# Patient Record
Sex: FEMALE | Race: WHITE | NOT HISPANIC OR LATINO | Employment: UNEMPLOYED | URBAN - METROPOLITAN AREA
[De-identification: names, ages, dates, MRNs, and addresses within clinical notes are randomized per-mention and may not be internally consistent; named-entity substitution may affect disease eponyms.]

---

## 2017-08-28 ENCOUNTER — TELEPHONE (OUTPATIENT)
Dept: RHEUMATOLOGY | Facility: CLINIC | Age: 52
End: 2017-08-28

## 2017-09-28 ENCOUNTER — TELEPHONE (OUTPATIENT)
Dept: RHEUMATOLOGY | Facility: CLINIC | Age: 52
End: 2017-09-28

## 2017-09-28 ENCOUNTER — OFFICE VISIT (OUTPATIENT)
Dept: RHEUMATOLOGY | Facility: CLINIC | Age: 52
End: 2017-09-28
Payer: MEDICARE

## 2017-09-28 VITALS
TEMPERATURE: 98.6 F | SYSTOLIC BLOOD PRESSURE: 155 MMHG | HEART RATE: 78 BPM | DIASTOLIC BLOOD PRESSURE: 76 MMHG | WEIGHT: 272 LBS

## 2017-09-28 DIAGNOSIS — M77.8 TENDONITIS OF WRIST, RIGHT: Primary | ICD-10-CM

## 2017-09-28 PROCEDURE — 99203 OFFICE O/P NEW LOW 30 MIN: CPT | Performed by: INTERNAL MEDICINE

## 2017-09-28 RX ORDER — DOCUSATE SODIUM 100 MG/1
100 CAPSULE, LIQUID FILLED ORAL AT BEDTIME
COMMUNITY
Start: 2014-06-24 | End: 2019-03-06

## 2017-09-28 RX ORDER — LORAZEPAM 0.5 MG/1
0.5 TABLET ORAL
COMMUNITY
Start: 2017-08-18 | End: 2018-07-02

## 2017-09-28 RX ORDER — CAPSAICIN 0.025 %
CREAM (GRAM) TOPICAL
COMMUNITY
Start: 2015-09-14 | End: 2018-07-02

## 2017-09-28 RX ORDER — ZIPRASIDONE HYDROCHLORIDE 40 MG/1
60 CAPSULE ORAL AT BEDTIME
COMMUNITY
Start: 2017-08-25

## 2017-09-28 RX ORDER — MEDROXYPROGESTERONE ACETATE 150 MG/ML
150 INJECTION, SUSPENSION INTRAMUSCULAR
COMMUNITY
Start: 2015-07-16 | End: 2018-07-02

## 2017-09-28 RX ORDER — OXYCODONE AND ACETAMINOPHEN 7.5; 325 MG/1; MG/1
1 TABLET ORAL
COMMUNITY
Start: 2017-08-16 | End: 2018-07-02

## 2017-09-28 RX ORDER — HYDROXYZINE PAMOATE 50 MG/1
100 CAPSULE ORAL EVERY 6 HOURS PRN
COMMUNITY
Start: 2017-08-25 | End: 2018-07-11

## 2017-09-28 RX ORDER — FUROSEMIDE 80 MG
40 TABLET ORAL 2 TIMES DAILY
COMMUNITY
Start: 2017-09-14

## 2017-09-28 RX ORDER — ACETAMINOPHEN 325 MG/1
650 TABLET ORAL
COMMUNITY
Start: 2016-02-16 | End: 2018-07-02

## 2017-09-28 RX ORDER — BENZTROPINE MESYLATE 0.5 MG/1
0.5 TABLET ORAL DAILY
COMMUNITY
Start: 2017-08-25 | End: 2018-09-04

## 2017-09-28 RX ORDER — BUDESONIDE 1 MG/2ML
INHALANT ORAL
COMMUNITY
Start: 2016-05-11 | End: 2018-11-21 | Stop reason: DRUGHIGH

## 2017-09-28 RX ORDER — ESCITALOPRAM OXALATE 20 MG/1
TABLET ORAL
COMMUNITY
Start: 2017-08-25

## 2017-09-28 RX ORDER — LITHIUM CARBONATE 300 MG/1
CAPSULE ORAL
COMMUNITY
Start: 2017-08-25

## 2017-09-28 RX ORDER — FLUTICASONE PROPIONATE 50 MCG
1 SPRAY, SUSPENSION (ML) NASAL DAILY PRN
Status: ON HOLD | COMMUNITY
Start: 2017-09-08 | End: 2018-11-21

## 2017-09-28 RX ORDER — PREGABALIN 150 MG/1
150 CAPSULE ORAL 2 TIMES DAILY
COMMUNITY
Start: 2017-09-20 | End: 2019-03-06

## 2017-09-28 RX ORDER — LAMOTRIGINE 200 MG/1
200 TABLET ORAL AT BEDTIME
COMMUNITY
Start: 2017-08-25

## 2017-09-28 RX ORDER — ZIPRASIDONE HYDROCHLORIDE 80 MG/1
80 CAPSULE ORAL DAILY
COMMUNITY
Start: 2017-08-25

## 2017-09-28 RX ORDER — LANSOPRAZOLE 30 MG/1
15 CAPSULE, DELAYED RELEASE ORAL DAILY
COMMUNITY
Start: 2017-09-14

## 2017-09-28 RX ORDER — ALBUTEROL SULFATE 90 UG/1
2 AEROSOL, METERED RESPIRATORY (INHALATION) EVERY 4 HOURS PRN
COMMUNITY
Start: 2016-12-19

## 2017-09-28 RX ORDER — FENTANYL 75 UG/1
1 PATCH TRANSDERMAL
COMMUNITY
Start: 2017-09-07 | End: 2018-07-02

## 2017-09-28 RX ORDER — PROPRANOLOL HYDROCHLORIDE 10 MG/1
TABLET ORAL
COMMUNITY
Start: 2017-08-25 | End: 2018-07-02

## 2017-09-28 RX ORDER — MECLIZINE HYDROCHLORIDE 25 MG/1
25 TABLET ORAL EVERY 8 HOURS PRN
Status: ON HOLD | COMMUNITY
Start: 2016-11-11 | End: 2018-11-21

## 2017-09-28 RX ORDER — LOSARTAN POTASSIUM 25 MG/1
12.5 TABLET ORAL DAILY
COMMUNITY
Start: 2017-08-04 | End: 2018-09-10

## 2017-09-28 RX ORDER — LIOTHYRONINE SODIUM 25 UG/1
25 TABLET ORAL DAILY
COMMUNITY
Start: 2017-07-20 | End: 2018-07-02

## 2017-09-28 RX ORDER — CETIRIZINE HYDROCHLORIDE 10 MG/1
10 TABLET ORAL DAILY
COMMUNITY
Start: 2017-01-06 | End: 2018-08-03

## 2017-09-28 RX ORDER — IBUPROFEN 800 MG/1
800 TABLET, FILM COATED ORAL EVERY 8 HOURS PRN
COMMUNITY
Start: 2017-08-08 | End: 2018-08-27

## 2017-09-28 RX ORDER — GABAPENTIN 800 MG/1
400 TABLET ORAL 3 TIMES DAILY
Status: ON HOLD | COMMUNITY
Start: 2016-11-09 | End: 2018-11-25

## 2017-09-28 NOTE — PROGRESS NOTES
CHIEF COMPLAINT:  Right hand swelling.      HISTORY OF PRESENT ILLNESS:  Betsy Resendiz is a 52-year-old female who arrives almost 20 minutes late for her appointment, who presents for evaluation of ongoing right hand swelling, wrist swelling and pain.  She had been seen by Dr. Nicole and his PA on multiple occasions, initially this started off as a fall onto an outstretched hand.  X-rays were read as unremarkable.  She then had labs performed which she was not responding and improving despite wearing a splint and having some PT.  Labs were interpreted sad abnormal based on elevated CRP, however, it should be noted that the CRP is actually minimally elevated and has been historically more elevated than it was recently.  CRP of 2.62 on 08/01/2017 is down from 3.3 on 03/28/2017.  It was actually 7.7 on 09/20/2013.  An LEE was negative.  Rheumatoid factor and CCP antibody were negative.      Unbeknownst to her orthopedist, she was actually given 40 mg of prednisone for 5 days by her chiropractor for her neck pain.  This did not lead to any type of improvement whatsoever in the swelling or pain in her hands.      She does have neck pain and knee pain.  There are no other small joint pain, swelling, or stiffness.  She does not have a rash or psoriasis.  She has not had an acute or chronic diarrhea.  She does not carry a diagnosis of inflammatory bowel disease.  She does not have any fevers, chills, or sweats.  There is no malar rash, photosensitive rash, stomatitis, or Raynaud's.  She has never had pleurisy, pericarditis, hepatitis, nephritis.      PAST MEDICAL HISTORY:  Bipolar disorder, carpal tunnel syndrome, depression, chronic pain, gastroesophageal reflux disease, pulmonary insufficiency following trauma and surgery, type 2 non-insulin dependent diabetes, asthma, glaucoma.      MEDICATIONS:   1.  Albuterol.   2.  DuoNebs.   3.  Cogentin 0.5 mg b.i.d.   4.  Oxygen.   5.  Pulmicort.   6.  Zostrix cream p.r.n.   7.   Cariprazine 1.5 mg 2 capsules daily.   8.  Cetirizine 10 mg daily.   9.  Docusate 100 mg b.i.d.   10.  Lexapro 20 mg daily.   11.  Fentanyl patch 75 mcg per hour.   12.  Flonase.   13.  Furosemide 80 mg daily.   14.  Gabapentin 800 mg b.i.d.   15.  Hydroxyzine 50 mg q.6h.  p.r.n.   16.  Ibuprofen.   17.  Lamictal 200 mg at bedtime.   18.  Prevacid 30 mg b.i.d.   19.  Liothyronine 25 mcg daily.   20.  Lithium 300 mg in the morning, 600 mg in the evening.   21.  Lorazepam.   22.  Cozaar 25 mg daily.   23.  Antivert   24.  Metformin  mg in the evening.   25.  Singular 10 mg daily.   26.  Emerge in the a.m. 2.5 mg q.4h.  p.r.n.   27.  Oxycodone 7.5/325 mg p.r.n.   28.  Polyethylene glycol.   29.  Trazodone   30.  Lyrica 300 mg b.i.d.   31.  Propranolol 10 mg b.i.d. p.r.n. anxiety.   32.  Geodon 40 mg in the morning and 80 mg in the evening.      DRUG ALLERGIES:  Codeine, lisinopril, statins, sulfa, Imitrex.      SOCIAL HISTORY:  Not a smoker or drinker.      FAMILY HISTORY:  Nobody in the family with rheumatoid arthritis, psoriasis, lupus, Crohn's disease.      REVIEW OF SYSTEMS:  A 10-point review of systems is otherwise negative.      PHYSICAL EXAMINATION:   VITAL SIGNS:  Blood pressure 155/76, pulse 78, weight 272.   HEENT:  Normocephalic, atraumatic.  Sclerae are clear and moist.  Oropharynx without lesions.   NECK:  Supple, without lymphadenopathy.   LUNGS:  Clear.   HEART:  Regular.   EXTREMITIES:  Joint exam there is diffuse swelling throughout the right wrist into the MCP region and really looks more like edema.  There is tenderness diffusely throughout, 2+ symmetrical radial pulses.  Sensation is intact in the fingers.  She does not have any similar findings in the left hand.  It really I cannot say that this is synovitis of the MCPs, PIPs or wrist.  There is no synovitis of the elbows, shoulders, knees, ankles.   SKIN:  Without lesions.      IMPRESSION:  Right hand swelling with previous history of  injury, not responsive to prednisone.      RECOMMENDATIONS:     1.  It is unlikely that this is an inflammatory disorder given her lack of response to prednisone and the fact that her serologies are really negative.   2.  Hence at this point would proceed with an MRI of the hand and wrist to further define and delineate what is going on.         MICHELLE WILKINS MD             D: 2017 14:11   T: 2017 14:48   MT: [id number]      Name:     BECCA HANNAH   MRN:      [id number]        Account:      [id number]   :      1965           Visit Date:   2017      Document: [id number]       cc: Margarito Nicole MD

## 2017-09-28 NOTE — MR AVS SNAPSHOT
"              After Visit Summary   9/28/2017    Betsy Resendiz    MRN: [id number]           Patient Information     Date Of Birth          1965        Visit Information        Provider Department      9/28/2017 12:45 PM Maurilio Watts MD [facility]        Today's Diagnoses     Tendonitis of wrist, right    -  1       Follow-ups after your visit        Future tests that were ordered for you today     Open Future Orders        Priority Expected Expires Ordered    MR Upper Ext Joint Rt w/o & w Contrast Routine  9/28/2018 9/28/2017    MR Hand Right w/o & w Contrast Routine  9/28/2018 9/28/2017            Who to contact     If you have questions or need follow up information about today's clinic visit or your schedule please contact [facility] directly at [phone].  Normal or non-critical lab and imaging results will be communicated to you by MyChart, letter or phone within 4 business days after the clinic has received the results. If you do not hear from us within 7 days, please contact the clinic through MyChart or phone. If you have a critical or abnormal lab result, we will notify you by phone as soon as possible.  Submit refill requests through [employer] or call your pharmacy and they will forward the refill request to us. Please allow 3 business days for your refill to be completed.          Additional Information About Your Visit        MyChart Information     [employer] lets you send messages to your doctor, view your test results, renew your prescriptions, schedule appointments and more. To sign up, go to [email or web address] . Click on \"Log in\" on the left side of the screen, which will take you to the Welcome page. Then click on \"Sign up Now\" on the right side of the page.     You will be asked to enter the access code listed below, as well as some personal information. Please follow the directions to create your username and password.     Your access code is: " [email or web address]  Expires: 2017  1:40 PM     Your access code will  in 90 days. If you need help or a new code, please call your [facility] clinic or [phone].        Care EveryWhere ID     This is your Care EveryWhere ID. This could be used by other organizations to access your [facility] medical records  [id number]        Your Vitals Were     Pulse Temperature                78 98.6  F (37  C)           Blood Pressure from Last 3 Encounters:   17 155/76    Weight from Last 3 Encounters:   17 272 lb (123.4 kg)               Primary Care Provider Office Phone # Fax #    Elle Dunlap [phone]       [facility] [address] [address]        Equal Access to Services     BLADE CHERY : Hadii gemma king hadasho Soomaali, waaxda luqadaha, qaybta kaalmada adeegyada, waxay deysiin hayita chowdhury . So [facility] [phone].    ATENCIÓN: Si habla español, tiene a knight disposición servicios gratuitos de asistencia lingüística. John al [phone].    We comply with applicable federal civil rights laws and [place] laws. We do not discriminate on the basis of race, color, national origin, age, disability sex, sexual orientation or gender identity.            Thank you!     Thank you for choosing [facility]  for your care. Our goal is always to provide you with excellent care. Hearing back from our patients is one way we can continue to improve our services. Please take a few minutes to complete the written survey that you may receive in the mail after your visit with us. Thank you!             Your Updated Medication List - Protect others around you: Learn how to safely use, store and throw away your medicines at www.disposemymeds.org.          This list is accurate as of: 17  1:40 PM.  Always use your most recent med list.                   Brand Name Dispense Instructions for use Diagnosis    acetaminophen 325 MG tablet    TYLENOL      Take 650 mg by mouth        albuterol 108 (90 BASE) MCG/ACT Inhaler    PROAIR HFA/PROVENTIL HFA/VENTOLIN HFA     Inhale 2 puffs into the lungs        benztropine 0.5 MG tablet    COGENTIN     Take 0.5 mg by mouth        budesonide 1 MG/2ML Susp neb solution    PULMICORT     USE 1 VIAL IN NEBULIZER TWICE DAILY        capsaicin 0.025 % Crea cream    ZOSTRIX          cariprazine 1.5 MG Caps capsule    VRAYLAR     Take 3 mg by mouth        cetirizine 10 MG tablet    zyrTEC     Take 10 mg by mouth        escitalopram 20 MG tablet    LEXAPRO     Take 1 tablet by mouth at bedtime        fentaNYL 75 mcg/hr 72 hr patch    DURAGESIC     1 patch        fluticasone 50 MCG/ACT spray    FLONASE     1 spray        furosemide 80 MG tablet    LASIX     Take 80 mg by mouth        gabapentin 800 MG tablet    NEURONTIN     Take 800 mg by mouth        hydrOXYzine 50 MG capsule    VISTARIL     Take 50 mg by mouth        ibuprofen 800 MG tablet    ADVIL/MOTRIN     Take 800 mg by mouth        lamoTRIgine 200 MG tablet    LaMICtal     Take 200 mg by mouth        LANsoprazole 30 MG CR capsule    PREVACID     Take 30 mg by mouth        liothyronine 25 MCG tablet    CYTOMEL     Take 25 mcg by mouth        lithium 300 MG capsule      Take 1 capsule by mouth in the morning and 2 capsules at bedtime        LORazepam 0.5 MG tablet    ATIVAN     Take 0.5 mg by mouth        losartan 25 MG tablet    COZAAR     Take 25 mg by mouth        meclizine 25 MG tablet    ANTIVERT     Take 25 mg by mouth        medroxyPROGESTERone 150 MG/ML injection    DEPO-PROVERA     Inject 150 mg into the muscle        oxyCODONE-acetaminophen 7.5-325 MG per tablet    PERCOCET     Take 1 tablet by mouth        pregabalin 150 MG capsule    LYRICA     Take 300 mg by mouth        propranolol 10 MG tablet    INDERAL     TAKE 1/2 TO 1 TABLET BY MOUTH TWICE DAILY AS NEEDED FOR ANXIETY        STOOL SOFTENER 100 MG capsule   Generic drug:  docusate sodium      Take 100 mg by  mouth        * ziprasidone 80 MG capsule    GEODON     Take 160 mg by mouth        * ziprasidone 40 MG capsule    GEODON     Take 1 capsule by mouth in the morning        * Notice:  This list has 2 medication(s) that are the same as other medications prescribed for you. Read the directions carefully, and ask your doctor or other care provider to review them with you.

## 2017-09-28 NOTE — TELEPHONE ENCOUNTER
Called [facility] to see if they can do the added MRI that was ordered.   They are unable to do all.  Pt. Would like to do her already scheduled one and will call them and make another appointment.  Orders were faxed.      An CLARK  [facility]  Rheumatology-Dr. Watts  [address]   [address] [place] [address]

## 2017-09-28 NOTE — NURSING NOTE
Chief Complaint   Patient presents with     Consult   ref.     Initial /76  Pulse 78  Temp 98.6  F (37  C)  Wt 272 lb (123.4 kg) There is no height or weight on file to calculate BMI.      Patient prefers to be contacted via at Home.   Okay to leave detailed message: Yes  Patient uses MyChart: No    An BUI LPN

## 2017-12-06 ASSESSMENT — MIFFLIN-ST. JEOR: SCORE: 1784.87

## 2017-12-11 ENCOUNTER — ANESTHESIA - HEALTHEAST (OUTPATIENT)
Dept: SURGERY | Facility: CLINIC | Age: 52
End: 2017-12-11

## 2017-12-12 ENCOUNTER — SURGERY - HEALTHEAST (OUTPATIENT)
Dept: SURGERY | Facility: CLINIC | Age: 52
End: 2017-12-12

## 2017-12-12 ASSESSMENT — MIFFLIN-ST. JEOR: SCORE: 1753.02

## 2018-05-25 ENCOUNTER — TELEPHONE (OUTPATIENT)
Dept: ORTHOPEDICS | Facility: CLINIC | Age: 53
End: 2018-05-25

## 2018-05-25 NOTE — TELEPHONE ENCOUNTER
LVM for return call to discuss her shoulder injury.  Briefly reviewed chart in care everywhere, no recent shoulder/upper arm films.  Would like to know where patient was seen, likely given description from below that she would better served by seeing Ortho Surgeon and not Sports Medicine.    Garth Marshall ATC

## 2018-05-25 NOTE — TELEPHONE ENCOUNTER
Patient called today at 8:18. She said she was calling to see if she could be squeezed in today with orthopedics. She has three broken bones in her shoulder and one in her arm. We can call her at [phone] or we can call her friend Dianne at [phone]    Marion MONTGOMERY (R)

## 2018-07-02 ENCOUNTER — NURSING HOME VISIT (OUTPATIENT)
Dept: GERIATRICS | Facility: CLINIC | Age: 53
End: 2018-07-02
Payer: COMMERCIAL

## 2018-07-02 VITALS
TEMPERATURE: 98.2 F | RESPIRATION RATE: 16 BRPM | BODY MASS INDEX: 43.46 KG/M2 | OXYGEN SATURATION: 97 % | HEIGHT: 64 IN | DIASTOLIC BLOOD PRESSURE: 77 MMHG | WEIGHT: 254.6 LBS | HEART RATE: 91 BPM | SYSTOLIC BLOOD PRESSURE: 163 MMHG

## 2018-07-02 DIAGNOSIS — G93.40 ACUTE ENCEPHALOPATHY: Primary | ICD-10-CM

## 2018-07-02 DIAGNOSIS — E66.01 MORBID OBESITY WITH BMI OF 40.0-44.9, ADULT (H): ICD-10-CM

## 2018-07-02 DIAGNOSIS — R40.4 ALTERED LEVEL OF CONSCIOUSNESS: ICD-10-CM

## 2018-07-02 DIAGNOSIS — F11.20 OPIOID TYPE DEPENDENCE, CONTINUOUS USE (H): ICD-10-CM

## 2018-07-02 DIAGNOSIS — F25.0 SCHIZOAFFECTIVE DISORDER, BIPOLAR TYPE (H): ICD-10-CM

## 2018-07-02 DIAGNOSIS — E11.9 TYPE 2 DIABETES MELLITUS WITHOUT COMPLICATION, WITHOUT LONG-TERM CURRENT USE OF INSULIN (H): ICD-10-CM

## 2018-07-02 DIAGNOSIS — Z71.89 ACP (ADVANCE CARE PLANNING): ICD-10-CM

## 2018-07-02 DIAGNOSIS — S42.301D CLOSED FRACTURE OF SHAFT OF RIGHT HUMERUS WITH ROUTINE HEALING, UNSPECIFIED FRACTURE MORPHOLOGY, SUBSEQUENT ENCOUNTER: ICD-10-CM

## 2018-07-02 DIAGNOSIS — J44.9 CHRONIC OBSTRUCTIVE PULMONARY DISEASE, UNSPECIFIED COPD TYPE (H): ICD-10-CM

## 2018-07-02 DIAGNOSIS — G47.33 OSA (OBSTRUCTIVE SLEEP APNEA): ICD-10-CM

## 2018-07-02 PROBLEM — E86.0 DEHYDRATION, MILD: Status: ACTIVE | Noted: 2018-06-26

## 2018-07-02 PROBLEM — F41.1 GAD (GENERALIZED ANXIETY DISORDER): Status: ACTIVE | Noted: 2017-11-21

## 2018-07-02 PROBLEM — D62 ACUTE BLOOD LOSS ANEMIA: Status: ACTIVE | Noted: 2018-02-05

## 2018-07-02 PROBLEM — I10 HTN (HYPERTENSION): Status: ACTIVE | Noted: 2017-10-28

## 2018-07-02 PROBLEM — R79.89 ELEVATED LFTS: Status: ACTIVE | Noted: 2017-10-30

## 2018-07-02 PROBLEM — S42.301A CLOSED FRACTURE OF SHAFT OF RIGHT HUMERUS: Status: ACTIVE | Noted: 2018-05-22

## 2018-07-02 PROBLEM — I26.99 OTHER PULMONARY EMBOLISM WITHOUT ACUTE COR PULMONALE (H): Status: ACTIVE | Noted: 2017-11-28

## 2018-07-02 PROBLEM — D49.4 BLADDER TUMOR: Status: ACTIVE | Noted: 2018-02-07

## 2018-07-02 PROBLEM — J96.11 CHRONIC RESPIRATORY FAILURE WITH HYPOXIA (H): Status: ACTIVE | Noted: 2018-02-07

## 2018-07-02 PROBLEM — L97.921 CHRONIC ULCER OF LEFT LEG, LIMITED TO BREAKDOWN OF SKIN (H): Status: ACTIVE | Noted: 2018-01-12

## 2018-07-02 PROBLEM — I26.99 PULMONARY EMBOLUS (H): Status: ACTIVE | Noted: 2018-02-08

## 2018-07-02 PROCEDURE — 99310 SBSQ NF CARE HIGH MDM 45: CPT | Performed by: NURSE PRACTITIONER

## 2018-07-02 RX ORDER — IPRATROPIUM BROMIDE AND ALBUTEROL SULFATE 2.5; .5 MG/3ML; MG/3ML
1 SOLUTION RESPIRATORY (INHALATION) 4 TIMES DAILY PRN
COMMUNITY

## 2018-07-02 RX ORDER — MONTELUKAST SODIUM 10 MG/1
10 TABLET ORAL AT BEDTIME
COMMUNITY
End: 2018-08-27

## 2018-07-02 RX ORDER — BRIMONIDINE TARTRATE 1.5 MG/ML
1 SOLUTION/ DROPS OPHTHALMIC 2 TIMES DAILY
COMMUNITY

## 2018-07-02 RX ORDER — TRAVOPROST OPHTHALMIC SOLUTION 0.04 MG/ML
1 SOLUTION OPHTHALMIC AT BEDTIME
COMMUNITY

## 2018-07-02 RX ORDER — PALIPERIDONE 6 MG/1
6 TABLET, EXTENDED RELEASE ORAL EVERY MORNING
COMMUNITY

## 2018-07-02 RX ORDER — ERGOCALCIFEROL 1.25 MG/1
50000 CAPSULE, LIQUID FILLED ORAL WEEKLY
COMMUNITY
End: 2018-08-06

## 2018-07-02 NOTE — PROGRESS NOTES
[facility]  PRIMARY CARE PROVIDER AND CLINIC:  [facility] [address] [facility] / [facility] 5*  Chief Complaint   Patient presents with     Clinic Care Coordination - Initial     [place] Medical Record Number:  [id number]    HPI:    Betsy Resendiz is a 53 year old  (1965),admitted to the [facility] [place] from Hospital  [facility].  Hospital stay 6/26/18  through 6/28/18.  Admitted to this facility for  rehab, medical management and nursing care.  HPI information obtained from: facility chart records, facility staff, patient report, [facility] chart review and Care Everywhere [facility] chart review.      Hospital Course: Brought in to [facility] emergency department on 6/26/2018 after a friend found her in her home with an altered level of consciousness and right humoral fx. Abernathy catheter was placed in 2000 cc of urine removed at the time of her Abernathy catheter. Supplemental O2. Narcan was not given initially in the ED.     Current issues are:        Acute encephalopathy  Altered level of consciousness  Patient cannot recall being admitted to the ED and only remembers waking up in a hospital bed later. Tox screen was positive presumptive for Oxycodone and Benzos, patient has long-standing hx of opioid dependence. Catheter was removed prior to discharge to TCU.  Today patient feels tired, denies fever, chills, headache. Denies bouts of confusion.    Closed fracture of shaft of right humerus with routine healing, unspecified fracture morphology, subsequent encounter  Opioid type dependence, continuous use (H)  Right arm is in a sling, patient states that pain is intermittent and that her brace helps. She is using Ultram and Ibuprofen and says that the medicine helps take the edge off. She does not remember breaking her arm. Chart review notes that Ortho follow-up is scheduled w/ Janie for 7/10/18.     Schizoaffective disorder,  bipolar type (H)  Patient taking numerous antipsychotic drugs and is managed by Dr. Veliz, CNS through [facility].  Patient states she has apts w/ Dr. Veliz every 4-6 weeks. She is sleeping fine, denies nausea, changes in B&B, and chart review shows recent electrolytes are stable.     Chronic obstructive pulmonary disease, unspecified COPD type (H)  SPIKE (obstructive sleep apnea)  Patient denies SOB, cough, but is on O2 @ 2L via NC.  Has LD ordered PRN, Singulair 10mg QD, Budesonide BID, and PRN nebs.     Type 2 diabetes mellitus without complication, without long-term current use of insulin (H)  Morbid obesity with BMI of 40.0-44.9, adult (H)  Patient states she has a good appetite and is on Metformin. BG readings are as noted below:  07/02/2018 05:25 122 mg/dL  07/01/2018 05:40 127 mg/dL  06/30/2018 04:45 174 mg/dL  06/29/2018 04:54 150 mg/dL    ACP (advance care planning)  Patient was walked through POLST form with provider and given opportunity to ask questions and confirm wishes.    CODE STATUS/ADVANCE DIRECTIVES DISCUSSION:   CPR/Full code   Patient's living condition: lives alone    ALLERGIES:Codeine; Hmg-coa-r inhibitors; Lisinopril; No clinical screening - see comments; Sulfa drugs; and Sumatriptan    Surgical History    Surgery Date Laterality Comments   BACK SURGERY seven times/back   lumbar /hardware   BIOPSY CERVIX         THORACOTOMY     Left in 2008 from abcess   BLADDER REPAIR         Arthrodesis, right first metatarsophalangeal joint. 1/8/09       RIGHT REVISION ARTHRODESIS 1ST METATARSAL PHALANGEAL JOINT 7/27/09   Right foot /hardware   BUNIONECTOMY 04/21/2011       pressure necrosis and abscess left gluteus. 6/24/2014   debridement   RADIOFREQUENCY ABLATION 04/12/2016 Left     Medical History    Medical History Date   Unspecified asthma(493.90)     Migraine, unspecified, without mention of intractable migraine without mention of status migrainosus     Unspecified glaucoma(365.9)     Depressive  disorder, not elsewhere classified     Bipolar disorder, unspecified (HC)     Type II or unspecified type diabetes mellitus without mention of complication, not stated as uncontrolled dx    Pain disorder     SPIKE (obstructive sleep apnea)     Unspecified disorder of skin and subcutaneous tissue     Carpal tunnel syndrome     Esophageal reflux     Retention of urine, unspecified     Anemia, unspecified     Pulmonary insufficiency following trauma and surgery     Tobacco use disorder     Osteoarthrosis and allied disorders     Pain in joint, ankle and foot     Pneumonia 2008   Bipolar disorder, unspecified (HC)     Compression fracture of L1 lumbar vertebra (HC)     Family History    Medical History Relation Name Comments   Diabetes Maternal Grandmother       Cancer Mother   Multiple myeloma   Cancer Other   Maternal Niece-Wilms     Relation Name Status Comments   Brother   Alive     Father   Alive     Maternal Grandmother         Mother    (Age 51)     Other    (Age 12)     Social History    Tobacco Use Types Packs/Day Years Used Date   Current Some Day Smoker Cigarettes 0.5 17 Quit: 2018   Smokeless Tobacco: Never Used           Tobacco Cessation: Ready to Quit: No; Counseling Given: Yes       Alcohol Use Drinks/Week oz/Week Comments   No 0 Standard drinks or equivalent   0.0        Post Discharge Medication Reconciliation Status: discharge medications reconciled and changed, per note/orders (see AVS).  Current Outpatient Prescriptions   Medication Sig Dispense Refill     ACETAMINOPHEN PO Take 1,000 mg by mouth 3 times daily       albuterol (PROAIR HFA/PROVENTIL HFA/VENTOLIN HFA) 108 (90 BASE) MCG/ACT Inhaler Inhale 2 puffs into the lungs every 4 hours as needed        benztropine (COGENTIN) 0.5 MG tablet Take 0.5 mg by mouth 2 times daily        brimonidine (ALPHAGAN-P) 0.15 % ophthalmic solution Place 1 drop into both eyes 2 times daily       budesonide (PULMICORT) 1 MG/2ML SUSP neb  solution USE 1 VIAL IN NEBULIZER TWICE DAILY       cariprazine (VRAYLAR) 1.5 MG CAPS capsule Take 2 mg by mouth daily        cetirizine (ZYRTEC) 10 MG tablet Take 10 mg by mouth daily        docusate sodium (STOOL SOFTENER) 100 MG capsule Take 100 mg by mouth At Bedtime        escitalopram (LEXAPRO) 20 MG tablet Take 1 tablet by mouth at bedtime       fluticasone (FLONASE) 50 MCG/ACT spray Spray 1 spray into both nostrils daily as needed        furosemide (LASIX) 80 MG tablet Take 80 mg by mouth 2 times daily        gabapentin (NEURONTIN) 800 MG tablet Take 400 mg by mouth 3 times daily        hydrOXYzine (VISTARIL) 50 MG capsule Take 50 mg by mouth every 6 hours as needed        ibuprofen (ADVIL/MOTRIN) 800 MG tablet Take 800 mg by mouth every 8 hours as needed        ipratropium - albuterol 0.5 mg/2.5 mg/3 mL (DUONEB) 0.5-2.5 (3) MG/3ML neb solution Take 1 vial by nebulization 4 times daily       lamoTRIgine (LAMICTAL) 200 MG tablet Take 200 mg by mouth At Bedtime        LANsoprazole (PREVACID) 30 MG CR capsule Take 30 mg by mouth 2 times daily        lithium 300 MG capsule Take 1 capsule by mouth in the morning and 2 capsules at bedtime       losartan (COZAAR) 25 MG tablet Take 25 mg by mouth daily        meclizine (ANTIVERT) 25 MG tablet Take 25 mg by mouth every 8 hours as needed        [START ON 7/29/2018] METFORMIN HCL PO Take 500 mg by mouth daily (with breakfast)       [START ON 7/7/2018] METFORMIN HCL PO Take 500 mg by mouth 2 times daily (with meals)       montelukast (SINGULAIR) 10 MG tablet Take 10 mg by mouth At Bedtime       OYSTER SHELL CALCIUM PO Take 500 mg by mouth 4 times daily       paliperidone (INVEGA) 6 MG 24 hr tablet Take 6 mg by mouth every morning       POTASSIUM CHLORIDE ER PO 30meq by mouth two times a day       pregabalin (LYRICA) 150 MG capsule Take 300 mg by mouth 2 times daily        PROPRANOLOL HCL PO Take 10 mg by mouth 2 times daily       TRAMADOL HCL PO Take 50 mg by mouth every  "6 hours as needed for moderate to severe pain       travoprost, BAK Free, (TRAVATAN Z) 0.004 % ophthalmic solution Place 1 drop into both eyes At Bedtime       vitamin D (ERGOCALCIFEROL) 00688 UNIT capsule Take 50,000 Units by mouth once a week       ziprasidone (GEODON) 40 MG capsule Take 1 capsule by mouth in the morning       ziprasidone (GEODON) 80 MG capsule Take 160 mg by mouth At Bedtime        ROS:  10 point ROS of systems including Constitutional, Eyes, Respiratory, Cardiovascular, Gastroenterology, Genitourinary, Integumentary, Muscularskeletal, Psychiatric were all negative except for pertinent positives noted in my HPI.    Exam:  /77  Pulse 91  Temp 98.2  F (36.8  C)  Resp 16  Ht 5' 4\" (1.626 m)  Wt 254 lb 9.6 oz (115.5 kg)  SpO2 97%  BMI 43.7 kg/m2  GENERAL APPEARANCE:  Alert, in no distress, morbidly obese, cooperative, sluggish.  EYES:  PERRL, Conjunctiva and lids normal with glasses and hx of glaucoma.  RESP: respiratory effort and palpation of chest normal, lungs clear to auscultation , no respiratory distress  CV:  Palpation and auscultation of heart done , regular rate and rhythm, no murmur, rub, or gallop, +2 pedal pulses,  2 pedal pulses in BLE    ABDOMEN: normal bowel sounds, soft, nontender, no hepatosplenomegaly or other masses, no guarding or rebound, bowel sounds normal  NEURO: Examination of sensation by touch is abnormal with numbness/tingling in all extremities which she states she has had., While ROM is intact, movement is sluggish and delayed.   PSYCH: Oriented X 3, insight and judgement impaired, memory impaired , affect and mood flat and sluggish.    Lab/Diagnostic data:  GLUCOSE METER (06/28/2018 12:02 PM)  Only the most recent of 12 results within the time period is included.    Component Value   GLUCOSE METER 130 (H)     Basic Metabolic Panel (06/27/2018 6:16 AM)  Only the most recent of 2 results within the time period is included.    Component Value   SODIUM 143 " "  POTASSIUM 3.9   CHLORIDE 108   CO2,TOTAL 28   ANION GAP 7   GLUCOSE 81   CALCIUM 8.8   BUN 10   CREATININE 0.97   BUN/CREAT RATIO  10   GFR if  >60   GFR if not  >60     URINALYSIS MICROSCOPIC (06/26/2018 5:40 PM)  Component Value   RBC 0-2   WBC 3-5   BACTERIA  Rare   EPITHELIAL CELLS  Few     Blood Gas-Venous (06/26/2018 3:38 PM)  Only the most recent of 2 results within the time period is included.    Component Value   PH,VENOUS  7.43   PCO2,VENOUS  48   PO2,VENOUS 45 (H)   HCO3,VENOUS  32 (H)   BASE EXCESS 6.4 (H)   O2 SAT,VENOUS  87 (H)   PATIENT TEMPERATURE 37.0     CBC WITH AUTO DIFFERENTIAL (06/26/2018 12:50 PM)  Only the most recent of 3 results within the time period is included.    Component Value   WHITE BLOOD COUNT  9.6   RED BLOOD COUNT  4.21   HEMOGLOBIN  13.1   HEMATOCRIT  41.1   MCV  98   MCH  31.1   MCHC  31.9 (L)   RDW  14.4   PLATELET COUNT  190   MPV  11.0     ASSESSMENT/PLAN:  Acute encephalopathy  Altered level of consciousness  Acute. Stable. Improving. Continue to carefully monitor. Follow-up routinely.    Closed fracture of shaft of right humerus with routine healing, unspecified fracture morphology, subsequent encounter  Opioid type dependence, continuous use (H)  Acute. Stable. Healing. Follow-up w/ Ortho on 7/10/18 as planned.    Schizoaffective disorder, bipolar type (H)  Chronic. Stable. Unchanged. Order placed for nursing to schedule regular psych apt w/ Dr. Veliz as noted below.    Chronic obstructive pulmonary disease, unspecified COPD type (H)  SPIKE (obstructive sleep apnea)  Chronic. Stable. Unchanged. Continue current POC and follow-up routinely.    Type 2 diabetes mellitus without complication, without long-term current use of insulin (H)  Morbid obesity with BMI of 40.0-44.9, adult (H)  Chronic. Stable. Unchanged. Continue current POC and follow-up routinely.    ACP (advance care planning)  Patient wishes to remain as a \"Full Code\" which is " reflected in her POLST.    Orders:  1.  Please call primary psych caregiver/clinic to schedule regular appt.  Provider is Dr. Ramona Veliz CNS--per pt, sees every 4-6wks.    Total time spent with patient visit at the skilled nursing facility was 35 min including patient visit, review of past records and review of multiple chronic conditions. . Greater than 50% of total time spent with counseling and coordinating care due to multiple complex chronic conditions.     Electronically signed by:  JOY Barahona DNP, APRN CNP

## 2018-07-02 NOTE — LETTER
7/2/2018        RE: Betsy Resendiz  [address]  [place] [id number]        [place] [facility]  PRIMARY CARE PROVIDER AND CLINIC:  [facility] [address] / [facility] [address]*  Chief Complaint   Patient presents with     Clinic Care Coordination - Initial     [place] Medical Record Number:  [id number]    HPI:    Betsy Resendiz is a 53 year old  (1965),admitted to the [facility] [place] from Hospital  [facility].  Hospital stay 6/26/18  through 6/28/18.  Admitted to this facility for  rehab, medical management and nursing care.  HPI information obtained from: facility chart records, facility staff, patient report, [facility] chart review and Care Everywhere [facility] chart review.      Hospital Course: Brought in to [facility] emergency department on 6/26/2018 after a friend found her in her home with an altered level of consciousness and right humoral fx. Abernathy catheter was placed in 2000 cc of urine removed at the time of her Abernathy catheter. Supplemental O2. Narcan was not given initially in the ED.     Current issues are:        Acute encephalopathy  Altered level of consciousness  Patient cannot recall being admitted to the ED and only remembers waking up in a hospital bed later. Tox screen was positive presumptive for Oxycodone and Benzos, patient has long-standing hx of opioid dependence. Catheter was removed prior to discharge to TCU.  Today patient feels tired, denies fever, chills, headache. Denies bouts of confusion.    Closed fracture of shaft of right humerus with routine healing, unspecified fracture morphology, subsequent encounter  Opioid type dependence, continuous use (H)  Right arm is in a sling, patient states that pain is intermittent and that her brace helps. She is using Ultram and Ibuprofen and says that the medicine helps take the edge off. She does not remember breaking her arm. Chart review  notes that Ortho follow-up is scheduled w/ Janie for 7/10/18.     Schizoaffective disorder, bipolar type (H)  Patient taking numerous antipsychotic drugs and is managed by Dr. Veliz, CNS through [facility].  Patient states she has apts w/ Dr. Veliz every 4-6 weeks. She is sleeping fine, denies nausea, changes in B&B, and chart review shows recent electrolytes are stable.     Chronic obstructive pulmonary disease, unspecified COPD type (H)  SPIKE (obstructive sleep apnea)  Patient denies SOB, cough, but is on O2 @ 2L via NC.  Has LD ordered PRN, Singulair 10mg QD, Budesonide BID, and PRN nebs.     Type 2 diabetes mellitus without complication, without long-term current use of insulin (H)  Morbid obesity with BMI of 40.0-44.9, adult (H)  Patient states she has a good appetite and is on Metformin. BG readings are as noted below:  07/02/2018 05:25 122 mg/dL  07/01/2018 05:40 127 mg/dL  06/30/2018 04:45 174 mg/dL  06/29/2018 04:54 150 mg/dL    ACP (advance care planning)  Patient was walked through POLST form with provider and given opportunity to ask questions and confirm wishes.    CODE STATUS/ADVANCE DIRECTIVES DISCUSSION:   CPR/Full code   Patient's living condition: lives alone    ALLERGIES:Codeine; Hmg-coa-r inhibitors; Lisinopril; No clinical screening - see comments; Sulfa drugs; and Sumatriptan    Surgical History    Surgery Date Laterality Comments   BACK SURGERY seven times/back   lumbar /hardware   BIOPSY CERVIX         THORACOTOMY     Left in 2008 from abcess   BLADDER REPAIR         Arthrodesis, right first metatarsophalangeal joint. 1/8/09       RIGHT REVISION ARTHRODESIS 1ST METATARSAL PHALANGEAL JOINT 7/27/09   Right foot /hardware   BUNIONECTOMY 04/21/2011       pressure necrosis and abscess left gluteus. 6/24/2014   debridement   RADIOFREQUENCY ABLATION 04/12/2016 Left     Medical History    Medical History Date   Unspecified asthma(493.90)     Migraine, unspecified, without mention of intractable  migraine without mention of status migrainosus     Unspecified glaucoma(365.9)     Depressive disorder, not elsewhere classified     Bipolar disorder, unspecified (HC)     Type II or unspecified type diabetes mellitus without mention of complication, not stated as uncontrolled dx    Pain disorder     SPIKE (obstructive sleep apnea)     Unspecified disorder of skin and subcutaneous tissue     Carpal tunnel syndrome     Esophageal reflux     Retention of urine, unspecified     Anemia, unspecified     Pulmonary insufficiency following trauma and surgery     Tobacco use disorder     Osteoarthrosis and allied disorders     Pain in joint, ankle and foot     Pneumonia 2008   Bipolar disorder, unspecified (HC)     Compression fracture of L1 lumbar vertebra (HC)     Family History    Medical History Relation Name Comments   Diabetes Maternal Grandmother       Cancer Mother   Multiple myeloma   Cancer Other   Maternal Niece-Wilms     Relation Name Status Comments   Brother   Alive     Father   Alive     Maternal Grandmother         Mother    (Age 51)     Other    (Age 12)     Social History    Tobacco Use Types Packs/Day Years Used Date   Current Some Day Smoker Cigarettes 0.5 17 Quit: 2018   Smokeless Tobacco: Never Used           Tobacco Cessation: Ready to Quit: No; Counseling Given: Yes       Alcohol Use Drinks/Week oz/Week Comments   No 0 Standard drinks or equivalent   0.0        Post Discharge Medication Reconciliation Status: discharge medications reconciled and changed, per note/orders (see AVS).  Current Outpatient Prescriptions   Medication Sig Dispense Refill     ACETAMINOPHEN PO Take 1,000 mg by mouth 3 times daily       albuterol (PROAIR HFA/PROVENTIL HFA/VENTOLIN HFA) 108 (90 BASE) MCG/ACT Inhaler Inhale 2 puffs into the lungs every 4 hours as needed        benztropine (COGENTIN) 0.5 MG tablet Take 0.5 mg by mouth 2 times daily        brimonidine (ALPHAGAN-P) 0.15 % ophthalmic  solution Place 1 drop into both eyes 2 times daily       budesonide (PULMICORT) 1 MG/2ML SUSP neb solution USE 1 VIAL IN NEBULIZER TWICE DAILY       cariprazine (VRAYLAR) 1.5 MG CAPS capsule Take 2 mg by mouth daily        cetirizine (ZYRTEC) 10 MG tablet Take 10 mg by mouth daily        docusate sodium (STOOL SOFTENER) 100 MG capsule Take 100 mg by mouth At Bedtime        escitalopram (LEXAPRO) 20 MG tablet Take 1 tablet by mouth at bedtime       fluticasone (FLONASE) 50 MCG/ACT spray Spray 1 spray into both nostrils daily as needed        furosemide (LASIX) 80 MG tablet Take 80 mg by mouth 2 times daily        gabapentin (NEURONTIN) 800 MG tablet Take 400 mg by mouth 3 times daily        hydrOXYzine (VISTARIL) 50 MG capsule Take 50 mg by mouth every 6 hours as needed        ibuprofen (ADVIL/MOTRIN) 800 MG tablet Take 800 mg by mouth every 8 hours as needed        ipratropium - albuterol 0.5 mg/2.5 mg/3 mL (DUONEB) 0.5-2.5 (3) MG/3ML neb solution Take 1 vial by nebulization 4 times daily       lamoTRIgine (LAMICTAL) 200 MG tablet Take 200 mg by mouth At Bedtime        LANsoprazole (PREVACID) 30 MG CR capsule Take 30 mg by mouth 2 times daily        lithium 300 MG capsule Take 1 capsule by mouth in the morning and 2 capsules at bedtime       losartan (COZAAR) 25 MG tablet Take 25 mg by mouth daily        meclizine (ANTIVERT) 25 MG tablet Take 25 mg by mouth every 8 hours as needed        [START ON 7/29/2018] METFORMIN HCL PO Take 500 mg by mouth daily (with breakfast)       [START ON 7/7/2018] METFORMIN HCL PO Take 500 mg by mouth 2 times daily (with meals)       montelukast (SINGULAIR) 10 MG tablet Take 10 mg by mouth At Bedtime       OYSTER SHELL CALCIUM PO Take 500 mg by mouth 4 times daily       paliperidone (INVEGA) 6 MG 24 hr tablet Take 6 mg by mouth every morning       POTASSIUM CHLORIDE ER PO 30meq by mouth two times a day       pregabalin (LYRICA) 150 MG capsule Take 300 mg by mouth 2 times daily         "PROPRANOLOL HCL PO Take 10 mg by mouth 2 times daily       TRAMADOL HCL PO Take 50 mg by mouth every 6 hours as needed for moderate to severe pain       travoprost, BAK Free, (TRAVATAN Z) 0.004 % ophthalmic solution Place 1 drop into both eyes At Bedtime       vitamin D (ERGOCALCIFEROL) 16654 UNIT capsule Take 50,000 Units by mouth once a week       ziprasidone (GEODON) 40 MG capsule Take 1 capsule by mouth in the morning       ziprasidone (GEODON) 80 MG capsule Take 160 mg by mouth At Bedtime        ROS:  10 point ROS of systems including Constitutional, Eyes, Respiratory, Cardiovascular, Gastroenterology, Genitourinary, Integumentary, Muscularskeletal, Psychiatric were all negative except for pertinent positives noted in my HPI.    Exam:  /77  Pulse 91  Temp 98.2  F (36.8  C)  Resp 16  Ht 5' 4\" (1.626 m)  Wt 254 lb 9.6 oz (115.5 kg)  SpO2 97%  BMI 43.7 kg/m2  GENERAL APPEARANCE:  Alert, in no distress, morbidly obese, cooperative, sluggish.  EYES:  PERRL, Conjunctiva and lids normal with glasses and hx of glaucoma.  RESP: respiratory effort and palpation of chest normal, lungs clear to auscultation , no respiratory distress  CV:  Palpation and auscultation of heart done , regular rate and rhythm, no murmur, rub, or gallop, +2 pedal pulses,  2 pedal pulses in BLE    ABDOMEN: normal bowel sounds, soft, nontender, no hepatosplenomegaly or other masses, no guarding or rebound, bowel sounds normal  NEURO: Examination of sensation by touch is abnormal with numbness/tingling in all extremities which she states she has had., While ROM is intact, movement is sluggish and delayed.   PSYCH: Oriented X 3, insight and judgement impaired, memory impaired , affect and mood flat and sluggish.    Lab/Diagnostic data:  GLUCOSE METER (06/28/2018 12:02 PM)  Only the most recent of 12 results within the time period is included.    Component Value   GLUCOSE METER 130 (H)     Basic Metabolic Panel (06/27/2018 6:16 AM)  Only " the most recent of 2 results within the time period is included.    Component Value   SODIUM 143   POTASSIUM 3.9   CHLORIDE 108   CO2,TOTAL 28   ANION GAP 7   GLUCOSE 81   CALCIUM 8.8   BUN 10   CREATININE 0.97   BUN/CREAT RATIO  10   GFR if  >60   GFR if not  >60     URINALYSIS MICROSCOPIC (06/26/2018 5:40 PM)  Component Value   RBC 0-2   WBC 3-5   BACTERIA  Rare   EPITHELIAL CELLS  Few     Blood Gas-Venous (06/26/2018 3:38 PM)  Only the most recent of 2 results within the time period is included.    Component Value   PH,VENOUS  7.43   PCO2,VENOUS  48   PO2,VENOUS 45 (H)   HCO3,VENOUS  32 (H)   BASE EXCESS 6.4 (H)   O2 SAT,VENOUS  87 (H)   PATIENT TEMPERATURE 37.0     CBC WITH AUTO DIFFERENTIAL (06/26/2018 12:50 PM)  Only the most recent of 3 results within the time period is included.    Component Value   WHITE BLOOD COUNT  9.6   RED BLOOD COUNT  4.21   HEMOGLOBIN  13.1   HEMATOCRIT  41.1   MCV  98   MCH  31.1   MCHC  31.9 (L)   RDW  14.4   PLATELET COUNT  190   MPV  11.0     ASSESSMENT/PLAN:  Acute encephalopathy  Altered level of consciousness  Acute. Stable. Improving. Continue to carefully monitor. Follow-up routinely.    Closed fracture of shaft of right humerus with routine healing, unspecified fracture morphology, subsequent encounter  Opioid type dependence, continuous use (H)  Acute. Stable. Healing. Follow-up w/ Ortho on 7/10/18 as planned.    Schizoaffective disorder, bipolar type (H)  Chronic. Stable. Unchanged. Order placed for nursing to schedule regular psych apt w/ Dr. Veliz as noted below.    Chronic obstructive pulmonary disease, unspecified COPD type (H)  SPIKE (obstructive sleep apnea)  Chronic. Stable. Unchanged. Continue current POC and follow-up routinely.    Type 2 diabetes mellitus without complication, without long-term current use of insulin (H)  Morbid obesity with BMI of 40.0-44.9, adult (H)  Chronic. Stable. Unchanged. Continue current POC and follow-up  "routinely.    ACP (advance care planning)  Patient wishes to remain as a \"Full Code\" which is reflected in her POLST.    Orders:  1.  Please call primary psych caregiver/clinic to schedule regular appt.  Provider is Dr. Ramona Veliz CNS--per pt, sees every 4-6wks.    Total time spent with patient visit at the skilled nursing facility was 35 min including patient visit, review of past records and review of multiple chronic conditions. . Greater than 50% of total time spent with counseling and coordinating care due to multiple complex chronic conditions.     Electronically signed by:  JOY Barahona DNP, APRN CNP                    Sincerely,        DELL Queen CNP    "

## 2018-07-06 ENCOUNTER — NURSING HOME VISIT (OUTPATIENT)
Dept: GERIATRICS | Facility: CLINIC | Age: 53
End: 2018-07-06
Payer: COMMERCIAL

## 2018-07-06 VITALS
OXYGEN SATURATION: 94 % | TEMPERATURE: 98 F | DIASTOLIC BLOOD PRESSURE: 99 MMHG | RESPIRATION RATE: 17 BRPM | SYSTOLIC BLOOD PRESSURE: 121 MMHG | WEIGHT: 254.6 LBS | HEART RATE: 73 BPM | BODY MASS INDEX: 43.7 KG/M2

## 2018-07-06 DIAGNOSIS — R30.0 DYSURIA: ICD-10-CM

## 2018-07-06 DIAGNOSIS — E66.01 MORBID OBESITY WITH BMI OF 40.0-44.9, ADULT (H): ICD-10-CM

## 2018-07-06 DIAGNOSIS — F25.0 SCHIZOAFFECTIVE DISORDER, BIPOLAR TYPE (H): Primary | ICD-10-CM

## 2018-07-06 PROCEDURE — 99309 SBSQ NF CARE MODERATE MDM 30: CPT | Performed by: NURSE PRACTITIONER

## 2018-07-06 NOTE — PROGRESS NOTES
[facility]    Chief Complaint   Patient presents with     penitentiary Acute       [place] Medical Record Number:  [id number]    HPI:    Betsy Resendiz is a 53 year old  (1965), who is being seen today for an episodic care visit at [facility].  HPI information obtained from: facility chart records, facility staff, patient report and [facility] chart review.    Today's concern is:  Schizoaffective disorder, bipolar type (H)  - No recent mood or behavior concerns. Follows with OP psych.     Morbid obesity with BMI of 40.0-44.9, adult (H)  - Body mass index is 43.7 kg/(m^2).   - On multiple psych meds, likely contributing to obesity.     Dysuria  - Patient states she has a hx of UTI's. Reports dysuria, frequency, urgency and a strong odor.       ALLERGIES: Codeine; Hmg-coa-r inhibitors; Lisinopril; No clinical screening - see comments; Sulfa drugs; and Sumatriptan  Past Medical, Surgical, Family and Social History reviewed and updated in [facility].    Current Outpatient Prescriptions   Medication Sig Dispense Refill     ACETAMINOPHEN PO Take 1,000 mg by mouth 3 times daily       albuterol (PROAIR HFA/PROVENTIL HFA/VENTOLIN HFA) 108 (90 BASE) MCG/ACT Inhaler Inhale 2 puffs into the lungs every 4 hours as needed        benztropine (COGENTIN) 0.5 MG tablet Take 0.5 mg by mouth 2 times daily        brimonidine (ALPHAGAN-P) 0.15 % ophthalmic solution Place 1 drop into both eyes 2 times daily       budesonide (PULMICORT) 1 MG/2ML SUSP neb solution USE 1 VIAL IN NEBULIZER TWICE DAILY       cariprazine (VRAYLAR) 1.5 MG CAPS capsule Take 2 mg by mouth daily        cetirizine (ZYRTEC) 10 MG tablet Take 10 mg by mouth daily        docusate sodium (STOOL SOFTENER) 100 MG capsule Take 100 mg by mouth At Bedtime        escitalopram (LEXAPRO) 20 MG tablet Take 1 tablet by mouth at bedtime       fluticasone (FLONASE) 50 MCG/ACT spray Spray 1 spray into both nostrils daily as needed        furosemide  (LASIX) 80 MG tablet Take 80 mg by mouth 2 times daily        gabapentin (NEURONTIN) 800 MG tablet Take 400 mg by mouth 3 times daily        hydrOXYzine (VISTARIL) 50 MG capsule Take 100 mg by mouth every 6 hours as needed        ibuprofen (ADVIL/MOTRIN) 800 MG tablet Take 800 mg by mouth every 8 hours as needed        ipratropium - albuterol 0.5 mg/2.5 mg/3 mL (DUONEB) 0.5-2.5 (3) MG/3ML neb solution Take 1 vial by nebulization 4 times daily       lamoTRIgine (LAMICTAL) 200 MG tablet Take 200 mg by mouth At Bedtime        LANsoprazole (PREVACID) 30 MG CR capsule Take 30 mg by mouth 2 times daily        LIOTHYRONINE SODIUM PO Take 25 mcg by mouth daily       lithium 300 MG capsule Take 1 capsule by mouth in the morning and 2 capsules at bedtime       losartan (COZAAR) 25 MG tablet Take 12.5 mg by mouth daily        meclizine (ANTIVERT) 25 MG tablet Take 25 mg by mouth every 8 hours as needed        METFORMIN HCL PO Take 500 mg by mouth 2 times daily (with meals)       montelukast (SINGULAIR) 10 MG tablet Take 10 mg by mouth At Bedtime       OYSTER SHELL CALCIUM PO Take 500 mg by mouth 4 times daily       paliperidone (INVEGA) 6 MG 24 hr tablet Take 6 mg by mouth every morning       POTASSIUM CHLORIDE ER PO 30meq by mouth two times a day       pregabalin (LYRICA) 150 MG capsule Take 150 mg by mouth 2 times daily        PROPRANOLOL HCL PO Take 10 mg by mouth 2 times daily       TRAMADOL HCL PO Take 50 mg by mouth every 6 hours as needed for moderate to severe pain       travoprost, BAK Free, (TRAVATAN Z) 0.004 % ophthalmic solution Place 1 drop into both eyes At Bedtime       vitamin D (ERGOCALCIFEROL) 20193 UNIT capsule Take 50,000 Units by mouth once a week       ziprasidone (GEODON) 40 MG capsule Take 1 capsule by mouth in the morning       ziprasidone (GEODON) 80 MG capsule Take 160 mg by mouth At Bedtime        Medications reviewed:  Medications reconciled to facility chart and changes were made to reflect  current medications as identified as above med list. Below are the changes that were made:   Medications stopped since last EPIC medication reconciliation:   Medications Discontinued During This Encounter   Medication Reason     METFORMIN HCL PO Medication Reconciliation Clean Up       Medications started since last [facility] medication reconciliation:  Orders Placed This Encounter   Medications     LIOTHYRONINE SODIUM PO     Sig: Take 25 mcg by mouth daily       REVIEW OF SYSTEMS:  4 point ROS including Respiratory, CV, GI and , other than that noted in the HPI,  is negative    Physical Exam:  BP (!) 121/99 (Patient Position: Sitting)  Pulse 73  Temp 98  F (36.7  C)  Resp 17  Wt 254 lb 9.6 oz (115.5 kg)  SpO2 94%  BMI 43.7 kg/m2  GENERAL APPEARANCE:  Alert, in no distress  RESP:  respiratory effort and palpation of chest normal, auscultation of lungs clear, no respiratory distress  CV:  Palpation and auscultation of heart done , rate and rhythm regular, no murmur, +1 BLE peripheral edema  ABDOMEN:  normal bowel sounds, soft, nontender, no hepatosplenomegaly or other masses  M/S:   Gait and station not assessed- sitting in w/c, Digits and nails normal  SKIN:  Inspection and Palpation of skin and subcutaneous tissue normal  NEURO: 2-12 in normal limits and at patient's baseline  PSYCH:  insight and judgement, memory poor, affect and mood normal    Recent Labs:   GLUCOSE METER (06/28/2018 12:02 PM)  Only the most recent of 12 results within the time period is included.    Component Value   GLUCOSE METER 130 (H)     Basic Metabolic Panel (06/27/2018 6:16 AM)  Only the most recent of 2 results within the time period is included.    Component Value   SODIUM 143   POTASSIUM 3.9   CHLORIDE 108   CO2,TOTAL 28   ANION GAP 7   GLUCOSE 81   CALCIUM 8.8   BUN 10   CREATININE 0.97   BUN/CREAT RATIO  10   GFR if  >60   GFR if not  >60     URINALYSIS MICROSCOPIC (06/26/2018 5:40 PM)  Component  Value   RBC 0-2   WBC 3-5   BACTERIA  Rare   EPITHELIAL CELLS  Few     Blood Gas-Venous (06/26/2018 3:38 PM)  Only the most recent of 2 results within the time period is included.    Component Value   PH,VENOUS  7.43   PCO2,VENOUS  48   PO2,VENOUS 45 (H)   HCO3,VENOUS  32 (H)   BASE EXCESS 6.4 (H)   O2 SAT,VENOUS  87 (H)   PATIENT TEMPERATURE 37.0     CBC WITH AUTO DIFFERENTIAL (06/26/2018 12:50 PM)  Only the most recent of 3 results within the time period is included.    Component Value   WHITE BLOOD COUNT  9.6   RED BLOOD COUNT  4.21   HEMOGLOBIN  13.1   HEMATOCRIT  41.1   MCV  98   MCH  31.1   MCHC  31.9 (L)   RDW  14.4   PLATELET COUNT  190   MPV  11.0       Assessment/Plan:  Schizoaffective disorder, bipolar type (H)  - stable    Morbid obesity with BMI of 40.0-44.9, adult (H)  - continue to monitor    Dysuria  - Will check a UA      Orders:  1. UA Culture if positive Dx: Dysuria    Electronically signed by  DELL Queen CNP

## 2018-07-06 NOTE — LETTER
7/6/2018        RE: Betsy Resendiz  [address]  [facility] [address]        [place] [facility]    Chief Complaint   Patient presents with     long term Acute       [facility] Record Number:  [id number]    HPI:    Betsy Resendiz is a 53 year old  (1965), who is being seen today for an episodic care visit at [facility].  HPI information obtained from: facility chart records, facility staff, patient report and [facility] chart review.    Today's concern is:  Schizoaffective disorder, bipolar type (H)  - No recent mood or behavior concerns. Follows with OP psych.     Morbid obesity with BMI of 40.0-44.9, adult (H)  - Body mass index is 43.7 kg/(m^2).   - On multiple psych meds, likely contributing to obesity.     Dysuria  - Patient states she has a hx of UTI's. Reports dysuria, frequency, urgency and a strong odor.       ALLERGIES: Codeine; Hmg-coa-r inhibitors; Lisinopril; No clinical screening - see comments; Sulfa drugs; and Sumatriptan  Past Medical, Surgical, Family and Social History reviewed and updated in [facility].    Current Outpatient Prescriptions   Medication Sig Dispense Refill     ACETAMINOPHEN PO Take 1,000 mg by mouth 3 times daily       albuterol (PROAIR HFA/PROVENTIL HFA/VENTOLIN HFA) 108 (90 BASE) MCG/ACT Inhaler Inhale 2 puffs into the lungs every 4 hours as needed        benztropine (COGENTIN) 0.5 MG tablet Take 0.5 mg by mouth 2 times daily        brimonidine (ALPHAGAN-P) 0.15 % ophthalmic solution Place 1 drop into both eyes 2 times daily       budesonide (PULMICORT) 1 MG/2ML SUSP neb solution USE 1 VIAL IN NEBULIZER TWICE DAILY       cariprazine (VRAYLAR) 1.5 MG CAPS capsule Take 2 mg by mouth daily        cetirizine (ZYRTEC) 10 MG tablet Take 10 mg by mouth daily        docusate sodium (STOOL SOFTENER) 100 MG capsule Take 100 mg by mouth At Bedtime        escitalopram (LEXAPRO) 20 MG tablet Take 1 tablet by mouth at bedtime        fluticasone (FLONASE) 50 MCG/ACT spray Spray 1 spray into both nostrils daily as needed        furosemide (LASIX) 80 MG tablet Take 80 mg by mouth 2 times daily        gabapentin (NEURONTIN) 800 MG tablet Take 400 mg by mouth 3 times daily        hydrOXYzine (VISTARIL) 50 MG capsule Take 100 mg by mouth every 6 hours as needed        ibuprofen (ADVIL/MOTRIN) 800 MG tablet Take 800 mg by mouth every 8 hours as needed        ipratropium - albuterol 0.5 mg/2.5 mg/3 mL (DUONEB) 0.5-2.5 (3) MG/3ML neb solution Take 1 vial by nebulization 4 times daily       lamoTRIgine (LAMICTAL) 200 MG tablet Take 200 mg by mouth At Bedtime        LANsoprazole (PREVACID) 30 MG CR capsule Take 30 mg by mouth 2 times daily        LIOTHYRONINE SODIUM PO Take 25 mcg by mouth daily       lithium 300 MG capsule Take 1 capsule by mouth in the morning and 2 capsules at bedtime       losartan (COZAAR) 25 MG tablet Take 12.5 mg by mouth daily        meclizine (ANTIVERT) 25 MG tablet Take 25 mg by mouth every 8 hours as needed        METFORMIN HCL PO Take 500 mg by mouth 2 times daily (with meals)       montelukast (SINGULAIR) 10 MG tablet Take 10 mg by mouth At Bedtime       OYSTER SHELL CALCIUM PO Take 500 mg by mouth 4 times daily       paliperidone (INVEGA) 6 MG 24 hr tablet Take 6 mg by mouth every morning       POTASSIUM CHLORIDE ER PO 30meq by mouth two times a day       pregabalin (LYRICA) 150 MG capsule Take 150 mg by mouth 2 times daily        PROPRANOLOL HCL PO Take 10 mg by mouth 2 times daily       TRAMADOL HCL PO Take 50 mg by mouth every 6 hours as needed for moderate to severe pain       travoprost, BAK Free, (TRAVATAN Z) 0.004 % ophthalmic solution Place 1 drop into both eyes At Bedtime       vitamin D (ERGOCALCIFEROL) 56320 UNIT capsule Take 50,000 Units by mouth once a week       ziprasidone (GEODON) 40 MG capsule Take 1 capsule by mouth in the morning       ziprasidone (GEODON) 80 MG capsule Take 160 mg by mouth At Bedtime         Medications reviewed:  Medications reconciled to facility chart and changes were made to reflect current medications as identified as above med list. Below are the changes that were made:   Medications stopped since last EPIC medication reconciliation:   Medications Discontinued During This Encounter   Medication Reason     METFORMIN HCL PO Medication Reconciliation Clean Up       Medications started since last [facility] medication reconciliation:  Orders Placed This Encounter   Medications     LIOTHYRONINE SODIUM PO     Sig: Take 25 mcg by mouth daily       REVIEW OF SYSTEMS:  4 point ROS including Respiratory, CV, GI and , other than that noted in the HPI,  is negative    Physical Exam:  BP (!) 121/99 (Patient Position: Sitting)  Pulse 73  Temp 98  F (36.7  C)  Resp 17  Wt 254 lb 9.6 oz (115.5 kg)  SpO2 94%  BMI 43.7 kg/m2  GENERAL APPEARANCE:  Alert, in no distress  RESP:  respiratory effort and palpation of chest normal, auscultation of lungs clear, no respiratory distress  CV:  Palpation and auscultation of heart done , rate and rhythm regular, no murmur, +1 BLE peripheral edema  ABDOMEN:  normal bowel sounds, soft, nontender, no hepatosplenomegaly or other masses  M/S:   Gait and station not assessed- sitting in w/c, Digits and nails normal  SKIN:  Inspection and Palpation of skin and subcutaneous tissue normal  NEURO: 2-12 in normal limits and at patient's baseline  PSYCH:  insight and judgement, memory poor, affect and mood normal    Recent Labs:   GLUCOSE METER (06/28/2018 12:02 PM)  Only the most recent of 12 results within the time period is included.    Component Value   GLUCOSE METER 130 (H)     Basic Metabolic Panel (06/27/2018 6:16 AM)  Only the most recent of 2 results within the time period is included.    Component Value   SODIUM 143   POTASSIUM 3.9   CHLORIDE 108   CO2,TOTAL 28   ANION GAP 7   GLUCOSE 81   CALCIUM 8.8   BUN 10   CREATININE 0.97   BUN/CREAT RATIO  10   GFR if   American >60   GFR if not  >60     URINALYSIS MICROSCOPIC (06/26/2018 5:40 PM)  Component Value   RBC 0-2   WBC 3-5   BACTERIA  Rare   EPITHELIAL CELLS  Few     Blood Gas-Venous (06/26/2018 3:38 PM)  Only the most recent of 2 results within the time period is included.    Component Value   PH,VENOUS  7.43   PCO2,VENOUS  48   PO2,VENOUS 45 (H)   HCO3,VENOUS  32 (H)   BASE EXCESS 6.4 (H)   O2 SAT,VENOUS  87 (H)   PATIENT TEMPERATURE 37.0     CBC WITH AUTO DIFFERENTIAL (06/26/2018 12:50 PM)  Only the most recent of 3 results within the time period is included.    Component Value   WHITE BLOOD COUNT  9.6   RED BLOOD COUNT  4.21   HEMOGLOBIN  13.1   HEMATOCRIT  41.1   MCV  98   MCH  31.1   MCHC  31.9 (L)   RDW  14.4   PLATELET COUNT  190   MPV  11.0       Assessment/Plan:  Schizoaffective disorder, bipolar type (H)  - stable    Morbid obesity with BMI of 40.0-44.9, adult (H)  - continue to monitor    Dysuria  - Will check a UA      Orders:  1. UA Culture if positive Dx: Dysuria    Electronically signed by  DELL Queen CNP       Sincerely,        DELL Queen CNP

## 2018-07-10 ENCOUNTER — NURSING HOME VISIT (OUTPATIENT)
Dept: GERIATRICS | Facility: CLINIC | Age: 53
End: 2018-07-10
Payer: COMMERCIAL

## 2018-07-10 VITALS
WEIGHT: 258.4 LBS | BODY MASS INDEX: 44.12 KG/M2 | SYSTOLIC BLOOD PRESSURE: 110 MMHG | HEIGHT: 64 IN | OXYGEN SATURATION: 98 % | RESPIRATION RATE: 18 BRPM | HEART RATE: 76 BPM | DIASTOLIC BLOOD PRESSURE: 73 MMHG | TEMPERATURE: 97.8 F

## 2018-07-10 VITALS
RESPIRATION RATE: 18 BRPM | SYSTOLIC BLOOD PRESSURE: 110 MMHG | BODY MASS INDEX: 44.12 KG/M2 | HEART RATE: 76 BPM | WEIGHT: 258.4 LBS | TEMPERATURE: 97.8 F | DIASTOLIC BLOOD PRESSURE: 73 MMHG | HEIGHT: 64 IN | OXYGEN SATURATION: 98 %

## 2018-07-10 DIAGNOSIS — R30.0 DYSURIA: Primary | ICD-10-CM

## 2018-07-10 PROCEDURE — 99308 SBSQ NF CARE LOW MDM 20: CPT | Performed by: NURSE PRACTITIONER

## 2018-07-10 NOTE — PROGRESS NOTES
"[place] [facility]    Chief Complaint   Patient presents with     Nursing Home Acute       HPI:    Betsy Resendiz is a 53 year old  (1965), who is being seen today for an episodic care visit at [facility].    HPI information obtained from: facility chart records, facility staff and patient report. Today's concern is:  Dysuria  Patient states she has a hx of UTI's. Reports dysuria, frequency, urgency and a strong odor.  Had these symptoms x a week  Review of chart indicates similar c/o last week and UA/UC ordered  Conf with DON--urine not collected as when they were attempting to collect urine, patient fell in BR and was sent to urgent care because she fell on her affected arm and was in severe pain.  No urine was ultimately collected      REVIEW OF SYSTEMS:  4 point ROS including Respiratory, CV, GI and , other than that noted in the HPI,  is negative    /73  Pulse 76  Temp 97.8  F (36.6  C)  Resp 18  Ht 5' 4\" (1.626 m)  Wt 258 lb 6.4 oz (117.2 kg)  SpO2 98%  BMI 44.35 kg/m2  GENERAL APPEARANCE:  Alert, in no distress  Patient on toilet during interview.  Has urgency but currently unable to void.  Pain with palpation of lower abdomen    ASSESSMENT/PLAN:  Dysuria  Will re-order ua with conditional uc       Orders:  1.  UA with conditional UC.  Dysuria, abd pain    Electronically signed by:  DELL Lam CNP  "

## 2018-07-10 NOTE — PROGRESS NOTES
"[place] [facility]  PRIMARY CARE PROVIDER AND CLINIC:  Elle Dunlap [facility] [address] [facility] [address]  Chief Complaint   Patient presents with     Hospital F/U     [facility] Medical Record Number:  [id number]    HPI:    Betsy Resendiz is a 53 year old  (1965),admitted to the [facility] from Hospital  [facility].  Hospital stay 6/26/18  through 6/28/18.  Admitted to this facility for  rehab, medical management and nursing care.  HPI information obtained from: facility staff, patient report and [facility] chart review.      According to EHR Hospital Course: Brought in to [facility] emergency department on 6/26/2018 after a friend found her in her home with an altered level of consciousness and right humoral fx. Abernathy catheter was placed in 2000 cc of urine removed at the time of her Abernathy catheter. Supplemental O2. Narcan was not given initially in the ED.\"  - medically managed.     TCU:  - had a fall on 7/6, sent to ED, imaging negative for acute fx. Saw ortho on 7/10, doing well, plan to follow up in 6 weeks.   - Had dysuria a week ago, UA ordered, was not collected, another order placed by NP, collected today.     Current issues are:      - Started on OT/PT, reports making a progress  - Reports pain is 9/10 when severe, aggravated with movent, better with  Ice and medicine. Asking for stronger pain meds. Admits for being on norco and percocet prior to hospitalization. When asked about the fall, reports she tripped. NP reports pt was found unconscious, and was found be positive on admission to the hospital.   - admits for dysuria, frequency, and urgency, also discomfort over lower abdomen.     CODE STATUS/ADVANCE DIRECTIVES DISCUSSION:   CPR/Full code   Patient's living condition: lives alone    ALLERGIES:Codeine; Hmg-coa-r inhibitors; Lisinopril; No clinical screening - see comments; Sulfa drugs; and Sumatriptan     Surgical " History    Surgery Date Laterality Comments   BACK SURGERY seven times/back   lumbar /hardware   BIOPSY CERVIX         THORACOTOMY     Left in  from abscess   BLADDER REPAIR         Arthrodesis, right first metatarsophalangeal joint. 09       RIGHT REVISION ARTHRODESIS 1ST METATARSAL PHALANGEAL JOINT 09   Right foot /hardware   BUNIONECTOMY 2011       pressure necrosis and abscess left gluteus. 2014   debridement   RADIOFREQUENCY ABLATION 2016 Left     Medical History    Medical History Date Comments   Unspecified asthma(493.90)   nebs /inhalers as needed   Migraine, unspecified, without mention of intractable migraine without mention of status migrainosus       Unspecified glaucoma(365.9)       Depressive disorder, not elsewhere classified       Bipolar disorder, unspecified (HC)       Type II or unspecified type diabetes mellitus without mention of complication, not stated as uncontrolled dx  oral agents   Pain disorder   Intractable post thoracotomy pain   SPIKE (obstructive sleep apnea)   CPAP   Unspecified disorder of skin and subcutaneous tissue       Carpal tunnel syndrome       Esophageal reflux       Retention of urine, unspecified       Anemia, unspecified       Pulmonary insufficiency following trauma and surgery       Tobacco use disorder   1 ppd   Osteoarthrosis and allied disorders       Pain in joint, ankle and foot       Pneumonia 2008 LLL empyema and ARDS   Bipolar disorder, unspecified (HC)       Compression fracture of L1 lumbar vertebra (HC)       Family History    Medical History Relation Name Comments   Diabetes Maternal Grandmother       Cancer Mother   Multiple myeloma   Cancer Other   Maternal Niece-Wilms     Relation Name Status Comments   Brother   Alive     Father   Alive     Maternal Grandmother         Mother    (Age 51)     Other    (Age 12)     Social History    Tobacco Use Types Packs/Day Years Used Date   Current Some Day Smoker  Cigarettes 0.5 17 Quit: 01/06/2018   Smokeless Tobacco: Never Used           Tobacco Cessation: Ready to Quit: No; Counseling Given: Yes       Alcohol Use Drinks/Week oz/Week Comments   No 0 Standard drinks or equivalent   0.0        Sex Assigned at Birth Date Recorded   Not on file           Post Discharge Medication Reconciliation Status: discharge medications reconciled and changed, per note/orders (see AVS).  Current Outpatient Prescriptions   Medication Sig Dispense Refill     ACETAMINOPHEN PO Take 1,000 mg by mouth 3 times daily       albuterol (PROAIR HFA/PROVENTIL HFA/VENTOLIN HFA) 108 (90 BASE) MCG/ACT Inhaler Inhale 2 puffs into the lungs every 4 hours as needed        benztropine (COGENTIN) 0.5 MG tablet Take 0.5 mg by mouth 2 times daily        brimonidine (ALPHAGAN-P) 0.15 % ophthalmic solution Place 1 drop into both eyes 2 times daily       budesonide (PULMICORT) 1 MG/2ML SUSP neb solution USE 1 VIAL IN NEBULIZER TWICE DAILY       cariprazine (VRAYLAR) 1.5 MG CAPS capsule Take 2 mg by mouth daily        cetirizine (ZYRTEC) 10 MG tablet Take 10 mg by mouth daily        docusate sodium (STOOL SOFTENER) 100 MG capsule Take 100 mg by mouth At Bedtime        escitalopram (LEXAPRO) 20 MG tablet Take 1 tablet by mouth at bedtime       fluticasone (FLONASE) 50 MCG/ACT spray Spray 1 spray into both nostrils daily as needed        furosemide (LASIX) 80 MG tablet Take 80 mg by mouth 2 times daily        gabapentin (NEURONTIN) 800 MG tablet Take 400 mg by mouth 3 times daily        hydrOXYzine (VISTARIL) 50 MG capsule Take 100 mg by mouth every 6 hours as needed        ibuprofen (ADVIL/MOTRIN) 800 MG tablet Take 800 mg by mouth every 8 hours as needed        ipratropium - albuterol 0.5 mg/2.5 mg/3 mL (DUONEB) 0.5-2.5 (3) MG/3ML neb solution Take 1 vial by nebulization 4 times daily       lamoTRIgine (LAMICTAL) 200 MG tablet Take 200 mg by mouth At Bedtime        LANsoprazole (PREVACID) 30 MG CR capsule Take 30 mg  "by mouth 2 times daily        LIOTHYRONINE SODIUM PO Take 25 mcg by mouth daily       lithium 300 MG capsule Take 1 capsule by mouth in the morning and 2 capsules at bedtime       losartan (COZAAR) 25 MG tablet Take 12.5 mg by mouth daily        meclizine (ANTIVERT) 25 MG tablet Take 25 mg by mouth every 8 hours as needed        METFORMIN HCL PO Take 500 mg by mouth 2 times daily (with meals)       montelukast (SINGULAIR) 10 MG tablet Take 10 mg by mouth At Bedtime       OYSTER SHELL CALCIUM PO Take 500 mg by mouth 4 times daily       paliperidone (INVEGA) 6 MG 24 hr tablet Take 6 mg by mouth every morning       POTASSIUM CHLORIDE ER PO 30meq by mouth two times a day       pregabalin (LYRICA) 150 MG capsule Take 150 mg by mouth 2 times daily        PROPRANOLOL HCL PO Take 10 mg by mouth 2 times daily       TRAMADOL HCL PO Take 50 mg by mouth every 6 hours as needed for moderate to severe pain       travoprost, BAK Free, (TRAVATAN Z) 0.004 % ophthalmic solution Place 1 drop into both eyes At Bedtime       vitamin D (ERGOCALCIFEROL) 47275 UNIT capsule Take 50,000 Units by mouth once a week       ziprasidone (GEODON) 40 MG capsule Take 1 capsule by mouth in the morning       ziprasidone (GEODON) 80 MG capsule Take 160 mg by mouth At Bedtime          ROS:  10 point ROS of systems including Constitutional, Eyes, Respiratory, Cardiovascular, Gastroenterology, Genitourinary, Integumentary, Muscularskeletal, Psychiatric were all negative except for pertinent positives noted in my HPI.    Exam:  /73  Pulse 76  Temp 97.8  F (36.6  C)  Resp 18  Ht 5' 4\" (1.626 m)  Wt 258 lb 6.4 oz (117.2 kg)  SpO2 98%  BMI 44.35 kg/m2  GENERAL APPEARANCE:  in no distress, cooperative, drowsy  ENT:  Mouth and posterior oropharynx normal, moist mucous membranes  EYES:  EOM, conjunctivae, lids, pupils and irises normal  RESP:  respiratory effort and palpation of chest normal, lungs clear to auscultation , no respiratory distress  CV: "  Palpation and auscultation of heart done , regular rate and rhythm, no murmur, rub, or gallop, peripheral edema 1+ in pedal, and distal legs (pitting).   ABDOMEN:  normal bowel sounds, soft, nontender, no hepatosplenomegaly or other masses  M/S:   Gait and station abnormal uses walker and WC for ambulation. RUE in sling. Distal NVB intact.   SKIN:  deep copper discoloration over distal legs.   NEURO:   no NFD appreciated on observation.   PSYCH:  mood and affect flat, drowsy.     Lab/Diagnostic data:    GLUCOSE METER (06/28/2018 12:02 PM)  Only the most recent of 12 results within the time period is included.    Component Value   GLUCOSE METER 130 (H)      Basic Metabolic Panel (06/27/2018 6:16 AM)  Only the most recent of 2 results within the time period is included.    Component Value   SODIUM 143   POTASSIUM 3.9   CHLORIDE 108   CO2,TOTAL 28   ANION GAP 7   GLUCOSE 81   CALCIUM 8.8   BUN 10   CREATININE 0.97   BUN/CREAT RATIO  10   GFR if  >60   GFR if not  >60      URINALYSIS MICROSCOPIC (06/26/2018 5:40 PM)  Component Value   RBC 0-2   WBC 3-5   BACTERIA  Rare   EPITHELIAL CELLS  Few      Blood Gas-Venous (06/26/2018 3:38 PM)  Only the most recent of 2 results within the time period is included.    Component Value   PH,VENOUS  7.43   PCO2,VENOUS  48   PO2,VENOUS 45 (H)   HCO3,VENOUS  32 (H)   BASE EXCESS 6.4 (H)   O2 SAT,VENOUS  87 (H)   PATIENT TEMPERATURE 37.0      CBC WITH AUTO DIFFERENTIAL (06/26/2018 12:50 PM)  Only the most recent of 3 results within the time period is included.    Component Value   WHITE BLOOD COUNT  9.6   RED BLOOD COUNT  4.21   HEMOGLOBIN  13.1   HEMATOCRIT  41.1   MCV  98   MCH  31.1   MCHC  31.9 (L)   RDW  14.4   PLATELET COUNT  190   MPV  11.0          ASSESSMENT/PLAN:  Closed fracture of shaft of right humerus with routine healing, unspecified fracture morphology, subsequent encounter  Opioid type dependence, continuous use (H)  - Physical  deconditioning  -  Physical function improving with OT/PT, continue.  - Analgesia suboptimal subjectively. Pt noted to be comfortable most of the time. Currently on tramadol and tylenol. Given hx of opioid dependence adherence AMS, we would be careful with increasing the dose or changing to a stronger meds.   - Continue DVT Prophylaxis according to Orthopedist's recommendations  - Follow on the surgeon's recommendations    Chronic respiratory failure with hypoxia (H)  Chronic obstructive pulmonary disease, unspecified COPD type (H)  - SPIKE (obstructive sleep apnea)  - on O2 2 liter, at baseline.   - continue current meds.     Morbid obesity with BMI of 40.0-44.9, adult (H)  - complicating therapy, T2D, and SPIKE.   - continue education.     Dysuria  - acute symptoms, sample collected for UA/UCx. Meets the criteria for starting ABX. GFR > 60, hx of allergy to sulfa abx, young age, will give Macrobid 100 mg bid x 5 days. Follow on UCx and manage accordingly.     T2D:  - No results found for: A1C  - on oral meds. Continue to monitor BG and adjust meds accordingly.     Schizoaffective disorder, unspecified type (H)  - on multiple regiment. Continue current meds. Stable. Defer to Psych addressing polypharmacy.        Orders:  1.  DC atarax  2.  Macrobid 100mg BID x5 days  3.  Push oral fluid intake    Electronically signed by:  Selene Rutherford MD

## 2018-07-11 ENCOUNTER — NURSING HOME VISIT (OUTPATIENT)
Dept: GERIATRICS | Facility: CLINIC | Age: 53
End: 2018-07-11
Payer: COMMERCIAL

## 2018-07-11 ENCOUNTER — HOSPITAL LABORATORY (OUTPATIENT)
Facility: OTHER | Age: 53
End: 2018-07-11

## 2018-07-11 DIAGNOSIS — E66.01 MORBID OBESITY WITH BMI OF 40.0-44.9, ADULT (H): ICD-10-CM

## 2018-07-11 DIAGNOSIS — J96.11 CHRONIC RESPIRATORY FAILURE WITH HYPOXIA (H): ICD-10-CM

## 2018-07-11 DIAGNOSIS — Z01.89 ROUTINE LAB DRAW: Primary | ICD-10-CM

## 2018-07-11 DIAGNOSIS — F25.9 SCHIZOAFFECTIVE DISORDER, UNSPECIFIED TYPE (H): ICD-10-CM

## 2018-07-11 DIAGNOSIS — R30.0 DYSURIA: ICD-10-CM

## 2018-07-11 DIAGNOSIS — J44.9 CHRONIC OBSTRUCTIVE PULMONARY DISEASE, UNSPECIFIED COPD TYPE (H): ICD-10-CM

## 2018-07-11 DIAGNOSIS — F11.20 OPIOID TYPE DEPENDENCE, CONTINUOUS USE (H): ICD-10-CM

## 2018-07-11 DIAGNOSIS — S42.301D CLOSED FRACTURE OF SHAFT OF RIGHT HUMERUS WITH ROUTINE HEALING, UNSPECIFIED FRACTURE MORPHOLOGY, SUBSEQUENT ENCOUNTER: Primary | ICD-10-CM

## 2018-07-11 DIAGNOSIS — E11.8 TYPE 2 DIABETES MELLITUS WITH COMPLICATION, WITHOUT LONG-TERM CURRENT USE OF INSULIN (H): ICD-10-CM

## 2018-07-11 LAB
ALBUMIN UR-MCNC: NEGATIVE MG/DL
APPEARANCE UR: CLEAR
BILIRUB UR QL STRIP: NEGATIVE
COLOR UR AUTO: YELLOW
GLUCOSE UR STRIP-MCNC: NEGATIVE MG/DL
HGB UR QL STRIP: NEGATIVE
HYALINE CASTS #/AREA URNS LPF: 1 /LPF (ref 0–2)
KETONES UR STRIP-MCNC: NEGATIVE MG/DL
LEUKOCYTE ESTERASE UR QL STRIP: NEGATIVE
NITRATE UR QL: NEGATIVE
PH UR STRIP: 5 PH (ref 5–7)
RBC #/AREA URNS AUTO: 1 /HPF (ref 0–2)
SOURCE: ABNORMAL
SP GR UR STRIP: 1.02 (ref 1–1.03)
SQUAMOUS #/AREA URNS AUTO: 2 /HPF (ref 0–1)
UROBILINOGEN UR STRIP-MCNC: 0 MG/DL (ref 0–2)
WBC #/AREA URNS AUTO: 1 /HPF (ref 0–5)

## 2018-07-11 PROCEDURE — 99305 1ST NF CARE MODERATE MDM 35: CPT | Performed by: FAMILY MEDICINE

## 2018-07-11 NOTE — LETTER
"    7/11/2018        RE: Betsy Resendiz  [address]  [place] [address]        [place] [facility]  PRIMARY CARE PROVIDER AND CLINIC:  Elle Dunlap [facility] [address]  Chief Complaint   Patient presents with     Hospital F/U     [place] Medical Record Number:  [id number]    HPI:    Betsy Resendiz is a 53 year old  (1965),admitted to the [facility] from Hospital  [facility].  Hospital stay 6/26/18  through 6/28/18.  Admitted to this facility for  rehab, medical management and nursing care.  HPI information obtained from: facility staff, patient report and [facility] chart review.      According to EHR Hospital Course: Brought in to [facility] emergency department on 6/26/2018 after a friend found her in her home with an altered level of consciousness and right humoral fx. Abernathy catheter was placed in 2000 cc of urine removed at the time of her Abernathy catheter. Supplemental O2. Narcan was not given initially in the ED.\"  - medically managed.     TCU:  - had a fall on 7/6, sent to ED, imaging negative for acute fx. Saw ortho on 7/10, doing well, plan to follow up in 6 weeks.   - Had dysuria a week ago, UA ordered, was not collected, another order placed by NP, collected today.     Current issues are:      - Started on OT/PT, reports making a progress  - Reports pain is 9/10 when severe, aggravated with movent, better with  Ice and medicine. Asking for stronger pain meds. Admits for being on norco and percocet prior to hospitalization. When asked about the fall, reports she tripped. NP reports pt was found unconscious, and was found be positive on admission to the hospital.   - admits for dysuria, frequency, and urgency, also discomfort over lower abdomen.     CODE STATUS/ADVANCE DIRECTIVES DISCUSSION:   CPR/Full code   Patient's living condition: lives alone    ALLERGIES:Codeine; Hmg-coa-r " inhibitors; Lisinopril; No clinical screening - see comments; Sulfa drugs; and Sumatriptan     Surgical History    Surgery Date Laterality Comments   BACK SURGERY seven times/back   lumbar /hardware   BIOPSY CERVIX         THORACOTOMY     Left in  from abscess   BLADDER REPAIR         Arthrodesis, right first metatarsophalangeal joint. 09       RIGHT REVISION ARTHRODESIS 1ST METATARSAL PHALANGEAL JOINT 09   Right foot /hardware   BUNIONECTOMY 2011       pressure necrosis and abscess left gluteus. 2014   debridement   RADIOFREQUENCY ABLATION 2016 Left     Medical History    Medical History Date Comments   Unspecified asthma(493.90)   nebs /inhalers as needed   Migraine, unspecified, without mention of intractable migraine without mention of status migrainosus       Unspecified glaucoma(365.9)       Depressive disorder, not elsewhere classified       Bipolar disorder, unspecified (HC)       Type II or unspecified type diabetes mellitus without mention of complication, not stated as uncontrolled dx  oral agents   Pain disorder   Intractable post thoracotomy pain   SPIKE (obstructive sleep apnea)   CPAP   Unspecified disorder of skin and subcutaneous tissue       Carpal tunnel syndrome       Esophageal reflux       Retention of urine, unspecified       Anemia, unspecified       Pulmonary insufficiency following trauma and surgery       Tobacco use disorder   1 ppd   Osteoarthrosis and allied disorders       Pain in joint, ankle and foot       Pneumonia 2008 LLL empyema and ARDS   Bipolar disorder, unspecified (HC)       Compression fracture of L1 lumbar vertebra (HC)       Family History    Medical History Relation Name Comments   Diabetes Maternal Grandmother       Cancer Mother   Multiple myeloma   Cancer Other   Maternal Niece-Wilms     Relation Name Status Comments   Brother   Alive     Father   Alive     Maternal Grandmother         Mother    (Age 51)     Other     (Age 12)     Social History    Tobacco Use Types Packs/Day Years Used Date   Current Some Day Smoker Cigarettes 0.5 17 Quit: 2018   Smokeless Tobacco: Never Used           Tobacco Cessation: Ready to Quit: No; Counseling Given: Yes       Alcohol Use Drinks/Week oz/Week Comments   No 0 Standard drinks or equivalent   0.0        Sex Assigned at Birth Date Recorded   Not on file           Post Discharge Medication Reconciliation Status: discharge medications reconciled and changed, per note/orders (see AVS).  Current Outpatient Prescriptions   Medication Sig Dispense Refill     ACETAMINOPHEN PO Take 1,000 mg by mouth 3 times daily       albuterol (PROAIR HFA/PROVENTIL HFA/VENTOLIN HFA) 108 (90 BASE) MCG/ACT Inhaler Inhale 2 puffs into the lungs every 4 hours as needed        benztropine (COGENTIN) 0.5 MG tablet Take 0.5 mg by mouth 2 times daily        brimonidine (ALPHAGAN-P) 0.15 % ophthalmic solution Place 1 drop into both eyes 2 times daily       budesonide (PULMICORT) 1 MG/2ML SUSP neb solution USE 1 VIAL IN NEBULIZER TWICE DAILY       cariprazine (VRAYLAR) 1.5 MG CAPS capsule Take 2 mg by mouth daily        cetirizine (ZYRTEC) 10 MG tablet Take 10 mg by mouth daily        docusate sodium (STOOL SOFTENER) 100 MG capsule Take 100 mg by mouth At Bedtime        escitalopram (LEXAPRO) 20 MG tablet Take 1 tablet by mouth at bedtime       fluticasone (FLONASE) 50 MCG/ACT spray Spray 1 spray into both nostrils daily as needed        furosemide (LASIX) 80 MG tablet Take 80 mg by mouth 2 times daily        gabapentin (NEURONTIN) 800 MG tablet Take 400 mg by mouth 3 times daily        hydrOXYzine (VISTARIL) 50 MG capsule Take 100 mg by mouth every 6 hours as needed        ibuprofen (ADVIL/MOTRIN) 800 MG tablet Take 800 mg by mouth every 8 hours as needed        ipratropium - albuterol 0.5 mg/2.5 mg/3 mL (DUONEB) 0.5-2.5 (3) MG/3ML neb solution Take 1 vial by nebulization 4 times daily       lamoTRIgine  "(LAMICTAL) 200 MG tablet Take 200 mg by mouth At Bedtime        LANsoprazole (PREVACID) 30 MG CR capsule Take 30 mg by mouth 2 times daily        LIOTHYRONINE SODIUM PO Take 25 mcg by mouth daily       lithium 300 MG capsule Take 1 capsule by mouth in the morning and 2 capsules at bedtime       losartan (COZAAR) 25 MG tablet Take 12.5 mg by mouth daily        meclizine (ANTIVERT) 25 MG tablet Take 25 mg by mouth every 8 hours as needed        METFORMIN HCL PO Take 500 mg by mouth 2 times daily (with meals)       montelukast (SINGULAIR) 10 MG tablet Take 10 mg by mouth At Bedtime       OYSTER SHELL CALCIUM PO Take 500 mg by mouth 4 times daily       paliperidone (INVEGA) 6 MG 24 hr tablet Take 6 mg by mouth every morning       POTASSIUM CHLORIDE ER PO 30meq by mouth two times a day       pregabalin (LYRICA) 150 MG capsule Take 150 mg by mouth 2 times daily        PROPRANOLOL HCL PO Take 10 mg by mouth 2 times daily       TRAMADOL HCL PO Take 50 mg by mouth every 6 hours as needed for moderate to severe pain       travoprost, BAK Free, (TRAVATAN Z) 0.004 % ophthalmic solution Place 1 drop into both eyes At Bedtime       vitamin D (ERGOCALCIFEROL) 91637 UNIT capsule Take 50,000 Units by mouth once a week       ziprasidone (GEODON) 40 MG capsule Take 1 capsule by mouth in the morning       ziprasidone (GEODON) 80 MG capsule Take 160 mg by mouth At Bedtime          ROS:  10 point ROS of systems including Constitutional, Eyes, Respiratory, Cardiovascular, Gastroenterology, Genitourinary, Integumentary, Muscularskeletal, Psychiatric were all negative except for pertinent positives noted in my HPI.    Exam:  /73  Pulse 76  Temp 97.8  F (36.6  C)  Resp 18  Ht 5' 4\" (1.626 m)  Wt 258 lb 6.4 oz (117.2 kg)  SpO2 98%  BMI 44.35 kg/m2  GENERAL APPEARANCE:  in no distress, cooperative, drowsy  ENT:  Mouth and posterior oropharynx normal, moist mucous membranes  EYES:  EOM, conjunctivae, lids, pupils and irises " normal  RESP:  respiratory effort and palpation of chest normal, lungs clear to auscultation , no respiratory distress  CV:  Palpation and auscultation of heart done , regular rate and rhythm, no murmur, rub, or gallop, peripheral edema 1+ in pedal, and distal legs (pitting).   ABDOMEN:  normal bowel sounds, soft, nontender, no hepatosplenomegaly or other masses  M/S:   Gait and station abnormal uses walker and WC for ambulation. RUE in sling. Distal NVB intact.   SKIN:  deep copper discoloration over distal legs.   NEURO:   no NFD appreciated on observation.   PSYCH:  mood and affect flat, drowsy.     Lab/Diagnostic data:    GLUCOSE METER (06/28/2018 12:02 PM)  Only the most recent of 12 results within the time period is included.    Component Value   GLUCOSE METER 130 (H)      Basic Metabolic Panel (06/27/2018 6:16 AM)  Only the most recent of 2 results within the time period is included.    Component Value   SODIUM 143   POTASSIUM 3.9   CHLORIDE 108   CO2,TOTAL 28   ANION GAP 7   GLUCOSE 81   CALCIUM 8.8   BUN 10   CREATININE 0.97   BUN/CREAT RATIO  10   GFR if  >60   GFR if not  >60      URINALYSIS MICROSCOPIC (06/26/2018 5:40 PM)  Component Value   RBC 0-2   WBC 3-5   BACTERIA  Rare   EPITHELIAL CELLS  Few      Blood Gas-Venous (06/26/2018 3:38 PM)  Only the most recent of 2 results within the time period is included.    Component Value   PH,VENOUS  7.43   PCO2,VENOUS  48   PO2,VENOUS 45 (H)   HCO3,VENOUS  32 (H)   BASE EXCESS 6.4 (H)   O2 SAT,VENOUS  87 (H)   PATIENT TEMPERATURE 37.0      CBC WITH AUTO DIFFERENTIAL (06/26/2018 12:50 PM)  Only the most recent of 3 results within the time period is included.    Component Value   WHITE BLOOD COUNT  9.6   RED BLOOD COUNT  4.21   HEMOGLOBIN  13.1   HEMATOCRIT  41.1   MCV  98   MCH  31.1   MCHC  31.9 (L)   RDW  14.4   PLATELET COUNT  190   MPV  11.0          ASSESSMENT/PLAN:  Closed fracture of shaft of right humerus with routine  healing, unspecified fracture morphology, subsequent encounter  Opioid type dependence, continuous use (H)  - Physical deconditioning  -  Physical function improving with OT/PT, continue.  - Analgesia suboptimal subjectively. Pt noted to be comfortable most of the time. Currently on tramadol and tylenol. Given hx of opioid dependence adherence AMS, we would be careful with increasing the dose or changing to a stronger meds.   - Continue DVT Prophylaxis according to Orthopedist's recommendations  - Follow on the surgeon's recommendations    Chronic respiratory failure with hypoxia (H)  Chronic obstructive pulmonary disease, unspecified COPD type (H)  - SPIKE (obstructive sleep apnea)  - on O2 2 liter, at baseline.   - continue current meds.     Morbid obesity with BMI of 40.0-44.9, adult (H)  - complicating therapy, T2D, and SPIKE.   - continue education.     Dysuria  - acute symptoms, sample collected for UA/UCx. Meets the criteria for starting ABX. GFR > 60, hx of allergy to sulfa abx, young age, will give Macrobid 100 mg bid x 5 days. Follow on UCx and manage accordingly.     T2D:  - No results found for: A1C  - on oral meds. Continue to monitor BG and adjust meds accordingly.     Schizoaffective disorder, unspecified type (H)  - on multiple regiment. Continue current meds. Stable. Defer to Psych addressing polypharmacy.        Orders:  1.  DC atarax  2.  Macrobid 100mg BID x5 days  3.  Push oral fluid intake    Electronically signed by:  Selene Rutherford MD              Sincerely,        Selene Rutherford MD

## 2018-07-20 ENCOUNTER — NURSING HOME VISIT (OUTPATIENT)
Dept: GERIATRICS | Facility: CLINIC | Age: 53
End: 2018-07-20
Payer: COMMERCIAL

## 2018-07-20 VITALS
BODY MASS INDEX: 44.29 KG/M2 | OXYGEN SATURATION: 97 % | RESPIRATION RATE: 18 BRPM | HEART RATE: 86 BPM | SYSTOLIC BLOOD PRESSURE: 142 MMHG | WEIGHT: 258 LBS | DIASTOLIC BLOOD PRESSURE: 100 MMHG | TEMPERATURE: 97.8 F

## 2018-07-20 DIAGNOSIS — F25.9 SCHIZOAFFECTIVE DISORDER, UNSPECIFIED TYPE (H): ICD-10-CM

## 2018-07-20 DIAGNOSIS — G89.4 CHRONIC PAIN SYNDROME: ICD-10-CM

## 2018-07-20 DIAGNOSIS — S42.301D CLOSED FRACTURE OF SHAFT OF RIGHT HUMERUS WITH ROUTINE HEALING, UNSPECIFIED FRACTURE MORPHOLOGY, SUBSEQUENT ENCOUNTER: Primary | ICD-10-CM

## 2018-07-20 PROCEDURE — 99308 SBSQ NF CARE LOW MDM 20: CPT | Performed by: NURSE PRACTITIONER

## 2018-07-20 NOTE — PROGRESS NOTES
"[place] [facility]    Chief Complaint   Patient presents with     residential Acute       [place] Medical Record Number:  [id number]    HPI:    Betsy Resendiz is a 53 year old  (1965), who is being seen today for an episodic care visit at [facility].  HPI information obtained from: facility chart records, facility staff, patient report and [facility] chart review     Patient asking to see me today for \"pain\".   She tells me the pain in her arm is 9/10. It is intermittent. It does not radiate.   She is not sure if she is receiving tramadol or naproxen.   Staff and therapy tell me she falls asleep during conversations and therapy. Her non verbal pain signs are absent.   Has some signs of anxiety related to wanting to smoke. She is out of cigarettes.       ALLERGIES: Codeine; Hmg-coa-r inhibitors; Lisinopril; No clinical screening - see comments; Sulfa drugs; and Sumatriptan  Past Medical, Surgical, Family and Social History reviewed and updated in [facility].    Current Outpatient Prescriptions   Medication Sig Dispense Refill     ACETAMINOPHEN PO Take 1,000 mg by mouth 3 times daily       albuterol (PROAIR HFA/PROVENTIL HFA/VENTOLIN HFA) 108 (90 BASE) MCG/ACT Inhaler Inhale 2 puffs into the lungs every 4 hours as needed        benztropine (COGENTIN) 0.5 MG tablet Take 0.5 mg by mouth 2 times daily        brimonidine (ALPHAGAN-P) 0.15 % ophthalmic solution Place 1 drop into both eyes 2 times daily       budesonide (PULMICORT) 1 MG/2ML SUSP neb solution USE 1 VIAL IN NEBULIZER TWICE DAILY       cariprazine (VRAYLAR) 1.5 MG CAPS capsule Take 2 mg by mouth daily        cetirizine (ZYRTEC) 10 MG tablet Take 10 mg by mouth daily        docusate sodium (STOOL SOFTENER) 100 MG capsule Take 100 mg by mouth At Bedtime        escitalopram (LEXAPRO) 20 MG tablet Take 1 tablet by mouth at bedtime       fluticasone (FLONASE) 50 MCG/ACT spray Spray 1 spray into both nostrils daily as needed        " furosemide (LASIX) 80 MG tablet Take 80 mg by mouth 2 times daily        gabapentin (NEURONTIN) 800 MG tablet Take 400 mg by mouth 3 times daily        ibuprofen (ADVIL/MOTRIN) 800 MG tablet Take 800 mg by mouth every 8 hours as needed        ipratropium - albuterol 0.5 mg/2.5 mg/3 mL (DUONEB) 0.5-2.5 (3) MG/3ML neb solution Take 1 vial by nebulization 4 times daily       lamoTRIgine (LAMICTAL) 200 MG tablet Take 200 mg by mouth At Bedtime        LANsoprazole (PREVACID) 30 MG CR capsule Take 30 mg by mouth 2 times daily        LIOTHYRONINE SODIUM PO Take 25 mcg by mouth daily       lithium 300 MG capsule Take 1 capsule by mouth in the morning and 2 capsules at bedtime       losartan (COZAAR) 25 MG tablet Take 12.5 mg by mouth daily        meclizine (ANTIVERT) 25 MG tablet Take 25 mg by mouth every 8 hours as needed        METFORMIN HCL PO Take 500 mg by mouth 2 times daily (with meals)       montelukast (SINGULAIR) 10 MG tablet Take 10 mg by mouth At Bedtime       OYSTER SHELL CALCIUM PO Take 500 mg by mouth 4 times daily       paliperidone (INVEGA) 6 MG 24 hr tablet Take 6 mg by mouth every morning       POTASSIUM CHLORIDE ER PO 30meq by mouth two times a day       pregabalin (LYRICA) 150 MG capsule Take 150 mg by mouth 2 times daily        PROPRANOLOL HCL PO Take 10 mg by mouth 2 times daily       TRAMADOL HCL PO Take 50 mg by mouth every 6 hours as needed for moderate to severe pain       travoprost, BAK Free, (TRAVATAN Z) 0.004 % ophthalmic solution Place 1 drop into both eyes At Bedtime       vitamin D (ERGOCALCIFEROL) 57864 UNIT capsule Take 50,000 Units by mouth once a week       ziprasidone (GEODON) 40 MG capsule Take 1 capsule by mouth in the morning       ziprasidone (GEODON) 80 MG capsule Take 160 mg by mouth At Bedtime        Medications reviewed:  Medications reconciled to facility chart and changes were made to reflect current medications as identified as above med list. Below are the changes that were  made:   Medications stopped since last EPIC medication reconciliation:   There are no discontinued medications.    Medications started since last [facility] medication reconciliation:  No orders of the defined types were placed in this encounter.        REVIEW OF SYSTEMS:  4 point ROS including Respiratory, CV, GI and , other than that noted in the HPI,  is negative    Physical Exam:  BP (!) 142/100  Pulse 86  Temp 97.8  F (36.6  C)  Resp 18  Wt 258 lb (117 kg)  SpO2 97%  BMI 44.29 kg/m2  GENERAL APPEARANCE:  Alert, in no distress  RESP:  respiratory effort and palpation of chest normal, auscultation of lungs clear , no respiratory distress  MSK: right arm in an immobilizer. Good CMS to right hand    Recent Labs: All labs reviewed, none recent.        Assessment/Plan:  1. Closed fracture of shaft of right humerus with routine healing, unspecified fracture morphology, subsequent encounter    2. Chronic pain syndrome    3. Schizoaffective disorder, unspecified type (H)      Patient has a hx of narcotic use for unspecified pain. Is c/o of arm pain d/t recent humerus fx.   Is on lyrica and scheduled tylenol.   Has prn tramadol and naproxen available.   Will have staff keep a pain log and review next week.     Orders:  1. Keep a pain log and note pain Q shift. Note if PRN administered and if effective.  2. Ice to right upper arm for 20 min Q shift Dx pain      Electronically signed by  DELL Queen CNP

## 2018-07-20 NOTE — LETTER
"    7/20/2018        RE: Betsy Resendiz  [address]  [place] [address]        [place] [facility]    Chief Complaint   Patient presents with     penitentiary Acute       [place] Medical Record Number:  [id number]    HPI:    Betsy Resendiz is a 53 year old  (1965), who is being seen today for an episodic care visit at [facility].  HPI information obtained from: facility chart records, facility staff, patient report and [facility] chart review     Patient asking to see me today for \"pain\".   She tells me the pain in her arm is 9/10. It is intermittent. It does not radiate.   She is not sure if she is receiving tramadol or naproxen.   Staff and therapy tell me she falls asleep during conversations and therapy. Her non verbal pain signs are absent.   Has some signs of anxiety related to wanting to smoke. She is out of cigarettes.       ALLERGIES: Codeine; Hmg-coa-r inhibitors; Lisinopril; No clinical screening - see comments; Sulfa drugs; and Sumatriptan  Past Medical, Surgical, Family and Social History reviewed and updated in [facility].    Current Outpatient Prescriptions   Medication Sig Dispense Refill     ACETAMINOPHEN PO Take 1,000 mg by mouth 3 times daily       albuterol (PROAIR HFA/PROVENTIL HFA/VENTOLIN HFA) 108 (90 BASE) MCG/ACT Inhaler Inhale 2 puffs into the lungs every 4 hours as needed        benztropine (COGENTIN) 0.5 MG tablet Take 0.5 mg by mouth 2 times daily        brimonidine (ALPHAGAN-P) 0.15 % ophthalmic solution Place 1 drop into both eyes 2 times daily       budesonide (PULMICORT) 1 MG/2ML SUSP neb solution USE 1 VIAL IN NEBULIZER TWICE DAILY       cariprazine (VRAYLAR) 1.5 MG CAPS capsule Take 2 mg by mouth daily        cetirizine (ZYRTEC) 10 MG tablet Take 10 mg by mouth daily        docusate sodium (STOOL SOFTENER) 100 MG capsule Take 100 mg by mouth At Bedtime        escitalopram (LEXAPRO) 20 MG tablet Take 1 tablet by mouth at bedtime       " fluticasone (FLONASE) 50 MCG/ACT spray Spray 1 spray into both nostrils daily as needed        furosemide (LASIX) 80 MG tablet Take 80 mg by mouth 2 times daily        gabapentin (NEURONTIN) 800 MG tablet Take 400 mg by mouth 3 times daily        ibuprofen (ADVIL/MOTRIN) 800 MG tablet Take 800 mg by mouth every 8 hours as needed        ipratropium - albuterol 0.5 mg/2.5 mg/3 mL (DUONEB) 0.5-2.5 (3) MG/3ML neb solution Take 1 vial by nebulization 4 times daily       lamoTRIgine (LAMICTAL) 200 MG tablet Take 200 mg by mouth At Bedtime        LANsoprazole (PREVACID) 30 MG CR capsule Take 30 mg by mouth 2 times daily        LIOTHYRONINE SODIUM PO Take 25 mcg by mouth daily       lithium 300 MG capsule Take 1 capsule by mouth in the morning and 2 capsules at bedtime       losartan (COZAAR) 25 MG tablet Take 12.5 mg by mouth daily        meclizine (ANTIVERT) 25 MG tablet Take 25 mg by mouth every 8 hours as needed        METFORMIN HCL PO Take 500 mg by mouth 2 times daily (with meals)       montelukast (SINGULAIR) 10 MG tablet Take 10 mg by mouth At Bedtime       OYSTER SHELL CALCIUM PO Take 500 mg by mouth 4 times daily       paliperidone (INVEGA) 6 MG 24 hr tablet Take 6 mg by mouth every morning       POTASSIUM CHLORIDE ER PO 30meq by mouth two times a day       pregabalin (LYRICA) 150 MG capsule Take 150 mg by mouth 2 times daily        PROPRANOLOL HCL PO Take 10 mg by mouth 2 times daily       TRAMADOL HCL PO Take 50 mg by mouth every 6 hours as needed for moderate to severe pain       travoprost, BAK Free, (TRAVATAN Z) 0.004 % ophthalmic solution Place 1 drop into both eyes At Bedtime       vitamin D (ERGOCALCIFEROL) 69362 UNIT capsule Take 50,000 Units by mouth once a week       ziprasidone (GEODON) 40 MG capsule Take 1 capsule by mouth in the morning       ziprasidone (GEODON) 80 MG capsule Take 160 mg by mouth At Bedtime        Medications reviewed:  Medications reconciled to facility chart and changes were made  to reflect current medications as identified as above med list. Below are the changes that were made:   Medications stopped since last EPIC medication reconciliation:   There are no discontinued medications.    Medications started since last [facility] medication reconciliation:  No orders of the defined types were placed in this encounter.        REVIEW OF SYSTEMS:  4 point ROS including Respiratory, CV, GI and , other than that noted in the HPI,  is negative    Physical Exam:  BP (!) 142/100  Pulse 86  Temp 97.8  F (36.6  C)  Resp 18  Wt 258 lb (117 kg)  SpO2 97%  BMI 44.29 kg/m2  GENERAL APPEARANCE:  Alert, in no distress  RESP:  respiratory effort and palpation of chest normal, auscultation of lungs clear , no respiratory distress  MSK: right arm in an immobilizer. Good CMS to right hand    Recent Labs: All labs reviewed, none recent.        Assessment/Plan:  1. Closed fracture of shaft of right humerus with routine healing, unspecified fracture morphology, subsequent encounter    2. Chronic pain syndrome    3. Schizoaffective disorder, unspecified type (H)      Patient has a hx of narcotic use for unspecified pain. Is c/o of arm pain d/t recent humerus fx.   Is on lyrica and scheduled tylenol.   Has prn tramadol and naproxen available.   Will have staff keep a pain log and review next week.     Orders:  1. Keep a pain log and note pain Q shift. Note if PRN administered and if effective.  2. Ice to right upper arm for 20 min Q shift Dx pain      Electronically signed by  DELL Queen CNP                      Sincerely,        DELL Queen CNP

## 2018-07-24 ENCOUNTER — TELEPHONE (OUTPATIENT)
Dept: GERIATRICS | Facility: CLINIC | Age: 53
End: 2018-07-24

## 2018-07-25 NOTE — TELEPHONE ENCOUNTER
Called re: urinary retention. Patient reported to nursing this evening that she has been unable to void since 1300 and her abdomen feels full and is starting to be uncomfortable. Per nurse, no issues prior to today. Nursing didn't know what a PVR is and if they have a bladder scanner doesn't think it works. She completed a course of abx for UTI on 7/16.     Imp:  Urinary retention    Plan  -- since facility unable to bladder scan for PVR, order given to straight cath  -- if >300 ml out, straight cath again next shift  -- they are unable to send out a UA/UC tonight so that can be done tomorrow at discretion of primary team    Suni Milan MD

## 2018-07-30 VITALS
SYSTOLIC BLOOD PRESSURE: 132 MMHG | BODY MASS INDEX: 43.87 KG/M2 | RESPIRATION RATE: 18 BRPM | HEART RATE: 73 BPM | DIASTOLIC BLOOD PRESSURE: 50 MMHG | TEMPERATURE: 98 F | WEIGHT: 255.6 LBS | OXYGEN SATURATION: 98 %

## 2018-07-30 NOTE — PROGRESS NOTES
"[place] [facility]    Chief Complaint   Patient presents with     senior living Acute       [facility] Medical Record Number:  [id number]    HPI:    Betsy Resendiz is a 53 year old  (1965), who is being seen today for an episodic care visit at [facility] [place].  HPI information obtained from: facility chart records, facility staff, patient report, [facility] chart review and Care Everywhere [facility] chart review     .Today's concern is:  Vitamin D deficiency  -on replacement.     Hypertension, unspecified type  - SBP's ~ 120-130. Has been taking scheduled propranolol. Per psych notes it appears this was ordered PRN.     Chronic pain syndrome  - On chronic narcotics prior to fracture. Pain is \"all over\" and achy.   - Previous discussion about chronic narcotics are not recommended long term.   - Currently using prn tramadol about 2-3 times per day. Also on scheduled tylenol and prn NSAIDS.   - Has not been following with pain team    Schizoaffective disorder, unspecified type (H)  - On multiple psych meds. Is anxious at times.   - Often dozing most of the day    Thrush  - Patient reports mouth and throat pain.   - Is on inhaled steroid. She is not rinsing after use.   - Has a hx of thrush    Urinary retention  - having diffculty emptying bladder. Was straight cathed for >1200 ml  - Recent UC negative        ALLERGIES: Codeine; Hmg-coa-r inhibitors; Lisinopril; No clinical screening - see comments; Sulfa drugs; and Sumatriptan  Past Medical, Surgical, Family and Social History reviewed and updated in [facility].    Current Outpatient Prescriptions   Medication Sig Dispense Refill     ACETAMINOPHEN PO Take 1,000 mg by mouth 3 times daily       albuterol (PROAIR HFA/PROVENTIL HFA/VENTOLIN HFA) 108 (90 BASE) MCG/ACT Inhaler Inhale 2 puffs into the lungs every 4 hours as needed        benztropine (COGENTIN) 0.5 MG tablet Take 0.5 mg by mouth 2 times daily        brimonidine (ALPHAGAN-P) 0.15 % ophthalmic " solution Place 1 drop into both eyes 2 times daily       budesonide (PULMICORT) 1 MG/2ML SUSP neb solution USE 1 VIAL IN NEBULIZER TWICE DAILY       cariprazine (VRAYLAR) 1.5 MG CAPS capsule Take 2 mg by mouth daily        cetirizine (ZYRTEC) 10 MG tablet Take 10 mg by mouth daily        docusate sodium (STOOL SOFTENER) 100 MG capsule Take 100 mg by mouth At Bedtime        escitalopram (LEXAPRO) 20 MG tablet Take 1 tablet by mouth at bedtime       fluticasone (FLONASE) 50 MCG/ACT spray Spray 1 spray into both nostrils daily as needed        furosemide (LASIX) 80 MG tablet Take 80 mg by mouth 2 times daily        gabapentin (NEURONTIN) 800 MG tablet Take 400 mg by mouth 3 times daily        ibuprofen (ADVIL/MOTRIN) 800 MG tablet Take 800 mg by mouth every 8 hours as needed        ipratropium - albuterol 0.5 mg/2.5 mg/3 mL (DUONEB) 0.5-2.5 (3) MG/3ML neb solution Take 1 vial by nebulization 4 times daily       lamoTRIgine (LAMICTAL) 200 MG tablet Take 200 mg by mouth At Bedtime        LANsoprazole (PREVACID) 30 MG CR capsule Take 30 mg by mouth 2 times daily        LIOTHYRONINE SODIUM PO Take 25 mcg by mouth daily       lithium 300 MG capsule Take 1 capsule by mouth in the morning and 2 capsules at bedtime       losartan (COZAAR) 25 MG tablet Take 12.5 mg by mouth daily        meclizine (ANTIVERT) 25 MG tablet Take 25 mg by mouth every 8 hours as needed        METFORMIN HCL PO Take 500 mg by mouth 2 times daily (with meals)       montelukast (SINGULAIR) 10 MG tablet Take 10 mg by mouth At Bedtime       OYSTER SHELL CALCIUM PO Take 500 mg by mouth 4 times daily       paliperidone (INVEGA) 6 MG 24 hr tablet Take 6 mg by mouth every morning       POTASSIUM CHLORIDE ER PO 30meq by mouth two times a day       pregabalin (LYRICA) 150 MG capsule Take 150 mg by mouth 2 times daily        PROPRANOLOL HCL PO Take 10 mg by mouth 2 times daily       TRAMADOL HCL PO Take 50 mg by mouth every 6 hours as needed for moderate to  severe pain       travoprost, NICHO Free, (TRAVATAN Z) 0.004 % ophthalmic solution Place 1 drop into both eyes At Bedtime       vitamin D (ERGOCALCIFEROL) 11375 UNIT capsule Take 50,000 Units by mouth once a week       ziprasidone (GEODON) 40 MG capsule Take 1 capsule by mouth in the morning       ziprasidone (GEODON) 80 MG capsule Take 160 mg by mouth At Bedtime        Medications reviewed:  Medications reconciled to facility chart and changes were made to reflect current medications as identified as above med list. Below are the changes that were made:   Medications stopped since last EPIC medication reconciliation:   There are no discontinued medications.    Medications started since last [facility] medication reconciliation:  No orders of the defined types were placed in this encounter.      REVIEW OF SYSTEMS:  4 point ROS including Respiratory, CV, GI and , other than that noted in the HPI,  is negative    Physical Exam:  /50  Pulse 73  Temp 98  F (36.7  C)  Resp 18  Wt 255 lb 9.6 oz (115.9 kg)  SpO2 98%  BMI 43.87 kg/m2  GENERAL APPEARANCE:  Alert, in no distress  RESP:  respiratory effort and palpation of chest normal, auscultation of lungs clear , no respiratory distress  CV:  Palpation and auscultation of heart done , rate and rhythm regular, no murmur, +1 peripheral edema  ABDOMEN:  normal bowel sounds, soft, nontender, no hepatosplenomegaly or other masses  M/S:   Gait and station not assesed  SKIN:  Inspection and Palpation of skin and subcutaneous tissue at baseline  NEURO: 2-12 in normal limits and at patient's baseline  PSYCH:  insight and judgement, memory poor , affect and mood normal    Recent Labs:     Hospital Laboratory on 07/11/2018   Component Date Value Ref Range Status     Color Urine 07/11/2018 Yellow   Final     Appearance Urine 07/11/2018 Clear   Final     Glucose Urine 07/11/2018 Negative  NEG^Negative mg/dL Final     Bilirubin Urine 07/11/2018 Negative  NEG^Negative Final      Ketones Urine 07/11/2018 Negative  NEG^Negative mg/dL Final     Specific Gravity Urine 07/11/2018 1.019  1.003 - 1.035 Final     Blood Urine 07/11/2018 Negative  NEG^Negative Final     pH Urine 07/11/2018 5.0  5.0 - 7.0 pH Final     Protein Albumin Urine 07/11/2018 Negative  NEG^Negative mg/dL Final     Urobilinogen mg/dL 07/11/2018 0.0  0.0 - 2.0 mg/dL Final     Nitrite Urine 07/11/2018 Negative  NEG^Negative Final     Leukocyte Esterase Urine 07/11/2018 Negative  NEG^Negative Final     Source 07/11/2018 Catheter   Final     WBC Urine 07/11/2018 1  0 - 5 /HPF Final     RBC Urine 07/11/2018 1  0 - 2 /HPF Final     Squamous Epithelial /HPF Urine 07/11/2018 2* 0 - 1 /HPF Final     Hyaline Casts 07/11/2018 1  0 - 2 /LPF Final       Assessment/Plan:  Vitamin D deficiency  - Will get labs. Continue replacement with chronic pain    Hypertension, unspecified type  - on arb. Will change propranalol to prn as this was ordered prn for anxiety    Chronic pain syndrome  - previous scheduled narcotics tapered off. Have discussed this with patient. Will continue prn tramadol for now. She is tolerating this, concern for serotonin syndrome with multiple serotoninergic medications.   - will attempt to taper this at f/u visit    Schizoaffective disorder, unspecified type (H)  - on multiple psych meds. Often lethargic. Still having mood swings  - Will have pharmacy review.   - Patient to schedule f/u with psych    Thrush  - Will need to rinse after nebs  - Will start nystatin swish and swallow      Angular cheilitis  - nystatin/tcm cream to areas around mouth until cleared    Urinary Retention-  - Patient to see urology in August. Per patient has a history of urinary retention.   - Will have pharmacy review to see if any mediations contributing to urinary retention      Orders:  1.  Labs:  CBC, CMP, TSH (HTN), A1C (DM), Vit D (Vit D def)  2.  Change propranolol to 10mg BID PRN  3.  Rinse mouth after nebs  4.  Nystatin oral 6ml swish  and hold and spit QID x10 days (thrush)  5.  Nystatin/triamcinoline 0.1% cream to lip folds TID until clears (angular chelitis)  6.  Pt to schedule with psychiatry (schizoaffective disorder, on multiple psych meds)    Total time spent with patient visit was 40 including patient visit and review of past records. Greater than 50% of total time spent with counseling and coordinating care due to multiple chronic and acute conditions.       Electronically signed by  DELL Queen CNP

## 2018-07-31 ENCOUNTER — NURSING HOME VISIT (OUTPATIENT)
Dept: GERIATRICS | Facility: CLINIC | Age: 53
End: 2018-07-31
Payer: COMMERCIAL

## 2018-07-31 DIAGNOSIS — K13.0 ANGULAR CHEILITIS: ICD-10-CM

## 2018-07-31 DIAGNOSIS — R33.9 URINARY RETENTION: ICD-10-CM

## 2018-07-31 DIAGNOSIS — B37.0 THRUSH: ICD-10-CM

## 2018-07-31 DIAGNOSIS — G89.4 CHRONIC PAIN SYNDROME: ICD-10-CM

## 2018-07-31 DIAGNOSIS — I10 HYPERTENSION, UNSPECIFIED TYPE: ICD-10-CM

## 2018-07-31 DIAGNOSIS — E55.9 VITAMIN D DEFICIENCY: Primary | ICD-10-CM

## 2018-07-31 DIAGNOSIS — F25.9 SCHIZOAFFECTIVE DISORDER, UNSPECIFIED TYPE (H): ICD-10-CM

## 2018-07-31 PROBLEM — E86.0 DEHYDRATION, MILD: Status: RESOLVED | Noted: 2018-06-26 | Resolved: 2018-07-31

## 2018-07-31 PROCEDURE — 99310 SBSQ NF CARE HIGH MDM 45: CPT | Performed by: NURSE PRACTITIONER

## 2018-07-31 RX ORDER — NYSTATIN 100000/ML
SUSPENSION, ORAL (FINAL DOSE FORM) ORAL
COMMUNITY
Start: 2018-07-31 | End: 2019-02-15

## 2018-07-31 RX ORDER — NYSTATIN AND TRIAMCINOLONE ACETONIDE 100000; 1 [USP'U]/G; MG/G
CREAM TOPICAL
COMMUNITY
End: 2018-09-17

## 2018-07-31 NOTE — LETTER
"    7/31/2018        RE: Betsy Resendiz  [address]  [place] [address]        [place] [facility]    Chief Complaint   Patient presents with     custodial Acute       [facility] Medical Record Number:  [id number]    HPI:    Betsy Resendiz is a 53 year old  (1965), who is being seen today for an episodic care visit at [facility] [place].  HPI information obtained from: facility chart records, facility staff, patient report, [facility] chart review and Care Everywhere [facility] chart review     .Today's concern is:  Vitamin D deficiency  -on replacement.     Hypertension, unspecified type  - SBP's ~ 120-130. Has been taking scheduled propranolol. Per psych notes it appears this was ordered PRN.     Chronic pain syndrome  - On chronic narcotics prior to fracture. Pain is \"all over\" and achy.   - Previous discussion about chronic narcotics are not recommended long term.   - Currently using prn tramadol about 2-3 times per day. Also on scheduled tylenol and prn NSAIDS.   - Has not been following with pain team    Schizoaffective disorder, unspecified type (H)  - On multiple psych meds. Is anxious at times.   - Often dozing most of the day    Thrush  - Patient reports mouth and throat pain.   - Is on inhaled steroid. She is not rinsing after use.   - Has a hx of thrush    Urinary retention  - having diffculty emptying bladder. Was straight cathed for >1200 ml  - Recent UC negative        ALLERGIES: Codeine; Hmg-coa-r inhibitors; Lisinopril; No clinical screening - see comments; Sulfa drugs; and Sumatriptan  Past Medical, Surgical, Family and Social [facility]    Current Outpatient Prescriptions   Medication Sig Dispense Refill     ACETAMINOPHEN PO Take 1,000 mg by mouth 3 times daily       albuterol (PROAIR HFA/PROVENTIL HFA/VENTOLIN HFA) 108 (90 BASE) MCG/ACT Inhaler Inhale 2 puffs into the lungs every 4 hours as needed        benztropine (COGENTIN) 0.5 MG " tablet Take 0.5 mg by mouth 2 times daily        brimonidine (ALPHAGAN-P) 0.15 % ophthalmic solution Place 1 drop into both eyes 2 times daily       budesonide (PULMICORT) 1 MG/2ML SUSP neb solution USE 1 VIAL IN NEBULIZER TWICE DAILY       cariprazine (VRAYLAR) 1.5 MG CAPS capsule Take 2 mg by mouth daily        cetirizine (ZYRTEC) 10 MG tablet Take 10 mg by mouth daily        docusate sodium (STOOL SOFTENER) 100 MG capsule Take 100 mg by mouth At Bedtime        escitalopram (LEXAPRO) 20 MG tablet Take 1 tablet by mouth at bedtime       fluticasone (FLONASE) 50 MCG/ACT spray Spray 1 spray into both nostrils daily as needed        furosemide (LASIX) 80 MG tablet Take 80 mg by mouth 2 times daily        gabapentin (NEURONTIN) 800 MG tablet Take 400 mg by mouth 3 times daily        ibuprofen (ADVIL/MOTRIN) 800 MG tablet Take 800 mg by mouth every 8 hours as needed        ipratropium - albuterol 0.5 mg/2.5 mg/3 mL (DUONEB) 0.5-2.5 (3) MG/3ML neb solution Take 1 vial by nebulization 4 times daily       lamoTRIgine (LAMICTAL) 200 MG tablet Take 200 mg by mouth At Bedtime        LANsoprazole (PREVACID) 30 MG CR capsule Take 30 mg by mouth 2 times daily        LIOTHYRONINE SODIUM PO Take 25 mcg by mouth daily       lithium 300 MG capsule Take 1 capsule by mouth in the morning and 2 capsules at bedtime       losartan (COZAAR) 25 MG tablet Take 12.5 mg by mouth daily        meclizine (ANTIVERT) 25 MG tablet Take 25 mg by mouth every 8 hours as needed        METFORMIN HCL PO Take 500 mg by mouth 2 times daily (with meals)       montelukast (SINGULAIR) 10 MG tablet Take 10 mg by mouth At Bedtime       OYSTER SHELL CALCIUM PO Take 500 mg by mouth 4 times daily       paliperidone (INVEGA) 6 MG 24 hr tablet Take 6 mg by mouth every morning       POTASSIUM CHLORIDE ER PO 30meq by mouth two times a day       pregabalin (LYRICA) 150 MG capsule Take 150 mg by mouth 2 times daily        PROPRANOLOL HCL PO Take 10 mg by mouth 2 times  daily       TRAMADOL HCL PO Take 50 mg by mouth every 6 hours as needed for moderate to severe pain       travoprost, BAK Free, (TRAVATAN Z) 0.004 % ophthalmic solution Place 1 drop into both eyes At Bedtime       vitamin D (ERGOCALCIFEROL) 80181 UNIT capsule Take 50,000 Units by mouth once a week       ziprasidone (GEODON) 40 MG capsule Take 1 capsule by mouth in the morning       ziprasidone (GEODON) 80 MG capsule Take 160 mg by mouth At Bedtime        Medications reviewed:  Medications reconciled to facility chart and changes were made to reflect current medications as identified as above med list. Below are the changes that were made:   Medications stopped since last EPIC medication reconciliation:   There are no discontinued medications.    Medications started since last [facility] medication reconciliation:  No orders of the defined types were placed in this encounter.      REVIEW OF SYSTEMS:  4 point ROS including Respiratory, CV, GI and , other than that noted in the HPI,  is negative    Physical Exam:  /50  Pulse 73  Temp 98  F (36.7  C)  Resp 18  Wt 255 lb 9.6 oz (115.9 kg)  SpO2 98%  BMI 43.87 kg/m2  GENERAL APPEARANCE:  Alert, in no distress  RESP:  respiratory effort and palpation of chest normal, auscultation of lungs clear , no respiratory distress  CV:  Palpation and auscultation of heart done , rate and rhythm regular, no murmur, +1 peripheral edema  ABDOMEN:  normal bowel sounds, soft, nontender, no hepatosplenomegaly or other masses  M/S:   Gait and station not assesed  SKIN:  Inspection and Palpation of skin and subcutaneous tissue at baseline  NEURO: 2-12 in normal limits and at patient's baseline  PSYCH:  insight and judgement, memory poor , affect and mood normal    Recent Labs:     Hospital Laboratory on 07/11/2018   Component Date Value Ref Range Status     Color Urine 07/11/2018 Yellow   Final     Appearance Urine 07/11/2018 Clear   Final     Glucose Urine 07/11/2018 Negative   NEG^Negative mg/dL Final     Bilirubin Urine 07/11/2018 Negative  NEG^Negative Final     Ketones Urine 07/11/2018 Negative  NEG^Negative mg/dL Final     Specific Gravity Urine 07/11/2018 1.019  1.003 - 1.035 Final     Blood Urine 07/11/2018 Negative  NEG^Negative Final     pH Urine 07/11/2018 5.0  5.0 - 7.0 pH Final     Protein Albumin Urine 07/11/2018 Negative  NEG^Negative mg/dL Final     Urobilinogen mg/dL 07/11/2018 0.0  0.0 - 2.0 mg/dL Final     Nitrite Urine 07/11/2018 Negative  NEG^Negative Final     Leukocyte Esterase Urine 07/11/2018 Negative  NEG^Negative Final     Source 07/11/2018 Catheter   Final     WBC Urine 07/11/2018 1  0 - 5 /HPF Final     RBC Urine 07/11/2018 1  0 - 2 /HPF Final     Squamous Epithelial /HPF Urine 07/11/2018 2* 0 - 1 /HPF Final     Hyaline Casts 07/11/2018 1  0 - 2 /LPF Final       Assessment/Plan:  Vitamin D deficiency  - Will get labs. Continue replacement with chronic pain    Hypertension, unspecified type  - on arb. Will change propranalol to prn as this was ordered prn for anxiety    Chronic pain syndrome  - previous scheduled narcotics tapered off. Have discussed this with patient. Will continue prn tramadol for now. She is tolerating this, concern for serotonin syndrome with multiple serotoninergic medications.   - will attempt to taper this at f/u visit    Schizoaffective disorder, unspecified type (H)  - on multiple psych meds. Often lethargic. Still having mood swings  - Will have pharmacy review.   - Patient to schedule f/u with psych    Thrush  - Will need to rinse after nebs  - Will start nystatin swish and swallow      Angular cheilitis  - nystatin/tcm cream to areas around mouth until cleared    Urinary Retention-  - Patient to see urology in August. Per patient has a history of urinary retention.   - Will have pharmacy review to see if any mediations contributing to urinary retention      Orders:  1.  Labs:  CBC, CMP, TSH (HTN), A1C (DM), Vit D (Vit D def)  2.   Change propranolol to 10mg BID PRN  3.  Rinse mouth after nebs  4.  Nystatin oral 6ml swish and hold and spit QID x10 days (thrush)  5.  Nystatin/triamcinoline 0.1% cream to lip folds TID until clears (angular chelitis)  6.  Pt to schedule with psychiatry (schizoaffective disorder, on multiple psych meds)    Total time spent with patient visit was 40 including patient visit and review of past records. Greater than 50% of total time spent with counseling and coordinating care due to multiple chronic and acute conditions.       Electronically signed by  DELL Queen CNP                      Sincerely,        DELL Queen CNP

## 2018-07-31 NOTE — Clinical Note
Can you review patient's med list. She is on multiple psych meds and now on tramadol. Having urinary retention.

## 2018-08-02 VITALS
DIASTOLIC BLOOD PRESSURE: 70 MMHG | RESPIRATION RATE: 18 BRPM | OXYGEN SATURATION: 97 % | BODY MASS INDEX: 43.77 KG/M2 | TEMPERATURE: 97.5 F | SYSTOLIC BLOOD PRESSURE: 130 MMHG | WEIGHT: 255 LBS | HEART RATE: 86 BPM

## 2018-08-03 ENCOUNTER — HOSPITAL LABORATORY (OUTPATIENT)
Facility: OTHER | Age: 53
End: 2018-08-03

## 2018-08-03 ENCOUNTER — NURSING HOME VISIT (OUTPATIENT)
Dept: GERIATRICS | Facility: CLINIC | Age: 53
End: 2018-08-03
Payer: COMMERCIAL

## 2018-08-03 DIAGNOSIS — G89.4 CHRONIC PAIN SYNDROME: ICD-10-CM

## 2018-08-03 DIAGNOSIS — S42.301D CLOSED FRACTURE OF SHAFT OF RIGHT HUMERUS WITH ROUTINE HEALING, UNSPECIFIED FRACTURE MORPHOLOGY, SUBSEQUENT ENCOUNTER: ICD-10-CM

## 2018-08-03 DIAGNOSIS — K21.9 GASTROESOPHAGEAL REFLUX DISEASE WITHOUT ESOPHAGITIS: ICD-10-CM

## 2018-08-03 DIAGNOSIS — F25.0 SCHIZOAFFECTIVE DISORDER, BIPOLAR TYPE (H): ICD-10-CM

## 2018-08-03 DIAGNOSIS — E11.9 TYPE 2 DIABETES MELLITUS WITHOUT COMPLICATION, WITHOUT LONG-TERM CURRENT USE OF INSULIN (H): ICD-10-CM

## 2018-08-03 DIAGNOSIS — E66.01 MORBID OBESITY WITH BMI OF 40.0-44.9, ADULT (H): Primary | ICD-10-CM

## 2018-08-03 DIAGNOSIS — F17.200 TOBACCO USE DISORDER: ICD-10-CM

## 2018-08-03 LAB
ALBUMIN SERPL-MCNC: 3.1 G/DL (ref 3.4–5)
ALP SERPL-CCNC: 102 U/L (ref 40–150)
ALT SERPL W P-5'-P-CCNC: 22 U/L (ref 0–50)
ANION GAP SERPL CALCULATED.3IONS-SCNC: 6 MMOL/L (ref 3–14)
AST SERPL W P-5'-P-CCNC: 10 U/L (ref 0–45)
BILIRUB DIRECT SERPL-MCNC: <0.1 MG/DL (ref 0–0.2)
BILIRUB SERPL-MCNC: 0.3 MG/DL (ref 0.2–1.3)
BUN SERPL-MCNC: 24 MG/DL (ref 7–30)
CALCIUM SERPL-MCNC: 9.1 MG/DL (ref 8.5–10.1)
CHLORIDE SERPL-SCNC: 100 MMOL/L (ref 94–109)
CO2 SERPL-SCNC: 33 MMOL/L (ref 20–32)
CREAT SERPL-MCNC: 1.24 MG/DL (ref 0.52–1.04)
ERYTHROCYTE [DISTWIDTH] IN BLOOD BY AUTOMATED COUNT: 14 % (ref 10–15)
GFR SERPL CREATININE-BSD FRML MDRD: 46 ML/MIN/1.7M2
GLUCOSE SERPL-MCNC: 81 MG/DL (ref 70–99)
HBA1C MFR BLD: 5.4 % (ref 0–5.6)
HCT VFR BLD AUTO: 36.6 % (ref 35–47)
HGB BLD-MCNC: 11.3 G/DL (ref 11.7–15.7)
MCH RBC QN AUTO: 30.9 PG (ref 26.5–33)
MCHC RBC AUTO-ENTMCNC: 30.9 G/DL (ref 31.5–36.5)
MCV RBC AUTO: 100 FL (ref 78–100)
PLATELET # BLD AUTO: 232 10E9/L (ref 150–450)
POTASSIUM SERPL-SCNC: 3.6 MMOL/L (ref 3.4–5.3)
PROT SERPL-MCNC: 7.1 G/DL (ref 6.8–8.8)
RBC # BLD AUTO: 3.66 10E12/L (ref 3.8–5.2)
SODIUM SERPL-SCNC: 139 MMOL/L (ref 133–144)
TSH SERPL DL<=0.005 MIU/L-ACNC: 1.4 MU/L (ref 0.4–4)
WBC # BLD AUTO: 15.6 10E9/L (ref 4–11)

## 2018-08-03 PROCEDURE — 99310 SBSQ NF CARE HIGH MDM 45: CPT | Performed by: NURSE PRACTITIONER

## 2018-08-03 NOTE — PROGRESS NOTES
"  [facility]    Chief Complaint   Patient presents with     correction Regulatory       [facility] Record Number:  [id number]    HPI:    Betsy Resendiz is a 53 year old  (1965), who is being seen today for a federally mandated E/M visit at [facility].  HPI information obtained from: facility chart records, facility staff, patient report and [facility] chart review.     Today's concerns are:  Morbid obesity with BMI of 40.0-44.9, adult (H)  - Likely related to multiple antipsychotic meds.   - Patient does not feel like she is eating large portions  - Rare exercise    Gastroesophageal reflux disease without esophagitis  - on PPI BID. No current sx's per patient has had \"bad reflux\" in the past    Schizoaffective disorder, bipolar type (H)  - Discussed multiple psych meds with patient.   - Discussed meeting with psych [facility] pharmacist. She is hesitant to change any medications. Reports in the past became suicidal when meds were adjusted.       Tobacco use disorder  - Is not interested in cessation    Closed fracture of shaft of right humerus with routine healing, unspecified fracture morphology, subsequent encounter  - Occurred 7/6/18  - Wearing a splint.       Chronic pain syndrome  - Previously on scheduled narcotics.   - Came to TCU on ultram.   - Have had many discussions about long term narcotics not effective for chronic pain.   - Also having urinary retention, explained nacs likely contributing      ALLERGIES: Codeine; Hmg-coa-r inhibitors; Lisinopril; No clinical screening - see comments; Sulfa drugs; and Sumatriptan  PAST MEDICAL HISTORY:  has no past medical history on file.  PAST SURGICAL HISTORY:  has no past surgical history on file.  FAMILY HISTORY: family history is not on file.  SOCIAL HISTORY:      MEDICATIONS:  Current Outpatient Prescriptions   Medication Sig Dispense Refill     ACETAMINOPHEN PO Take 1,000 mg by mouth 3 times daily       albuterol (PROAIR " HFA/PROVENTIL HFA/VENTOLIN HFA) 108 (90 BASE) MCG/ACT Inhaler Inhale 2 puffs into the lungs every 4 hours as needed        benztropine (COGENTIN) 0.5 MG tablet Take 0.5 mg by mouth 2 times daily        brimonidine (ALPHAGAN-P) 0.15 % ophthalmic solution Place 1 drop into both eyes 2 times daily       budesonide (PULMICORT) 1 MG/2ML SUSP neb solution USE 1 VIAL IN NEBULIZER TWICE DAILY       cariprazine (VRAYLAR) 1.5 MG CAPS capsule Take 2 mg by mouth daily        docusate sodium (STOOL SOFTENER) 100 MG capsule Take 100 mg by mouth At Bedtime        escitalopram (LEXAPRO) 20 MG tablet Take 1 tablet by mouth at bedtime       fluticasone (FLONASE) 50 MCG/ACT spray Spray 1 spray into both nostrils daily as needed        furosemide (LASIX) 80 MG tablet Take 40 mg by mouth 2 times daily        gabapentin (NEURONTIN) 800 MG tablet Take 400 mg by mouth 3 times daily        ibuprofen (ADVIL/MOTRIN) 800 MG tablet Take 800 mg by mouth every 8 hours as needed        ipratropium - albuterol 0.5 mg/2.5 mg/3 mL (DUONEB) 0.5-2.5 (3) MG/3ML neb solution Take 1 vial by nebulization 4 times daily       lamoTRIgine (LAMICTAL) 200 MG tablet Take 200 mg by mouth At Bedtime        LANsoprazole (PREVACID) 30 MG CR capsule Take 30 mg by mouth daily        LIOTHYRONINE SODIUM PO Take 25 mcg by mouth daily       lithium 300 MG capsule Take 1 capsule by mouth in the morning and 2 capsules at bedtime       losartan (COZAAR) 25 MG tablet Take 12.5 mg by mouth daily        meclizine (ANTIVERT) 25 MG tablet Take 25 mg by mouth every 8 hours as needed        montelukast (SINGULAIR) 10 MG tablet Take 10 mg by mouth At Bedtime       nystatin (MYCOSTATIN) 166329 UNIT/ML suspension 6ml swish and hold and spit QID x10 days (thrush)       nystatin-triamcinolone (MYCOLOG II) cream To lip folds TID until clear       OYSTER SHELL CALCIUM PO Take 500 mg by mouth 4 times daily       paliperidone (INVEGA) 6 MG 24 hr tablet Take 6 mg by mouth every morning    "    POTASSIUM CHLORIDE ER PO 30meq by mouth two times a day       pregabalin (LYRICA) 150 MG capsule Take 150 mg by mouth 2 times daily        PROPRANOLOL HCL PO Take 10 mg by mouth 2 times daily as needed        TRAMADOL HCL PO Take 25 mg by mouth every 6 hours as needed for moderate to severe pain        travoprost, BAK Free, (TRAVATAN Z) 0.004 % ophthalmic solution Place 1 drop into both eyes At Bedtime       vitamin D (ERGOCALCIFEROL) 20924 UNIT capsule Take 50,000 Units by mouth once a week       ziprasidone (GEODON) 40 MG capsule Take 1 capsule by mouth in the morning       ziprasidone (GEODON) 80 MG capsule Take 160 mg by mouth At Bedtime        Medications reviewed:  Medications reconciled to facility chart and changes were made to reflect current medications as identified as above med list. Below are the changes that were made:   Medications stopped since last EPIC medication reconciliation:   There are no discontinued medications.    Medications started since last [facility] medication reconciliation:  No orders of the defined types were placed in this encounter.        Case Management:  I have reviewed the care plan and MDS and do agree with the plan. Patient's desire to return to the community is present, but is not able due to care needs .  Information reviewed:  Medications, vital signs, orders, and nursing notes.    ROS:  Limited secondary to cognitive impairment but today pt reports ongoing pain \"everywhere\".     Exam:  Vitals: /70  Pulse 86  Temp 97.5  F (36.4  C)  Resp 18  Wt 255 lb (115.7 kg)  SpO2 97%  BMI 43.77 kg/m2  BMI= Body mass index is 43.77 kg/(m^2).  GENERAL APPEARANCE:  Alert, in no distress  RESP:  respiratory effort and palpation of chest normal, auscultation of lungs diminished , no respiratory distress  CV:  Palpation and auscultation of heart done , rate and rhythm regular, no murmur, +1 peripheral edema  ABDOMEN:  normal bowel sounds, soft, nontender, no hepatosplenomegaly " "or other masses  M/S:   Gait and station not assessed, Digits and nails at baseline  SKIN:  Inspection and Palpation of skin and subcutaneous tissue normal  NEURO: 2-12 in normal limits and at patient's baseline  PSYCH:  insight and judgement, memory poor, affect and mood normal      CBC RESULTS:   Recent Labs   Lab Test  08/03/18   0945   WBC  15.6*   RBC  3.66*   HGB  11.3*   HCT  36.6   MCV  100   MCH  30.9   MCHC  30.9*   RDW  14.0   PLT  232       Last Basic Metabolic Panel:  Recent Labs   Lab Test  08/03/18   0945   NA  139   POTASSIUM  3.6   CHLORIDE  100   GAURAV  9.1   CO2  33*   BUN  24   CR  1.24*   GLC  81       Liver Function Studies -   Recent Labs   Lab Test  08/03/18 0945   PROTTOTAL  7.1   ALBUMIN  3.1*   BILITOTAL  0.3   ALKPHOS  102   AST  10   ALT  22       TSH   Date Value Ref Range Status   08/03/2018 1.40 0.40 - 4.00 mU/L Final       Lab Results   Component Value Date    A1C 5.4 08/03/2018     ASSESSMENT/PLAN  Morbid obesity with BMI of 40.0-44.9, adult (H)  - Patient not willing to decrease any antipsychotic medications.   - Is rarely walking. Staff to encourage walking.  - Consider dietician consult    Gastroesophageal reflux disease without esophagitis  - Sx's stable. Will reduce PPI.   - Plan to monitor closely.   - Sleep apnea may be contributing    Schizoaffective disorder, bipolar type (H)  - Patient to follow up with her psych NP.   - Per MTM review patient may need an EKG for possible QT changes.   - Patient not willing to adjust her medications at this time    Tobacco use disorder  - Is not interested in cessation.    Closed fracture of shaft of right humerus with routine healing, unspecified fracture morphology, subsequent encounter  - Following with ortho. Will need TCU placement until brace is removed.       Chronic pain syndrome  - Long term. Pain \"all over\".   - No changes in pain with weaning tramadol. Will continue to wean as able.     Urinary Retention:  - Will attempt to " remove mac over the weekend. If not able to void will order a bladder US  - Will taper narcotics. Per pharmacy cogentin may also be contributing  - Will stop antihistamine    DIABETES:  -  A1c is 5.4%. Will stop metformin d/t ckd and poly pharmacy.   - Will recheck A1c and follow up if blood sugars elevated      Orders:  1. DC Zyrtec  2. Decrease Prevacid to 30 mg every day  3. Decrease lasix to 40 mg BID  4. DC Metformin  5. Decrease tramadol to 25 mg Q6 PRN  6. Remove foly at midnight on 8/5/18    Total time spent with patient visit at the skilled nursing facility was 40 including patient visit, review of past records and discussion with staff. Greater than 50% of total time spent with counseling and coordinating care due to multiple chronic condions    Electronically signed by:  DELL Queen CNP

## 2018-08-03 NOTE — LETTER
"    8/3/2018        RE: Betsy Resendiz  [address]  [place] [address]          [facility]    Chief Complaint   Patient presents with     correction Regulatory       [facility] Medical Record Number:  [id number]    HPI:    Betsy Resendiz is a 53 year old  (1965), who is being seen today for a federally mandated E/M visit at [facility].  HPI information obtained from: facility chart records, facility staff, patient report and [facility] chart review.     Today's concerns are:  Morbid obesity with BMI of 40.0-44.9, adult (H)  - Likely related to multiple antipsychotic meds.   - Patient does not feel like she is eating large portions  - Rare exercise    Gastroesophageal reflux disease without esophagitis  - on PPI BID. No current sx's per patient has had \"bad reflux\" in the past    Schizoaffective disorder, bipolar type (H)  - Discussed multiple psych meds with patient.   - Discussed meeting with psych [facility] pharmacist. She is hesitant to change any medications. Reports in the past became suicidal when meds were adjusted.       Tobacco use disorder  - Is not interested in cessation    Closed fracture of shaft of right humerus with routine healing, unspecified fracture morphology, subsequent encounter  - Occurred 7/6/18  - Wearing a splint.       Chronic pain syndrome  - Previously on scheduled narcotics.   - Came to TCU on ultram.   - Have had many discussions about long term narcotics not effective for chronic pain.   - Also having urinary retention, explained nacs likely contributing      ALLERGIES: Codeine; Hmg-coa-r inhibitors; Lisinopril; No clinical screening - see comments; Sulfa drugs; and Sumatriptan  PAST MEDICAL HISTORY:  has no past medical history on file.  PAST SURGICAL HISTORY:  has no past surgical history on file.  FAMILY HISTORY: family history is not on file.  SOCIAL HISTORY:      MEDICATIONS:  Current Outpatient Prescriptions   Medication Sig " Dispense Refill     ACETAMINOPHEN PO Take 1,000 mg by mouth 3 times daily       albuterol (PROAIR HFA/PROVENTIL HFA/VENTOLIN HFA) 108 (90 BASE) MCG/ACT Inhaler Inhale 2 puffs into the lungs every 4 hours as needed        benztropine (COGENTIN) 0.5 MG tablet Take 0.5 mg by mouth 2 times daily        brimonidine (ALPHAGAN-P) 0.15 % ophthalmic solution Place 1 drop into both eyes 2 times daily       budesonide (PULMICORT) 1 MG/2ML SUSP neb solution USE 1 VIAL IN NEBULIZER TWICE DAILY       cariprazine (VRAYLAR) 1.5 MG CAPS capsule Take 2 mg by mouth daily        docusate sodium (STOOL SOFTENER) 100 MG capsule Take 100 mg by mouth At Bedtime        escitalopram (LEXAPRO) 20 MG tablet Take 1 tablet by mouth at bedtime       fluticasone (FLONASE) 50 MCG/ACT spray Spray 1 spray into both nostrils daily as needed        furosemide (LASIX) 80 MG tablet Take 40 mg by mouth 2 times daily        gabapentin (NEURONTIN) 800 MG tablet Take 400 mg by mouth 3 times daily        ibuprofen (ADVIL/MOTRIN) 800 MG tablet Take 800 mg by mouth every 8 hours as needed        ipratropium - albuterol 0.5 mg/2.5 mg/3 mL (DUONEB) 0.5-2.5 (3) MG/3ML neb solution Take 1 vial by nebulization 4 times daily       lamoTRIgine (LAMICTAL) 200 MG tablet Take 200 mg by mouth At Bedtime        LANsoprazole (PREVACID) 30 MG CR capsule Take 30 mg by mouth daily        LIOTHYRONINE SODIUM PO Take 25 mcg by mouth daily       lithium 300 MG capsule Take 1 capsule by mouth in the morning and 2 capsules at bedtime       losartan (COZAAR) 25 MG tablet Take 12.5 mg by mouth daily        meclizine (ANTIVERT) 25 MG tablet Take 25 mg by mouth every 8 hours as needed        montelukast (SINGULAIR) 10 MG tablet Take 10 mg by mouth At Bedtime       nystatin (MYCOSTATIN) 783961 UNIT/ML suspension 6ml swish and hold and spit QID x10 days (thrush)       nystatin-triamcinolone (MYCOLOG II) cream To lip folds TID until clear       OYSTER SHELL CALCIUM PO Take 500 mg by mouth  "4 times daily       paliperidone (INVEGA) 6 MG 24 hr tablet Take 6 mg by mouth every morning       POTASSIUM CHLORIDE ER PO 30meq by mouth two times a day       pregabalin (LYRICA) 150 MG capsule Take 150 mg by mouth 2 times daily        PROPRANOLOL HCL PO Take 10 mg by mouth 2 times daily as needed        TRAMADOL HCL PO Take 25 mg by mouth every 6 hours as needed for moderate to severe pain        travoprost, BAK Free, (TRAVATAN Z) 0.004 % ophthalmic solution Place 1 drop into both eyes At Bedtime       vitamin D (ERGOCALCIFEROL) 28972 UNIT capsule Take 50,000 Units by mouth once a week       ziprasidone (GEODON) 40 MG capsule Take 1 capsule by mouth in the morning       ziprasidone (GEODON) 80 MG capsule Take 160 mg by mouth At Bedtime        Medications reviewed:  Medications reconciled to facility chart and changes were made to reflect current medications as identified as above med list. Below are the changes that were made:   Medications stopped since last EPIC medication reconciliation:   There are no discontinued medications.    Medications started since last [facility] medication reconciliation:  No orders of the defined types were placed in this encounter.        Case Management:  I have reviewed the care plan and MDS and do agree with the plan. Patient's desire to return to the community is present, but is not able due to care needs .  Information reviewed:  Medications, vital signs, orders, and nursing notes.    ROS:  Limited secondary to cognitive impairment but today pt reports ongoing pain \"everywhere\".     Exam:  Vitals: /70  Pulse 86  Temp 97.5  F (36.4  C)  Resp 18  Wt 255 lb (115.7 kg)  SpO2 97%  BMI 43.77 kg/m2  BMI= Body mass index is 43.77 kg/(m^2).  GENERAL APPEARANCE:  Alert, in no distress  RESP:  respiratory effort and palpation of chest normal, auscultation of lungs diminished , no respiratory distress  CV:  Palpation and auscultation of heart done , rate and rhythm regular, no " "murmur, +1 peripheral edema  ABDOMEN:  normal bowel sounds, soft, nontender, no hepatosplenomegaly or other masses  M/S:   Gait and station not assessed, Digits and nails at baseline  SKIN:  Inspection and Palpation of skin and subcutaneous tissue normal  NEURO: 2-12 in normal limits and at patient's baseline  PSYCH:  insight and judgement, memory poor, affect and mood normal      CBC RESULTS:   Recent Labs   Lab Test  08/03/18   0945   WBC  15.6*   RBC  3.66*   HGB  11.3*   HCT  36.6   MCV  100   MCH  30.9   MCHC  30.9*   RDW  14.0   PLT  232       Last Basic Metabolic Panel:  Recent Labs   Lab Test  08/03/18   0945   NA  139   POTASSIUM  3.6   CHLORIDE  100   GAURAV  9.1   CO2  33*   BUN  24   CR  1.24*   GLC  81       Liver Function Studies -   Recent Labs   Lab Test  08/03/18   0945   PROTTOTAL  7.1   ALBUMIN  3.1*   BILITOTAL  0.3   ALKPHOS  102   AST  10   ALT  22       TSH   Date Value Ref Range Status   08/03/2018 1.40 0.40 - 4.00 mU/L Final       Lab Results   Component Value Date    A1C 5.4 08/03/2018     ASSESSMENT/PLAN  Morbid obesity with BMI of 40.0-44.9, adult (H)  - Patient not willing to decrease any antipsychotic medications.   - Is rarely walking. Staff to encourage walking.  - Consider dietician consult    Gastroesophageal reflux disease without esophagitis  - Sx's stable. Will reduce PPI.   - Plan to monitor closely.   - Sleep apnea may be contributing    Schizoaffective disorder, bipolar type (H)  - Patient to follow up with her psych NP.   - Per MTM review patient may need an EKG for possible QT changes.   - Patient not willing to adjust her medications at this time    Tobacco use disorder  - Is not interested in cessation.    Closed fracture of shaft of right humerus with routine healing, unspecified fracture morphology, subsequent encounter  - Following with ortho. Will need TCU placement until brace is removed.       Chronic pain syndrome  - Long term. Pain \"all over\".   - No changes in pain " with weaning tramadol. Will continue to wean as able.     Urinary Retention:  - Will attempt to remove mac over the weekend. If not able to void will order a bladder US  - Will taper narcotics. Per pharmacy cogentin may also be contributing  - Will stop antihistamine    DIABETES:  -  A1c is 5.4%. Will stop metformin d/t ckd and poly pharmacy.   - Will recheck A1c and follow up if blood sugars elevated      Orders:  1. DC Zyrtec  2. Decrease Prevacid to 30 mg every day  3. Decrease lasix to 40 mg BID  4. DC Metformin  5. Decrease tramadol to 25 mg Q6 PRN  6. Remove foly at midnight on 8/5/18    Total time spent with patient visit at the skilled nursing facility was 40 including patient visit, review of past records and discussion with staff. Greater than 50% of total time spent with counseling and coordinating care due to multiple chronic condions    Electronically signed by:  DELL Queen CNP        Sincerely,        DELL Queen CNP

## 2018-08-05 ENCOUNTER — TELEPHONE (OUTPATIENT)
Dept: GERIATRICS | Facility: CLINIC | Age: 53
End: 2018-08-05

## 2018-08-05 LAB — DEPRECATED CALCIDIOL+CALCIFEROL SERPL-MC: 24 UG/L (ref 20–75)

## 2018-08-05 NOTE — TELEPHONE ENCOUNTER
8/5/2018     Situation:  -mac removed per orders on 8/5/18 at 12 midnight and patient has not been able to void all night, she is not feeling any urges to void, no pressure  Background:  -patient with chronic urinary retention, has appointment to see urology later this month  -facility does not have (or does not know how to work) a bladder scanner  Assessment:  -likely ongoing urinary retention  Plan:  -straight cath now and record amount  -push fluids-240 ml Q2h   -toilet Q2h   -if unable to void in next 4 hours, then straight cath again  -Repeat this, and if unable to void on her own by 8 pm, then reinsert mac cath-same size and balloon as removed, and update primary NP     Yeimy Vasquez, PATRICE   [facility]  [phone] cell

## 2018-08-06 ENCOUNTER — NURSING HOME VISIT (OUTPATIENT)
Dept: GERIATRICS | Facility: CLINIC | Age: 53
End: 2018-08-06
Payer: COMMERCIAL

## 2018-08-06 VITALS
DIASTOLIC BLOOD PRESSURE: 70 MMHG | SYSTOLIC BLOOD PRESSURE: 122 MMHG | HEART RATE: 85 BPM | HEIGHT: 64 IN | TEMPERATURE: 97.5 F | OXYGEN SATURATION: 98 % | WEIGHT: 255.6 LBS | BODY MASS INDEX: 43.64 KG/M2 | RESPIRATION RATE: 18 BRPM

## 2018-08-06 DIAGNOSIS — I89.0 LYMPHEDEMA: ICD-10-CM

## 2018-08-06 DIAGNOSIS — E87.6 HYPOKALEMIA: ICD-10-CM

## 2018-08-06 DIAGNOSIS — F17.200 TOBACCO USE DISORDER: ICD-10-CM

## 2018-08-06 DIAGNOSIS — R33.9 URINARY RETENTION: ICD-10-CM

## 2018-08-06 DIAGNOSIS — B37.0 THRUSH: ICD-10-CM

## 2018-08-06 DIAGNOSIS — E66.01 MORBID OBESITY WITH BMI OF 40.0-44.9, ADULT (H): ICD-10-CM

## 2018-08-06 DIAGNOSIS — N32.89 BLADDER SPASMS: ICD-10-CM

## 2018-08-06 DIAGNOSIS — S42.301D CLOSED FRACTURE OF SHAFT OF RIGHT HUMERUS WITH ROUTINE HEALING, UNSPECIFIED FRACTURE MORPHOLOGY, SUBSEQUENT ENCOUNTER: ICD-10-CM

## 2018-08-06 DIAGNOSIS — D72.829 LEUKOCYTOSIS, UNSPECIFIED TYPE: ICD-10-CM

## 2018-08-06 DIAGNOSIS — K21.9 GASTROESOPHAGEAL REFLUX DISEASE WITHOUT ESOPHAGITIS: ICD-10-CM

## 2018-08-06 DIAGNOSIS — J44.9 CHRONIC OBSTRUCTIVE PULMONARY DISEASE, UNSPECIFIED COPD TYPE (H): Primary | ICD-10-CM

## 2018-08-06 DIAGNOSIS — F25.0 SCHIZOAFFECTIVE DISORDER, BIPOLAR TYPE (H): ICD-10-CM

## 2018-08-06 PROBLEM — D62 ACUTE BLOOD LOSS ANEMIA: Status: RESOLVED | Noted: 2018-02-05 | Resolved: 2018-08-06

## 2018-08-06 PROBLEM — R79.89 ELEVATED LFTS: Status: RESOLVED | Noted: 2017-10-30 | Resolved: 2018-08-06

## 2018-08-06 PROBLEM — J96.11 CHRONIC RESPIRATORY FAILURE WITH HYPOXIA (H): Status: RESOLVED | Noted: 2018-02-07 | Resolved: 2018-08-06

## 2018-08-06 PROBLEM — L97.921 CHRONIC ULCER OF LEFT LEG, LIMITED TO BREAKDOWN OF SKIN (H): Status: RESOLVED | Noted: 2018-01-12 | Resolved: 2018-08-06

## 2018-08-06 PROCEDURE — 99310 SBSQ NF CARE HIGH MDM 45: CPT | Performed by: NURSE PRACTITIONER

## 2018-08-06 RX ORDER — CHOLECALCIFEROL (VITAMIN D3) 1250 MCG
50000 CAPSULE ORAL DAILY
COMMUNITY
End: 2018-09-10

## 2018-08-06 NOTE — PROGRESS NOTES
[facility]    Chief Complaint   Patient presents with     long-term Acute       [place] Medical Record Number:  [id number]    HPI:    Betsy Resendiz is a 53 year old  (1965), who is being seen today for an episodic care visit at [facility].  HPI information obtained from: facility chart records, facility staff, patient report and [facility] chart review.    Today's concern is:  Chronic obstructive pulmonary disease, unspecified COPD type (H)  Tobacco use disorder  - Per staff patient is requesting to wear oxygen at 3 L all day despite sats >96%.   - Patient is smoking more frequently.   - Patient reports occasional tight feeling in her chest and feels short of air. No cough or fever    Morbid obesity with BMI of 40.0-44.9, adult (H)  - Rare activity. On multiple antipsychotic medications    Gastroesophageal reflux disease without esophagitis  - No indigestion or epigastric pain since reduction of PPI    Hypokalemia  - Recent K+ 3.6    Closed fracture of shaft of right humerus with routine healing, unspecified fracture morphology, subsequent encounter  - Due to see ortho 8/21. Is wearing immobilizer    Thrush  - Mouth pain has diminished with nystatin    Urinary retention  Bladder spasms  - Failed trial of cath removal. Was not able to void for >10 hrs. Having bladder spasms  - Per staff odor to urine and increased fatigue      ALLERGIES: Codeine; Hmg-coa-r inhibitors; Lisinopril; No clinical screening - see comments; Sulfa drugs; and Sumatriptan  Past Medical, Surgical, Family and Social History reviewed and updated in [facility].    Current Outpatient Prescriptions   Medication Sig Dispense Refill     ACETAMINOPHEN PO Take 1,000 mg by mouth 3 times daily       albuterol (PROAIR HFA/PROVENTIL HFA/VENTOLIN HFA) 108 (90 BASE) MCG/ACT Inhaler Inhale 2 puffs into the lungs every 4 hours as needed        benztropine (COGENTIN) 0.5 MG tablet Take 0.5 mg by mouth 2 times daily         brimonidine (ALPHAGAN-P) 0.15 % ophthalmic solution Place 1 drop into both eyes 2 times daily       budesonide (PULMICORT) 1 MG/2ML SUSP neb solution USE 1 VIAL IN NEBULIZER TWICE DAILY       cariprazine (VRAYLAR) 1.5 MG CAPS capsule Take 2 mg by mouth daily        docusate sodium (STOOL SOFTENER) 100 MG capsule Take 100 mg by mouth At Bedtime        escitalopram (LEXAPRO) 20 MG tablet Take 1 tablet by mouth at bedtime       fluticasone (FLONASE) 50 MCG/ACT spray Spray 1 spray into both nostrils daily as needed        furosemide (LASIX) 80 MG tablet Take 40 mg by mouth 2 times daily        gabapentin (NEURONTIN) 800 MG tablet Take 400 mg by mouth 3 times daily        ibuprofen (ADVIL/MOTRIN) 800 MG tablet Take 800 mg by mouth every 8 hours as needed        ipratropium - albuterol 0.5 mg/2.5 mg/3 mL (DUONEB) 0.5-2.5 (3) MG/3ML neb solution Take 1 vial by nebulization 4 times daily       lamoTRIgine (LAMICTAL) 200 MG tablet Take 200 mg by mouth At Bedtime        LANsoprazole (PREVACID) 30 MG CR capsule Take 30 mg by mouth daily        LIOTHYRONINE SODIUM PO Take 25 mcg by mouth daily       lithium 300 MG capsule Take 1 capsule by mouth in the morning and 2 capsules at bedtime       losartan (COZAAR) 25 MG tablet Take 12.5 mg by mouth daily        meclizine (ANTIVERT) 25 MG tablet Take 25 mg by mouth every 8 hours as needed        montelukast (SINGULAIR) 10 MG tablet Take 10 mg by mouth At Bedtime       nystatin (MYCOSTATIN) 149719 UNIT/ML suspension 6ml swish and hold and spit QID x10 days (thrush)       nystatin-triamcinolone (MYCOLOG II) cream To lip folds TID until clear       OYSTER SHELL CALCIUM PO Take 500 mg by mouth 4 times daily       paliperidone (INVEGA) 6 MG 24 hr tablet Take 6 mg by mouth every morning       POTASSIUM CHLORIDE ER PO 30meq by mouth two times a day       pregabalin (LYRICA) 150 MG capsule Take 150 mg by mouth 2 times daily        PROPRANOLOL HCL PO Take 10 mg by mouth 2 times daily as  "needed        TRAMADOL HCL PO Take 25 mg by mouth every 6 hours as needed for moderate to severe pain        travoprost, BAK Free, (TRAVATAN Z) 0.004 % ophthalmic solution Place 1 drop into both eyes At Bedtime       vitamin D (ERGOCALCIFEROL) 81121 UNIT capsule Take 50,000 Units by mouth once a week       ziprasidone (GEODON) 40 MG capsule Take 1 capsule by mouth in the morning       ziprasidone (GEODON) 80 MG capsule Take 160 mg by mouth At Bedtime        Medications reviewed:  Medications reconciled to facility chart and changes were made to reflect current medications as identified as above med list. Below are the changes that were made:   Medications stopped since last EPIC medication reconciliation:   There are no discontinued medications.    Medications started since last [facility] medication reconciliation:  No orders of the defined types were placed in this encounter.        REVIEW OF SYSTEMS:  4 point ROS including Respiratory, CV, GI and , other than that noted in the HPI,  is negative    Physical Exam:  /70  Pulse 85  Temp 97.5  F (36.4  C)  Resp 18  Ht 5' 4\" (1.626 m)  Wt 255 lb 9.6 oz (115.9 kg)  SpO2 98%  BMI 43.87 kg/m2  GENERAL APPEARANCE:  Alert, in no distress  RESP:  respiratory effort and palpation of chest normal, auscultation of lungs diminished , no respiratory distress  CV:  Palpation and auscultation of heart done , rate and rhythm regular- distant heart tones, no murmur, +1 peripheral edema  ABDOMEN:  normal bowel sounds, soft, nontender, no hepatosplenomegaly or other masses  M/S:   Gait and station not assessed, Digits and nails at baseline  SKIN:  Inspection and Palpation of skin and subcutaneous tissue at baseline  NEURO: 2-12 in normal limits and at patient's baseline  PSYCH:  insight and judgement, memory fair , affect and mood normal      Recent Labs:     CBC RESULTS:   Recent Labs   Lab Test  08/03/18   0945   WBC  15.6*   RBC  3.66*   HGB  11.3*   HCT  36.6   MCV  100 "   MCH  30.9   MCHC  30.9*   RDW  14.0   PLT  232       Last Basic Metabolic Panel:  Recent Labs   Lab Test  08/03/18   0945   NA  139   POTASSIUM  3.6   CHLORIDE  100   GAURAV  9.1   CO2  33*   BUN  24   CR  1.24*   GLC  81       Liver Function Studies -   Recent Labs   Lab Test  08/03/18   0945   PROTTOTAL  7.1   ALBUMIN  3.1*   BILITOTAL  0.3   ALKPHOS  102   AST  10   ALT  22       TSH   Date Value Ref Range Status   08/03/2018 1.40 0.40 - 4.00 mU/L Final   ]    Lab Results   Component Value Date    A1C 5.4 08/03/2018         Assessment/Plan:  Chronic obstructive pulmonary disease, unspecified COPD type (H)  Tobacco use disorder  - Is not interested in smoking cessation  - Advised patient to use oxygen only IF sats< 90%. She refuses to use prn nebs  - Vitals stable.    Morbid obesity with BMI of 40.0-44.9, adult (H)  - Discussed increased ambulation with therapy.   - Multiple psych meds likely contributing  - TSH WNL    Gastroesophageal reflux disease without esophagitis  - has tolerated reduction of PPI. Continue to monitor    Hypokalemia  - Continue supplement    Closed fracture of shaft of right humerus with routine healing, unspecified fracture morphology, subsequent encounter  - To continue physical therapy and wear splint until sees orhto later this month. Has chronic pain. Will attempt to wean off narcotics. Staff to use ibuprofen prior to tramadol.     Thrush  - Has improved. Continue to have patient rinse mouth after nebs.     Urinary retention  Bladder spasms  Leukocytosis, unspecified type  - Urine retention with removal of cath.   - Will get a UA/UC  - Due to see urology 8/21    Lymphedema  - Edema stable with reduction of lasix. Monitor edema and wts closely.     Schizoaffective:  - Patient not willing to adjust meds at this time and is not interested in seeing MTM pharmacit  - Due to see her psych provider 8/15  - Will check a lithium level      Orders:  1.  Oxygen 2L only if SATS <90%  2.  UA/UC  3.   Lithium level (bipolar)  4.  DC 50,000 Vit d, start Vit D3 50,000IU daily (vit def)  5.  Decrease benzotropine to 0.5mg daily  6.  Use ibuprofen first before tramadol for c/o pain       Total time spent with patient visit at the skilled nursing facility was 36 including patient visit, review of past records and disucssion with therapy. Greater than 50% of total time spent with counseling and coordinating care due to multiple complex conditions.     Electronically signed by  DELL Queen CNP

## 2018-08-06 NOTE — LETTER
8/6/2018        RE: Betsy Resendiz  [address]  [place] [address]        [facility]    Chief Complaint   Patient presents with     detention Acute       [facility] Medical Record Number:  [id number]    HPI:    Betsy Resendiz is a 53 year old  (1965), who is being seen today for an episodic care visit at [facility] [place].  HPI information obtained from: facility chart records, facility staff, patient report and [facility] chart review.    Today's concern is:  Chronic obstructive pulmonary disease, unspecified COPD type (H)  Tobacco use disorder  - Per staff patient is requesting to wear oxygen at 3 L all day despite sats >96%.   - Patient is smoking more frequently.   - Patient reports occasional tight feeling in her chest and feels short of air. No cough or fever    Morbid obesity with BMI of 40.0-44.9, adult (H)  - Rare activity. On multiple antipsychotic medications    Gastroesophageal reflux disease without esophagitis  - No indigestion or epigastric pain since reduction of PPI    Hypokalemia  - Recent K+ 3.6    Closed fracture of shaft of right humerus with routine healing, unspecified fracture morphology, subsequent encounter  - Due to see ortho 8/21. Is wearing immobilizer    Thrush  - Mouth pain has diminished with nystatin    Urinary retention  Bladder spasms  - Failed trial of cath removal. Was not able to void for >10 hrs. Having bladder spasms  - Per staff odor to urine and increased fatigue      ALLERGIES: Codeine; Hmg-coa-r inhibitors; Lisinopril; No clinical screening - see comments; Sulfa drugs; and Sumatriptan  Past Medical, Surgical, Family and Social History reviewed and updated in [facility].    Current Outpatient Prescriptions   Medication Sig Dispense Refill     ACETAMINOPHEN PO Take 1,000 mg by mouth 3 times daily       albuterol (PROAIR HFA/PROVENTIL HFA/VENTOLIN HFA) 108 (90 BASE) MCG/ACT Inhaler Inhale 2 puffs into the lungs every 4 hours  as needed        benztropine (COGENTIN) 0.5 MG tablet Take 0.5 mg by mouth 2 times daily        brimonidine (ALPHAGAN-P) 0.15 % ophthalmic solution Place 1 drop into both eyes 2 times daily       budesonide (PULMICORT) 1 MG/2ML SUSP neb solution USE 1 VIAL IN NEBULIZER TWICE DAILY       cariprazine (VRAYLAR) 1.5 MG CAPS capsule Take 2 mg by mouth daily        docusate sodium (STOOL SOFTENER) 100 MG capsule Take 100 mg by mouth At Bedtime        escitalopram (LEXAPRO) 20 MG tablet Take 1 tablet by mouth at bedtime       fluticasone (FLONASE) 50 MCG/ACT spray Spray 1 spray into both nostrils daily as needed        furosemide (LASIX) 80 MG tablet Take 40 mg by mouth 2 times daily        gabapentin (NEURONTIN) 800 MG tablet Take 400 mg by mouth 3 times daily        ibuprofen (ADVIL/MOTRIN) 800 MG tablet Take 800 mg by mouth every 8 hours as needed        ipratropium - albuterol 0.5 mg/2.5 mg/3 mL (DUONEB) 0.5-2.5 (3) MG/3ML neb solution Take 1 vial by nebulization 4 times daily       lamoTRIgine (LAMICTAL) 200 MG tablet Take 200 mg by mouth At Bedtime        LANsoprazole (PREVACID) 30 MG CR capsule Take 30 mg by mouth daily        LIOTHYRONINE SODIUM PO Take 25 mcg by mouth daily       lithium 300 MG capsule Take 1 capsule by mouth in the morning and 2 capsules at bedtime       losartan (COZAAR) 25 MG tablet Take 12.5 mg by mouth daily        meclizine (ANTIVERT) 25 MG tablet Take 25 mg by mouth every 8 hours as needed        montelukast (SINGULAIR) 10 MG tablet Take 10 mg by mouth At Bedtime       nystatin (MYCOSTATIN) 184914 UNIT/ML suspension 6ml swish and hold and spit QID x10 days (thrush)       nystatin-triamcinolone (MYCOLOG II) cream To lip folds TID until clear       OYSTER SHELL CALCIUM PO Take 500 mg by mouth 4 times daily       paliperidone (INVEGA) 6 MG 24 hr tablet Take 6 mg by mouth every morning       POTASSIUM CHLORIDE ER PO 30meq by mouth two times a day       pregabalin (LYRICA) 150 MG capsule Take  "150 mg by mouth 2 times daily        PROPRANOLOL HCL PO Take 10 mg by mouth 2 times daily as needed        TRAMADOL HCL PO Take 25 mg by mouth every 6 hours as needed for moderate to severe pain        travoprost, BAK Free, (TRAVATAN Z) 0.004 % ophthalmic solution Place 1 drop into both eyes At Bedtime       vitamin D (ERGOCALCIFEROL) 19203 UNIT capsule Take 50,000 Units by mouth once a week       ziprasidone (GEODON) 40 MG capsule Take 1 capsule by mouth in the morning       ziprasidone (GEODON) 80 MG capsule Take 160 mg by mouth At Bedtime        Medications reviewed:  Medications reconciled to facility chart and changes were made to reflect current medications as identified as above med list. Below are the changes that were made:   Medications stopped since last EPIC medication reconciliation:   There are no discontinued medications.    Medications started since last [facility] medication reconciliation:  No orders of the defined types were placed in this encounter.        REVIEW OF SYSTEMS:  4 point ROS including Respiratory, CV, GI and , other than that noted in the HPI,  is negative    Physical Exam:  /70  Pulse 85  Temp 97.5  F (36.4  C)  Resp 18  Ht 5' 4\" (1.626 m)  Wt 255 lb 9.6 oz (115.9 kg)  SpO2 98%  BMI 43.87 kg/m2  GENERAL APPEARANCE:  Alert, in no distress  RESP:  respiratory effort and palpation of chest normal, auscultation of lungs diminished , no respiratory distress  CV:  Palpation and auscultation of heart done , rate and rhythm regular- distant heart tones, no murmur, +1 peripheral edema  ABDOMEN:  normal bowel sounds, soft, nontender, no hepatosplenomegaly or other masses  M/S:   Gait and station not assessed, Digits and nails at baseline  SKIN:  Inspection and Palpation of skin and subcutaneous tissue at baseline  NEURO: 2-12 in normal limits and at patient's baseline  PSYCH:  insight and judgement, memory fair , affect and mood normal      Recent Labs:     CBC RESULTS:   Recent Labs "   Lab Test  08/03/18   0945   WBC  15.6*   RBC  3.66*   HGB  11.3*   HCT  36.6   MCV  100   MCH  30.9   MCHC  30.9*   RDW  14.0   PLT  232       Last Basic Metabolic Panel:  Recent Labs   Lab Test  08/03/18   0945   NA  139   POTASSIUM  3.6   CHLORIDE  100   GAURAV  9.1   CO2  33*   BUN  24   CR  1.24*   GLC  81       Liver Function Studies -   Recent Labs   Lab Test  08/03/18   0945   PROTTOTAL  7.1   ALBUMIN  3.1*   BILITOTAL  0.3   ALKPHOS  102   AST  10   ALT  22       TSH   Date Value Ref Range Status   08/03/2018 1.40 0.40 - 4.00 mU/L Final   ]    Lab Results   Component Value Date    A1C 5.4 08/03/2018         Assessment/Plan:  Chronic obstructive pulmonary disease, unspecified COPD type (H)  Tobacco use disorder  - Is not interested in smoking cessation  - Advised patient to use oxygen only IF sats< 90%. She refuses to use prn nebs  - Vitals stable.    Morbid obesity with BMI of 40.0-44.9, adult (H)  - Discussed increased ambulation with therapy.   - Multiple psych meds likely contributing  - TSH WNL    Gastroesophageal reflux disease without esophagitis  - has tolerated reduction of PPI. Continue to monitor    Hypokalemia  - Continue supplement    Closed fracture of shaft of right humerus with routine healing, unspecified fracture morphology, subsequent encounter  - To continue physical therapy and wear splint until sees orhto later this month. Has chronic pain. Will attempt to wean off narcotics. Staff to use ibuprofen prior to tramadol.     Thrush  - Has improved. Continue to have patient rinse mouth after nebs.     Urinary retention  Bladder spasms  Leukocytosis, unspecified type  - Urine retention with removal of cath.   - Will get a UA/UC  - Due to see urology 8/21    Lymphedema  - Edema stable with reduction of lasix. Monitor edema and wts closely.     Schizoaffective:  - Patient not willing to adjust meds at this time and is not interested in seeing MTM pharmacit  - Due to see her psych provider 8/15  -  Will check a lithium level      Orders:  1.  Oxygen 2L only if SATS <90%  2.  UA/UC  3.  Lithium level (bipolar)  4.  DC 50,000 Vit d, start Vit D3 50,000IU daily (vit def)  5.  Decrease benzotropine to 0.5mg daily  6.  Use ibuprofen first before tramadol for c/o pain       Total time spent with patient visit at the skilled nursing facility was 36 including patient visit, review of past records and disucssion with therapy. Greater than 50% of total time spent with counseling and coordinating care due to multiple complex conditions.     Electronically signed by  DELL Queen CNP                      Sincerely,        DELL Queen CNP

## 2018-08-07 ENCOUNTER — HOSPITAL LABORATORY (OUTPATIENT)
Facility: OTHER | Age: 53
End: 2018-08-07

## 2018-08-07 LAB
ALBUMIN UR-MCNC: NEGATIVE MG/DL
APPEARANCE UR: CLEAR
BILIRUB UR QL STRIP: NEGATIVE
COLOR UR AUTO: ABNORMAL
GLUCOSE UR STRIP-MCNC: NEGATIVE MG/DL
HGB UR QL STRIP: ABNORMAL
KETONES UR STRIP-MCNC: NEGATIVE MG/DL
LEUKOCYTE ESTERASE UR QL STRIP: ABNORMAL
NITRATE UR QL: NEGATIVE
PH UR STRIP: 8 PH (ref 5–7)
RBC #/AREA URNS AUTO: 4 /HPF (ref 0–2)
SOURCE: ABNORMAL
SP GR UR STRIP: 1.01 (ref 1–1.03)
SQUAMOUS #/AREA URNS AUTO: <1 /HPF (ref 0–1)
UROBILINOGEN UR STRIP-MCNC: 0 MG/DL (ref 0–2)
WBC #/AREA URNS AUTO: 38 /HPF (ref 0–5)

## 2018-08-08 ENCOUNTER — NURSING HOME VISIT (OUTPATIENT)
Dept: GERIATRICS | Facility: CLINIC | Age: 53
End: 2018-08-08
Payer: COMMERCIAL

## 2018-08-08 VITALS
RESPIRATION RATE: 18 BRPM | DIASTOLIC BLOOD PRESSURE: 70 MMHG | SYSTOLIC BLOOD PRESSURE: 122 MMHG | TEMPERATURE: 97.5 F | HEART RATE: 74 BPM | OXYGEN SATURATION: 97 %

## 2018-08-08 DIAGNOSIS — H10.32 ACUTE BACTERIAL CONJUNCTIVITIS OF LEFT EYE: Primary | ICD-10-CM

## 2018-08-08 PROCEDURE — 99308 SBSQ NF CARE LOW MDM 20: CPT | Performed by: NURSE PRACTITIONER

## 2018-08-08 RX ORDER — POLYMYXIN B SULFATE AND TRIMETHOPRIM 1; 10000 MG/ML; [USP'U]/ML
1-2 SOLUTION OPHTHALMIC 4 TIMES DAILY
COMMUNITY
Start: 2018-08-08 | End: 2019-02-15

## 2018-08-08 NOTE — LETTER
8/8/2018        RE: Betsy Resendiz  [address]  [place] [address]        [facility]    Chief Complaint   Patient presents with     Nursing Home Acute       HPI:    Betsy Resendiz is a 53 year old  (1965), who is being seen today for an episodic care visit at [facility].    HPI information obtained from: facility staff and patient report. Today's concern is:  Acute bacterial conjunctivitis of left eye     Nsg requested a visit today.   Patient's left eye is red.   Per patient eye has drainage and crust. Minimally painful. Does not itch. No vision changes    REVIEW OF SYSTEMS:  4 point ROS including Respiratory, CV, GI and , other than that noted in the HPI,  is negative    /70  Pulse 74  Temp 97.5  F (36.4  C)  Resp 18  SpO2 97%  GENERAL APPEARANCE:  Alert, in no distress  Eye: Left conjunctiva erythematous with yellow crust, scleral injection    ASSESSMENT/PLAN:  Acute bacterial conjunctivitis of left eye   See orders below. Nsg to update with concerns.     Orders:  1.  Polymyxin b/trimethoptim one drop to right eye QID x7 days.  conjuntivitis      Electronically signed by:  DELL Queen CNP      Sincerely,        DELL Queen CNP

## 2018-08-09 ENCOUNTER — HOSPITAL LABORATORY (OUTPATIENT)
Facility: OTHER | Age: 53
End: 2018-08-09

## 2018-08-09 LAB
BACTERIA SPEC CULT: NORMAL
LITHIUM SERPL-SCNC: 1.15 MMOL/L (ref 0.6–1.2)
Lab: NORMAL
SPECIMEN SOURCE: NORMAL

## 2018-08-15 ENCOUNTER — NURSING HOME VISIT (OUTPATIENT)
Dept: GERIATRICS | Facility: CLINIC | Age: 53
End: 2018-08-15
Payer: COMMERCIAL

## 2018-08-15 VITALS
RESPIRATION RATE: 18 BRPM | OXYGEN SATURATION: 93 % | HEART RATE: 85 BPM | DIASTOLIC BLOOD PRESSURE: 54 MMHG | SYSTOLIC BLOOD PRESSURE: 104 MMHG | TEMPERATURE: 97.7 F

## 2018-08-15 DIAGNOSIS — B37.2 CANDIDAL INTERTRIGO: ICD-10-CM

## 2018-08-15 DIAGNOSIS — R33.9 URINARY RETENTION: ICD-10-CM

## 2018-08-15 DIAGNOSIS — E66.01 MORBID OBESITY WITH BMI OF 40.0-44.9, ADULT (H): Primary | ICD-10-CM

## 2018-08-15 PROBLEM — F11.20 OPIOID TYPE DEPENDENCE, CONTINUOUS USE (H): Status: RESOLVED | Noted: 2017-10-28 | Resolved: 2018-08-15

## 2018-08-15 PROCEDURE — 99309 SBSQ NF CARE MODERATE MDM 30: CPT | Performed by: NURSE PRACTITIONER

## 2018-08-15 RX ORDER — CLOTRIMAZOLE 1 %
CREAM (GRAM) TOPICAL 2 TIMES DAILY
COMMUNITY

## 2018-08-15 NOTE — MR AVS SNAPSHOT
After Visit Summary   8/15/2018    Betsy Resendiz    MRN: [id number]           Patient Information     Date Of Birth          1965        Visit Information        Provider Department      8/15/2018 7:00 AM Ramona Jacome APRN CNP [facility]        Today's Diagnoses     Morbid obesity with BMI of 40.0-44.9, adult (H)    -  1    Urinary retention        Candidal intertrigo           Follow-ups after your visit        Your next 10 appointments already scheduled     Aug 21, 2018  2:30 PM CDT   New Visit with JACKSON Thomas MD   [facility] ([facility])    [address] [id number] [phone]              Who to contact     If you have questions or need follow up information about today's clinic visit or your schedule please contact G[facility]ATRICS TRANSITIONAL CARE directly at [phone].  Normal or non-critical lab and imaging results will be communicated to you by MyChart, letter or phone within 4 business days after the clinic has received the results. If you do not hear from us within 7 days, please contact the clinic through MyChart or phone. If you have a critical or abnormal lab result, we will notify you by phone as soon as possible.  Submit refill requests through [employer] or call your pharmacy and they will forward the refill request to us. Please allow 3 business days for your refill to be completed.          Additional Information About Your Visit        Care EveryWhere ID     This is your Care EveryWhere ID. This could be used by other organizations to access your [place] medical records  [id number]        Your Vitals Were     Pulse Temperature Respirations Pulse Oximetry          85 97.7  F (36.5  C) 18 93%         Blood Pressure from Last 3 Encounters:   08/15/18 104/54   08/08/18 122/70   08/06/18 122/70    Weight from Last 3 Encounters:   08/06/18 255 lb 9.6 oz (115.9 kg)   08/02/18 255 lb (115.7 kg)   07/30/18 255  lb 9.6 oz (115.9 kg)              Today, you had the following     No orders found for display       Primary Care Provider Office Phone # Fax #    Elle Dunlap [phone]       [facility] [address] [address]        Equal Access to Services     BLADE CHERY : Hadii aad ku hadasho Soomaali, waaxda luqadaha, qaybta kaalmada adeegyada, waxay idiin hayaan adechantale torresfabyue lopez. So [facility] [phone].    ATENCIÓN: Si habla español, tiene a knight disposición servicios gratuitos de asistencia lingüística. John al [phone].    We comply with applicable federal civil rights laws and [place] laws. We do not discriminate on the basis of race, color, national origin, age, disability, sex, sexual orientation, or gender identity.            Thank you!     Thank you for choosing [facility]AT[facility]AL [facility]  for your care. Our goal is always to provide you with excellent care. Hearing back from our patients is one way we can continue to improve our services. Please take a few minutes to complete the written survey that you may receive in the mail after your visit with us. Thank you!             Your Updated Medication List - Protect others around you: Learn how to safely use, store and throw away your medicines at www.disposemymeds.org.          This list is accurate as of 8/15/18  2:00 PM.  Always use your most recent med list.                   Brand Name Dispense Instructions for use Diagnosis    ACETAMINOPHEN PO      Take 1,000 mg by mouth 3 times daily        albuterol 108 (90 Base) MCG/ACT inhaler    PROAIR HFA/PROVENTIL HFA/VENTOLIN HFA     Inhale 2 puffs into the lungs every 4 hours as needed        benztropine 0.5 MG tablet    COGENTIN     Take 0.5 mg by mouth daily        brimonidine 0.15 % ophthalmic solution    ALPHAGAN-P     Place 1 drop into both eyes 2 times daily        budesonide 1 MG/2ML Susp neb solution    PULMICORT     USE 1 VIAL IN NEBULIZER TWICE DAILY         cariprazine 1.5 MG Caps capsule    VRAYLAR     Take 2 mg by mouth daily        clotrimazole 1 % cream    LOTRIMIN     Apply topically 2 times daily        escitalopram 20 MG tablet    LEXAPRO     Take 1 tablet by mouth at bedtime        fluticasone 50 MCG/ACT spray    FLONASE     Spray 1 spray into both nostrils daily as needed        furosemide 80 MG tablet    LASIX     Take 40 mg by mouth 2 times daily        gabapentin 800 MG tablet    NEURONTIN     Take 400 mg by mouth 3 times daily        ibuprofen 800 MG tablet    ADVIL/MOTRIN     Take 800 mg by mouth every 8 hours as needed        ipratropium - albuterol 0.5 mg/2.5 mg/3 mL 0.5-2.5 (3) MG/3ML neb solution    DUONEB     Take 1 vial by nebulization 4 times daily        lamoTRIgine 200 MG tablet    LaMICtal     Take 200 mg by mouth At Bedtime        LANsoprazole 30 MG CR capsule    PREVACID     Take 30 mg by mouth daily        LIOTHYRONINE SODIUM PO      Take 25 mcg by mouth daily        lithium 300 MG capsule      Take 1 capsule by mouth in the morning and 2 capsules at bedtime        losartan 25 MG tablet    COZAAR     Take 12.5 mg by mouth daily        meclizine 25 MG tablet    ANTIVERT     Take 25 mg by mouth every 8 hours as needed        montelukast 10 MG tablet    SINGULAIR     Take 10 mg by mouth At Bedtime        nystatin-triamcinolone cream    MYCOLOG II     To lip folds TID until clear        OYSTER SHELL CALCIUM PO      Take 500 mg by mouth 4 times daily        paliperidone 6 MG 24 hr tablet    INVEGA     Take 6 mg by mouth every morning        POTASSIUM CHLORIDE ER PO      30meq by mouth two times a day        pregabalin 150 MG capsule    LYRICA     Take 150 mg by mouth 2 times daily        PROPRANOLOL HCL PO      Take 10 mg by mouth 2 times daily as needed        STOOL SOFTENER 100 MG capsule   Generic drug:  docusate sodium      Take 100 mg by mouth At Bedtime        TRAMADOL HCL PO      Take 25 mg by mouth every 6 hours as needed for  moderate to severe pain        travoprost (NICHO Free) 0.004 % ophthalmic solution    TRAVATAN Z     Place 1 drop into both eyes At Bedtime        vitamin D3 60401 UNITS capsule    CHOLECALCIFEROL     Take 50,000 Units by mouth daily        * ziprasidone 80 MG capsule    GEODON     Take 160 mg by mouth At Bedtime        * ziprasidone 40 MG capsule    GEODON     Take 1 capsule by mouth in the morning        * Notice:  This list has 2 medication(s) that are the same as other medications prescribed for you. Read the directions carefully, and ask your doctor or other care provider to review them with you.

## 2018-08-15 NOTE — PROGRESS NOTES
[facility]    Chief Complaint   Patient presents with     Nursing Home Acute       HPI:    Betsy Resendiz is a 53 year old  (1965), who is being seen today for an episodic care visit at [facility] [place].    HPI information obtained from: facility chart records, facility staff and patient report.     Today's concern is:     Morbid obesity with BMI of 40.0-44.9, adult (H)  Urinary retention  Candidal intertrigo     Asked to see patient today for a groin rash.   Patient reports labial and groin folds itch and burn.   Staff having been using nystatin powder with no improvement.  Has an indwelling mac 2/2 to urinary retention. Narcotics are being tapered off due to urinary retention and likely not beneficial for chronic pain.     REVIEW OF SYSTEMS:  4 point ROS including Respiratory, CV, GI and , other than that noted in the HPI,  is negative    /54  Pulse 85  Temp 97.7  F (36.5  C)  Resp 18  SpO2 93%  GENERAL APPEARANCE:  Alert, in no distress  SKIN: bilateral groin folds erythematous and denuded. Labia majora erythema. No vaginal exudate    ASSESSMENT/PLAN:     Morbid obesity with BMI of 40.0-44.9, adult (H)  Urinary retention  Candidal intertrigo     - Has appointment scheduled with urology for urinary retention. Continue to taper off narcotics.   - Will change to clotrimazole cream      Orders:  1.  DC nystatin powder  2.  Clotrimazole cream 1% to labial folds and reddened groin folds BID.  Dx:  Candidal intertrigo        Electronically signed by:  DELL Queen CNP

## 2018-08-21 ENCOUNTER — OFFICE VISIT (OUTPATIENT)
Dept: UROLOGY | Facility: CLINIC | Age: 53
End: 2018-08-21
Payer: COMMERCIAL

## 2018-08-21 VITALS
DIASTOLIC BLOOD PRESSURE: 67 MMHG | TEMPERATURE: 97.4 F | HEART RATE: 76 BPM | RESPIRATION RATE: 16 BRPM | SYSTOLIC BLOOD PRESSURE: 112 MMHG

## 2018-08-21 DIAGNOSIS — R33.9 URINARY RETENTION: Primary | ICD-10-CM

## 2018-08-21 PROCEDURE — 99214 OFFICE O/P EST MOD 30 MIN: CPT | Performed by: UROLOGY

## 2018-08-21 NOTE — MR AVS SNAPSHOT
After Visit Summary   8/21/2018    Betsy Resendiz    MRN: [id number]           Patient Information     Date Of Birth          1965        Visit Information        Provider Department      8/21/2018 2:30 PM JACKSON Thomas MD [facility]        Today's Diagnoses     Urinary retention    -  1      Care Instructions    Begin oxybutynin for control of her bladder spasm induced by the catheter.    Culture the urine prior to her next voiding trial (10 days to two weeks from now) as bacteria are probably in the urine. Inform the lab that multiple organisms are expected and that sensitivities should be reported. Start culture specific antibiotics if bacteria are found in the urine culture one to two days prior to the voiding trial and treat for seven days.    Stop the oxybutynin about 2 days prior to the next voiding trial as it can interfere with bladder emptying.    Avoid allowing the bladder to accumulate more than about 500 ml of urine prior to abandoning a voiding trial and re instituting mac catheter drainage.    Terazosin has also been ordered for long term support of bladder emptying in view of the recurring pattern of urinary retention.          Follow-ups after your visit        Who to contact     If you have questions or need follow up information about today's clinic visit or your schedule please contact [facility] directly at [phone].  Normal or non-critical lab and imaging results will be communicated to you by MyChart, letter or phone within 4 business days after the clinic has received the results. If you do not hear from us within 7 days, please contact the clinic through MyChart or phone. If you have a critical or abnormal lab result, we will notify you by phone as soon as possible.  Submit refill requests through [employer] or call your pharmacy and they will forward the refill request to us. Please allow 3 business days for your refill to be completed.           Additional Information About Your Visit        Care EveryWhere ID     This is your Care EveryWhere ID. This could be used by other organizations to access your [facility] medical records  [id number]        Your Vitals Were     Pulse Temperature Respirations             76 97.4  F (36.3  C) (Tympanic) 16          Blood Pressure from Last 3 Encounters:   08/21/18 112/67   08/15/18 104/54   08/08/18 122/70    Weight from Last 3 Encounters:   08/06/18 115.9 kg (255 lb 9.6 oz)   08/02/18 115.7 kg (255 lb)   07/30/18 115.9 kg (255 lb 9.6 oz)              Today, you had the following     No orders found for display         Today's Medication Changes          These changes are accurate as of 8/21/18 11:59 PM.  If you have any questions, ask your nurse or doctor.               Start taking these medicines.        Dose/Directions    terazosin 2 MG capsule   Commonly known as:  HYTRIN   Used for:  Urinary retention   Started by:  JACKSON Thomas MD        Dose:  2 mg   Take 1 capsule (2 mg) by mouth At Bedtime Continue taking indefinitely for control of recurring urinary retention.   Quantity:  90 capsule   Refills:  3            Where to get your medicines      These medications were sent to [facility] [id number] [place] [address]     Phone:  [phone]     terazosin 2 MG capsule                Primary Care Provider Office Phone # Fax #    Elle Dunlap [phone]       [address] [address]        Equal Access to Services     [facility] Hospital[facility] : Hadii aad fernando hadasho Soomaali, waaxda luqadaha, qaybta kaalmada wander spangler . So [facility] [phone].    ATENCIÓN: Si habla español, tiene a knight disposición servicios gratuitos de asistencia lingüística. Llame al [phone].    We comply with applicable federal civil rights laws and [place] laws. We do not  discriminate on the basis of race, color, national origin, age, disability, sex, sexual orientation, or gender identity.            Thank you!     Thank you for choosing [facility]  for your care. Our goal is always to provide you with excellent care. Hearing back from our patients is one way we can continue to improve our services. Please take a few minutes to complete the written survey that you may receive in the mail after your visit with us. Thank you!             Your Updated Medication List - Protect others around you: Learn how to safely use, store and throw away your medicines at www.disposemymeds.org.          This list is accurate as of 8/21/18 11:59 PM.  Always use your most recent med list.                   Brand Name Dispense Instructions for use Diagnosis    ACETAMINOPHEN PO      Take 1,000 mg by mouth 3 times daily        albuterol 108 (90 Base) MCG/ACT inhaler    PROAIR HFA/PROVENTIL HFA/VENTOLIN HFA     Inhale 2 puffs into the lungs every 4 hours as needed        benztropine 0.5 MG tablet    COGENTIN     Take 0.5 mg by mouth daily        brimonidine 0.15 % ophthalmic solution    ALPHAGAN-P     Place 1 drop into both eyes 2 times daily        budesonide 1 MG/2ML Susp neb solution    PULMICORT     USE 1 VIAL IN NEBULIZER TWICE DAILY        cariprazine 1.5 MG Caps capsule    VRAYLAR     Take 2 mg by mouth daily        clotrimazole 1 % cream    LOTRIMIN     Apply topically 2 times daily        escitalopram 20 MG tablet    LEXAPRO     Take 1 tablet by mouth at bedtime        fluticasone 50 MCG/ACT spray    FLONASE     Spray 1 spray into both nostrils daily as needed        furosemide 80 MG tablet    LASIX     Take 40 mg by mouth daily        gabapentin 800 MG tablet    NEURONTIN     Take 400 mg by mouth 3 times daily        ibuprofen 800 MG tablet    ADVIL/MOTRIN     Take 800 mg by mouth every 8 hours as needed        ipratropium - albuterol 0.5 mg/2.5 mg/3 mL 0.5-2.5 (3) MG/3ML neb  solution    DUONEB     Take 1 vial by nebulization 4 times daily        lamoTRIgine 200 MG tablet    LaMICtal     Take 200 mg by mouth At Bedtime        LANsoprazole 30 MG CR capsule    PREVACID     Take 30 mg by mouth daily        LIOTHYRONINE SODIUM PO      Take 25 mcg by mouth daily        lithium 300 MG capsule      Take 1 capsule by mouth in the morning and 2 capsules at bedtime        losartan 25 MG tablet    COZAAR     Take 12.5 mg by mouth daily        meclizine 25 MG tablet    ANTIVERT     Take 25 mg by mouth every 8 hours as needed        montelukast 10 MG tablet    SINGULAIR     Take 10 mg by mouth At Bedtime        nystatin-triamcinolone cream    MYCOLOG II     To lip folds TID until clear        OYSTER SHELL CALCIUM PO      Take 500 mg by mouth 4 times daily        paliperidone 6 MG 24 hr tablet    INVEGA     Take 6 mg by mouth every morning        POTASSIUM CHLORIDE ER PO      30meq by mouth two times a day        pregabalin 150 MG capsule    LYRICA     Take 150 mg by mouth 2 times daily        PROPRANOLOL HCL PO      Take 10 mg by mouth 2 times daily as needed        STOOL SOFTENER 100 MG capsule   Generic drug:  docusate sodium      Take 100 mg by mouth At Bedtime        terazosin 2 MG capsule    HYTRIN    90 capsule    Take 1 capsule (2 mg) by mouth At Bedtime Continue taking indefinitely for control of recurring urinary retention.    Urinary retention       TRAMADOL HCL PO      Take 25 mg by mouth every 6 hours as needed for moderate to severe pain        travoprost (BAK Free) 0.004 % ophthalmic solution    TRAVATAN Z     Place 1 drop into both eyes At Bedtime        vitamin D3 48067 UNITS capsule    CHOLECALCIFEROL     Take 50,000 Units by mouth daily        * ziprasidone 80 MG capsule    GEODON     Take 160 mg by mouth At Bedtime        * ziprasidone 40 MG capsule    GEODON     Take 1 capsule by mouth in the morning        * Notice:  This list has 2 medication(s) that are the same as other  medications prescribed for you. Read the directions carefully, and ask your doctor or other care provider to review them with you.

## 2018-08-21 NOTE — NURSING NOTE
"Chief Complaint   Patient presents with     Consult     urinary retention       Initial /67 (BP Location: Left arm, Patient Position: Chair, Cuff Size: Adult Large)  Pulse 76  Temp 97.4  F (36.3  C) (Tympanic)  Resp 16 Estimated body mass index is 43.87 kg/(m^2) as calculated from the following:    Height as of 8/6/18: 1.626 m (5' 4\").    Weight as of 8/6/18: 115.9 kg (255 lb 9.6 oz).  Medications and allergies reviewed.    Esequiel WHITAKER, JAVIER    "

## 2018-08-22 RX ORDER — TERAZOSIN 2 MG/1
2 CAPSULE ORAL AT BEDTIME
Qty: 90 CAPSULE | Refills: 3 | Status: SHIPPED | OUTPATIENT
Start: 2018-08-22 | End: 2018-09-04

## 2018-08-22 NOTE — PROGRESS NOTES
Appointment source: New Patient  Patient name: Betsy Resendiz  Urology Staff: Giacomo Thomas MD    Subjective: This is a 53 year old year old female complaining of urinary retention    Objective:  Describes high volume (> 1 liter) urinary retention several weeks ago.    Has a history of prior episodes of retention.    Complaining of bladder spasms.    Assessment:  Recurrent urinary retention. Possibly a anticholinergic side effect related to her medications.    Plan:  Will treat her bladder spasms (secondary to the mac) with oxybutynin which needs to be stopped prior to her next voiding trial. The recurring retention will be treated with long term terazosin.    [facility] [place]    Total time 30 minutes, counseling 20 minutes discussing urinary retention.

## 2018-08-22 NOTE — PATIENT INSTRUCTIONS
Begin oxybutynin for control of her bladder spasm induced by the catheter.    Culture the urine prior to her next voiding trial (10 days to two weeks from now) as bacteria are probably in the urine. Inform the lab that multiple organisms are expected and that sensitivities should be reported. Start culture specific antibiotics if bacteria are found in the urine culture one to two days prior to the voiding trial and treat for seven days.    Stop the oxybutynin about 2 days prior to the next voiding trial as it can interfere with bladder emptying.    Avoid allowing the bladder to accumulate more than about 500 ml of urine prior to abandoning a voiding trial and re instituting mac catheter drainage.    Terazosin has also been ordered for long term support of bladder emptying in view of the recurring pattern of urinary retention.

## 2018-08-27 ENCOUNTER — NURSING HOME VISIT (OUTPATIENT)
Dept: GERIATRICS | Facility: CLINIC | Age: 53
End: 2018-08-27
Payer: COMMERCIAL

## 2018-08-27 VITALS
HEART RATE: 93 BPM | DIASTOLIC BLOOD PRESSURE: 63 MMHG | TEMPERATURE: 97.7 F | OXYGEN SATURATION: 93 % | WEIGHT: 252.2 LBS | SYSTOLIC BLOOD PRESSURE: 142 MMHG | BODY MASS INDEX: 43.29 KG/M2 | RESPIRATION RATE: 18 BRPM

## 2018-08-27 DIAGNOSIS — Z97.8 CHRONIC INDWELLING FOLEY CATHETER: ICD-10-CM

## 2018-08-27 DIAGNOSIS — G89.4 CHRONIC PAIN SYNDROME: Primary | ICD-10-CM

## 2018-08-27 DIAGNOSIS — J45.909 MODERATE ASTHMA, UNSPECIFIED WHETHER COMPLICATED, UNSPECIFIED WHETHER PERSISTENT: ICD-10-CM

## 2018-08-27 DIAGNOSIS — F25.0 SCHIZOAFFECTIVE DISORDER, BIPOLAR TYPE (H): ICD-10-CM

## 2018-08-27 DIAGNOSIS — R33.9 URINARY RETENTION: ICD-10-CM

## 2018-08-27 PROCEDURE — 99309 SBSQ NF CARE MODERATE MDM 30: CPT | Performed by: NURSE PRACTITIONER

## 2018-08-27 NOTE — PROGRESS NOTES
"[facility]    Chief Complaint   Patient presents with     Nursing Home Acute       HPI:    Betsy Resendiz is a 53 year old  (1965), who is being seen today for an episodic care visit at [facility] [place].    HPI information obtained from: facility chart records, patient report and [facility] chart review.     Today's concern is:     Chronic pain syndrome  Urinary retention  Chronic indwelling Mac catheter     Patient is requesting a visit today to discuss her pain. She states the pain is \"all over\" especially in her neck and low back. Prior to TCU admission patient was on chronic narcotics. She fell at home and broke her humerus.     Have had several prior discussions with patient regarding her pain and that chronic narcotics are not beneficial for treating chronic pain due to tolerance. Patient also has urinary retention, with an indwelling mac. She is following with urology for this.     REVIEW OF SYSTEMS:  4 point ROS including Respiratory, CV, GI and , other than that noted in the HPI,  is negative    /63  Pulse 93  Temp 97.7  F (36.5  C)  Resp 18  Wt 252 lb 3.2 oz (114.4 kg)  SpO2 93%  BMI 43.29 kg/m2  GENERAL APPEARANCE:  Alert, in no distress  RESP:  respiratory effort and palpation of chest normal, auscultation of lungs diminished , no respiratory distress  CV:  Palpation and auscultation of heart done , rate and rhythm reglar, no murmur, +1 peripheral edema  ABDOMEN:  normal bowel sounds, soft, nontender, no hepatosplenomegaly or other masses  M/S:   Gait and station steady with walker, Digits and nails at baseline  NEURO: 2-12 in normal limits and at patient's baseline  PSYCH:  insight and judgement, memory fair , affect and mood irritable    ASSESSMENT/PLAN:     Chronic pain syndrome  Urinary retention  Chronic indwelling Mac catheter  Moderate asthma, unspecified whether complicated, unspecified whether persistent  Schizoaffective disorder, bipolar type " (H)     - labs ordered to r/o RA/lupus  - will start low dose cyclobenzaprine  - will discontinue ibuprofen and add naproxen  - consider pain clinic referral    With hx of fall 2/2 to narcotic use and ongoing urinary retention will continue to utilize non narcotic modalities for pain.    Will continue to attempt to reduce polypharmacy. Plan to stop Singulair today as possible side effect of irritability. Plan to monitor breathing.,     Orders:  1.  CBC, CMP, ESR, CRP, LEE, rheumatoid factor.  Dx:  Pain/fibromyalgia  2.  DC ibuprofen  3.  Naproxen 500mg every 8hrs PRN for pain  4.  DC singulair  5.  Cyclobenzaprine 5mg TID PRN.  Dx:  Chronic pain/fibromyalgia        Electronically signed by:  DELL Queen CNP

## 2018-08-27 NOTE — LETTER
"    8/27/2018        RE: Betsy Resendiz  [address]  [place] [address]        [facility]    Chief Complaint   Patient presents with     Nursing Home Acute       HPI:    Betsy Resendiz is a 53 year old  (1965), who is being seen today for an episodic care visit at [facility] [place].    HPI information obtained from: facility chart records, patient report and [facility] chart review.     Today's concern is:     Chronic pain syndrome  Urinary retention  Chronic indwelling Mac catheter     Patient is requesting a visit today to discuss her pain. She states the pain is \"all over\" especially in her neck and low back. Prior to TCU admission patient was on chronic narcotics. She fell at home and broke her humerus.     Have had several prior discussions with patient regarding her pain and that chronic narcotics are not beneficial for treating chronic pain due to tolerance. Patient also has urinary retention, with an indwelling mac. She is following with urology for this.     REVIEW OF SYSTEMS:  4 point ROS including Respiratory, CV, GI and , other than that noted in the HPI,  is negative    /63  Pulse 93  Temp 97.7  F (36.5  C)  Resp 18  Wt 252 lb 3.2 oz (114.4 kg)  SpO2 93%  BMI 43.29 kg/m2  GENERAL APPEARANCE:  Alert, in no distress  RESP:  respiratory effort and palpation of chest normal, auscultation of lungs diminished , no respiratory distress  CV:  Palpation and auscultation of heart done , rate and rhythm reglar, no murmur, +1 peripheral edema  ABDOMEN:  normal bowel sounds, soft, nontender, no hepatosplenomegaly or other masses  M/S:   Gait and station steady with walker, Digits and nails at baseline  NEURO: 2-12 in normal limits and at patient's baseline  PSYCH:  insight and judgement, memory fair , affect and mood irritable    ASSESSMENT/PLAN:     Chronic pain syndrome  Urinary retention  Chronic indwelling Mac catheter  Moderate asthma, " unspecified whether complicated, unspecified whether persistent  Schizoaffective disorder, bipolar type (H)     - labs ordered to r/o RA/lupus  - will start low dose cyclobenzaprine  - will discontinue ibuprofen and add naproxen  - consider pain clinic referral    With hx of fall 2/2 to narcotic use and ongoing urinary retention will continue to utilize non narcotic modalities for pain.    Will continue to attempt to reduce polypharmacy. Plan to stop Singulair today as possible side effect of irritability. Plan to monitor breathing.,     Orders:  1.  CBC, CMP, ESR, CRP, LEE, rheumatoid factor.  Dx:  Pain/fibromyalgia  2.  DC ibuprofen  3.  Naproxen 500mg every 8hrs PRN for pain  4.  DC singulair  5.  Cyclobenzaprine 5mg TID PRN.  Dx:  Chronic pain/fibromyalgia        Electronically signed by:  DELL Queen CNP      Sincerely,        DELL Queen CNP

## 2018-08-30 ENCOUNTER — HOSPITAL LABORATORY (OUTPATIENT)
Facility: OTHER | Age: 53
End: 2018-08-30

## 2018-08-30 LAB
ALBUMIN SERPL-MCNC: 3.1 G/DL (ref 3.4–5)
ALP SERPL-CCNC: 105 U/L (ref 40–150)
ALT SERPL W P-5'-P-CCNC: 23 U/L (ref 0–50)
ANION GAP SERPL CALCULATED.3IONS-SCNC: 6 MMOL/L (ref 3–14)
AST SERPL W P-5'-P-CCNC: 15 U/L (ref 0–45)
BILIRUB SERPL-MCNC: 0.3 MG/DL (ref 0.2–1.3)
BUN SERPL-MCNC: 13 MG/DL (ref 7–30)
CALCIUM SERPL-MCNC: 8.8 MG/DL (ref 8.5–10.1)
CHLORIDE SERPL-SCNC: 105 MMOL/L (ref 94–109)
CO2 SERPL-SCNC: 30 MMOL/L (ref 20–32)
CREAT SERPL-MCNC: 1.06 MG/DL (ref 0.52–1.04)
CRP SERPL-MCNC: 38.8 MG/L (ref 0–8)
ERYTHROCYTE [DISTWIDTH] IN BLOOD BY AUTOMATED COUNT: 13.9 % (ref 10–15)
ERYTHROCYTE [SEDIMENTATION RATE] IN BLOOD BY WESTERGREN METHOD: 33 MM/H (ref 0–30)
GFR SERPL CREATININE-BSD FRML MDRD: 55 ML/MIN/1.7M2
GLUCOSE SERPL-MCNC: 83 MG/DL (ref 70–99)
HCT VFR BLD AUTO: 36 % (ref 35–47)
HGB BLD-MCNC: 11.3 G/DL (ref 11.7–15.7)
MCH RBC QN AUTO: 32.1 PG (ref 26.5–33)
MCHC RBC AUTO-ENTMCNC: 31.4 G/DL (ref 31.5–36.5)
MCV RBC AUTO: 102 FL (ref 78–100)
PLATELET # BLD AUTO: 218 10E9/L (ref 150–450)
POTASSIUM SERPL-SCNC: 3.8 MMOL/L (ref 3.4–5.3)
PROT SERPL-MCNC: 6.9 G/DL (ref 6.8–8.8)
RBC # BLD AUTO: 3.52 10E12/L (ref 3.8–5.2)
SODIUM SERPL-SCNC: 141 MMOL/L (ref 133–144)
WBC # BLD AUTO: 10 10E9/L (ref 4–11)

## 2018-08-31 ENCOUNTER — TELEPHONE (OUTPATIENT)
Dept: GERIATRICS | Facility: CLINIC | Age: 53
End: 2018-08-31

## 2018-08-31 LAB
ANA SER QL IF: NEGATIVE
RHEUMATOID FACT SER NEPH-ACNC: <20 IU/ML (ref 0–20)

## 2018-08-31 NOTE — TELEPHONE ENCOUNTER
RN called to report skin issue with Mrs. Resendiz.     Reports on her Left forearm she has two patch open skin areas where she has been scratching, and in-between the two areas she has raised white bumps also itchy.  RN reports she has no other bumps on her body, but does mention RUE is in an immobilizer.  Afebrile.  I do note WBC of 10 and mild elevated CRP of 38.8 on 8/30 for her RA workup, CRP in Aug 2017 was 2.62.     Unclear if this is related to bumps/skin issue or not.     Review of EMR also noted due to chronic pain she had recent med changes, Ibuprofen stopped, Naproxen and Cyclobenzaprine started.  RN reports she had some PRN doses of each on the 29th and 30th.  With only 1 smaller location on Left arm, wound not suspect drug reaction, but it's not ruled out.     Orders:  1) Continue daily VS's call if abnormal.   2) Clean with Left arm daily with soap and water  3) Start Benadryl OTC cream 2% q6h PRN for itching, avoid open skin  4) Have in house NP eval/treat with next visit.   5) Call over weekend if it get's worse.   6) Daily skin checks to verify not appearing else where on body.   --If bumps are spreading, we may need to cancel newly started medications and revert back to Ibuprofen in the short term.

## 2018-09-04 ENCOUNTER — NURSING HOME VISIT (OUTPATIENT)
Dept: GERIATRICS | Facility: CLINIC | Age: 53
End: 2018-09-04
Payer: COMMERCIAL

## 2018-09-04 VITALS
TEMPERATURE: 97.9 F | DIASTOLIC BLOOD PRESSURE: 64 MMHG | SYSTOLIC BLOOD PRESSURE: 122 MMHG | RESPIRATION RATE: 16 BRPM | WEIGHT: 256.8 LBS | BODY MASS INDEX: 44.08 KG/M2 | HEART RATE: 65 BPM | OXYGEN SATURATION: 98 %

## 2018-09-04 DIAGNOSIS — L30.9 DERMATITIS: Primary | ICD-10-CM

## 2018-09-04 DIAGNOSIS — F25.9 SCHIZOAFFECTIVE DISORDER, UNSPECIFIED TYPE (H): ICD-10-CM

## 2018-09-04 DIAGNOSIS — F25.0 SCHIZOAFFECTIVE DISORDER, BIPOLAR TYPE (H): ICD-10-CM

## 2018-09-04 DIAGNOSIS — G89.4 CHRONIC PAIN SYNDROME: ICD-10-CM

## 2018-09-04 PROCEDURE — 99309 SBSQ NF CARE MODERATE MDM 30: CPT | Performed by: NURSE PRACTITIONER

## 2018-09-04 NOTE — LETTER
9/4/2018        RE: Betsy Resendiz  [address]  [place] [address]        [place] [facility]    Chief Complaint   Patient presents with     Nursing Home Acute       HPI:    Betsy Resendiz is a 53 year old  (1965), who is being seen today for an episodic care visit at [facility] [place].    HPI information obtained from: facility chart records, facility staff and patient report.     Today's concern is:  Dermatitis  - Per staff patient reported arm itching. Started about 4 days ago.   - No new medications, lotions, creams or fabric softeners  - Per staff patient did go on an outing over the weekend (a walk around town)     Schizoaffective disorder, unspecified type (H)  Schizoaffective disorder, bipolar type (H)  - Reviewed new orders from patient's psych provider. Noted ativan prn added.   - Per patient having anxiety. Writer has been meeting with patient 1-2 times per week and has not reported anxiety. Falling asleep today during therapy.     Chronic pain syndrome  - Have tapered off most narcotics. Less narcotic seeking behaviors noted.         REVIEW OF SYSTEMS:  4 point ROS including Respiratory, CV, GI and , other than that noted in the HPI,  is negative    /64  Pulse 65  Temp 97.9  F (36.6  C)  Resp 16  Wt 256 lb 12.8 oz (116.5 kg)  SpO2 98%  BMI 44.08 kg/m2  GENERAL APPEARANCE:  Alert, in no distress      ASSESSMENT/PLAN:  Dermatitis  - will start low dose hydrocortisone cream  - Nsg to change dressing every day and update with concerns    Schizoaffective disorder, unspecified type (H)    Schizoaffective disorder, bipolar type (H)  - No signs of anxiety noted by writer.   - Advised patient to use this sparingly and if this needs renewal will need psych provider to fill if additional refills needed. Will need to follow up in 14 days.    Chronic pain syndrome  - have weaned off all chronic narcotics except for 25 mg prn tramadol Q 6  - continue prn  naproxen, flexeril and gabapentin for chronic pain       Orders:  1.  Hydrocortisone 2.5% cream to left arm daily  2.  Cover with non-adhesive dressing  3.  Nursing to update if increased redness  4.  PRN ativan to be used sparingly.  If refill needed--staff to contact Dr. Veliz    Total time spent with patient visit at the [facility] nursing [facility] was 25 min including patient visit, review of past records and discussion with nsg. Greater than 50% of total time spent with counseling and coordinating care due to complex mental health conditions    Electronically signed by:  DELL Queen CNP      Sincerely,        DELL Queen CNP

## 2018-09-07 ENCOUNTER — TELEPHONE (OUTPATIENT)
Dept: UROLOGY | Facility: CLINIC | Age: 53
End: 2018-09-07

## 2018-09-07 NOTE — TELEPHONE ENCOUNTER
I spoke to Dr Thomas. We reviewed the cathed urine volumes for Betsy. Dr Thomas would like to keep Betsy's urine volumes closer to 500 ml's. He is asking that staff straight cath Betsy more often to obtain smaller urine volumes of 500 cc's or less.   I spoke to Kyle again. One of Betsy's caregivers. She said that they could do this. I told her that I would fax this information to them for their record. Diana KUMAR Rn

## 2018-09-07 NOTE — TELEPHONE ENCOUNTER
I spoke to one of Betsy's caregiver Kyle today. Betsy is a patient of Dr Thomas's with Urinary retention. Kyle said that Dr Thomas had wanted them to straight cath Betsy about every 4 hours. She calls today to report the resent times and volumes of Urine.     At 11:00 pm the cathed volume is 900cc  At 3:30 am cathed volume was 550 cc   At 10:45 am the cathed volume 1500cc  At 12:35 pm the cathed volume 800cc    I asked Kyle to continue with Dr Thomas's straight cath recommendation and then I will route the information to Dr Thomas.  Diana KUMAR Rn

## 2018-09-07 NOTE — TELEPHONE ENCOUNTER
Daisy from [facility] called stating that Dr. Thomas had placed pt on a urinary trial in which pt should void no more than 500ccs, however, pt failed the test as she voiced 1500ccs today. Daisy states that Dr. Thomas wanted to see pt if this did not work and cannot schedule an appointment until October. Please advise.    Piper Kingsford  Clinic Station

## 2018-09-10 ENCOUNTER — NURSING HOME VISIT (OUTPATIENT)
Dept: GERIATRICS | Facility: CLINIC | Age: 53
End: 2018-09-10
Payer: COMMERCIAL

## 2018-09-10 VITALS
TEMPERATURE: 97.8 F | DIASTOLIC BLOOD PRESSURE: 64 MMHG | WEIGHT: 256.8 LBS | HEART RATE: 78 BPM | BODY MASS INDEX: 44.08 KG/M2 | RESPIRATION RATE: 18 BRPM | OXYGEN SATURATION: 94 % | SYSTOLIC BLOOD PRESSURE: 122 MMHG

## 2018-09-10 DIAGNOSIS — R33.9 URINARY RETENTION: ICD-10-CM

## 2018-09-10 DIAGNOSIS — S42.301D CLOSED FRACTURE OF SHAFT OF RIGHT HUMERUS WITH ROUTINE HEALING, UNSPECIFIED FRACTURE MORPHOLOGY, SUBSEQUENT ENCOUNTER: Primary | ICD-10-CM

## 2018-09-10 DIAGNOSIS — F25.0 SCHIZOAFFECTIVE DISORDER, BIPOLAR TYPE (H): ICD-10-CM

## 2018-09-10 DIAGNOSIS — I10 HYPERTENSION, UNSPECIFIED TYPE: ICD-10-CM

## 2018-09-10 PROBLEM — I26.99 PULMONARY EMBOLUS (H): Status: RESOLVED | Noted: 2018-02-08 | Resolved: 2018-09-10

## 2018-09-10 PROCEDURE — 99308 SBSQ NF CARE LOW MDM 20: CPT | Performed by: NURSE PRACTITIONER

## 2018-09-10 RX ORDER — PERPHENAZINE 4 MG/1
4 TABLET ORAL 2 TIMES DAILY
COMMUNITY

## 2018-09-10 NOTE — LETTER
9/10/2018        RE: Betsy Resendiz  [address]  [place] [address]        [place] [facility]    Chief Complaint   Patient presents with     Nursing Home Acute       HPI:    Betsy Resendiz is a 53 year old  (1965), who is being seen today for an episodic care visit at [facility] [place].    HPI information obtained from: facility chart records, facility staff, patient report and [facility] chart review.     Today's concern is:     Closed fracture of shaft of right humerus with routine healing, unspecified fracture morphology, subsequent encounter  Urinary retention  Schizoaffective disorder, bipolar type (H)  Hypertension, unspecified type     Seeing patient to follow up on urinary retention:  Patient had a large amt of urine retention following mac removal. Patient declining st cath Q 4 hr. No dysuria. States cannot initiate urine stream.     Patient recently saw her psych provider. Denies hallucinations. Is attending all activities.     No CP. No dizzy spells.     Rash on left arm. Arm wrapped today. No reports of infection.     REVIEW OF SYSTEMS:  4 point ROS including Respiratory, CV, GI and , other than that noted in the HPI,  is negative    /64  Pulse 78  Temp 97.8  F (36.6  C)  Resp 18  Wt 256 lb 12.8 oz (116.5 kg)  SpO2 94%  BMI 44.08 kg/m2  GENERAL APPEARANCE:  Alert, in no distress      ASSESSMENT/PLAN:  Closed fracture of shaft of right humerus with routine healing, unspecified fracture morphology, subsequent encounter  - Working with physical therapy/OT. Pain controlled.     Urinary retention  - Mac cath placed. Will have patient f/u with urology    Schizoaffective disorder, bipolar type (H)  - on multiple medications. Following with psych provider  - On multiple meds. Often falling asleep during the day.     Hypertension, unspecified type  -    - Will stop cozaar    Orders:  1.  Please take a picture of rash on arm or if NP present please  get me  2.  Did a f/u with urology get scheduled?  3.  DC cozaar  4.  DC PRN Tramadol        Electronically signed by:  DELL Queen CNP      Sincerely,        DELL Queen CNP

## 2018-09-10 NOTE — PROGRESS NOTES
[place]    Chief Complaint   Patient presents with     Nursing Home Acute       HPI:    Betsy Resendiz is a 53 year old  (1965), who is being seen today for an episodic care visit at [facility] [place].    HPI information obtained from: facility chart records, facility staff, patient report and [facility] chart review.     Today's concern is:     Closed fracture of shaft of right humerus with routine healing, unspecified fracture morphology, subsequent encounter  Urinary retention  Schizoaffective disorder, bipolar type (H)  Hypertension, unspecified type     Seeing patient to follow up on urinary retention:  Patient had a large amt of urine retention following mac removal. Patient declining st cath Q 4 hr. No dysuria. States cannot initiate urine stream.     Patient recently saw her psych provider. Denies hallucinations. Is attending all activities.     No CP. No dizzy spells.     Rash on left arm. Arm wrapped today. No reports of infection.     REVIEW OF SYSTEMS:  4 point ROS including Respiratory, CV, GI and , other than that noted in the HPI,  is negative    /64  Pulse 78  Temp 97.8  F (36.6  C)  Resp 18  Wt 256 lb 12.8 oz (116.5 kg)  SpO2 94%  BMI 44.08 kg/m2  GENERAL APPEARANCE:  Alert, in no distress      ASSESSMENT/PLAN:  Closed fracture of shaft of right humerus with routine healing, unspecified fracture morphology, subsequent encounter  - Working with physical therapy/OT. Pain controlled.     Urinary retention  - Mac cath placed. Will have patient f/u with urology    Schizoaffective disorder, bipolar type (H)  - on multiple medications. Following with psych provider  - On multiple meds. Often falling asleep during the day.     Hypertension, unspecified type  -    - Will stop cozaar    Orders:  1.  Please take a picture of rash on arm or if NP present please get me  2.  Did a f/u with urology get scheduled?  3.  DC cozaar  4.  DC PRN  Tramadol        Electronically signed by:  DELL Queen CNP

## 2018-09-11 VITALS
HEIGHT: 64 IN | WEIGHT: 256.8 LBS | OXYGEN SATURATION: 94 % | HEART RATE: 97 BPM | TEMPERATURE: 97.8 F | RESPIRATION RATE: 18 BRPM | SYSTOLIC BLOOD PRESSURE: 122 MMHG | DIASTOLIC BLOOD PRESSURE: 64 MMHG | BODY MASS INDEX: 43.84 KG/M2

## 2018-09-11 NOTE — PROGRESS NOTES
[place] [facility]    Chief Complaint   Patient presents with     intermediate Regulatory       [place] Medical Record Number:  [id number]    HPI:    Betsy Resendiz is a 53 year old  (1965), who is being seen today for a federally mandated E/M visit at [facility].  HPI information obtained from: facility staff, patient report, [facility] chart review and NP Ayaz.     Today's concerns are:  -  - Resident seen and examined.   - Reports OT ongoing and feels getting much better; off O2 now, but still smokes cigarette.   - Denies pain in the arm, - Reports sleep, appetite and BM are fine.   - RN has no concern today.   - GNP reports that Pt was recently evaluated by Psych who added ativan for auditory hallucination, per Pt's report, which she has not reported to the NH providers. Has s ongoing urinary retention. Have consulted with pharmacy for a med review to see if meds contributing. No obvious meds contributing since taper off of narcotics. She is due to follow up with urology.   -------------------------------  - - Past Medical, social, family histories, medications, and allergies reviewed and updated  - Medications reviewed: in the chart and EHR.   - Case Management:   I have reviewed the care plan and MDS and do agree with the plan. Patient's desire to return to the community is not present.  Information reviewed:  Medications, vital signs, orders, and nursing notes.    MEDICATIONS:  Current Outpatient Prescriptions   Medication Sig Dispense Refill     albuterol (PROAIR HFA/PROVENTIL HFA/VENTOLIN HFA) 108 (90 BASE) MCG/ACT Inhaler Inhale 2 puffs into the lungs every 4 hours as needed        brimonidine (ALPHAGAN-P) 0.15 % ophthalmic solution Place 1 drop into both eyes 2 times daily       budesonide (PULMICORT) 1 MG/2ML SUSP neb solution USE 1 VIAL IN NEBULIZER TWICE DAILY       cariprazine (VRAYLAR) 1.5 MG CAPS capsule Take 2 mg by mouth daily        cholecalciferol (VITAMIN D3) 5000 units  CAPS capsule Take 5,000 Units by mouth daily       clotrimazole (LOTRIMIN) 1 % cream Apply topically 2 times daily       CYCLOBENZAPRINE HCL PO Take 5 mg by mouth 3 times daily       docusate sodium (STOOL SOFTENER) 100 MG capsule Take 100 mg by mouth At Bedtime        escitalopram (LEXAPRO) 20 MG tablet Take 1 tablet by mouth at bedtime       fluticasone (FLONASE) 50 MCG/ACT spray Spray 1 spray into both nostrils daily as needed        furosemide (LASIX) 80 MG tablet Take 40 mg by mouth 2 times daily        gabapentin (NEURONTIN) 800 MG tablet Take 400 mg by mouth 3 times daily        ipratropium - albuterol 0.5 mg/2.5 mg/3 mL (DUONEB) 0.5-2.5 (3) MG/3ML neb solution Take 1 vial by nebulization 4 times daily       lamoTRIgine (LAMICTAL) 200 MG tablet Take 200 mg by mouth At Bedtime        LANsoprazole (PREVACID) 30 MG CR capsule Take 30 mg by mouth daily        LIOTHYRONINE SODIUM PO Take 25 mcg by mouth daily       lithium 300 MG capsule Take 1 capsule by mouth in the morning and 2 capsules at bedtime       meclizine (ANTIVERT) 25 MG tablet Take 25 mg by mouth every 8 hours as needed        NAPROXEN PO Take 500 mg by mouth every 8 hours as needed for moderate pain       OYSTER SHELL CALCIUM PO Take 500 mg by mouth 4 times daily       paliperidone (INVEGA) 6 MG 24 hr tablet Take 6 mg by mouth every morning       perphenazine 4 MG tablet Take 4 mg by mouth 2 times daily       POTASSIUM CHLORIDE ER PO 30meq by mouth two times a day       pregabalin (LYRICA) 150 MG capsule Take 150 mg by mouth 2 times daily        travoprost, BAK Free, (TRAVATAN Z) 0.004 % ophthalmic solution Place 1 drop into both eyes At Bedtime       ziprasidone (GEODON) 40 MG capsule Take 1 capsule by mouth in the morning       ziprasidone (GEODON) 80 MG capsule Take 160 mg by mouth At Bedtime        ACETAMINOPHEN PO Take 650 mg by mouth every 4 hours as needed for pain       LORazepam (ATIVAN PO) Take 0.5 mg by mouth 2 times daily as needed for  "anxiety       PROPRANOLOL HCL PO Take 10 mg by mouth 2 times daily     ROS:  4 point ROS including Respiratory, CV, GI and , other than that noted in the HPI,  is negative    Exam:  Vitals: /64  Pulse 97  Temp 97.8  F (36.6  C)  Resp 18  Ht 5' 4\" (1.626 m)  Wt 256 lb 12.8 oz (116.5 kg)  SpO2 94%  BMI 44.08 kg/m2  BMI= Body mass index is 44.08 kg/(m^2).  GENERAL APPEARANCE:  in no distress, cooperative,   RESP:  respiratory effort and palpation of chest normal, lungs clear to auscultation , no respiratory distress  CV:  Palpation and auscultation of heart done , regular rate and rhythm, no murmur, rub, or gallop, no peripheral edema   ABDOMEN:  normal bowel sounds, soft, nontender, no hepatosplenomegaly or other masses. Abernathy's cath.   M/S:   Gait and station abnormal uses walker and WC for ambulation.   SKIN:  deep copper discoloration over distal legs. Left forearm wound dressed- dry  NEURO:   no NFD appreciated on observation.   PSYCH:  mood and affect flat, drowsy.    Lab/Diagnostic data:   CBC RESULTS:   Recent Labs   Lab Test  08/30/18   0733  08/03/18   0945   WBC  10.0  15.6*   RBC  3.52*  3.66*   HGB  11.3*  11.3*   HCT  36.0  36.6   MCV  102*  100   MCH  32.1  30.9   MCHC  31.4*  30.9*   RDW  13.9  14.0   PLT  218  232       Last Basic Metabolic Panel:  Recent Labs   Lab Test  08/30/18   0733  08/03/18   0945   NA  141  139   POTASSIUM  3.8  3.6   CHLORIDE  105  100   GAURAV  8.8  9.1   CO2  30  33*   BUN  13  24   CR  1.06*  1.24*   GLC  83  81       Liver Function Studies -   Recent Labs   Lab Test  08/30/18   0733  08/03/18   0945   PROTTOTAL  6.9  7.1   ALBUMIN  3.1*  3.1*   BILITOTAL  0.3  0.3   ALKPHOS  105  102   AST  15  10   ALT  23  22       TSH   Date Value Ref Range Status   08/03/2018 1.40 0.40 - 4.00 mU/L Final   ]    Lab Results   Component Value Date    A1C 5.4 08/03/2018       ASSESSMENT/PLAN  Closed fracture of shaft of right humerus with routine healing, unspecified fracture " morphology, subsequent encounter  Opioid type dependence, continuous use (H)  - OT ongoing, making a progress.   - analgesia optimal.     Chronic respiratory failure with hypoxia (H)  Chronic obstructive pulmonary disease, unspecified COPD type (H)  - now off O2, doing fine. Continue meds.     Morbid obesity with BMI of 40.0-44.9, adult (H)  -  about diet modification.     Schizoaffective d/o:  - followed by Psychiatry.   - on multiple medications.   - follow on the recommendations.     Urinary Retention:  - has indwelling urinary catheter, followed by Dr. Thomas, follow on the recommendations.     T2D:  -A1C 5.4, over controlled. metformin stopped in August, agree. Repeat A1C when due.  Keep Level above 7.       Orders:  - See above, otherwise, continue the rest of the current POC.       Electronically signed by:  Selene Rutherford MD

## 2018-09-12 ENCOUNTER — NURSING HOME VISIT (OUTPATIENT)
Dept: GERIATRICS | Facility: CLINIC | Age: 53
End: 2018-09-12
Payer: COMMERCIAL

## 2018-09-12 DIAGNOSIS — J96.11 CHRONIC RESPIRATORY FAILURE WITH HYPOXIA (H): ICD-10-CM

## 2018-09-12 DIAGNOSIS — F25.0 SCHIZOAFFECTIVE DISORDER, BIPOLAR TYPE (H): ICD-10-CM

## 2018-09-12 DIAGNOSIS — S42.301D CLOSED FRACTURE OF SHAFT OF RIGHT HUMERUS WITH ROUTINE HEALING, UNSPECIFIED FRACTURE MORPHOLOGY, SUBSEQUENT ENCOUNTER: ICD-10-CM

## 2018-09-12 DIAGNOSIS — J44.9 CHRONIC OBSTRUCTIVE PULMONARY DISEASE, UNSPECIFIED COPD TYPE (H): ICD-10-CM

## 2018-09-12 DIAGNOSIS — F11.20 OPIOID TYPE DEPENDENCE, CONTINUOUS USE (H): Primary | ICD-10-CM

## 2018-09-12 DIAGNOSIS — E66.01 MORBID OBESITY WITH BMI OF 40.0-44.9, ADULT (H): ICD-10-CM

## 2018-09-12 DIAGNOSIS — R33.9 URINARY RETENTION: ICD-10-CM

## 2018-09-12 PROCEDURE — 99309 SBSQ NF CARE MODERATE MDM 30: CPT | Performed by: FAMILY MEDICINE

## 2018-09-12 NOTE — LETTER
9/12/2018        RE: Betsy Resendiz  [address]  [place] [address]          [place] [facility]    Chief Complaint   Patient presents with     [facility] Regulatory       [place] Medical Record Number:  [id number]    HPI:    Betsy Resendiz is a 53 year old  (1965), who is being seen today for a federally mandated E/M visit at [facility].  HPI information obtained from: facility staff, patient report, [facility] chart review and NP Ayaz.     Today's concerns are:  -  - Resident seen and examined.   - Reports OT ongoing and feels getting much better; off O2 now, but still smokes cigarette.   - Denies pain in the arm, - Reports sleep, appetite and BM are fine.   - RN has no concern today.   - GNP reports that Pt was recently evaluated by Psych who added ativan for auditory hallucination, per Pt's report, which she has not reported to the NH providers. Has s ongoing urinary retention. Have consulted with pharmacy for a med review to see if meds contributing. No obvious meds contributing since taper off of narcotics. She is due to follow up with urology.   -------------------------------  - - Past Medical, social, family histories, medications, and allergies reviewed and updated  - Medications reviewed: in the chart and EHR.   - Case Management:   I have reviewed the care plan and MDS and do agree with the plan. Patient's desire to return to the community is not present.  Information reviewed:  Medications, vital signs, orders, and nursing notes.    MEDICATIONS:  Current Outpatient Prescriptions   Medication Sig Dispense Refill     albuterol (PROAIR HFA/PROVENTIL HFA/VENTOLIN HFA) 108 (90 BASE) MCG/ACT Inhaler Inhale 2 puffs into the lungs every 4 hours as needed        brimonidine (ALPHAGAN-P) 0.15 % ophthalmic solution Place 1 drop into both eyes 2 times daily       budesonide (PULMICORT) 1 MG/2ML SUSP neb solution USE 1 VIAL IN NEBULIZER TWICE DAILY       cariprazine  (VRAYLAR) 1.5 MG CAPS capsule Take 2 mg by mouth daily        cholecalciferol (VITAMIN D3) 5000 units CAPS capsule Take 5,000 Units by mouth daily       clotrimazole (LOTRIMIN) 1 % cream Apply topically 2 times daily       CYCLOBENZAPRINE HCL PO Take 5 mg by mouth 3 times daily       docusate sodium (STOOL SOFTENER) 100 MG capsule Take 100 mg by mouth At Bedtime        escitalopram (LEXAPRO) 20 MG tablet Take 1 tablet by mouth at bedtime       fluticasone (FLONASE) 50 MCG/ACT spray Spray 1 spray into both nostrils daily as needed        furosemide (LASIX) 80 MG tablet Take 40 mg by mouth 2 times daily        gabapentin (NEURONTIN) 800 MG tablet Take 400 mg by mouth 3 times daily        ipratropium - albuterol 0.5 mg/2.5 mg/3 mL (DUONEB) 0.5-2.5 (3) MG/3ML neb solution Take 1 vial by nebulization 4 times daily       lamoTRIgine (LAMICTAL) 200 MG tablet Take 200 mg by mouth At Bedtime        LANsoprazole (PREVACID) 30 MG CR capsule Take 30 mg by mouth daily        LIOTHYRONINE SODIUM PO Take 25 mcg by mouth daily       lithium 300 MG capsule Take 1 capsule by mouth in the morning and 2 capsules at bedtime       meclizine (ANTIVERT) 25 MG tablet Take 25 mg by mouth every 8 hours as needed        NAPROXEN PO Take 500 mg by mouth every 8 hours as needed for moderate pain       OYSTER SHELL CALCIUM PO Take 500 mg by mouth 4 times daily       paliperidone (INVEGA) 6 MG 24 hr tablet Take 6 mg by mouth every morning       perphenazine 4 MG tablet Take 4 mg by mouth 2 times daily       POTASSIUM CHLORIDE ER PO 30meq by mouth two times a day       pregabalin (LYRICA) 150 MG capsule Take 150 mg by mouth 2 times daily        travoprost, BAK Free, (TRAVATAN Z) 0.004 % ophthalmic solution Place 1 drop into both eyes At Bedtime       ziprasidone (GEODON) 40 MG capsule Take 1 capsule by mouth in the morning       ziprasidone (GEODON) 80 MG capsule Take 160 mg by mouth At Bedtime        ACETAMINOPHEN PO Take 650 mg by mouth every 4  "hours as needed for pain       LORazepam (ATIVAN PO) Take 0.5 mg by mouth 2 times daily as needed for anxiety       PROPRANOLOL HCL PO Take 10 mg by mouth 2 times daily     ROS:  4 point ROS including Respiratory, CV, GI and , other than that noted in the HPI,  is negative    Exam:  Vitals: /64  Pulse 97  Temp 97.8  F (36.6  C)  Resp 18  Ht 5' 4\" (1.626 m)  Wt 256 lb 12.8 oz (116.5 kg)  SpO2 94%  BMI 44.08 kg/m2  BMI= Body mass index is 44.08 kg/(m^2).  GENERAL APPEARANCE:  in no distress, cooperative,   RESP:  respiratory effort and palpation of chest normal, lungs clear to auscultation , no respiratory distress  CV:  Palpation and auscultation of heart done , regular rate and rhythm, no murmur, rub, or gallop, no peripheral edema   ABDOMEN:  normal bowel sounds, soft, nontender, no hepatosplenomegaly or other masses. Abernathy's cath.   M/S:   Gait and station abnormal uses walker and WC for ambulation.   SKIN:  deep copper discoloration over distal legs. Left forearm wound dressed- dry  NEURO:   no NFD appreciated on observation.   PSYCH:  mood and affect flat, drowsy.    Lab/Diagnostic data:   CBC RESULTS:   Recent Labs   Lab Test  08/30/18 0733 08/03/18   0945   WBC  10.0  15.6*   RBC  3.52*  3.66*   HGB  11.3*  11.3*   HCT  36.0  36.6   MCV  102*  100   MCH  32.1  30.9   MCHC  31.4*  30.9*   RDW  13.9  14.0   PLT  218  232       Last Basic Metabolic Panel:  Recent Labs   Lab Test  08/30/18 0733  08/03/18   0945   NA  141  139   POTASSIUM  3.8  3.6   CHLORIDE  105  100   GAURAV  8.8  9.1   CO2  30  33*   BUN  13  24   CR  1.06*  1.24*   GLC  83  81       Liver Function Studies -   Recent Labs   Lab Test  08/30/18 0733  08/03/18   0945   PROTTOTAL  6.9  7.1   ALBUMIN  3.1*  3.1*   BILITOTAL  0.3  0.3   ALKPHOS  105  102   AST  15  10   ALT  23  22       TSH   Date Value Ref Range Status   08/03/2018 1.40 0.40 - 4.00 mU/L Final   ]    Lab Results   Component Value Date    A1C 5.4 08/03/2018 "       ASSESSMENT/PLAN  Closed fracture of shaft of right humerus with routine healing, unspecified fracture morphology, subsequent encounter  Opioid type dependence, continuous use (H)  - OT ongoing, making a progress.   - analgesia optimal.     Chronic respiratory failure with hypoxia (H)  Chronic obstructive pulmonary disease, unspecified COPD type (H)  - now off O2, doing fine. Continue meds.     Morbid obesity with BMI of 40.0-44.9, adult (H)  -  about diet modification.     Schizoaffective d/o:  - followed by Psychiatry.   - on multiple medications.   - follow on the recommendations.     Urinary Retention:  - has indwelling urinary catheter, followed by Dr. Thomas, follow on the recommendations.     T2D:  -A1C 5.4, over controlled. metformin stopped in August, agree. Repeat A1C when due.  Keep Level above 7.       Orders:  - See above, otherwise, continue the rest of the current POC.       Electronically signed by:  Selene Rutherford MD        Sincerely,        Selene Rutherford MD

## 2018-09-14 ENCOUNTER — NURSING HOME VISIT (OUTPATIENT)
Dept: GERIATRICS | Facility: CLINIC | Age: 53
End: 2018-09-14
Payer: COMMERCIAL

## 2018-09-14 VITALS
HEART RATE: 79 BPM | WEIGHT: 256 LBS | OXYGEN SATURATION: 98 % | BODY MASS INDEX: 43.94 KG/M2 | SYSTOLIC BLOOD PRESSURE: 122 MMHG | RESPIRATION RATE: 16 BRPM | DIASTOLIC BLOOD PRESSURE: 64 MMHG | TEMPERATURE: 98 F

## 2018-09-14 DIAGNOSIS — F25.0 SCHIZOAFFECTIVE DISORDER, BIPOLAR TYPE (H): Primary | ICD-10-CM

## 2018-09-14 DIAGNOSIS — R44.0 AUDITORY HALLUCINATIONS: ICD-10-CM

## 2018-09-14 DIAGNOSIS — F41.1 GAD (GENERALIZED ANXIETY DISORDER): ICD-10-CM

## 2018-09-14 PROCEDURE — 99309 SBSQ NF CARE MODERATE MDM 30: CPT | Performed by: NURSE PRACTITIONER

## 2018-09-14 NOTE — LETTER
"    9/14/2018        RE: Betsy Resendiz  [address]  [place] [address]        [facility] [facility]    Chief Complaint   Patient presents with     MCC Acute       [place] Medical Record Number:  [id number]    HPI:    Betsy Resendiz is a 53 year old  (1965), who is being seen today for an episodic care visit at [facility].   HPI information obtained from: facility chart records, facility staff, patient report and [facility] chart review.     Today's concern is:     Schizoaffective disorder, bipolar type (H)  JAVIER (generalized anxiety disorder)  Auditory hallucinations     Patient requesting a visit today to discuss her medications. Her prn ativan was stopped due to 14 day [facility] regulations. She states she \"needs this for her anxiety\". Discussed that she is on multiple medications and is often falling asleep during therapy. She states that is because she is not sleeping well at [facility]. She reports voices in her head that are bothersome. They occur at all time of the day and often are saying mean/bad words. She is able to distract herself with activities.     REVIEW OF SYSTEMS:  4 point ROS including Respiratory, CV, GI and , other than that noted in the HPI,  is negative    /64  Pulse 79  Temp 98  F (36.7  C)  Resp 16  Wt 256 lb (116.1 kg)  SpO2 98%  BMI 43.94 kg/m2  GENERAL APPEARANCE:  Alert, in no distress      ASSESSMENT/PLAN:     Schizoaffective disorder, bipolar type (H)  JAVIER (generalized anxiety disorder)  Auditory hallucinations     - Agree to re order patient's prn ativan. Discussed dependence and tolerance with frequent use. Will re start scheduled propranolol per patient request and this may also minimize ativan usage.   - Offered patient a psych referral for gene site testing. She states she will consider this.     1. Propranolol 10 mg po BID and DC prn propanolol Dx anxiety  2. Ativan 0.5 mg po BID prn anxiety  3. NP will see patient in 2 weeks to f/u " on ativan    Total time spent with patient visit at the skilled nursing facility was 26 including patient visit, review of past records and discussion with staff. Greater than 50% of total time spent with counseling and coordinating care due to multiple complex mental health concerns.     Electronically signed by:  DELL Queen CNP      Sincerely,        DELL Queen CNP

## 2018-09-14 NOTE — MR AVS SNAPSHOT
After Visit Summary   9/14/2018    Betsy Resendiz    MRN: [id number]           Patient Information     Date Of Birth          1965        Visit Information        Provider Department      9/14/2018 8:15 AM Ramona Jacome APRN CNP Geriatrics Transitional Care        Today's Diagnoses     Schizoaffective disorder, bipolar type (H)    -  1    JAVIER (generalized anxiety disorder)        Auditory hallucinations           Follow-ups after your visit        Your next 10 appointments already scheduled     Sep 28, 2018  7:00 AM T   Nursing Home with DELL Queen CNP   Geriatrics Transitional Care ([facility])    [address] [place] [id number] [phone]              Who to contact     If you have questions or need follow up information about today's clinic visit or your schedule please contact GERIATRICS TRANSITIONAL CARE directly at [phone].  Normal or non-critical lab and imaging results will be communicated to you by MyChart, letter or phone within 4 business days after the clinic has received the results. If you do not hear from us within 7 days, please contact the clinic through MyChart or phone. If you have a critical or abnormal lab result, we will notify you by phone as soon as possible.  Submit refill requests through [employer] or call your pharmacy and they will forward the refill request to us. Please allow 3 business days for your refill to be completed.          Additional Information About Your Visit        Care EveryWhere ID     This is your Care EveryWhere ID. This could be used by other organizations to access your [place] medical records  [id number]        Your Vitals Were     Pulse Temperature Respirations Pulse Oximetry BMI (Body Mass Index)       79 98  F (36.7  C) 16 98% 43.94 kg/m2        Blood Pressure from Last 3 Encounters:   09/14/18 122/64   09/11/18 122/64   09/10/18 122/64    Weight from Last 3 Encounters:   09/14/18 256 lb (116.1  kg)   09/11/18 256 lb 12.8 oz (116.5 kg)   09/10/18 256 lb 12.8 oz (116.5 kg)              Today, you had the following     No orders found for display         Today's Medication Changes          These changes are accurate as of 9/14/18  2:06 PM.  If you have any questions, ask your nurse or doctor.               These medicines have changed or have updated prescriptions.        Dose/Directions    ATIVAN PO   This may have changed:  Another medication with the same name was removed. Continue taking this medication, and follow the directions you see here.        Dose:  0.5 mg   Take 0.5 mg by mouth 2 times daily as needed for anxiety   Refills:  0       PROPRANOLOL HCL PO   This may have changed:  Another medication with the same name was removed. Continue taking this medication, and follow the directions you see here.        Dose:  10 mg   Take 10 mg by mouth 2 times daily   Refills:  0                Primary Care Provider Office Phone # Fax #    Elle Dunlap [phone]       [place] [id number]        Equal Access to Services     BLADE CHERY : Hadii aad ku hadasho Soomaali, waaxda luqadaha, qaybta kaalmada adegirma, wander chowdhury . So [facility] [phone].    ATENCIÓN: Si habla español, tiene a knight disposición servicios gratuitos de asistencia lingüística. Llame al [phone].    We comply with applicable federal civil rights laws and [place] laws. We do not discriminate on the basis of race, color, national origin, age, disability, sex, sexual orientation, or gender identity.            Thank you!     Thank you for choosing G[facility]AT[facility]AL [facility]  for your care. Our goal is always to provide you with excellent care. Hearing back from our patients is one way we can continue to improve our services. Please take a few minutes to complete the written survey that you may receive in the mail after your visit with us. Thank  you!             Your Updated Medication List - Protect others around you: Learn how to safely use, store and throw away your medicines at www.disposemymeds.org.          This list is accurate as of 9/14/18  2:06 PM.  Always use your most recent med list.                   Brand Name Dispense Instructions for use Diagnosis    albuterol 108 (90 Base) MCG/ACT inhaler    PROAIR HFA/PROVENTIL HFA/VENTOLIN HFA     Inhale 2 puffs into the lungs every 4 hours as needed        ATIVAN PO      Take 0.5 mg by mouth 2 times daily as needed for anxiety        brimonidine 0.15 % ophthalmic solution    ALPHAGAN-P     Place 1 drop into both eyes 2 times daily        budesonide 1 MG/2ML Susp neb solution    PULMICORT     USE 1 VIAL IN NEBULIZER TWICE DAILY        cariprazine 1.5 MG Caps capsule    VRAYLAR     Take 2 mg by mouth daily        cholecalciferol 5000 units Caps capsule    vitamin D3     Take 5,000 Units by mouth daily        clotrimazole 1 % cream    LOTRIMIN     Apply topically 2 times daily        CYCLOBENZAPRINE HCL PO      Take 5 mg by mouth 3 times daily        escitalopram 20 MG tablet    LEXAPRO     Take 1 tablet by mouth at bedtime        fluticasone 50 MCG/ACT spray    FLONASE     Spray 1 spray into both nostrils daily as needed        furosemide 80 MG tablet    LASIX     Take 40 mg by mouth 2 times daily        gabapentin 800 MG tablet    NEURONTIN     Take 400 mg by mouth 3 times daily        ipratropium - albuterol 0.5 mg/2.5 mg/3 mL 0.5-2.5 (3) MG/3ML neb solution    DUONEB     Take 1 vial by nebulization 4 times daily        lamoTRIgine 200 MG tablet    LaMICtal     Take 200 mg by mouth At Bedtime        LANsoprazole 30 MG CR capsule    PREVACID     Take 30 mg by mouth daily        LIOTHYRONINE SODIUM PO      Take 25 mcg by mouth daily        lithium 300 MG capsule      Take 1 capsule by mouth in the morning and 2 capsules at bedtime        meclizine 25 MG tablet    ANTIVERT     Take 25 mg by mouth every 8  hours as needed        NAPROXEN PO      Take 500 mg by mouth every 8 hours as needed for moderate pain        nystatin-triamcinolone cream    MYCOLOG II     To lip folds TID until clear        OYSTER SHELL CALCIUM PO      Take 500 mg by mouth 4 times daily        paliperidone 6 MG 24 hr tablet    INVEGA     Take 6 mg by mouth every morning        perphenazine 4 MG tablet      Take 4 mg by mouth 2 times daily        POTASSIUM CHLORIDE ER PO      30meq by mouth two times a day        pregabalin 150 MG capsule    LYRICA     Take 150 mg by mouth 2 times daily        PROPRANOLOL HCL PO      Take 10 mg by mouth 2 times daily        STOOL SOFTENER 100 MG capsule   Generic drug:  docusate sodium      Take 100 mg by mouth At Bedtime        travoprost (BAK Free) 0.004 % ophthalmic solution    TRAVATAN Z     Place 1 drop into both eyes At Bedtime        * ziprasidone 80 MG capsule    GEODON     Take 160 mg by mouth At Bedtime        * ziprasidone 40 MG capsule    GEODON     Take 1 capsule by mouth in the morning        * Notice:  This list has 2 medication(s) that are the same as other medications prescribed for you. Read the directions carefully, and ask your doctor or other care provider to review them with you.

## 2018-09-14 NOTE — PROGRESS NOTES
"[place] [facility]    Chief Complaint   Patient presents with     group home Acute       [place] Medical Record Number:  [id number]    HPI:    Betsy Resendiz is a 53 year old  (1965), who is being seen today for an episodic care visit at [facility].   HPI information obtained from: facility chart records, facility staff, patient report and [facility] chart review.     Today's concern is:     Schizoaffective disorder, bipolar type (H)  JAVIER (generalized anxiety disorder)  Auditory hallucinations     Patient requesting a visit today to discuss her medications. Her prn ativan was stopped due to 14 day [facility] regulations. She states she \"needs this for her anxiety\". Discussed that she is on multiple medications and is often falling asleep during therapy. She states that is because she is not sleeping well at [facility]. She reports voices in her head that are bothersome. They occur at all time of the day and often are saying mean/bad words. She is able to distract herself with activities.     REVIEW OF SYSTEMS:  4 point ROS including Respiratory, CV, GI and , other than that noted in the HPI,  is negative    /64  Pulse 79  Temp 98  F (36.7  C)  Resp 16  Wt 256 lb (116.1 kg)  SpO2 98%  BMI 43.94 kg/m2  GENERAL APPEARANCE:  Alert, in no distress      ASSESSMENT/PLAN:     Schizoaffective disorder, bipolar type (H)  JAVIER (generalized anxiety disorder)  Auditory hallucinations     - Agree to re order patient's prn ativan. Discussed dependence and tolerance with frequent use. Will re start scheduled propranolol per patient request and this may also minimize ativan usage.   - Offered patient a psych referral for gene site testing. She states she will consider this.     1. Propranolol 10 mg po BID and DC prn propanolol Dx anxiety  2. Ativan 0.5 mg po BID prn anxiety  3. NP will see patient in 2 weeks to f/u on ativan    Total time spent with patient visit at the skilled nursing facility was 26 including " patient visit, review of past records and discussion with staff. Greater than 50% of total time spent with counseling and coordinating care due to multiple complex mental health concerns.     Electronically signed by:  DELL Queen CNP

## 2018-09-17 ENCOUNTER — NURSING HOME VISIT (OUTPATIENT)
Dept: GERIATRICS | Facility: CLINIC | Age: 53
End: 2018-09-17
Payer: COMMERCIAL

## 2018-09-17 VITALS
SYSTOLIC BLOOD PRESSURE: 122 MMHG | DIASTOLIC BLOOD PRESSURE: 64 MMHG | OXYGEN SATURATION: 97 % | BODY MASS INDEX: 44.08 KG/M2 | WEIGHT: 256.8 LBS | RESPIRATION RATE: 16 BRPM | HEART RATE: 76 BPM | TEMPERATURE: 98 F

## 2018-09-17 DIAGNOSIS — R33.9 URINARY RETENTION: Primary | ICD-10-CM

## 2018-09-17 PROCEDURE — 99309 SBSQ NF CARE MODERATE MDM 30: CPT | Performed by: NURSE PRACTITIONER

## 2018-09-17 NOTE — LETTER
9/17/2018        RE: Betsy Resendiz  [address]  [place] [address]        [facility]    Chief Complaint   Patient presents with     custodial Acute       [place] Medical Record Number:  [id number]    HPI:    Betsy Resendiz is a 53 year old  (1965), who is being seen today for an episodic care visit at [facility].   HPI information obtained from: facility chart records, facility staff and patient report.     Today's concern is:  Urinary retention     Patient has ongoing urinary retention x 2 months.   Has failed removal.   Orders from urology to cath Q 4 hrs if not able to void.   Previous cath trial volumes:  11 AM: 900 ml  3:30 AM: 550 ml   10:45 AM: 1500 ml  12:25 pm: 800 ml    Patient refuses cath removal d/t pain with full bladder and cathing. She would like a second opinion.     REVIEW OF SYSTEMS:  4 point ROS including Respiratory, CV, GI and , other than that noted in the HPI,  is negative    /64  Pulse 76  Temp 98  F (36.7  C)  Resp 16  Wt 256 lb 12.8 oz (116.5 kg)  SpO2 97%  BMI 44.08 kg/m2  GENERAL APPEARANCE:  Alert, in no distress      ASSESSMENT/PLAN:  Urinary retention      Orders:  1.  Set patient up with urology consult at  of  for a second opinion for urinary retention    Total time spent with patient visit at the skilled nursing facility was 25 min including patient visit and review of past records. Greater than 50% of total time spent with counseling and coordinating care due to urinary retention and review of records.     Electronically signed by:  DELL Queen CNP      Sincerely,        DELL Queen CNP

## 2018-09-17 NOTE — PROGRESS NOTES
[facility]    Chief Complaint   Patient presents with     snf Acute       [place] Medical Record Number:  [id number]    HPI:    Betsy Resendiz is a 53 year old  (1965), who is being seen today for an episodic care visit at [facility].   HPI information obtained from: facility chart records, facility staff and patient report.     Today's concern is:  Urinary retention     Patient has ongoing urinary retention x 2 months.   Has failed removal.   Orders from urology to cath Q 4 hrs if not able to void.   Previous cath trial volumes:  11 AM: 900 ml  3:30 AM: 550 ml   10:45 AM: 1500 ml  12:25 pm: 800 ml    Patient refuses cath removal d/t pain with full bladder and cathing. She would like a second opinion.     REVIEW OF SYSTEMS:  4 point ROS including Respiratory, CV, GI and , other than that noted in the HPI,  is negative    /64  Pulse 76  Temp 98  F (36.7  C)  Resp 16  Wt 256 lb 12.8 oz (116.5 kg)  SpO2 97%  BMI 44.08 kg/m2  GENERAL APPEARANCE:  Alert, in no distress      ASSESSMENT/PLAN:  Urinary retention      Orders:  1.  Set patient up with urology consult at [facility] for a second opinion for urinary retention    Total time spent with patient visit at the skilled nursing facility was 25 min including patient visit and review of past records. Greater than 50% of total time spent with counseling and coordinating care due to urinary retention and review of records.     Electronically signed by:  DELL Queen CNP

## 2018-09-20 ENCOUNTER — PRE VISIT (OUTPATIENT)
Dept: UROLOGY | Facility: CLINIC | Age: 53
End: 2018-09-20

## 2018-09-20 NOTE — TELEPHONE ENCOUNTER
MEDICAL RECORDS REQUEST   [facility]  Urology Clinic  [address] [place] [address]  PHONE: [phone]  Fax: [phone]        FUTURE VISIT INFORMATION                                                   Betsy Resendiz, : 1965 scheduled for future visit at [facility]    APPOINTMENT INFORMATION:    Date: 2018    Provider:  Trisha Tamayo    Reason for Visit/Diagnosis: Incontinence    REFERRAL INFORMATION:    Referring provider:  Ramona Jacome    Specialty: NP    Referring providers clinic:  [facility] contact number:  [phone]    RECORDS REQUESTED FOR VISIT                                                     NOTES  STATUS/DETAILS   OFFICE NOTE from referring provider  yes   OFFICE NOTE from other specialist  no   DISCHARGE SUMMARY from hospital  no   DISCHARGE REPORT from the ER  in process   OPERATIVE REPORT  no   MEDICATION LIST  yes   LABS     URINALYSIS (UA)  yes       PRE-VISIT CHECKLIST      Record collection complete Yes   Appointment appropriately scheduled           (right time/right provider) Yes   MyChart activation Yes   Questionnaire complete If no, please explain in process     Completed by: Trisha Beasley

## 2018-09-27 VITALS
BODY MASS INDEX: 43.08 KG/M2 | HEART RATE: 86 BPM | WEIGHT: 251 LBS | RESPIRATION RATE: 15 BRPM | DIASTOLIC BLOOD PRESSURE: 66 MMHG | TEMPERATURE: 98.2 F | SYSTOLIC BLOOD PRESSURE: 131 MMHG | OXYGEN SATURATION: 93 %

## 2018-09-27 NOTE — PROGRESS NOTES
[facility] [facility]    Chief Complaint   Patient presents with     FCI Acute       [place] Medical Record Number:  [id number]    HPI:    Betsy Resendiz is a 53 year old  (1965), who is being seen today for an episodic care visit at [facility].   HPI information obtained from: facility chart records, facility staff, patient report and [facility] chart review. Today's concern is:     Schizoaffective disorder, unspecified type (H)  Posttraumatic stress disorder  JAVIER (generalized anxiety disorder)     Patient following closely with her Psychiatry provider. She has ordered prn ativan. Per [facility] regulations patient needs a 14 day assessment for this medication.     Reviewed recent psych notes. Patient apparently stressed about moving back to her apt.     Current Outpatient Prescriptions   Medication     ACETAMINOPHEN PO     albuterol (PROAIR HFA/PROVENTIL HFA/VENTOLIN HFA) 108 (90 BASE) MCG/ACT Inhaler     brimonidine (ALPHAGAN-P) 0.15 % ophthalmic solution     budesonide (PULMICORT) 1 MG/2ML SUSP neb solution     cariprazine (VRAYLAR) 1.5 MG CAPS capsule     cholecalciferol (VITAMIN D3) 5000 units CAPS capsule     clotrimazole (LOTRIMIN) 1 % cream     CYCLOBENZAPRINE HCL PO     docusate sodium (STOOL SOFTENER) 100 MG capsule     escitalopram (LEXAPRO) 20 MG tablet     fluticasone (FLONASE) 50 MCG/ACT spray     furosemide (LASIX) 80 MG tablet     gabapentin (NEURONTIN) 800 MG tablet     ipratropium - albuterol 0.5 mg/2.5 mg/3 mL (DUONEB) 0.5-2.5 (3) MG/3ML neb solution     lamoTRIgine (LAMICTAL) 200 MG tablet     LANsoprazole (PREVACID) 30 MG CR capsule     LIOTHYRONINE SODIUM PO     lithium 300 MG capsule     LORazepam (ATIVAN PO)     meclizine (ANTIVERT) 25 MG tablet     NAPROXEN PO     OYSTER SHELL CALCIUM PO     paliperidone (INVEGA) 6 MG 24 hr tablet     perphenazine 4 MG tablet     POTASSIUM CHLORIDE ER PO     pregabalin (LYRICA) 150 MG capsule     PROPRANOLOL HCL PO     travoprost, NICHO  Free, (TRAVATAN Z) 0.004 % ophthalmic solution     ziprasidone (GEODON) 40 MG capsule     ziprasidone (GEODON) 80 MG capsule     No current facility-administered medications for this visit.        REVIEW OF SYSTEMS:  4 point ROS including Respiratory, CV, GI and , other than that noted in the HPI,  is negative    /66  Pulse 86  Temp 98.2  F (36.8  C)  Resp 15  Wt 251 lb (113.9 kg)  SpO2 93%  BMI 43.08 kg/m2  GENERAL APPEARANCE:  Lethargic, lying in bed    ASSESSMENT/PLAN:     Schizoaffective disorder, unspecified type (H)  Posttraumatic stress disorder  JAVIER (generalized anxiety disorder)     Ok to continue prn ativan per psych recommendation. Advised patient of dependence and lethargy associated with this medication. She verbalizes she will use this sparingly.     Updated facility SW about patient verbalizing stress related to moving.    1. Okay to continue Ativan 0.5 mg po BID PRN as prescribed by Dr Ramona Lorenzo  2. Okay to F/U on benzodiazepines in 4 months, sooner if needed.     Electronically signed by:  DELL Queen CNP

## 2018-09-28 ENCOUNTER — NURSING HOME VISIT (OUTPATIENT)
Dept: GERIATRICS | Facility: CLINIC | Age: 53
End: 2018-09-28
Payer: COMMERCIAL

## 2018-09-28 DIAGNOSIS — F43.10 POSTTRAUMATIC STRESS DISORDER: ICD-10-CM

## 2018-09-28 DIAGNOSIS — F25.9 SCHIZOAFFECTIVE DISORDER, UNSPECIFIED TYPE (H): Primary | ICD-10-CM

## 2018-09-28 DIAGNOSIS — F41.1 GAD (GENERALIZED ANXIETY DISORDER): ICD-10-CM

## 2018-09-28 PROCEDURE — 99308 SBSQ NF CARE LOW MDM 20: CPT | Performed by: NURSE PRACTITIONER

## 2018-09-28 NOTE — LETTER
9/28/2018        RE: Betsy Resendiz  [address]  [place] [address]        [place] [facility]    Chief Complaint   Patient presents with     retirement Acute       [place] Medical Record Number:  [id number]    HPI:    Betsy Resendiz is a 53 year old  (1965), who is being seen today for an episodic care visit at [facility].   HPI information obtained from: facility chart records, facility staff, patient report and [facility] chart review. Today's concern is:     Schizoaffective disorder, unspecified type (H)  Posttraumatic stress disorder  JAVIER (generalized anxiety disorder)     Patient following closely with her Psychiatry provider. She has ordered prn ativan. Per [facility] regulations patient needs a 14 day assessment for this medication.     Reviewed recent psych notes. Patient apparently stressed about moving back to her apt.     Current Outpatient Prescriptions   Medication     ACETAMINOPHEN PO     albuterol (PROAIR HFA/PROVENTIL HFA/VENTOLIN HFA) 108 (90 BASE) MCG/ACT Inhaler     brimonidine (ALPHAGAN-P) 0.15 % ophthalmic solution     budesonide (PULMICORT) 1 MG/2ML SUSP neb solution     cariprazine (VRAYLAR) 1.5 MG CAPS capsule     cholecalciferol (VITAMIN D3) 5000 units CAPS capsule     clotrimazole (LOTRIMIN) 1 % cream     CYCLOBENZAPRINE HCL PO     docusate sodium (STOOL SOFTENER) 100 MG capsule     escitalopram (LEXAPRO) 20 MG tablet     fluticasone (FLONASE) 50 MCG/ACT spray     furosemide (LASIX) 80 MG tablet     gabapentin (NEURONTIN) 800 MG tablet     ipratropium - albuterol 0.5 mg/2.5 mg/3 mL (DUONEB) 0.5-2.5 (3) MG/3ML neb solution     lamoTRIgine (LAMICTAL) 200 MG tablet     LANsoprazole (PREVACID) 30 MG CR capsule     LIOTHYRONINE SODIUM PO     lithium 300 MG capsule     LORazepam (ATIVAN PO)     meclizine (ANTIVERT) 25 MG tablet     NAPROXEN PO     OYSTER SHELL CALCIUM PO     paliperidone (INVEGA) 6 MG 24 hr tablet     perphenazine 4 MG tablet     POTASSIUM  CHLORIDE ER PO     pregabalin (LYRICA) 150 MG capsule     PROPRANOLOL HCL PO     travoprost, NICHO Free, (TRAVATAN Z) 0.004 % ophthalmic solution     ziprasidone (GEODON) 40 MG capsule     ziprasidone (GEODON) 80 MG capsule     No current facility-administered medications for this visit.        REVIEW OF SYSTEMS:  4 point ROS including Respiratory, CV, GI and , other than that noted in the HPI,  is negative    /66  Pulse 86  Temp 98.2  F (36.8  C)  Resp 15  Wt 251 lb (113.9 kg)  SpO2 93%  BMI 43.08 kg/m2  GENERAL APPEARANCE:  Lethargic, lying in bed    ASSESSMENT/PLAN:     Schizoaffective disorder, unspecified type (H)  Posttraumatic stress disorder  JAVIER (generalized anxiety disorder)     Ok to continue prn ativan per psych recommendation. Advised patient of dependence and lethargy associated with this medication. She verbalizes she will use this sparingly.     Updated facility SW about patient verbalizing stress related to moving.    1. Okay to continue Ativan 0.5 mg po BID PRN as prescribed by Dr Ramona Lorenzo  2. Okay to F/U on benzodiazepines in 4 months, sooner if needed.     Electronically signed by:  DELL Queen CNP      Sincerely,        DELL Queen CNP

## 2018-10-10 ENCOUNTER — NURSING HOME VISIT (OUTPATIENT)
Dept: GERIATRICS | Facility: CLINIC | Age: 53
End: 2018-10-10
Payer: COMMERCIAL

## 2018-10-10 VITALS
HEART RATE: 78 BPM | HEIGHT: 64 IN | SYSTOLIC BLOOD PRESSURE: 153 MMHG | BODY MASS INDEX: 44.56 KG/M2 | WEIGHT: 261 LBS | DIASTOLIC BLOOD PRESSURE: 62 MMHG | RESPIRATION RATE: 18 BRPM | OXYGEN SATURATION: 96 % | TEMPERATURE: 98.3 F

## 2018-10-10 DIAGNOSIS — I89.0 LYMPHEDEMA: ICD-10-CM

## 2018-10-10 DIAGNOSIS — M62.81 GENERALIZED MUSCLE WEAKNESS: ICD-10-CM

## 2018-10-10 DIAGNOSIS — E66.01 MORBID OBESITY WITH BMI OF 40.0-44.9, ADULT (H): ICD-10-CM

## 2018-10-10 DIAGNOSIS — M15.9 GENERALIZED OSTEOARTHRITIS: Primary | ICD-10-CM

## 2018-10-10 PROCEDURE — 99309 SBSQ NF CARE MODERATE MDM 30: CPT | Performed by: NURSE PRACTITIONER

## 2018-10-10 NOTE — LETTER
"    10/10/2018        RE: Betsy Resendiz  [address] [facility] [address]  [facility] [address]          MEDICAL NECESSITY STATEMENT FOR DME    Demographic Information on Betsy Resendiz:    Betsy Resendiz  Gender: female  : 1965  [address] [facility] [address]  [facility] [address] [phone] (home)     Medical Record: [id number]  Social Security Number: [id number]  Primary Care Provider: Elle Dunlap  Insurance: Payor: HUMANA / Plan: HUMANA MEDICARE ADVANTAGE / Product Type: Medicare /          Generalized osteoarthritis  Generalized muscle weakness  Morbid obesity with BMI of 40.0-44.9, adult (H)  Lymphedema    DME: 4 wheel rolling walker (bariatric)     Mobility related ADL that are affected in the home    The walker is suitable and necessary for use in the patient's home and the  Home/rooms can accommodate the wheelchair.     Tolerance, level of assistance- endurance is limited   Does have the physical and cognitive ability to maneuver the equipment or has a    Caregiver who is available, willing, and able to provide assistance in the home    With the wheelchair.   WALKER: 4 W rolling/bariatric (TYPE)    VITAL SIGNS:  Vitals: /62  Pulse 78  Temp 98.3  F (36.8  C)  Resp 18  Ht 5' 4\" (1.626 m)  Wt 261 lb (118.4 kg)  SpO2 96%  BMI 44.8 kg/m2  BMI= Body mass index is 44.8 kg/(m^2).   GENERAL APPEARANCE:  Alert, in no distress, obese  RESP:  respiratory effort and palpation of chest normal, auscultation of lungs diminished , no respiratory distress  CV:  Palpation and auscultation of heart done , rate and rhythm regular, moderate peripheral edema  ABDOMEN:  normal bowel sounds, soft, nontender, no hepatosplenomegaly or other masses  M/S:   Gait and station steady with rolling walker  NEURO: 2-12 in normal limits and at patient's baseline    MEDICAL NECESSITY STATEMENT      Generalized osteoarthritis  Generalized muscle weakness  Morbid obesity with BMI of 40.0-44.9, adult (H)  Lymphedema "     Patient is in need a a rolling walker due to limited endurance, chronic pain, dyspnea, obesity     ELECTRONICALLY SIGNED BY VARSHA CERTIFIED PROVIDER:  DELL Queen CNP   NPI: [id number]  [facility]  [address] [place] [address], [address]  [place] [address]            Sincerely,        DELL Queen CNP

## 2018-10-10 NOTE — PROGRESS NOTES
"  MEDICAL NECESSITY STATEMENT FOR DME    Demographic Information on Betsy Resendiz:    Betsy Resendiz  Gender: female  : 1965  [address] [facility] [address]  [facility] [address] [phone] (home)     Medical Record: [id number]  Social Security Number: [id number]  Primary Care Provider: Elle Dunlap  Insurance: Payor: HUMANA / Plan: HUMANA MEDICARE ADVANTAGE / Product Type: Medicare /          Generalized osteoarthritis  Generalized muscle weakness  Morbid obesity with BMI of 40.0-44.9, adult (H)  Lymphedema    DME: 4 wheel rolling walker (bariatric)     Mobility related ADL that are affected in the home    The walker is suitable and necessary for use in the patient's home and the  Home/rooms can accommodate the wheelchair.     Tolerance, level of assistance- endurance is limited   Does have the physical and cognitive ability to maneuver the equipment or has a    Caregiver who is available, willing, and able to provide assistance in the home    With the wheelchair.   WALKER: 4 W rolling/bariatric (TYPE)    VITAL SIGNS:  Vitals: /62  Pulse 78  Temp 98.3  F (36.8  C)  Resp 18  Ht 5' 4\" (1.626 m)  Wt 261 lb (118.4 kg)  SpO2 96%  BMI 44.8 kg/m2  BMI= Body mass index is 44.8 kg/(m^2).   GENERAL APPEARANCE:  Alert, in no distress, obese  RESP:  respiratory effort and palpation of chest normal, auscultation of lungs diminished , no respiratory distress  CV:  Palpation and auscultation of heart done , rate and rhythm regular, moderate peripheral edema  ABDOMEN:  normal bowel sounds, soft, nontender, no hepatosplenomegaly or other masses  M/S:   Gait and station steady with rolling walker  NEURO: 2-12 in normal limits and at patient's baseline    MEDICAL NECESSITY STATEMENT      Generalized osteoarthritis  Generalized muscle weakness  Morbid obesity with BMI of 40.0-44.9, adult (H)  Lymphedema     Patient is in need a a rolling walker due to limited endurance, chronic pain, dyspnea, obesity "     ELECTRONICALLY SIGNED BY VARSHA CERTIFIED PROVIDER:  DELL Queen CNP   NPI: [id number]  [facility]  [facility] [address], [address]  [place] [address]

## 2018-10-29 ENCOUNTER — NURSING HOME VISIT (OUTPATIENT)
Dept: GERIATRICS | Facility: CLINIC | Age: 53
End: 2018-10-29
Payer: COMMERCIAL

## 2018-10-29 ENCOUNTER — PRE VISIT (OUTPATIENT)
Dept: UROLOGY | Facility: CLINIC | Age: 53
End: 2018-10-29

## 2018-10-29 VITALS
DIASTOLIC BLOOD PRESSURE: 88 MMHG | TEMPERATURE: 97.4 F | HEART RATE: 80 BPM | RESPIRATION RATE: 17 BRPM | SYSTOLIC BLOOD PRESSURE: 161 MMHG | BODY MASS INDEX: 27.09 KG/M2 | WEIGHT: 157.8 LBS | OXYGEN SATURATION: 96 %

## 2018-10-29 DIAGNOSIS — Z97.8 INDWELLING FOLEY CATHETER PRESENT: ICD-10-CM

## 2018-10-29 DIAGNOSIS — R33.9 URINARY RETENTION: Primary | ICD-10-CM

## 2018-10-29 PROCEDURE — 99308 SBSQ NF CARE LOW MDM 20: CPT | Performed by: NURSE PRACTITIONER

## 2018-10-29 NOTE — PROGRESS NOTES
[facility]    Chief Complaint   Patient presents with     retirement Acute       [facility] Medical Record Number:  [id number]  Place of Service where encounter took place:  [facility] [[id number]]    HPI:    Betsy Resendiz is a 53 year old  (1965), who is being seen today for an episodic care visit.  HPI information obtained from: facility chart records, facility staff and patient report.     Patient continues to have an indwelling catheter due to urinary retention. She has failed several mac removals due to retention. Patient declines further attempts to remove mac.   She has an apt to see urology 11/8/18.     REVIEW OF SYSTEMS:  4 point ROS including Respiratory, CV, GI and , other than that noted in the HPI,  is negative    /88  Pulse 80  Temp 97.4  F (36.3  C)  Resp 17  Wt 157 lb 12.8 oz (71.6 kg)  SpO2 96%  BMI 27.09 kg/m2  GENERAL APPEARANCE:  Alert, in no distress      ASSESSMENT/PLAN:     Urinary retention  Indwelling Mac catheter present     - have tapered off of narcotics to minimize urinary retention  - Continue mac for now  - Awaiting input from urology     Orders:  1.  Change cath every 4 weeks      Electronically signed by:  DELL Queen CNP

## 2018-10-29 NOTE — TELEPHONE ENCOUNTER
Reason for visit: urinary retention consult     Relevant information: Ramona Jacome NP referring     Records/imaging/labs: all records available    Pt called: no need for a call     Rooming: dip/pvr

## 2018-10-29 NOTE — LETTER
10/29/2018        RE: Betsy Resendiz  [address] [facility] [address]  [place] [facility] [address]        [facility]    Chief Complaint   Patient presents with     long term Acute       [facility] Medical Record Number:  [id number]  Place of Service where encounter took place[facility] [[id number]]    HPI:    Betsy Resendiz is a 53 year old  (1965), who is being seen today for an episodic care visit.  HPI information obtained from: facility chart records, facility staff and patient report.     Patient continues to have an indwelling catheter due to urinary retention. She has failed several mac removals due to retention. Patient declines further attempts to remove mac.   She has an apt to see urology 11/8/18.     REVIEW OF SYSTEMS:  4 point ROS including Respiratory, CV, GI and , other than that noted in the HPI,  is negative    /88  Pulse 80  Temp 97.4  F (36.3  C)  Resp 17  Wt 157 lb 12.8 oz (71.6 kg)  SpO2 96%  BMI 27.09 kg/m2  GENERAL APPEARANCE:  Alert, in no distress      ASSESSMENT/PLAN:     Urinary retention  Indwelling Mac catheter present     - have tapered off of narcotics to minimize urinary retention  - Continue mac for now  - Awaiting input from urology     Orders:  1.  Change cath every 4 weeks      Electronically signed by:  DELL Queen CNP        Sincerely,        DELL Queen CNP

## 2018-11-07 ENCOUNTER — PATIENT OUTREACH (OUTPATIENT)
Dept: CARE COORDINATION | Facility: CLINIC | Age: 53
End: 2018-11-07

## 2018-11-08 ENCOUNTER — TELEPHONE (OUTPATIENT)
Dept: GERIATRICS | Facility: CLINIC | Age: 53
End: 2018-11-08

## 2018-11-08 ENCOUNTER — OFFICE VISIT (OUTPATIENT)
Dept: UROLOGY | Facility: CLINIC | Age: 53
End: 2018-11-08
Payer: COMMERCIAL

## 2018-11-08 VITALS
HEIGHT: 64 IN | DIASTOLIC BLOOD PRESSURE: 60 MMHG | SYSTOLIC BLOOD PRESSURE: 130 MMHG | HEART RATE: 79 BPM | BODY MASS INDEX: 27.09 KG/M2

## 2018-11-08 DIAGNOSIS — R33.9 URINARY RETENTION: Primary | ICD-10-CM

## 2018-11-08 ASSESSMENT — PAIN SCALES - GENERAL: PAINLEVEL: NO PAIN (0)

## 2018-11-08 NOTE — MR AVS SNAPSHOT
After Visit Summary   11/8/2018    Betsy Resendiz    MRN: [id number]           Patient Information     Date Of Birth          1965        Visit Information        Provider Department      11/8/2018 7:30 AM Trisha Tamayo MD [facility]        Today's Diagnoses     Urinary retention    -  1      Care Instructions    Continue monthly catheter changes    Please return for a cystoscopy (procedure to look in the bladder because of this question of a bladder tumor in the past) and pelvic exam and for urodynamics (bladder testing to see why you cannot empty you bladder).      You need a urine culture 7-10 days prior to returning for this testing and if positive culture you will need to be on antibiotics when you return    It was a pleasure meeting with you today.  Thank you for allowing me and my team the privilege of caring for you today.  YOU are the reason we are here, and I truly hope we provided you with the excellent service you deserve.  Please let us know if there is anything else we can do for you so that we can be sure you are leaving completely satisfied with your care experience.    Cystoscopy    Cystoscopy is a procedure that lets your doctor look directly inside your urethra and bladder. It can be used to:    Help diagnose a problem with your urethra, bladder, or kidneys.    Take a sample (biopsy) of bladder or urethral tissue.    Treat certain problems (such as removing kidney stones).    Place a stent to bypass an obstruction.    Take special X-rays of the kidneys.  Based on the findings, your doctor may recommend other tests or treatments.  What is a cystoscope?  A cystoscope is a telescope-like instrument that contains lenses and fiberoptics (small glass wires that make bright light). The cystoscope may be straight and rigid, or flexible to bend around curves in the urethra. The doctor may look directly into the cystoscope, or  project the image onto a monitor.  Getting ready    Ask your doctor if you should stop taking any medicines before the procedure.    Follow any other instructions your doctor gives you.  Tell your doctor before the exam if you:    Take any medicines, such as aspirin or blood thinners    Have allergies to any medicines    Are pregnant   The procedure  Cystoscopy is done in the doctor s office, surgery center, or hospital. The doctor and a nurse are present during the procedure. It takes only a few minutes, longer if a biopsy, X-ray, or treatment needs to be done.  During the procedure:    You lie on an exam table on your back, knees bent and legs apart. You are covered with a drape.    Your urethra and the area around it are washed. Anesthetic jelly may be applied to numb the urethra.    The cystoscope is inserted. A sterile fluid is put into the bladder to expand it. You may feel pressure from this fluid.    When the procedure is done, the cystoscope is removed.  After the procedure   Once you re home:    Drink plenty of fluids.    You may have burning or light bleeding when you urinate--this is normal.    Medicines may be prescribed to ease any discomfort or prevent infection. Take these as directed.    Call your doctor if you have heavy bleeding or blood clots, burning that lasts more than a day, a fever over 100 F  (38  C), or trouble urinating.  Date Last Reviewed: 1/1/2017 2000-2017 [employer]. [address] [place] [address]. All rights reserved. This information is not intended as a substitute for professional medical care. Always follow your healthcare professional's instructions.            Follow-ups after your visit        Your next 10 appointments already scheduled     Nov 15, 2018  2:45 PM CST   (Arrive by 2:30 PM)   Return Visit with Trisha Tamayo MD   [facility] Prostate and Urologic C[facility] ([facility])    [address] [place] [address]  "[address] [id number] [phone]            Jan 02, 2019  4:00 PM CST   (Arrive by 3:45 PM)   Cystoscopy with Trisha Tamayo MD   [facility] and [facility] for Prostate and Urologic Cancers ([facility])    [address] [place] [id number] [phone]            Feb 13, 2019  7:00 AM CST   (Arrive by 6:45 AM)   Urodynamics with Cris Isaacs PA-C   [facility] and [facility] for [facility] and Urologic C[facility] ([facility])    [address] [place] [id number] [phone]              Who to contact     Please call your clinic at [phone] to:    Ask questions about your health    Make or cancel appointments    Discuss your medicines    Learn about your test results    Speak to your doctor            Additional Information About Your Visit        Care EveryWhere ID     This is your Care EveryWhere ID. This could be used by other organizations to access your [facility] medical records  [id number]        Your Vitals Were     Pulse Height BMI (Body Mass Index)             79 1.626 m (5' 4\") 27.09 kg/m2          Blood Pressure from Last 3 Encounters:   11/08/18 130/60   10/29/18 161/88   10/10/18 153/62    Weight from Last 3 Encounters:   10/29/18 71.6 kg (157 lb 12.8 oz)   10/10/18 118.4 kg (261 lb)   09/27/18 113.9 kg (251 lb)              Today, you had the following     No orders found for display       Primary Care Provider Office Phone # Fax #    Elle Dunlap [phone]       [place] [id number] [facility] [address]        Equal Access to Services     BLADE CHERY : Issa Russ, danis hernandez, mauri kaalmada wander spangler. So [facility] [phone].    ATENCIÓN: Si habla español, tiene a knight disposición servicios gratuitos de asistencia lingüística. Llame al " [phone].    We comply with applicable federal civil rights laws and [place] laws. We do not discriminate on the basis of race, color, national origin, age, disability, sex, sexual orientation, or gender identity.            Thank you!     Thank you for choosing [facility] UROLOGY AND [facility] FOR PROSTATE AND UROLOGIC CANCERS  for your care. Our goal is always to provide you with excellent care. Hearing back from our patients is one way we can continue to improve our services. Please take a few minutes to complete the written survey that you may receive in the mail after your visit with us. Thank you!             Your Updated Medication List - Protect others around you: Learn how to safely use, store and throw away your medicines at www.disposemymeds.org.          This list is accurate as of 11/8/18  4:04 PM.  Always use your most recent med list.                   Brand Name Dispense Instructions for use Diagnosis    ACETAMINOPHEN PO      Take 650 mg by mouth every 4 hours as needed for pain        albuterol 108 (90 Base) MCG/ACT inhaler    PROAIR HFA/PROVENTIL HFA/VENTOLIN HFA     Inhale 2 puffs into the lungs every 4 hours as needed        ATIVAN PO      Take 0.5 mg by mouth 2 times daily as needed for anxiety        brimonidine 0.15 % ophthalmic solution    ALPHAGAN-P     Place 1 drop into both eyes 2 times daily        budesonide 1 MG/2ML Susp neb solution    PULMICORT     USE 1 VIAL IN NEBULIZER TWICE DAILY        cariprazine 1.5 MG Caps capsule    VRAYLAR     Take 2 mg by mouth daily        cholecalciferol 5000 units Caps capsule    vitamin D3     Take 5,000 Units by mouth daily        clotrimazole 1 % cream    LOTRIMIN     Apply topically 2 times daily        CYCLOBENZAPRINE HCL PO      Take 5 mg by mouth 3 times daily        escitalopram 20 MG tablet    LEXAPRO     Take 1 tablet by mouth at bedtime        fluticasone 50 MCG/ACT spray    FLONASE     Spray 1 spray into both nostrils daily as needed         furosemide 80 MG tablet    LASIX     Take 40 mg by mouth 2 times daily        gabapentin 800 MG tablet    NEURONTIN     Take 400 mg by mouth 3 times daily        ipratropium - albuterol 0.5 mg/2.5 mg/3 mL 0.5-2.5 (3) MG/3ML neb solution    DUONEB     Take 1 vial by nebulization 4 times daily        lamoTRIgine 200 MG tablet    LaMICtal     Take 200 mg by mouth At Bedtime        LANsoprazole 30 MG CR capsule    PREVACID     Take 30 mg by mouth daily        LIOTHYRONINE SODIUM PO      Take 25 mcg by mouth daily        lithium 300 MG capsule      Take 1 capsule by mouth in the morning and 2 capsules at bedtime        meclizine 25 MG tablet    ANTIVERT     Take 25 mg by mouth every 8 hours as needed        NAPROXEN PO      Take 500 mg by mouth every 8 hours as needed for moderate pain        OYSTER SHELL CALCIUM PO      Take 500 mg by mouth 4 times daily        paliperidone 6 MG 24 hr tablet    INVEGA     Take 6 mg by mouth every morning        perphenazine 4 MG tablet      Take 4 mg by mouth 2 times daily        POTASSIUM CHLORIDE ER PO      30meq by mouth two times a day        pregabalin 150 MG capsule    LYRICA     Take 150 mg by mouth 2 times daily        PROPRANOLOL HCL PO      Take 10 mg by mouth 2 times daily        STOOL SOFTENER 100 MG capsule   Generic drug:  docusate sodium      Take 100 mg by mouth At Bedtime        travoprost (BAK Free) 0.004 % ophthalmic solution    TRAVATAN Z     Place 1 drop into both eyes At Bedtime        * ziprasidone 80 MG capsule    GEODON     Take 160 mg by mouth At Bedtime        * ziprasidone 40 MG capsule    GEODON     Take 1 capsule by mouth in the morning        * Notice:  This list has 2 medication(s) that are the same as other medications prescribed for you. Read the directions carefully, and ask your doctor or other care provider to review them with you.

## 2018-11-08 NOTE — LETTER
11/8/2018       RE: Betsy Resendiz  [address]  [place] [address]     Dear Colleague,    Thank you for referring your patient, Betsy Resendiz, to the [facility] UROLOGY AND INST FOR PROSTATE AND UROLOGIC CANCERS at [facility]. Please see a copy of my visit note below.    November 8, 2018    Referring Provider: Ramona Jacome, APRN CNP  [address]  [place] [address]    Primary Care Provider: Elle Dunlap    CC: Urinary retention    HPI:  Betsy Resendiz is a 53 year old female who presents for evaluation of her pelvic floor symptoms.    Patient dozing on and off while I was speaking to her so much of the history is taken from the EMR.  Denies any narcotic use.  States that she is here because she cannot urinate, has a catheter in place.  She has seen other urologist in the past including Jeff Kapadia and Martha.  There is some question of whether or not she has had a bladder tumor, apparently taken to OR for cystoscopy in February by Dr Kapadia for hematuria and bladder mass but no obvious tumor seen.  Patient denies recollection of this.  She reports that over the years she has had issues with requiring a catheter at times.  She was seen by Dr Thomas in August and had over 1 L of urine in her bladder at that time.  She was on anticholinergics which she was instructed to be off of before her next voiding trial and also started on terazosin.  She subsequently failed the voiding trial and persistently having high residuals and indwelling mac was replaced.     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    Social History     Social History     Marital status: Single     Spouse name: N/A     Number of children: N/A     Years of education: N/A     Occupational History     Not on file.     Social  History Main Topics     Smoking status: Current Every Day Smoker     Types: Cigarettes     Smokeless tobacco: Never Used     Alcohol use Not on file     Drug use: Not on file     Sexual activity: Not on file     Other Topics Concern     Not on file     Social History Narrative       No family history on file.    ROS    Allergies   Allergen Reactions     Codeine Shortness Of Breath     Has tolerated morphine 7/2008     Hmg-Coa-R Inhibitors Other (See Comments)     Patient declines  Patient declines     Lisinopril Cough     No Clinical Screening - See Comments      Statin-hmg-coa reductase inhibitors     Sulfa Drugs      Mouth sore     Sumatriptan Other (See Comments)       Current Outpatient Prescriptions   Medication     ACETAMINOPHEN PO     albuterol (PROAIR HFA/PROVENTIL HFA/VENTOLIN HFA) 108 (90 BASE) MCG/ACT Inhaler     brimonidine (ALPHAGAN-P) 0.15 % ophthalmic solution     budesonide (PULMICORT) 1 MG/2ML SUSP neb solution     cariprazine (VRAYLAR) 1.5 MG CAPS capsule     cholecalciferol (VITAMIN D3) 5000 units CAPS capsule     clotrimazole (LOTRIMIN) 1 % cream     CYCLOBENZAPRINE HCL PO     docusate sodium (STOOL SOFTENER) 100 MG capsule     escitalopram (LEXAPRO) 20 MG tablet     fluticasone (FLONASE) 50 MCG/ACT spray     furosemide (LASIX) 80 MG tablet     gabapentin (NEURONTIN) 800 MG tablet     ipratropium - albuterol 0.5 mg/2.5 mg/3 mL (DUONEB) 0.5-2.5 (3) MG/3ML neb solution     lamoTRIgine (LAMICTAL) 200 MG tablet     LANsoprazole (PREVACID) 30 MG CR capsule     LIOTHYRONINE SODIUM PO     lithium 300 MG capsule     LORazepam (ATIVAN PO)     meclizine (ANTIVERT) 25 MG tablet     NAPROXEN PO     OYSTER SHELL CALCIUM PO     paliperidone (INVEGA) 6 MG 24 hr tablet     perphenazine 4 MG tablet     POTASSIUM CHLORIDE ER PO     pregabalin (LYRICA) 150 MG capsule     PROPRANOLOL HCL PO     travoprost, NICHO Free, (TRAVATAN Z) 0.004 % ophthalmic solution     ziprasidone (GEODON) 40 MG capsule     ziprasidone  "(GEODON) 80 MG capsule     No current facility-administered medications for this visit.        /60  Pulse 79  Ht 1.626 m (5' 4\")  BMI 27.09 kg/m2 No LMP recorded. Body mass index is 27.09 kg/(m^2).  She is alert and oriented.  She is well groomed, comfortable in no acute distress. Normal mood and affect.   Non-labored breathing. Normocephalic without masses, lesions, obvious abnormalities. Abdomen is soft, non-tender, non-distended, no CVAT.      A/P: Betsy Resendiz is a 53 year old F with urinary retention, question of a prior bladder tumor    At this time given the question of a possible bladder tumor recommend to start with a cystoscopy. If this does not show anything we can discuss urodynamics testing to assess her detrusor function.  We discussed that there certainly appears to have been some overdistension injury that may slowly improve but that it is unclear why she has continued issues with urinary retention at this point    RTC for cystoscopy.  Will need to have a urine culture 7-10 days prior    30 minutes were spent with the patient today, > 50% in counseling and coordination of care    Trisha Tamayo MD MPH    Urology    CC  Patient Care Team:  Elle Dunlap MD as PCP - General  Camilo Jacome APRN CNP as Nurse Practitioner (Nurse Practitioner - Adult Health)  Trisha Tamayo MD as MD (Urology)  Yesy Wiseman, RN as Registered Nurse (Urology)  CAMILO JACOME      "

## 2018-11-08 NOTE — NURSING NOTE
"Chief Complaint   Patient presents with     Consult     urinary retention, pt has a catheter in place       Blood pressure 130/60, pulse 79, height 1.626 m (5' 4\"). Body mass index is 27.09 kg/(m^2).    Patient Active Problem List   Diagnosis     ACP (advance care planning)     Schizoaffective disorder, bipolar type (H)     Carpal tunnel syndrome     Cervical neck pain with evidence of disc disease     Chronic pain     DM w/o complication type II (H)     History of encephalopathy     Gastroesophageal reflux disease without esophagitis     Generalized osteoarthritis     Hypokalemia     Immunosuppressed status (H)     Migraine headache     Morbid obesity with BMI of 40.0-44.9, adult (H)     Normocytic anemia     SPIKE (obstructive sleep apnea)     Osteoarthritis of hip     Lymphedema     Pain in joint, ankle and foot     Conversion reaction     Posttraumatic stress disorder     Urinary retention     Rotator cuff tendinitis     Schizoaffective disorder, unspecified type (H)     Tobacco use disorder     Ulnar neuritis     Asthma     Unspecified glaucoma     Bladder tumor     Closed fracture of shaft of right humerus     COPD (chronic obstructive pulmonary disease) (H)     JAVIER (generalized anxiety disorder)     HTN (hypertension)     Other pulmonary embolism without acute cor pulmonale (H)     Auditory hallucinations       Allergies   Allergen Reactions     Codeine Shortness Of Breath     Has tolerated morphine 7/2008     Hmg-Coa-R Inhibitors Other (See Comments)     Patient declines  Patient declines     Lisinopril Cough     No Clinical Screening - See Comments      Statin-hmg-coa reductase inhibitors     Sulfa Drugs      Mouth sore     Sumatriptan Other (See Comments)       Current Outpatient Prescriptions   Medication Sig Dispense Refill     ACETAMINOPHEN PO Take 650 mg by mouth every 4 hours as needed for pain       albuterol (PROAIR HFA/PROVENTIL HFA/VENTOLIN HFA) 108 (90 BASE) MCG/ACT Inhaler Inhale 2 puffs into the " lungs every 4 hours as needed        brimonidine (ALPHAGAN-P) 0.15 % ophthalmic solution Place 1 drop into both eyes 2 times daily       budesonide (PULMICORT) 1 MG/2ML SUSP neb solution USE 1 VIAL IN NEBULIZER TWICE DAILY       cariprazine (VRAYLAR) 1.5 MG CAPS capsule Take 2 mg by mouth daily        cholecalciferol (VITAMIN D3) 5000 units CAPS capsule Take 5,000 Units by mouth daily       clotrimazole (LOTRIMIN) 1 % cream Apply topically 2 times daily       CYCLOBENZAPRINE HCL PO Take 5 mg by mouth 3 times daily       docusate sodium (STOOL SOFTENER) 100 MG capsule Take 100 mg by mouth At Bedtime        escitalopram (LEXAPRO) 20 MG tablet Take 1 tablet by mouth at bedtime       fluticasone (FLONASE) 50 MCG/ACT spray Spray 1 spray into both nostrils daily as needed        furosemide (LASIX) 80 MG tablet Take 40 mg by mouth 2 times daily        gabapentin (NEURONTIN) 800 MG tablet Take 400 mg by mouth 3 times daily        ipratropium - albuterol 0.5 mg/2.5 mg/3 mL (DUONEB) 0.5-2.5 (3) MG/3ML neb solution Take 1 vial by nebulization 4 times daily       lamoTRIgine (LAMICTAL) 200 MG tablet Take 200 mg by mouth At Bedtime        LANsoprazole (PREVACID) 30 MG CR capsule Take 30 mg by mouth daily        LIOTHYRONINE SODIUM PO Take 25 mcg by mouth daily       lithium 300 MG capsule Take 1 capsule by mouth in the morning and 2 capsules at bedtime       LORazepam (ATIVAN PO) Take 0.5 mg by mouth 2 times daily as needed for anxiety       meclizine (ANTIVERT) 25 MG tablet Take 25 mg by mouth every 8 hours as needed        NAPROXEN PO Take 500 mg by mouth every 8 hours as needed for moderate pain       OYSTER SHELL CALCIUM PO Take 500 mg by mouth 4 times daily       paliperidone (INVEGA) 6 MG 24 hr tablet Take 6 mg by mouth every morning       perphenazine 4 MG tablet Take 4 mg by mouth 2 times daily       POTASSIUM CHLORIDE ER PO 30meq by mouth two times a day       pregabalin (LYRICA) 150 MG capsule Take 150 mg by mouth 2  times daily        PROPRANOLOL HCL PO Take 10 mg by mouth 2 times daily       travoprost, NICHO Free, (TRAVATAN Z) 0.004 % ophthalmic solution Place 1 drop into both eyes At Bedtime       ziprasidone (GEODON) 40 MG capsule Take 1 capsule by mouth in the morning       ziprasidone (GEODON) 80 MG capsule Take 160 mg by mouth At Bedtime          Social History   Substance Use Topics     Smoking status: Current Every Day Smoker     Types: Cigarettes     Smokeless tobacco: Never Used     Alcohol use Not on file       MAURICE Youngblood  11/8/2018  7:36 AM

## 2018-11-08 NOTE — PROGRESS NOTES
November 8, 2018    Referring Provider: Ramona Jacome, APRN CNP  [address] [address]    Primary Care Provider: Elle Dunlap    CC: Urinary retention    HPI:  Betsy Resendiz is a 53 year old female who presents for evaluation of her pelvic floor symptoms.    Patient dozing on and off while I was speaking to her so much of the history is taken from the EMR.  Denies any narcotic use.  States that she is here because she cannot urinate, has a catheter in place.  She has seen other urologist in the past including Jeff Kapadia and Martha.  There is some question of whether or not she has had a bladder tumor, apparently taken to OR for cystoscopy in February by Dr Kapadia for hematuria and bladder mass but no obvious tumor seen.  Patient denies recollection of this.  She reports that over the years she has had issues with requiring a catheter at times.  She was seen by Dr Thomas in August and had over 1 L of urine in her bladder at that time.  She was on anticholinergics which she was instructed to be off of before her next voiding trial and also started on terazosin.  She subsequently failed the voiding trial and persistently having high residuals and indwelling mac was replaced.     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    Social History     Social History     Marital status: Single     Spouse name: N/A     Number of children: N/A     Years of education: N/A     Occupational History     Not on file.     Social History Main Topics     Smoking status: Current Every Day Smoker     Types: Cigarettes     Smokeless tobacco: Never Used     Alcohol use Not on file     Drug use: Not on file     Sexual activity: Not on file     Other Topics Concern     Not on file     Social History Narrative       No family history on file.    ROS    Allergies  "  Allergen Reactions     Codeine Shortness Of Breath     Has tolerated morphine 7/2008     Hmg-Coa-R Inhibitors Other (See Comments)     Patient declines  Patient declines     Lisinopril Cough     No Clinical Screening - See Comments      Statin-hmg-coa reductase inhibitors     Sulfa Drugs      Mouth sore     Sumatriptan Other (See Comments)       Current Outpatient Prescriptions   Medication     ACETAMINOPHEN PO     albuterol (PROAIR HFA/PROVENTIL HFA/VENTOLIN HFA) 108 (90 BASE) MCG/ACT Inhaler     brimonidine (ALPHAGAN-P) 0.15 % ophthalmic solution     budesonide (PULMICORT) 1 MG/2ML SUSP neb solution     cariprazine (VRAYLAR) 1.5 MG CAPS capsule     cholecalciferol (VITAMIN D3) 5000 units CAPS capsule     clotrimazole (LOTRIMIN) 1 % cream     CYCLOBENZAPRINE HCL PO     docusate sodium (STOOL SOFTENER) 100 MG capsule     escitalopram (LEXAPRO) 20 MG tablet     fluticasone (FLONASE) 50 MCG/ACT spray     furosemide (LASIX) 80 MG tablet     gabapentin (NEURONTIN) 800 MG tablet     ipratropium - albuterol 0.5 mg/2.5 mg/3 mL (DUONEB) 0.5-2.5 (3) MG/3ML neb solution     lamoTRIgine (LAMICTAL) 200 MG tablet     LANsoprazole (PREVACID) 30 MG CR capsule     LIOTHYRONINE SODIUM PO     lithium 300 MG capsule     LORazepam (ATIVAN PO)     meclizine (ANTIVERT) 25 MG tablet     NAPROXEN PO     OYSTER SHELL CALCIUM PO     paliperidone (INVEGA) 6 MG 24 hr tablet     perphenazine 4 MG tablet     POTASSIUM CHLORIDE ER PO     pregabalin (LYRICA) 150 MG capsule     PROPRANOLOL HCL PO     travoprost, NICHO Free, (TRAVATAN Z) 0.004 % ophthalmic solution     ziprasidone (GEODON) 40 MG capsule     ziprasidone (GEODON) 80 MG capsule     No current facility-administered medications for this visit.        /60  Pulse 79  Ht 1.626 m (5' 4\")  BMI 27.09 kg/m2 No LMP recorded. Body mass index is 27.09 kg/(m^2).  She is alert and oriented.  She is well groomed, comfortable in no acute distress. Normal mood and affect.   Non-labored " breathing. Normocephalic without masses, lesions, obvious abnormalities. Abdomen is soft, non-tender, non-distended, no CVAT.      A/P: Betsy Resendiz is a 53 year old F with urinary retention, question of a prior bladder tumor    At this time given the question of a possible bladder tumor recommend to start with a cystoscopy. If this does not show anything we can discuss urodynamics testing to assess her detrusor function.  We discussed that there certainly appears to have been some overdistension injury that may slowly improve but that it is unclear why she has continued issues with urinary retention at this point    RTC for cystoscopy.  Will need to have a urine culture 7-10 days prior    30 minutes were spent with the patient today, > 50% in counseling and coordination of care    Trisha Tamayo MD MPH    Urology    CC  Patient Care Team:  Elle Dunlap MD as PCP - General  Camilo Jacome, DELL CNP as Nurse Practitioner (Nurse Practitioner - Adult Health)  Trisha Tamayo MD as MD (Urology)  Yesy Wiseman, RN as Registered Nurse (Urology)  CAMILO JACOME

## 2018-11-08 NOTE — PATIENT INSTRUCTIONS
Continue monthly catheter changes    Please return for a cystoscopy (procedure to look in the bladder because of this question of a bladder tumor in the past) and pelvic exam and for urodynamics (bladder testing to see why you cannot empty you bladder).      You need a urine culture 7-10 days prior to returning for this testing and if positive culture you will need to be on antibiotics when you return    It was a pleasure meeting with you today.  Thank you for allowing me and my team the privilege of caring for you today.  YOU are the reason we are here, and I truly hope we provided you with the excellent service you deserve.  Please let us know if there is anything else we can do for you so that we can be sure you are leaving completely satisfied with your care experience.    Cystoscopy    Cystoscopy is a procedure that lets your doctor look directly inside your urethra and bladder. It can be used to:    Help diagnose a problem with your urethra, bladder, or kidneys.    Take a sample (biopsy) of bladder or urethral tissue.    Treat certain problems (such as removing kidney stones).    Place a stent to bypass an obstruction.    Take special X-rays of the kidneys.  Based on the findings, your doctor may recommend other tests or treatments.  What is a cystoscope?  A cystoscope is a telescope-like instrument that contains lenses and fiberoptics (small glass wires that make bright light). The cystoscope may be straight and rigid, or flexible to bend around curves in the urethra. The doctor may look directly into the cystoscope, or project the image onto a monitor.  Getting ready    Ask your doctor if you should stop taking any medicines before the procedure.    Follow any other instructions your doctor gives you.  Tell your doctor before the exam if you:    Take any medicines, such as aspirin or blood thinners    Have allergies to any medicines    Are pregnant   The procedure  Cystoscopy is done in the doctor s office,  surgery center, or hospital. The doctor and a nurse are present during the procedure. It takes only a few minutes, longer if a biopsy, X-ray, or treatment needs to be done.  During the procedure:    You lie on an exam table on your back, knees bent and legs apart. You are covered with a drape.    Your urethra and the area around it are washed. Anesthetic jelly may be applied to numb the urethra.    The cystoscope is inserted. A sterile fluid is put into the bladder to expand it. You may feel pressure from this fluid.    When the procedure is done, the cystoscope is removed.  After the procedure   Once you re home:    Drink plenty of fluids.    You may have burning or light bleeding when you urinate--this is normal.    Medicines may be prescribed to ease any discomfort or prevent infection. Take these as directed.    Call your doctor if you have heavy bleeding or blood clots, burning that lasts more than a day, a fever over 100 F  (38  C), or trouble urinating.  Date Last Reviewed: 1/1/2017 2000-2017 [employer]. [address] [place] [address], [place] [address]. All rights reserved. This information is not intended as a substitute for professional medical care. Always follow your healthcare professional's instructions.

## 2018-11-09 NOTE — TELEPHONE ENCOUNTER
Called by nursing staff at [facility] about patient who is c/o bladder spasms this evening.   Saw Urology today.     Plan: Detrol 4 mg q8 hours prn bladder spasms x24 hours only; follow up with primary team tomorrow.   Amaya Lopez MD

## 2018-11-12 ENCOUNTER — PRE VISIT (OUTPATIENT)
Dept: UROLOGY | Facility: CLINIC | Age: 53
End: 2018-11-12

## 2018-11-13 VITALS
RESPIRATION RATE: 18 BRPM | DIASTOLIC BLOOD PRESSURE: 54 MMHG | BODY MASS INDEX: 44.18 KG/M2 | TEMPERATURE: 98 F | WEIGHT: 257.4 LBS | HEART RATE: 80 BPM | SYSTOLIC BLOOD PRESSURE: 107 MMHG | OXYGEN SATURATION: 94 %

## 2018-11-13 NOTE — PROGRESS NOTES
[facility]    Chief Complaint   Patient presents with     longterm Regulatory       [place] Medical Record Number:  [id number]  Place of Service where encounter took place:  [facility] [[id number]]    HPI:    Betsy Resendiz is a 53 year old  (1965), who is being seen today for a federally mandated E/M visit.  HPI information obtained from: facility chart records, facility staff, patient report and [facility] chart review.     Urine retention:  - continues to have indwelling cath  - saw urology in Nov.   - Was having bladder spasms, detrol ordered for 24 hrs. No pain today  - Was in the ER at [facility] for UTI. Treated with rocephin and QID keflex    Neck pain-  - ongoing neck pain that radiates to left shoulder  - following with spine specialist  - Patient planning on having cervical decompression in the next month or so Patient plans to schedule this apt.     Moods-  - Patient continues to follow with psych NP for med mgmt  - Patient is using prn ativan about 2 times per day. States her whole body feels anxious at times. Occurs mostly in the evening.     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  PAST SURGICAL HISTORY:  has no past surgical history on file.  FAMILY HISTORY: family history is not on file.  SOCIAL HISTORY:  reports that she has been smoking Cigarettes.  She has never used smokeless tobacco.    MEDICATIONS:  Current Outpatient Prescriptions   Medication Sig Dispense Refill     ACETAMINOPHEN PO Take 650 mg by mouth every 4 hours as needed for pain       albuterol (PROAIR HFA/PROVENTIL HFA/VENTOLIN HFA) 108 (90 BASE) MCG/ACT Inhaler Inhale 2 puffs into the lungs every 4 hours as needed         brimonidine (ALPHAGAN-P) 0.15 % ophthalmic solution Place 1 drop into both eyes 2 times daily       budesonide (PULMICORT) 1 MG/2ML SUSP neb solution USE 1 VIAL IN NEBULIZER TWICE DAILY       cariprazine (VRAYLAR) 1.5 MG CAPS capsule Take 2 mg by mouth daily        CEPHALEXIN PO Take 500 mg by mouth 4 times daily       cholecalciferol (VITAMIN D3) 5000 units CAPS capsule Take 5,000 Units by mouth daily       clotrimazole (LOTRIMIN) 1 % cream Apply topically 2 times daily       CYCLOBENZAPRINE HCL PO Take 5 mg by mouth 3 times daily       docusate sodium (STOOL SOFTENER) 100 MG capsule Take 100 mg by mouth At Bedtime        escitalopram (LEXAPRO) 20 MG tablet Take 1 tablet by mouth at bedtime       fluticasone (FLONASE) 50 MCG/ACT spray Spray 1 spray into both nostrils daily as needed        furosemide (LASIX) 80 MG tablet Take 40 mg by mouth 2 times daily        gabapentin (NEURONTIN) 800 MG tablet Take 400 mg by mouth 3 times daily        ipratropium - albuterol 0.5 mg/2.5 mg/3 mL (DUONEB) 0.5-2.5 (3) MG/3ML neb solution Take 1 vial by nebulization 4 times daily       lamoTRIgine (LAMICTAL) 200 MG tablet Take 200 mg by mouth At Bedtime        LANsoprazole (PREVACID) 30 MG CR capsule Take 30 mg by mouth daily        LIOTHYRONINE SODIUM PO Take 25 mcg by mouth daily       lithium 300 MG capsule Take 1 capsule by mouth in the morning and 2 capsules at bedtime       LORazepam (ATIVAN PO) Take 0.5 mg by mouth 2 times daily as needed for anxiety       meclizine (ANTIVERT) 25 MG tablet Take 25 mg by mouth every 8 hours as needed        METHOCARBAMOL PO Take 500 mg by mouth 3 times daily       NAPROXEN PO Take 500 mg by mouth every 8 hours as needed for moderate pain       OYSTER SHELL CALCIUM PO Take 500 mg by mouth 4 times daily       paliperidone (INVEGA) 6 MG 24 hr tablet Take 6 mg by mouth every morning       perphenazine 4 MG tablet Take 4 mg by mouth 2 times daily       POTASSIUM CHLORIDE ER PO 30meq by mouth  two times a day       pregabalin (LYRICA) 150 MG capsule Take 150 mg by mouth 2 times daily        PROPRANOLOL HCL PO Take 10 mg by mouth 2 times daily       travoprost, NICHO Free, (TRAVATAN Z) 0.004 % ophthalmic solution Place 1 drop into both eyes At Bedtime       ziprasidone (GEODON) 40 MG capsule Take 1 capsule by mouth in the morning       ziprasidone (GEODON) 80 MG capsule Take 160 mg by mouth At Bedtime        Medications reviewed:  Medications reconciled to facility chart and changes were made to reflect current medications as identified as above med list. Below are the changes that were made:   Medications stopped since last EPIC medication reconciliation:   There are no discontinued medications.    Medications started since last [facility] medication reconciliation:  Orders Placed This Encounter   Medications     METHOCARBAMOL PO     Sig: Take 500 mg by mouth 3 times daily     CEPHALEXIN PO     Sig: Take 500 mg by mouth 4 times daily       Case Management:  I have reviewed the care plan and MDS and do agree with the plan. Patient's desire to return to the community is Plans to d/c in the next couple of weeks.  Information reviewed:  Medications, vital signs, orders, and nursing notes.    ROS:  4 point ROS including Respiratory, CV, GI and , other than that noted in the HPI,  is negative    Exam:  Vitals: /54  Pulse 80  Temp 98  F (36.7  C)  Resp 18  Wt 257 lb 6.4 oz (116.8 kg)  SpO2 94%  BMI 44.18 kg/m2  BMI= Body mass index is 44.18 kg/(m^2).  GENERAL APPEARANCE:  Alert, in no distress  RESP:  respiratory effort and palpation of chest normal, auscultation of lungs diminished , no respiratory distress  CV:  Palpation and auscultation of heart done , rate and rhythm regular, no murmur, moderate peripheral edema  ABDOMEN:  normal bowel sounds, soft, nontender, no hepatosplenomegaly or other masses  M/S:   Gait and station in wheelchair, Digits and nails at baseline  SKIN:  Inspection and Palpation of  skin and subcutaneous tissue excoriated diony on forearms  NEURO: 2-12 in normal limits and at patient's baseline  PSYCH:  insight and judgement, memory fair , affect and mood normal      Lab/Diagnostic data:     CBC RESULTS:   Recent Labs   Lab Test  08/30/18   0733  08/03/18   0945   WBC  10.0  15.6*   RBC  3.52*  3.66*   HGB  11.3*  11.3*   HCT  36.0  36.6   MCV  102*  100   MCH  32.1  30.9   MCHC  31.4*  30.9*   RDW  13.9  14.0   PLT  218  232       Last Basic Metabolic Panel:  Recent Labs   Lab Test  08/30/18   0733  08/03/18   0945   NA  141  139   POTASSIUM  3.8  3.6   CHLORIDE  105  100   GAURAV  8.8  9.1   CO2  30  33*   BUN  13  24   CR  1.06*  1.24*   GLC  83  81       Liver Function Studies -   Recent Labs   Lab Test  08/30/18   0733  08/03/18   0945   PROTTOTAL  6.9  7.1   ALBUMIN  3.1*  3.1*   BILITOTAL  0.3  0.3   ALKPHOS  105  102   AST  15  10   ALT  23  22       TSH   Date Value Ref Range Status   08/03/2018 1.40 0.40 - 4.00 mU/L Final   ]    Lab Results   Component Value Date    A1C 5.4 08/03/2018       ASSESSMENT/PLAN  Type 2 diabetes mellitus without complication, without long-term current use of insulin (H)  Lab Results   Component Value Date    A1C 5.4 08/03/2018     - diet controlled    Hypertension, unspecified type  - labile. On lasix.   BP Readings from Last 3 Encounters:   11/13/18 107/54   11/08/18 130/60   10/29/18 161/88         Morbid obesity with BMI of 40.0-44.9, adult (H)  - patient is rarely active. Has not been open to dietary modifications  - multiple psych meds likely contributing    Schizoaffective disorder, bipolar type (H)  Schizoaffective disorder, unspecified type (H)  Auditory hallucinations  JAVIER (generalized anxiety disorder)  - on multiple psych meds.   - patient is not wanting to adjust medications or see psychiatry  - Ongoing reports of daily paniac attacks- using prn ativan.   - patient is attending activities    Urinary retention  - following with urology  - scheduled  for a pelvic exam and a cystoscopy at the  of  in Nov    Cervical neck pain with evidence of disc disease  Chronic pain syndrome  - have successfully weaned off narcotics.   - patient plans to have a cervical disc decompression    Gastroesophageal reflux disease without esophagitis  - asymptomatic. Continue prevacid    Chronic obstructive pulmonary disease, unspecified COPD type (H)  - patient continues to smoke, does not desire cessation  - continue duo nebs and budesonide and prn albuterol. Consider adding a LAMA      Orders:  No new orders    Total time spent with patient visit at the [facility] nursing [facility] was 35 min including patient visit, review of past records and discussion with staff. Greater than 50% of total time spent with counseling and coordinating care due to complex conditions and review of specialty visits    Electronically signed by:  DELL Queen CNP

## 2018-11-13 NOTE — TELEPHONE ENCOUNTER
Reason for visit: cystoscopy and pelvic exam    Relevant information: history of bladder tumor    Records/imaging/labs: all records available    Pt called: no need for a call    Rooming: pt has a catheter.

## 2018-11-14 ENCOUNTER — NURSING HOME VISIT (OUTPATIENT)
Dept: GERIATRICS | Facility: CLINIC | Age: 53
End: 2018-11-14
Payer: COMMERCIAL

## 2018-11-14 DIAGNOSIS — M50.90 CERVICAL NECK PAIN WITH EVIDENCE OF DISC DISEASE: ICD-10-CM

## 2018-11-14 DIAGNOSIS — K21.9 GASTROESOPHAGEAL REFLUX DISEASE WITHOUT ESOPHAGITIS: ICD-10-CM

## 2018-11-14 DIAGNOSIS — J44.9 CHRONIC OBSTRUCTIVE PULMONARY DISEASE, UNSPECIFIED COPD TYPE (H): ICD-10-CM

## 2018-11-14 DIAGNOSIS — E66.01 MORBID OBESITY WITH BMI OF 40.0-44.9, ADULT (H): ICD-10-CM

## 2018-11-14 DIAGNOSIS — R33.9 URINARY RETENTION: ICD-10-CM

## 2018-11-14 DIAGNOSIS — E11.9 TYPE 2 DIABETES MELLITUS WITHOUT COMPLICATION, WITHOUT LONG-TERM CURRENT USE OF INSULIN (H): Primary | ICD-10-CM

## 2018-11-14 DIAGNOSIS — I10 HYPERTENSION, UNSPECIFIED TYPE: ICD-10-CM

## 2018-11-14 DIAGNOSIS — F41.1 GAD (GENERALIZED ANXIETY DISORDER): ICD-10-CM

## 2018-11-14 DIAGNOSIS — F25.0 SCHIZOAFFECTIVE DISORDER, BIPOLAR TYPE (H): ICD-10-CM

## 2018-11-14 DIAGNOSIS — R44.0 AUDITORY HALLUCINATIONS: ICD-10-CM

## 2018-11-14 DIAGNOSIS — F25.9 SCHIZOAFFECTIVE DISORDER, UNSPECIFIED TYPE (H): ICD-10-CM

## 2018-11-14 DIAGNOSIS — G89.4 CHRONIC PAIN SYNDROME: ICD-10-CM

## 2018-11-14 PROCEDURE — 99310 SBSQ NF CARE HIGH MDM 45: CPT | Performed by: NURSE PRACTITIONER

## 2018-11-14 NOTE — LETTER
11/14/2018        RE: Betsy Resendiz  [address]  [place] [address]          [facility]    Chief Complaint   Patient presents with     FCI Regulatory       [facility] Record Number:  [id number]  Place of Service where encounter took place:  [facility] ([facility]) [[id number]]    HPI:    Betsy Resendiz is a 53 year old  (1965), who is being seen today for a federally mandated E/M visit.  HPI information obtained from: facility chart records, facility staff, patient report and [facility] chart review.     Urine retention:  - continues to have indwelling cath  - saw urology in Nov.   - Was having bladder spasms, detrol ordered for 24 hrs. No pain today  - Was in the ER at [facility] for UTI. Treated with rocephin and QID keflex    Neck pain-  - ongoing neck pain that radiates to left shoulder  - following with spine specialist  - Patient planning on having cervical decompression in the next month or so Patient plans to schedule this apt.     Moods-  - Patient continues to follow with psych NP for med mgmt  - Patient is using prn ativan about 2 times per day. States her whole body feels anxious at times. Occurs mostly in the evening.     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  PAST SURGICAL HISTORY:  has no past surgical history on file.  FAMILY HISTORY: family history is not on file.  SOCIAL HISTORY:  reports that she has been smoking Cigarettes.  She has never used smokeless tobacco.    MEDICATIONS:  Current Outpatient Prescriptions   Medication Sig Dispense Refill     ACETAMINOPHEN PO Take 650 mg by mouth every 4 hours as needed for pain       albuterol (PROAIR HFA/PROVENTIL  HFA/VENTOLIN HFA) 108 (90 BASE) MCG/ACT Inhaler Inhale 2 puffs into the lungs every 4 hours as needed        brimonidine (ALPHAGAN-P) 0.15 % ophthalmic solution Place 1 drop into both eyes 2 times daily       budesonide (PULMICORT) 1 MG/2ML SUSP neb solution USE 1 VIAL IN NEBULIZER TWICE DAILY       cariprazine (VRAYLAR) 1.5 MG CAPS capsule Take 2 mg by mouth daily        CEPHALEXIN PO Take 500 mg by mouth 4 times daily       cholecalciferol (VITAMIN D3) 5000 units CAPS capsule Take 5,000 Units by mouth daily       clotrimazole (LOTRIMIN) 1 % cream Apply topically 2 times daily       CYCLOBENZAPRINE HCL PO Take 5 mg by mouth 3 times daily       docusate sodium (STOOL SOFTENER) 100 MG capsule Take 100 mg by mouth At Bedtime        escitalopram (LEXAPRO) 20 MG tablet Take 1 tablet by mouth at bedtime       fluticasone (FLONASE) 50 MCG/ACT spray Spray 1 spray into both nostrils daily as needed        furosemide (LASIX) 80 MG tablet Take 40 mg by mouth 2 times daily        gabapentin (NEURONTIN) 800 MG tablet Take 400 mg by mouth 3 times daily        ipratropium - albuterol 0.5 mg/2.5 mg/3 mL (DUONEB) 0.5-2.5 (3) MG/3ML neb solution Take 1 vial by nebulization 4 times daily       lamoTRIgine (LAMICTAL) 200 MG tablet Take 200 mg by mouth At Bedtime        LANsoprazole (PREVACID) 30 MG CR capsule Take 30 mg by mouth daily        LIOTHYRONINE SODIUM PO Take 25 mcg by mouth daily       lithium 300 MG capsule Take 1 capsule by mouth in the morning and 2 capsules at bedtime       LORazepam (ATIVAN PO) Take 0.5 mg by mouth 2 times daily as needed for anxiety       meclizine (ANTIVERT) 25 MG tablet Take 25 mg by mouth every 8 hours as needed        METHOCARBAMOL PO Take 500 mg by mouth 3 times daily       NAPROXEN PO Take 500 mg by mouth every 8 hours as needed for moderate pain       OYSTER SHELL CALCIUM PO Take 500 mg by mouth 4 times daily       paliperidone (INVEGA) 6 MG 24 hr tablet Take 6 mg by mouth every morning        perphenazine 4 MG tablet Take 4 mg by mouth 2 times daily       POTASSIUM CHLORIDE ER PO 30meq by mouth two times a day       pregabalin (LYRICA) 150 MG capsule Take 150 mg by mouth 2 times daily        PROPRANOLOL HCL PO Take 10 mg by mouth 2 times daily       travoprost, NICHO Free, (TRAVATAN Z) 0.004 % ophthalmic solution Place 1 drop into both eyes At Bedtime       ziprasidone (GEODON) 40 MG capsule Take 1 capsule by mouth in the morning       ziprasidone (GEODON) 80 MG capsule Take 160 mg by mouth At Bedtime        Medications reviewed:  Medications reconciled to facility chart and changes were made to reflect current medications as identified as above med list. Below are the changes that were made:   Medications stopped since last EPIC medication reconciliation:   There are no discontinued medications.    Medications started since last [facility] medication reconciliation:  Orders Placed This Encounter   Medications     METHOCARBAMOL PO     Sig: Take 500 mg by mouth 3 times daily     CEPHALEXIN PO     Sig: Take 500 mg by mouth 4 times daily       Case Management:  I have reviewed the care plan and MDS and do agree with the plan. Patient's desire to return to the community is Plans to d/c in the next couple of weeks.  Information reviewed:  Medications, vital signs, orders, and nursing notes.    ROS:  4 point ROS including Respiratory, CV, GI and , other than that noted in the HPI,  is negative    Exam:  Vitals: /54  Pulse 80  Temp 98  F (36.7  C)  Resp 18  Wt 257 lb 6.4 oz (116.8 kg)  SpO2 94%  BMI 44.18 kg/m2  BMI= Body mass index is 44.18 kg/(m^2).  GENERAL APPEARANCE:  Alert, in no distress  RESP:  respiratory effort and palpation of chest normal, auscultation of lungs diminished , no respiratory distress  CV:  Palpation and auscultation of heart done , rate and rhythm regular, no murmur, moderate peripheral edema  ABDOMEN:  normal bowel sounds, soft, nontender, no hepatosplenomegaly or other  masses  M/S:   Gait and station in wheelchair, Digits and nails at baseline  SKIN:  Inspection and Palpation of skin and subcutaneous tissue excoriated diony on forearms  NEURO: 2-12 in normal limits and at patient's baseline  PSYCH:  insight and judgement, memory fair , affect and mood normal      Lab/Diagnostic data:     CBC RESULTS:   Recent Labs   Lab Test  08/30/18   0733  08/03/18   0945   WBC  10.0  15.6*   RBC  3.52*  3.66*   HGB  11.3*  11.3*   HCT  36.0  36.6   MCV  102*  100   MCH  32.1  30.9   MCHC  31.4*  30.9*   RDW  13.9  14.0   PLT  218  232       Last Basic Metabolic Panel:  Recent Labs   Lab Test  08/30/18   0733  08/03/18   0945   NA  141  139   POTASSIUM  3.8  3.6   CHLORIDE  105  100   GAURAV  8.8  9.1   CO2  30  33*   BUN  13  24   CR  1.06*  1.24*   GLC  83  81       Liver Function Studies -   Recent Labs   Lab Test  08/30/18   0733  08/03/18   0945   PROTTOTAL  6.9  7.1   ALBUMIN  3.1*  3.1*   BILITOTAL  0.3  0.3   ALKPHOS  105  102   AST  15  10   ALT  23  22       TSH   Date Value Ref Range Status   08/03/2018 1.40 0.40 - 4.00 mU/L Final   ]    Lab Results   Component Value Date    A1C 5.4 08/03/2018       ASSESSMENT/PLAN  Type 2 diabetes mellitus without complication, without long-term current use of insulin (H)  Lab Results   Component Value Date    A1C 5.4 08/03/2018     - diet controlled    Hypertension, unspecified type  - labile. On lasix.   BP Readings from Last 3 Encounters:   11/13/18 107/54   11/08/18 130/60   10/29/18 161/88         Morbid obesity with BMI of 40.0-44.9, adult (H)  - patient is rarely active. Has not been open to dietary modifications  - multiple psych meds likely contributing    Schizoaffective disorder, bipolar type (H)  Schizoaffective disorder, unspecified type (H)  Auditory hallucinations  JAVIER (generalized anxiety disorder)  - on multiple psych meds.   - patient is not wanting to adjust medications or see psychiatry  - Ongoing reports of daily paniac attacks-  using prn ativan.   - patient is attending activities    Urinary retention  - following with urology  - scheduled for a pelvic exam and a cystoscopy at the [facility] in Nov    Cervical neck pain with evidence of disc disease  Chronic pain syndrome  - have successfully weaned off narcotics.   - patient plans to have a cervical disc decompression    Gastroesophageal reflux disease without esophagitis  - asymptomatic. Continue prevacid    Chronic obstructive pulmonary disease, unspecified COPD type (H)  - patient continues to smoke, does not desire cessation  - continue duo nebs and budesonide and prn albuterol. Consider adding a LAMA      Orders:  No new orders    Total time spent with patient visit at the [facility] nursing [facility] was 35 min including patient visit, review of past records and discussion with staff. Greater than 50% of total time spent with counseling and coordinating care due to complex conditions and review of specialty visits    Electronically signed by:  DELL Queen CNP        Sincerely,        DELL Queen CNP

## 2018-11-19 PROBLEM — I26.99 OTHER PULMONARY EMBOLISM WITHOUT ACUTE COR PULMONALE (H): Status: RESOLVED | Noted: 2017-11-28 | Resolved: 2018-11-19

## 2018-11-21 ENCOUNTER — NURSING HOME VISIT (OUTPATIENT)
Dept: GERIATRICS | Facility: CLINIC | Age: 53
End: 2018-11-21
Payer: COMMERCIAL

## 2018-11-21 ENCOUNTER — HOSPITAL ENCOUNTER (INPATIENT)
Facility: CLINIC | Age: 53
LOS: 4 days | Discharge: NURSING, REHAB OR LONG-TERM CARE | DRG: 189 | End: 2018-11-25
Attending: EMERGENCY MEDICINE | Admitting: FAMILY MEDICINE
Payer: COMMERCIAL

## 2018-11-21 ENCOUNTER — APPOINTMENT (OUTPATIENT)
Dept: GENERAL RADIOLOGY | Facility: CLINIC | Age: 53
DRG: 189 | End: 2018-11-21
Attending: EMERGENCY MEDICINE
Payer: COMMERCIAL

## 2018-11-21 VITALS
RESPIRATION RATE: 18 BRPM | BODY MASS INDEX: 44.3 KG/M2 | DIASTOLIC BLOOD PRESSURE: 77 MMHG | OXYGEN SATURATION: 96 % | TEMPERATURE: 97.5 F | HEART RATE: 84 BPM | WEIGHT: 258.1 LBS | SYSTOLIC BLOOD PRESSURE: 132 MMHG

## 2018-11-21 DIAGNOSIS — J96.02 ACUTE RESPIRATORY FAILURE WITH HYPOXIA AND HYPERCAPNIA (H): ICD-10-CM

## 2018-11-21 DIAGNOSIS — J96.01 ACUTE RESPIRATORY FAILURE WITH HYPOXIA AND HYPERCAPNIA (H): ICD-10-CM

## 2018-11-21 DIAGNOSIS — J44.9 CHRONIC OBSTRUCTIVE PULMONARY DISEASE, UNSPECIFIED COPD TYPE (H): Primary | ICD-10-CM

## 2018-11-21 DIAGNOSIS — J44.1 COPD EXACERBATION (H): ICD-10-CM

## 2018-11-21 DIAGNOSIS — G89.4 CHRONIC PAIN SYNDROME: ICD-10-CM

## 2018-11-21 DIAGNOSIS — J45.909 MODERATE ASTHMA, UNSPECIFIED WHETHER COMPLICATED, UNSPECIFIED WHETHER PERSISTENT: ICD-10-CM

## 2018-11-21 DIAGNOSIS — R51.9 ACUTE NONINTRACTABLE HEADACHE, UNSPECIFIED HEADACHE TYPE: ICD-10-CM

## 2018-11-21 DIAGNOSIS — F17.210 CIGARETTE SMOKER: ICD-10-CM

## 2018-11-21 DIAGNOSIS — R06.2 WHEEZING: ICD-10-CM

## 2018-11-21 DIAGNOSIS — F41.1 GAD (GENERALIZED ANXIETY DISORDER): Primary | ICD-10-CM

## 2018-11-21 DIAGNOSIS — F17.200 TOBACCO USE DISORDER: ICD-10-CM

## 2018-11-21 PROBLEM — N18.30 CKD (CHRONIC KIDNEY DISEASE) STAGE 3, GFR 30-59 ML/MIN (H): Status: ACTIVE | Noted: 2018-11-21

## 2018-11-21 LAB
ALBUMIN SERPL-MCNC: 3.1 G/DL (ref 3.4–5)
ALBUMIN UR-MCNC: 30 MG/DL
ALP SERPL-CCNC: 96 U/L (ref 40–150)
ALT SERPL W P-5'-P-CCNC: 36 U/L (ref 0–50)
ANION GAP SERPL CALCULATED.3IONS-SCNC: 3 MMOL/L (ref 3–14)
APPEARANCE UR: ABNORMAL
AST SERPL W P-5'-P-CCNC: 21 U/L (ref 0–45)
BACTERIA #/AREA URNS HPF: ABNORMAL /HPF
BASE EXCESS BLDV CALC-SCNC: 8.4 MMOL/L
BASOPHILS # BLD AUTO: 0.1 10E9/L (ref 0–0.2)
BASOPHILS NFR BLD AUTO: 0.7 %
BILIRUB SERPL-MCNC: 0.2 MG/DL (ref 0.2–1.3)
BILIRUB UR QL STRIP: NEGATIVE
BUN SERPL-MCNC: 15 MG/DL (ref 7–30)
CALCIUM SERPL-MCNC: 8.2 MG/DL (ref 8.5–10.1)
CAOX CRY #/AREA URNS HPF: ABNORMAL /HPF
CHLORIDE SERPL-SCNC: 105 MMOL/L (ref 94–109)
CO2 SERPL-SCNC: 34 MMOL/L (ref 20–32)
COLOR UR AUTO: YELLOW
CREAT SERPL-MCNC: 1.32 MG/DL (ref 0.52–1.04)
DIFFERENTIAL METHOD BLD: ABNORMAL
EOSINOPHIL # BLD AUTO: 0.3 10E9/L (ref 0–0.7)
EOSINOPHIL NFR BLD AUTO: 3.5 %
ERYTHROCYTE [DISTWIDTH] IN BLOOD BY AUTOMATED COUNT: 12.1 % (ref 10–15)
GFR SERPL CREATININE-BSD FRML MDRD: 42 ML/MIN/1.7M2
GLUCOSE SERPL-MCNC: 97 MG/DL (ref 70–99)
GLUCOSE UR STRIP-MCNC: NEGATIVE MG/DL
HCO3 BLDV-SCNC: 37 MMOL/L (ref 21–28)
HCT VFR BLD AUTO: 39.4 % (ref 35–47)
HGB BLD-MCNC: 12.5 G/DL (ref 11.7–15.7)
HGB UR QL STRIP: NEGATIVE
HYALINE CASTS #/AREA URNS LPF: 14 /LPF (ref 0–2)
IMM GRANULOCYTES # BLD: 0 10E9/L (ref 0–0.4)
IMM GRANULOCYTES NFR BLD: 0.4 %
KETONES UR STRIP-MCNC: NEGATIVE MG/DL
LEUKOCYTE ESTERASE UR QL STRIP: ABNORMAL
LYMPHOCYTES # BLD AUTO: 1.4 10E9/L (ref 0.8–5.3)
LYMPHOCYTES NFR BLD AUTO: 15.2 %
MCH RBC QN AUTO: 32.6 PG (ref 26.5–33)
MCHC RBC AUTO-ENTMCNC: 31.7 G/DL (ref 31.5–36.5)
MCV RBC AUTO: 103 FL (ref 78–100)
MONOCYTES # BLD AUTO: 1.3 10E9/L (ref 0–1.3)
MONOCYTES NFR BLD AUTO: 14.8 %
NEUTROPHILS # BLD AUTO: 5.8 10E9/L (ref 1.6–8.3)
NEUTROPHILS NFR BLD AUTO: 65.4 %
NITRATE UR QL: NEGATIVE
NRBC # BLD AUTO: 0 10*3/UL
NRBC BLD AUTO-RTO: 0 /100
NT-PROBNP SERPL-MCNC: 78 PG/ML (ref 0–900)
O2/TOTAL GAS SETTING VFR VENT: ABNORMAL %
PCO2 BLDV: 70 MM HG (ref 40–50)
PH BLDV: 7.33 PH (ref 7.32–7.43)
PH UR STRIP: 6 PH (ref 5–7)
PLATELET # BLD AUTO: 199 10E9/L (ref 150–450)
PO2 BLDV: 37 MM HG (ref 25–47)
POTASSIUM SERPL-SCNC: 3.9 MMOL/L (ref 3.4–5.3)
PROT SERPL-MCNC: 7.4 G/DL (ref 6.8–8.8)
RBC # BLD AUTO: 3.83 10E12/L (ref 3.8–5.2)
RBC #/AREA URNS AUTO: 71 /HPF (ref 0–2)
SODIUM SERPL-SCNC: 142 MMOL/L (ref 133–144)
SOURCE: ABNORMAL
SP GR UR STRIP: 1.01 (ref 1–1.03)
TROPONIN I SERPL-MCNC: <0.015 UG/L (ref 0–0.04)
UROBILINOGEN UR STRIP-MCNC: 0 MG/DL (ref 0–2)
WBC # BLD AUTO: 8.9 10E9/L (ref 4–11)
WBC #/AREA URNS AUTO: 1989 /HPF (ref 0–5)
WBC CLUMPS #/AREA URNS HPF: PRESENT /HPF
YEAST #/AREA URNS HPF: ABNORMAL /HPF

## 2018-11-21 PROCEDURE — 25000125 ZZHC RX 250: Performed by: PHYSICIAN ASSISTANT

## 2018-11-21 PROCEDURE — 93010 ELECTROCARDIOGRAM REPORT: CPT | Mod: Z6 | Performed by: EMERGENCY MEDICINE

## 2018-11-21 PROCEDURE — 94640 AIRWAY INHALATION TREATMENT: CPT | Mod: 76

## 2018-11-21 PROCEDURE — 82803 BLOOD GASES ANY COMBINATION: CPT | Performed by: EMERGENCY MEDICINE

## 2018-11-21 PROCEDURE — 25000128 H RX IP 250 OP 636: Performed by: PHYSICIAN ASSISTANT

## 2018-11-21 PROCEDURE — 25000132 ZZH RX MED GY IP 250 OP 250 PS 637: Performed by: PHYSICIAN ASSISTANT

## 2018-11-21 PROCEDURE — 71046 X-RAY EXAM CHEST 2 VIEWS: CPT

## 2018-11-21 PROCEDURE — 93005 ELECTROCARDIOGRAM TRACING: CPT | Performed by: EMERGENCY MEDICINE

## 2018-11-21 PROCEDURE — 25000128 H RX IP 250 OP 636: Performed by: EMERGENCY MEDICINE

## 2018-11-21 PROCEDURE — 84484 ASSAY OF TROPONIN QUANT: CPT | Performed by: EMERGENCY MEDICINE

## 2018-11-21 PROCEDURE — 20000003 ZZH R&B ICU

## 2018-11-21 PROCEDURE — 80053 COMPREHEN METABOLIC PANEL: CPT | Performed by: EMERGENCY MEDICINE

## 2018-11-21 PROCEDURE — 40000275 ZZH STATISTIC RCP TIME EA 10 MIN

## 2018-11-21 PROCEDURE — 94640 AIRWAY INHALATION TREATMENT: CPT | Performed by: EMERGENCY MEDICINE

## 2018-11-21 PROCEDURE — 40000270 ZZH STATISTIC OXYGEN  O2DAILY TECH TIME

## 2018-11-21 PROCEDURE — 99285 EMERGENCY DEPT VISIT HI MDM: CPT | Mod: 25 | Performed by: EMERGENCY MEDICINE

## 2018-11-21 PROCEDURE — 99223 1ST HOSP IP/OBS HIGH 75: CPT | Mod: AI | Performed by: PHYSICIAN ASSISTANT

## 2018-11-21 PROCEDURE — 99310 SBSQ NF CARE HIGH MDM 45: CPT | Performed by: NURSE PRACTITIONER

## 2018-11-21 PROCEDURE — 85025 COMPLETE CBC W/AUTO DIFF WBC: CPT | Performed by: EMERGENCY MEDICINE

## 2018-11-21 PROCEDURE — 83880 ASSAY OF NATRIURETIC PEPTIDE: CPT | Performed by: EMERGENCY MEDICINE

## 2018-11-21 PROCEDURE — 25000125 ZZHC RX 250: Performed by: EMERGENCY MEDICINE

## 2018-11-21 PROCEDURE — 81001 URINALYSIS AUTO W/SCOPE: CPT | Performed by: EMERGENCY MEDICINE

## 2018-11-21 PROCEDURE — 96374 THER/PROPH/DIAG INJ IV PUSH: CPT | Performed by: EMERGENCY MEDICINE

## 2018-11-21 PROCEDURE — 99291 CRITICAL CARE FIRST HOUR: CPT | Mod: 25 | Performed by: EMERGENCY MEDICINE

## 2018-11-21 PROCEDURE — 25000132 ZZH RX MED GY IP 250 OP 250 PS 637: Performed by: FAMILY MEDICINE

## 2018-11-21 RX ORDER — PREDNISONE 20 MG/1
40 TABLET ORAL DAILY
Status: DISCONTINUED | OUTPATIENT
Start: 2018-11-22 | End: 2018-11-25 | Stop reason: HOSPADM

## 2018-11-21 RX ORDER — FUROSEMIDE 40 MG
40 TABLET ORAL 2 TIMES DAILY
Status: DISCONTINUED | OUTPATIENT
Start: 2018-11-21 | End: 2018-11-23

## 2018-11-21 RX ORDER — ZIPRASIDONE HYDROCHLORIDE 20 MG/1
40 CAPSULE ORAL EVERY MORNING
Status: DISCONTINUED | OUTPATIENT
Start: 2018-11-22 | End: 2018-11-25 | Stop reason: HOSPADM

## 2018-11-21 RX ORDER — ONDANSETRON 2 MG/ML
4 INJECTION INTRAMUSCULAR; INTRAVENOUS EVERY 6 HOURS PRN
Status: DISCONTINUED | OUTPATIENT
Start: 2018-11-21 | End: 2018-11-25 | Stop reason: HOSPADM

## 2018-11-21 RX ORDER — NALOXONE HYDROCHLORIDE 0.4 MG/ML
.1-.4 INJECTION, SOLUTION INTRAMUSCULAR; INTRAVENOUS; SUBCUTANEOUS
Status: DISCONTINUED | OUTPATIENT
Start: 2018-11-21 | End: 2018-11-22

## 2018-11-21 RX ORDER — PROCHLORPERAZINE MALEATE 10 MG
10 TABLET ORAL EVERY 6 HOURS PRN
Status: DISCONTINUED | OUTPATIENT
Start: 2018-11-21 | End: 2018-11-25 | Stop reason: HOSPADM

## 2018-11-21 RX ORDER — PROPRANOLOL HYDROCHLORIDE 10 MG/1
10 TABLET ORAL 2 TIMES DAILY
Status: DISCONTINUED | OUTPATIENT
Start: 2018-11-21 | End: 2018-11-25 | Stop reason: HOSPADM

## 2018-11-21 RX ORDER — METHYLPREDNISOLONE SODIUM SUCCINATE 125 MG/2ML
125 INJECTION, POWDER, LYOPHILIZED, FOR SOLUTION INTRAMUSCULAR; INTRAVENOUS ONCE
Status: COMPLETED | OUTPATIENT
Start: 2018-11-21 | End: 2018-11-21

## 2018-11-21 RX ORDER — IPRATROPIUM BROMIDE AND ALBUTEROL SULFATE 2.5; .5 MG/3ML; MG/3ML
3 SOLUTION RESPIRATORY (INHALATION) ONCE
Status: COMPLETED | OUTPATIENT
Start: 2018-11-21 | End: 2018-11-21

## 2018-11-21 RX ORDER — GABAPENTIN 400 MG/1
400 CAPSULE ORAL 3 TIMES DAILY
Status: DISCONTINUED | OUTPATIENT
Start: 2018-11-21 | End: 2018-11-24

## 2018-11-21 RX ORDER — PERPHENAZINE 4 MG/1
4 TABLET ORAL 2 TIMES DAILY
Status: DISCONTINUED | OUTPATIENT
Start: 2018-11-21 | End: 2018-11-25 | Stop reason: HOSPADM

## 2018-11-21 RX ORDER — LORAZEPAM 0.5 MG/1
0.5 TABLET ORAL 2 TIMES DAILY PRN
Status: DISCONTINUED | OUTPATIENT
Start: 2018-11-21 | End: 2018-11-25 | Stop reason: HOSPADM

## 2018-11-21 RX ORDER — POTASSIUM CHLORIDE 750 MG/1
10 TABLET, EXTENDED RELEASE ORAL DAILY
Status: DISCONTINUED | OUTPATIENT
Start: 2018-11-22 | End: 2018-11-25 | Stop reason: HOSPADM

## 2018-11-21 RX ORDER — PREGABALIN 75 MG/1
150 CAPSULE ORAL 2 TIMES DAILY
Status: DISCONTINUED | OUTPATIENT
Start: 2018-11-21 | End: 2018-11-22

## 2018-11-21 RX ORDER — POLYETHYLENE GLYCOL 3350 17 G/17G
17 POWDER, FOR SOLUTION ORAL DAILY PRN
Status: DISCONTINUED | OUTPATIENT
Start: 2018-11-21 | End: 2018-11-25 | Stop reason: HOSPADM

## 2018-11-21 RX ORDER — DOCUSATE SODIUM 100 MG/1
100 CAPSULE, LIQUID FILLED ORAL AT BEDTIME
Status: DISCONTINUED | OUTPATIENT
Start: 2018-11-21 | End: 2018-11-25 | Stop reason: HOSPADM

## 2018-11-21 RX ORDER — PANTOPRAZOLE SODIUM 40 MG/1
40 TABLET, DELAYED RELEASE ORAL
Status: DISCONTINUED | OUTPATIENT
Start: 2018-11-22 | End: 2018-11-25 | Stop reason: HOSPADM

## 2018-11-21 RX ORDER — PROCHLORPERAZINE 25 MG
25 SUPPOSITORY, RECTAL RECTAL EVERY 12 HOURS PRN
Status: DISCONTINUED | OUTPATIENT
Start: 2018-11-21 | End: 2018-11-25 | Stop reason: HOSPADM

## 2018-11-21 RX ORDER — CYCLOBENZAPRINE HCL 5 MG
5 TABLET ORAL EVERY 8 HOURS PRN
Status: DISCONTINUED | OUTPATIENT
Start: 2018-11-21 | End: 2018-11-25 | Stop reason: HOSPADM

## 2018-11-21 RX ORDER — MAGNESIUM CARB/ALUMINUM HYDROX 105-160MG
148 TABLET,CHEWABLE ORAL
Status: DISCONTINUED | OUTPATIENT
Start: 2018-11-21 | End: 2018-11-25 | Stop reason: HOSPADM

## 2018-11-21 RX ORDER — ALBUTEROL SULFATE 5 MG/ML
2.5 SOLUTION RESPIRATORY (INHALATION)
Status: DISCONTINUED | OUTPATIENT
Start: 2018-11-21 | End: 2018-11-25 | Stop reason: HOSPADM

## 2018-11-21 RX ORDER — LOPERAMIDE HCL 2 MG
2 CAPSULE ORAL EVERY 6 HOURS PRN
COMMUNITY

## 2018-11-21 RX ORDER — BISACODYL 5 MG
10 TABLET, DELAYED RELEASE (ENTERIC COATED) ORAL DAILY PRN
Status: DISCONTINUED | OUTPATIENT
Start: 2018-11-21 | End: 2018-11-25 | Stop reason: HOSPADM

## 2018-11-21 RX ORDER — ZIPRASIDONE HYDROCHLORIDE 80 MG/1
160 CAPSULE ORAL AT BEDTIME
Status: DISCONTINUED | OUTPATIENT
Start: 2018-11-21 | End: 2018-11-25 | Stop reason: HOSPADM

## 2018-11-21 RX ORDER — AZITHROMYCIN 250 MG/1
250 TABLET, FILM COATED ORAL DAILY
Status: COMPLETED | OUTPATIENT
Start: 2018-11-22 | End: 2018-11-25

## 2018-11-21 RX ORDER — GUAIFENESIN/DEXTROMETHORPHAN 100-10MG/5
5 SYRUP ORAL EVERY 4 HOURS PRN
Status: DISCONTINUED | OUTPATIENT
Start: 2018-11-21 | End: 2018-11-25 | Stop reason: HOSPADM

## 2018-11-21 RX ORDER — SODIUM CHLORIDE 9 MG/ML
INJECTION, SOLUTION INTRAVENOUS CONTINUOUS
Status: CANCELLED | OUTPATIENT
Start: 2018-11-21

## 2018-11-21 RX ORDER — LANSOPRAZOLE 30 MG/1
30 CAPSULE, DELAYED RELEASE ORAL DAILY
Status: DISCONTINUED | OUTPATIENT
Start: 2018-11-22 | End: 2018-11-21 | Stop reason: CLARIF

## 2018-11-21 RX ORDER — ACETAMINOPHEN 325 MG/1
650 TABLET ORAL EVERY 4 HOURS PRN
Status: DISCONTINUED | OUTPATIENT
Start: 2018-11-21 | End: 2018-11-25 | Stop reason: HOSPADM

## 2018-11-21 RX ORDER — ESCITALOPRAM OXALATE 20 MG/1
20 TABLET ORAL AT BEDTIME
Status: DISCONTINUED | OUTPATIENT
Start: 2018-11-21 | End: 2018-11-25 | Stop reason: HOSPADM

## 2018-11-21 RX ORDER — IPRATROPIUM BROMIDE AND ALBUTEROL SULFATE 2.5; .5 MG/3ML; MG/3ML
3 SOLUTION RESPIRATORY (INHALATION)
Status: CANCELLED | OUTPATIENT
Start: 2018-11-21

## 2018-11-21 RX ORDER — LITHIUM CARBONATE 300 MG/1
300 CAPSULE ORAL EVERY MORNING
Status: DISCONTINUED | OUTPATIENT
Start: 2018-11-22 | End: 2018-11-22

## 2018-11-21 RX ORDER — METHOCARBAMOL 500 MG/1
500 TABLET, FILM COATED ORAL 3 TIMES DAILY
Status: DISCONTINUED | OUTPATIENT
Start: 2018-11-21 | End: 2018-11-22

## 2018-11-21 RX ORDER — LITHIUM CARBONATE 600 MG/1
600 CAPSULE ORAL AT BEDTIME
Status: DISCONTINUED | OUTPATIENT
Start: 2018-11-21 | End: 2018-11-22

## 2018-11-21 RX ORDER — NALOXONE HYDROCHLORIDE 0.4 MG/ML
.1-.4 INJECTION, SOLUTION INTRAMUSCULAR; INTRAVENOUS; SUBCUTANEOUS
Status: DISCONTINUED | OUTPATIENT
Start: 2018-11-21 | End: 2018-11-21

## 2018-11-21 RX ORDER — NAPROXEN 500 MG/1
500 TABLET ORAL EVERY 8 HOURS PRN
Status: DISCONTINUED | OUTPATIENT
Start: 2018-11-21 | End: 2018-11-23

## 2018-11-21 RX ORDER — TRAVOPROST OPHTHALMIC SOLUTION 0.04 MG/ML
1 SOLUTION OPHTHALMIC AT BEDTIME
Status: DISCONTINUED | OUTPATIENT
Start: 2018-11-21 | End: 2018-11-25 | Stop reason: HOSPADM

## 2018-11-21 RX ORDER — ACETAMINOPHEN 325 MG/1
650 TABLET ORAL EVERY 4 HOURS PRN
Status: DISCONTINUED | OUTPATIENT
Start: 2018-11-21 | End: 2018-11-21

## 2018-11-21 RX ORDER — LAMOTRIGINE 200 MG/1
200 TABLET ORAL AT BEDTIME
Status: DISCONTINUED | OUTPATIENT
Start: 2018-11-21 | End: 2018-11-25 | Stop reason: HOSPADM

## 2018-11-21 RX ORDER — IPRATROPIUM BROMIDE AND ALBUTEROL SULFATE 2.5; .5 MG/3ML; MG/3ML
3 SOLUTION RESPIRATORY (INHALATION)
Status: DISCONTINUED | OUTPATIENT
Start: 2018-11-21 | End: 2018-11-25 | Stop reason: HOSPADM

## 2018-11-21 RX ORDER — BRIMONIDINE TARTRATE 2 MG/ML
1 SOLUTION/ DROPS OPHTHALMIC 2 TIMES DAILY
Status: DISCONTINUED | OUTPATIENT
Start: 2018-11-21 | End: 2018-11-25 | Stop reason: HOSPADM

## 2018-11-21 RX ORDER — PALIPERIDONE 6 MG/1
6 TABLET, EXTENDED RELEASE ORAL EVERY MORNING
Status: DISCONTINUED | OUTPATIENT
Start: 2018-11-22 | End: 2018-11-25 | Stop reason: HOSPADM

## 2018-11-21 RX ORDER — FENTANYL CITRATE 50 UG/ML
50 INJECTION, SOLUTION INTRAMUSCULAR; INTRAVENOUS EVERY 4 HOURS PRN
Status: DISCONTINUED | OUTPATIENT
Start: 2018-11-21 | End: 2018-11-23

## 2018-11-21 RX ORDER — BISACODYL 5 MG
5 TABLET, DELAYED RELEASE (ENTERIC COATED) ORAL DAILY PRN
Status: DISCONTINUED | OUTPATIENT
Start: 2018-11-21 | End: 2018-11-25 | Stop reason: HOSPADM

## 2018-11-21 RX ORDER — BISACODYL 5 MG
15 TABLET, DELAYED RELEASE (ENTERIC COATED) ORAL DAILY PRN
Status: DISCONTINUED | OUTPATIENT
Start: 2018-11-21 | End: 2018-11-25 | Stop reason: HOSPADM

## 2018-11-21 RX ORDER — ONDANSETRON 4 MG/1
4 TABLET, ORALLY DISINTEGRATING ORAL EVERY 6 HOURS PRN
Status: DISCONTINUED | OUTPATIENT
Start: 2018-11-21 | End: 2018-11-25 | Stop reason: HOSPADM

## 2018-11-21 RX ORDER — BUDESONIDE 0.5 MG/2ML
0.5 INHALANT ORAL 2 TIMES DAILY
COMMUNITY

## 2018-11-21 RX ORDER — LIOTHYRONINE SODIUM 25 UG/1
25 TABLET ORAL DAILY
Status: DISCONTINUED | OUTPATIENT
Start: 2018-11-22 | End: 2018-11-25 | Stop reason: HOSPADM

## 2018-11-21 RX ORDER — POLYETHYLENE GLYCOL 3350 17 G
2-4 POWDER IN PACKET (EA) ORAL
Status: DISCONTINUED | OUTPATIENT
Start: 2018-11-21 | End: 2018-11-25 | Stop reason: HOSPADM

## 2018-11-21 RX ORDER — BUDESONIDE 0.5 MG/2ML
0.5 INHALANT ORAL 2 TIMES DAILY
Status: DISCONTINUED | OUTPATIENT
Start: 2018-11-21 | End: 2018-11-25 | Stop reason: HOSPADM

## 2018-11-21 RX ORDER — AZITHROMYCIN 250 MG/1
500 TABLET, FILM COATED ORAL ONCE
Status: COMPLETED | OUTPATIENT
Start: 2018-11-21 | End: 2018-11-21

## 2018-11-21 RX ADMIN — IPRATROPIUM BROMIDE AND ALBUTEROL SULFATE 3 ML: .5; 3 SOLUTION RESPIRATORY (INHALATION) at 17:10

## 2018-11-21 RX ADMIN — DOCUSATE SODIUM 100 MG: 100 CAPSULE, LIQUID FILLED ORAL at 20:34

## 2018-11-21 RX ADMIN — LAMOTRIGINE 200 MG: 200 TABLET ORAL at 20:35

## 2018-11-21 RX ADMIN — LORAZEPAM 0.5 MG: 0.5 TABLET ORAL at 20:44

## 2018-11-21 RX ADMIN — PERPHENAZINE 4 MG: 2 TABLET, FILM COATED ORAL at 20:29

## 2018-11-21 RX ADMIN — IPRATROPIUM BROMIDE AND ALBUTEROL SULFATE 3 ML: .5; 3 SOLUTION RESPIRATORY (INHALATION) at 20:47

## 2018-11-21 RX ADMIN — LITHIUM CARBONATE 600 MG: 300 CAPSULE, GELATIN COATED ORAL at 20:36

## 2018-11-21 RX ADMIN — ENOXAPARIN SODIUM 40 MG: 40 INJECTION SUBCUTANEOUS at 20:26

## 2018-11-21 RX ADMIN — GUAIFENESIN AND DEXTROMETHORPHAN 5 ML: 100; 10 SYRUP ORAL at 20:44

## 2018-11-21 RX ADMIN — ACETAMINOPHEN 650 MG: 325 TABLET, FILM COATED ORAL at 20:30

## 2018-11-21 RX ADMIN — CYCLOBENZAPRINE HYDROCHLORIDE 5 MG: 5 TABLET, FILM COATED ORAL at 20:38

## 2018-11-21 RX ADMIN — FUROSEMIDE 40 MG: 40 TABLET ORAL at 20:26

## 2018-11-21 RX ADMIN — METHOCARBAMOL 500 MG: 500 TABLET ORAL at 20:27

## 2018-11-21 RX ADMIN — BRIMONIDINE TARTRATE 1 DROP: 2 SOLUTION OPHTHALMIC at 20:25

## 2018-11-21 RX ADMIN — TRAVOPROST 1 DROP: 0.04 SOLUTION/ DROPS OPHTHALMIC at 20:37

## 2018-11-21 RX ADMIN — PREGABALIN 150 MG: 75 CAPSULE ORAL at 20:30

## 2018-11-21 RX ADMIN — ESCITALOPRAM OXALATE 20 MG: 20 TABLET ORAL at 20:34

## 2018-11-21 RX ADMIN — GABAPENTIN 400 MG: 400 CAPSULE ORAL at 20:27

## 2018-11-21 RX ADMIN — IPRATROPIUM BROMIDE AND ALBUTEROL SULFATE 3 ML: .5; 3 SOLUTION RESPIRATORY (INHALATION) at 15:52

## 2018-11-21 RX ADMIN — ZIPRASIDONE HCL 160 MG: 80 CAPSULE ORAL at 22:44

## 2018-11-21 RX ADMIN — AZITHROMYCIN MONOHYDRATE 500 MG: 250 TABLET ORAL at 20:25

## 2018-11-21 RX ADMIN — IPRATROPIUM BROMIDE AND ALBUTEROL SULFATE 3 ML: .5; 3 SOLUTION RESPIRATORY (INHALATION) at 13:00

## 2018-11-21 RX ADMIN — PROPRANOLOL HYDROCHLORIDE 10 MG: 10 TABLET ORAL at 20:30

## 2018-11-21 RX ADMIN — BUDESONIDE 0.5 MG: 0.5 INHALANT RESPIRATORY (INHALATION) at 20:47

## 2018-11-21 RX ADMIN — FENTANYL CITRATE 50 MCG: 50 INJECTION, SOLUTION INTRAMUSCULAR; INTRAVENOUS at 22:43

## 2018-11-21 RX ADMIN — METHYLPREDNISOLONE SODIUM SUCCINATE 125 MG: 125 INJECTION, POWDER, FOR SOLUTION INTRAMUSCULAR; INTRAVENOUS at 14:15

## 2018-11-21 ASSESSMENT — ENCOUNTER SYMPTOMS
COUGH: 1
WHEEZING: 1
FATIGUE: 1
ACTIVITY CHANGE: 1

## 2018-11-21 ASSESSMENT — ACTIVITIES OF DAILY LIVING (ADL): ADLS_ACUITY_SCORE: 16

## 2018-11-21 ASSESSMENT — PAIN DESCRIPTION - DESCRIPTORS
DESCRIPTORS: ACHING;DULL;SPASM
DESCRIPTORS: ACHING;DISCOMFORT

## 2018-11-21 NOTE — MR AVS SNAPSHOT
After Visit Summary   11/21/2018    Betsy Resendiz    MRN: [id number]           Patient Information     Date Of Birth          1965        Visit Information        Provider Department      11/21/2018 7:30 AM Ramona Jacome APRN CNP Geriatrics Transitional Care        Today's Diagnoses     Chronic obstructive pulmonary disease, unspecified COPD type (H)    -  1    Moderate asthma, unspecified whether complicated, unspecified whether persistent        Tobacco use disorder        Acute nonintractable headache, unspecified headache type        Wheezing           Follow-ups after your visit        Your next 10 appointments already scheduled     Jan 02, 2019  4:00 PM CST   (Arrive by 3:45 PM)   Cystoscopy with Trisha Tamayo MD   [facility] ([facility])    [address] [place] [address] [phone]            Feb 13, 2019  7:00 AM CST   (Arrive by 6:45 AM)   Urodynamics with Cris Isaacs PA-C   [facility] ([facility])    [address] [place] [address] [phone]              Who to contact     If you have questions or need follow up information about today's clinic visit or your schedule please contact GERIATRICS TRANSITIONAL CARE directly at [phone].  Normal or non-critical lab and imaging results will be communicated to you by MyChart, letter or phone within 4 business days after the clinic has received the results. If you do not hear from us within 7 days, please contact the clinic through MyChart or phone. If you have a critical or abnormal lab result, we will notify you by phone as soon as possible.  Submit refill requests through [employer] or call your pharmacy and they will forward the refill request to us. Please allow 3 business days for your refill to be completed.           Additional Information About Your Visit        Care EveryWhere ID     This is your Care EveryWhere ID. This could be used by other organizations to access your [place] medical records  [id number]        Your Vitals Were     Pulse Temperature Respirations Pulse Oximetry BMI (Body Mass Index)       84 97.5  F (36.4  C) 18 96% 44.3 kg/m2        Blood Pressure from Last 3 Encounters:   11/21/18 132/77   11/13/18 107/54   11/08/18 130/60    Weight from Last 3 Encounters:   11/21/18 258 lb 1.6 oz (117.1 kg)   11/13/18 257 lb 6.4 oz (116.8 kg)   10/29/18 157 lb 12.8 oz (71.6 kg)              Today, you had the following     No orders found for display       Primary Care Provider Office Phone # Fax #    Elle Dunlap [phone]       [place] [address]        Equal Access to Services     BLADE CHERY : Hadii gemma ku hadasho Soomaali, waaxda luqadaha, qaybta kaalmada adeegyada, wander lopez. So [facility] [phone].    ATENCIÓN: Si habla español, tiene a knight disposición servicios gratuitos de asistencia lingüística. Llame al [phone].    We comply with applicable federal civil rights laws and [place] laws. We do not discriminate on the basis of race, color, national origin, age, disability, sex, sexual orientation, or gender identity.            Thank you!     Thank you for choosing GERIATRICS TRANSITIONAL CARE  for your care. Our goal is always to provide you with excellent care. Hearing back from our patients is one way we can continue to improve our services. Please take a few minutes to complete the written survey that you may receive in the mail after your visit with us. Thank you!             Your Updated Medication List - Protect others around you: Learn how to safely use, store and throw away your medicines at www.disposemymeds.org.          This list is accurate as of 11/21/18 12:16 PM.  Always use your most recent  med list.                   Brand Name Dispense Instructions for use Diagnosis    ACETAMINOPHEN PO      Take 650 mg by mouth every 4 hours as needed for pain        albuterol 108 (90 Base) MCG/ACT inhaler    PROAIR HFA/PROVENTIL HFA/VENTOLIN HFA     Inhale 2 puffs into the lungs every 4 hours as needed        ATIVAN PO      Take 0.5 mg by mouth 2 times daily as needed for anxiety        brimonidine 0.15 % ophthalmic solution    ALPHAGAN-P     Place 1 drop into both eyes 2 times daily        budesonide 1 MG/2ML Susp neb solution    PULMICORT     USE 1 VIAL IN NEBULIZER TWICE DAILY        cariprazine 1.5 MG Caps capsule    VRAYLAR     Take 2 mg by mouth daily        CEPHALEXIN PO      Take 500 mg by mouth 4 times daily        cholecalciferol 5000 units (125 mcg) Caps capsule    vitamin D3     Take 5,000 Units by mouth daily        clotrimazole 1 % cream    LOTRIMIN     Apply topically 2 times daily        CYCLOBENZAPRINE HCL PO      Take 5 mg by mouth 3 times daily        DOXYCYCLINE HYCLATE PO      Take 100 mg by mouth 2 times daily        escitalopram 20 MG tablet    LEXAPRO     Take 1 tablet by mouth at bedtime        fluticasone 50 MCG/ACT spray    FLONASE     Spray 1 spray into both nostrils daily as needed        furosemide 80 MG tablet    LASIX     Take 40 mg by mouth 2 times daily        gabapentin 800 MG tablet    NEURONTIN     Take 400 mg by mouth 3 times daily        ipratropium - albuterol 0.5 mg/2.5 mg/3 mL 0.5-2.5 (3) MG/3ML neb solution    DUONEB     Take 1 vial by nebulization 4 times daily        lamoTRIgine 200 MG tablet    LaMICtal     Take 200 mg by mouth At Bedtime        LANsoprazole 30 MG CR capsule    PREVACID     Take 30 mg by mouth daily        LIOTHYRONINE SODIUM PO      Take 25 mcg by mouth daily        lithium 300 MG capsule      Take 1 capsule by mouth in the morning and 2 capsules at bedtime        loperamide 2 MG capsule    IMODIUM     Take 2 mg by mouth every 6 hours as needed for  diarrhea        meclizine 25 MG tablet    ANTIVERT     Take 25 mg by mouth every 8 hours as needed        METHOCARBAMOL PO      Take 500 mg by mouth 3 times daily        NAPROXEN PO      Take 500 mg by mouth every 8 hours as needed for moderate pain        OYSTER SHELL CALCIUM PO      Take 500 mg by mouth 4 times daily        paliperidone 6 MG 24 hr tablet    INVEGA     Take 6 mg by mouth every morning        perphenazine 4 MG tablet      Take 2 mg by mouth 2 times daily        POTASSIUM CHLORIDE ER PO      30meq by mouth two times a day        PREDNISONE PO      Take 20 mg by mouth daily        pregabalin 150 MG capsule    LYRICA     Take 150 mg by mouth 2 times daily        PROPRANOLOL HCL PO      Take 10 mg by mouth 2 times daily        STOOL SOFTENER 100 MG capsule   Generic drug:  docusate sodium      Take 100 mg by mouth At Bedtime        travoprost (BAK Free) 0.004 % ophthalmic solution    TRAVATAN Z     Place 1 drop into both eyes At Bedtime        * ziprasidone 80 MG capsule    GEODON     Take 160 mg by mouth At Bedtime        * ziprasidone 40 MG capsule    GEODON     Take 1 capsule by mouth in the morning        * Notice:  This list has 2 medication(s) that are the same as other medications prescribed for you. Read the directions carefully, and ask your doctor or other care provider to review them with you.

## 2018-11-21 NOTE — IP AVS SNAPSHOT
` ` Patient Information     Patient Name Sex     Betsy Resendiz ([id number]) Female 1965       Room Bed    2308      Patient Demographics     Address Phone    [address] [address] [phone] (Home)  [phone] (Mobile)      Patient Ethnicity & Race     Ethnic Group Patient Race    American White      Emergency Contact(s)     Name Relation Home Work Mobile    Delfino Resendiz Brother [phone]      Documents on File        Status Date Received Description       Documents for the Patient    Affiliate Privacy placeholder   phase3    Consent for EHR Access Received 17     Insurance Card       External Medication Information Consent Accepted 17     Patient ID Scan Refused 18     [facility] Specified Other       Consent for Services/Privacy Notice - Hospital/Clinic Received () 17     Privacy Notice - [place] Received 17     Consent for Services - Hospital and Clinic Received 18     HIE Auth Received 18     Consent for Services - Geriatrics Received 18     Consent for Services - [facility]       Advance Directives and Living Will Received 10/29/18 POLST 18    Business/Insurance/Care Coordination/Health Form - Patient Received 18     Patient Photo   Photo of Patient       Documents for the Encounter    CMS IM for Patient Signature Received 18     ECG   ECG Report      Admission Information     Attending Provider Admitting Provider Admission Type Admission Date/Time    Escobar Boland MD Eikens, John Patrick, MD Emergency 18  1243    Discharge Date Hospital Service Auth/Cert Status [facility]    Unit Room/Bed Admission Status       [facility]  Admission (Confirmed)       Admission     Complaint    COPD exacerbation (H)      Hospital Account     Name Acct ID Class Status Primary Coverage    [employer] [id number]  Inpatient Open HUMANA - HUMANA MEDICARE ADVANTAGE            Guarantor Account (for Hospital Account #[id number])     Name Relation to Pt Service Area Active? Acct Type    Betsy Resendiz  FCS Yes Personal/Family    Address Phone          [address]  [place] [address] [phone](H)              Coverage Information (for Hospital Account #[id number])     1. HUMANA/HUMANA MEDICARE ADVANTAGE     F/O Payor/Plan Precert #    HUMANA/HUMANA MEDICARE ADVANTAGE     Subscriber Subscriber #    Betsy Reesndiz [id number]    Address Phone    [address] [address]  [place] [address] [phone]          2. [place]     [place]     Subscriber Subscriber #    Betsy Resendiz [id number]    Address Phone    [address] [address]  [place] [address] [phone]

## 2018-11-21 NOTE — ED PROVIDER NOTES
"  History     Chief Complaint   Patient presents with     Cough     coming from Nursing home for evaluation of cough and congestion      HPI  Betsy Resendiz is a 53 year old female with history of COPD who presents from [facility] via EMS to the emergency department for evaluation of cough and congestion.  Patient has reported non-productive cough for 1 week per EMS.  In house nursing facility doctor considered prednisone treatment although ultimately decided to have her further evaluated.  Staff noted patient to have increased fatigue with low oxygen saturation, starting her on o2 at facility.  She has a wheeze.  She has painful hard coughing and states she feels she \"has a brick on her back\".    Problem List:    Patient Active Problem List    Diagnosis Date Noted     Auditory hallucinations 09/14/2018     Priority: Medium     Closed fracture of shaft of right humerus 05/22/2018     Priority: Medium     Bladder tumor 02/07/2018     Priority: Medium     COPD (chronic obstructive pulmonary disease) (H) 02/05/2018     Priority: Medium     JAVIER (generalized anxiety disorder) 11/21/2017     Priority: Medium     HTN (hypertension) 10/28/2017     Priority: Medium     Morbid obesity with BMI of 40.0-44.9, adult (H) 12/08/2016     Priority: Medium     SPIKE (obstructive sleep apnea) 11/13/2015     Priority: Medium     Ulnar neuritis 01/17/2014     Priority: Medium     Immunosuppressed status (H) 09/19/2013     Priority: Medium     Chronic pain 09/18/2013     Priority: Medium     Normocytic anemia 09/18/2013     Priority: Medium     History of encephalopathy 06/04/2013     Priority: Medium     Rotator cuff tendinitis 11/05/2012     Priority: Medium     Osteoarthritis of hip 03/17/2012     Priority: Medium     Posttraumatic stress disorder 10/07/2011     Priority: Medium     Schizoaffective disorder, unspecified type (H) 06/07/2011     Priority: Medium     Cervical neck pain with evidence of disc disease 07/28/2010     " Priority: Medium     Hypokalemia 06/04/2010     Priority: Medium     Conversion reaction 06/04/2010     Priority: Medium     Pain in joint, ankle and foot 01/20/2009     Priority: Medium     Generalized osteoarthritis 01/08/2009     Priority: Medium     Tobacco use disorder 11/26/2008     Priority: Medium     Urinary retention 06/06/2008     Priority: Medium     DM w/o complication type II (H) 04/09/2008     Priority: Medium     Gastroesophageal reflux disease without esophagitis 04/09/2008     Priority: Medium     Lymphedema 01/31/2008     Priority: Medium     Carpal tunnel syndrome 07/12/2007     Priority: Medium     Schizoaffective disorder, bipolar type (H) 10/30/2006     Priority: Medium     Migraine headache 10/30/2006     Priority: Medium     Asthma 10/30/2006     Priority: Medium     Unspecified glaucoma 10/30/2006     Priority: Medium        Past Medical History:    Past Medical History:   Diagnosis Date     Atrophy of muscle of left lower leg 11/3/2015     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    Family History:    No family history on file.    Social History:  Marital Status:  Single [1]  Social History   Substance Use Topics     Smoking status: Current Every Day Smoker     Types: Cigarettes     Smokeless tobacco: Never Used     Alcohol use Not on file        Medications:      ACETAMINOPHEN PO   albuterol (PROAIR HFA/PROVENTIL HFA/VENTOLIN HFA) 108 (90 BASE) MCG/ACT Inhaler   brimonidine (ALPHAGAN-P) 0.15 % ophthalmic solution   budesonide (PULMICORT) 1 MG/2ML SUSP neb solution   cariprazine (VRAYLAR) 1.5 MG CAPS capsule   CEPHALEXIN PO   cholecalciferol (VITAMIN D3) 5000 units CAPS capsule   clotrimazole (LOTRIMIN) 1 % cream   CYCLOBENZAPRINE HCL PO   docusate sodium (STOOL SOFTENER) 100 MG capsule   DOXYCYCLINE HYCLATE PO   escitalopram (LEXAPRO) 20 MG tablet   fluticasone (FLONASE) 50 MCG/ACT spray    furosemide (LASIX) 80 MG tablet   gabapentin (NEURONTIN) 800 MG tablet   ipratropium - albuterol 0.5 mg/2.5 mg/3 mL (DUONEB) 0.5-2.5 (3) MG/3ML neb solution   lamoTRIgine (LAMICTAL) 200 MG tablet   LANsoprazole (PREVACID) 30 MG CR capsule   LIOTHYRONINE SODIUM PO   lithium 300 MG capsule   loperamide (IMODIUM) 2 MG capsule   LORazepam (ATIVAN PO)   meclizine (ANTIVERT) 25 MG tablet   METHOCARBAMOL PO   NAPROXEN PO   OYSTER SHELL CALCIUM PO   paliperidone (INVEGA) 6 MG 24 hr tablet   perphenazine 4 MG tablet   POTASSIUM CHLORIDE ER PO   PREDNISONE PO   pregabalin (LYRICA) 150 MG capsule   PROPRANOLOL HCL PO   travoprost, NICHO Free, (TRAVATAN Z) 0.004 % ophthalmic solution   ziprasidone (GEODON) 40 MG capsule   ziprasidone (GEODON) 80 MG capsule         Review of Systems   Constitutional: Positive for activity change, appetite change and fatigue. Negative for chills and fever.   HENT: Positive for congestion. Negative for sore throat and trouble swallowing.    Eyes: Negative for visual disturbance.   Respiratory: Positive for cough and wheezing.    Cardiovascular: Negative for chest pain.   Gastrointestinal: Negative for abdominal pain, nausea and vomiting.   Genitourinary: Negative for decreased urine volume and dysuria.   Musculoskeletal: Negative for back pain and neck pain.   Skin: Negative for rash.   Neurological: Negative for dizziness, weakness, light-headedness, numbness and headaches.   Hematological: Does not bruise/bleed easily.   Psychiatric/Behavioral: Negative for confusion.   All other systems reviewed and are negative.      Physical Exam          Physical Exam   Constitutional: She appears well-developed and well-nourished.   Mild respiratory distress   Eyes: Conjunctivae are normal.   Psychiatric: She has a normal mood and affect.   Nursing note and vitals reviewed.    HENT: Oral mucosa moist. No lesions.  Neck: Supple  Pulmonary/Chest: Lungs are coarse expiratory wheeze  bilaterally.  Cardiovascular: Heart is regular rate and rhythm. No murmur.  Abdomen: Soft, non-distended, non-tender.   Musculoskeletal: Moving all extremities well. Mild peripheral edema bilateral lower extremities  Neurological: Alert. No focal neurologic deficit.   Skin: No rash.   ED Course     ED Course     Procedures               EKG Interpretation:      Interpreted by Bartolome Cota  Rhythm: normal sinus   Rate: normal  Axis: normal  Ectopy: none  Conduction: normal  ST Segments/ T Waves: No ST-T wave changes  Q Waves: none  Comparison to prior: No old EKG available    Clinical Impression: normal EKG          Critical Care time:  30 minutes for management of hypoxia.               Results for orders placed or performed during the hospital encounter of 11/21/18 (from the past 24 hour(s))   CBC with platelets, differential   Result Value Ref Range    WBC 8.9 4.0 - 11.0 10e9/L    RBC Count 3.83 3.8 - 5.2 10e12/L    Hemoglobin 12.5 11.7 - 15.7 g/dL    Hematocrit 39.4 35.0 - 47.0 %     (H) 78 - 100 fl    MCH 32.6 26.5 - 33.0 pg    MCHC 31.7 31.5 - 36.5 g/dL    RDW 12.1 10.0 - 15.0 %    Platelet Count 199 150 - 450 10e9/L    Diff Method Automated Method     % Neutrophils 65.4 %    % Lymphocytes 15.2 %    % Monocytes 14.8 %    % Eosinophils 3.5 %    % Basophils 0.7 %    % Immature Granulocytes 0.4 %    Nucleated RBCs 0 0 /100    Absolute Neutrophil 5.8 1.6 - 8.3 10e9/L    Absolute Lymphocytes 1.4 0.8 - 5.3 10e9/L    Absolute Monocytes 1.3 0.0 - 1.3 10e9/L    Absolute Eosinophils 0.3 0.0 - 0.7 10e9/L    Absolute Basophils 0.1 0.0 - 0.2 10e9/L    Abs Immature Granulocytes 0.0 0 - 0.4 10e9/L    Absolute Nucleated RBC 0.0    Comprehensive metabolic panel   Result Value Ref Range    Sodium 142 133 - 144 mmol/L    Potassium 3.9 3.4 - 5.3 mmol/L    Chloride 105 94 - 109 mmol/L    Carbon Dioxide 34 (H) 20 - 32 mmol/L    Anion Gap 3 3 - 14 mmol/L    Glucose 97 70 - 99 mg/dL    Urea Nitrogen 15 7 - 30 mg/dL     Creatinine 1.32 (H) 0.52 - 1.04 mg/dL    GFR Estimate 42 (L) >60 mL/min/1.7m2    GFR Estimate If Black 51 (L) >60 mL/min/1.7m2    Calcium 8.2 (L) 8.5 - 10.1 mg/dL    Bilirubin Total 0.2 0.2 - 1.3 mg/dL    Albumin 3.1 (L) 3.4 - 5.0 g/dL    Protein Total 7.4 6.8 - 8.8 g/dL    Alkaline Phosphatase 96 40 - 150 U/L    ALT 36 0 - 50 U/L    AST 21 0 - 45 U/L   Troponin I   Result Value Ref Range    Troponin I ES <0.015 0.000 - 0.045 ug/L   NT pro BNP   Result Value Ref Range    N-Terminal Pro BNP Inpatient 78 0 - 900 pg/mL   Blood gas venous   Result Value Ref Range    Ph Venous 7.33 7.32 - 7.43 pH    PCO2 Venous 70 (H) 40 - 50 mm Hg    PO2 Venous 37 25 - 47 mm Hg    Bicarbonate Venous 37 (H) 21 - 28 mmol/L    Base Excess Venous 8.4 mmol/L    FIO2 2l    Chest XR,  PA & LAT    Narrative    XR CHEST 2 VW 11/21/2018 2:09 PM    HISTORY: Short of breath.    COMPARISON: None.    FINDINGS: Benign granuloma in the right upper lung. No airspace  consolidation, pleural effusion or pneumothorax. Normal heart size.      Impression    IMPRESSION: No acute cardiopulmonary abnormality.    ANGELES PALOMINO MD       Medications - No data to display    Assessments & Plan (with Medical Decision Making) records were reviewed.  Patient was given a DuoNeb and placed on oxygen secondary to be satting 8889%.  Labs were obtained.  A chest x-ray was ordered.  White count was 8.9 hemoglobin 12.5 platelet count 199.  There is no left shift.  Comprehensive metabolic panel significant for creatinine of 1.32 with a GFR of 42.  Bicarb was 34.  Creatinine was slightly increased from previous.  Troponin was within normal limits.  ProBNP was not elevated.  Venous blood gas revealed a pH of 7.33 CO2 elevated 70 O2 of 37 bicarb 37.  EKG with normal sinus rhythm and nonspecific ST-T wave changes no old EKG for comparison.  Patient's chest x-ray revealed no obvious infiltrate although patient is a large person and the chest x-ray is of moderate quality.  On  reexamination patient is breathing better but still wheezing a second DuoNeb was given to the patient.  Solu-Medrol has been given to the patient.  Patient was again observed for some time and continued to have a expiratory wheeze.  It was decided to admit the patient for further evaluation and care this appears to be a COPD exacerbation.  I discussed the case with Katiana West micki.  We discussed possible antibiotic therapy but it was decided to hold off on this at this time.  Patient is going to be admitted as a border to the ICU.  She is oxygenating well on 2 L at this point and does not appear to be in any acute distress.  She she is comfortable with admission.  She may need BiPAP this evening if symptoms do not improve.     I have reviewed the nursing notes.    I have reviewed the findings, diagnosis, plan and need for follow up with the patient.       New Prescriptions    No medications on file       Final diagnoses:   COPD exacerbation (H)   Acute respiratory failure with hypoxia and hypercapnia (H)     This document serves as a record of the services and decisions personally performed and made by Bartolome Cota MD. It was created on HIS/HER behalf by Jennifer Jensen, a trained medical scribe. The creation of this document is based the provider's statements to the medical scribe.  Jennifer Jensen 12:49 PM 11/21/2018    Provider:   The information in this document, created by the medical scribe for me, accurately reflects the services I personally performed and the decisions made by me. I have reviewed and approved this document for accuracy prior to leaving the patient care area.  Bartolome Cota MD 12:49 PM 11/21/2018 11/21/2018   [facility] EMERGENCY DEPARTMENT     Bartolome Cota MD  11/23/18 [id number]

## 2018-11-21 NOTE — IP AVS SNAPSHOT
` `     [facility] SURGICAL: [phone]            Medication Administration Report for Betsy Resendiz as of 11/25/18 1032   Legend:    Given Hold Not Given Due Canceled Entry Other Actions    Time Time (Time) Time  Time-Action       Inactive    Active    Linked        Medications 11/19/18 11/20/18 11/21/18 11/22/18 11/23/18 11/24/18 11/25/18    acetaminophen (TYLENOL) tablet 650 mg  Dose: 650 mg  Freq: EVERY 4 HOURS PRN Route: PO  PRN Comment: Pain, fever  Start: 11/21/18 1921   Admin Instructions: Maximum acetaminophen dose from all sources = 75 mg/kg/day not to exceed 4 grams/day.    Admin. Amount: 2 tablet (2 × 325 mg tablet)  Last Admin: 11/24/18 2105  Dispense Loc: [facility]       2030 (650 mg)-Given        1250 (650 mg)-Given       2052 (650 mg)-Given         2105 (650 mg)-Given            albuterol (PROVENTIL) neb solution 2.5 mg  Dose: 2.5 mg  Freq: EVERY 2 HOURS PRN Route: NEBULIZATION  PRN Reason: wheezing  Start: 11/21/18 1928   Admin. Amount: 2.5 mg = 0.5 mL Conc: 2.5 mg/0.5 mL  Dispense Loc: [facility]  Volume: 20 mL               bisacodyl (DULCOLAX) EC tablet 5 mg  Dose: 5 mg  Freq: DAILY PRN Route: PO  PRN Reason: constipation  Start: 11/21/18 1927   Admin Instructions: Do not crush or chew. Swallow whole. May titrate 1 tablet each day until response. Maximum of 3 tablets daily. Hold for loose stools. This is the first step of a three step constipation treatment.    Admin. Amount: 1 tablet (1 × 5 mg tablet)  Dispense Loc: [facility] 200              Or  bisacodyl (DULCOLAX) EC tablet 10 mg  Dose: 10 mg  Freq: DAILY PRN Route: PO  PRN Reason: constipation  Start: 11/21/18 1927   Admin Instructions: Do not crush or chew. Swallow whole. May titrate 1 tablet each day until response. Maximum of 3 tablets daily. Hold for loose stools. This is the first step of a three step constipation treatment.    Admin. Amount: 2 tablet (2 × 5 mg tablet)  Dispense Loc: [facility] 200               Or  bisacodyl (DULCOLAX) EC tablet 15 mg  Dose: 15 mg  Freq: DAILY PRN Route: PO  PRN Reason: constipation  Start: 11/21/18 1927   Admin Instructions: Do not crush or chew. Swallow whole. May titrate 1 tablet each day until response. Maximum of 3 tablets daily. Hold for loose stools. This is the first step of a three step constipation treatment.    Admin. Amount: 3 tablet (3 × 5 mg tablet)  Dispense Loc: [facility] 200               brimonidine (ALPHAGAN) 0.2 % ophthalmic solution 1 drop  Dose: 1 drop  Freq: 2 TIMES DAILY Route: Both Eyes  Start: 11/21/18 2000   Admin Instructions: Substitution for Alphagan-P 0.15% while inpt    Admin. Amount: 1 drop  Last Admin: 11/25/18 0800  Disp[facility] Loc: [facility]  Volume: 5 mL       2025 (1 drop)-Given        1001 (1 drop)-Given       2054 (1 drop)-Given        0846 (1 drop)-Given       2019 (1 drop)-Given        0832 (1 drop)-Given       2014 (1 drop)-Given        0800 (1 drop)-Given       [ ] 2000           budesonide (PULMICORT) neb solution 0.5 mg  Dose: 0.5 mg  Freq: 2 TIMES DAILY Route: NEBULIZATION  Start: 11/21/18 2000   Admin. Amount: 0.5 mg = 2 mL Conc: 0.5 mg/2 mL  Last Admin: 11/25/18 0741  Dispense Loc: [facility]       2047 (0.5 mg)-Given        0754 (0.5 mg)-Given       1930 (0.5 mg)-Given        0811 (0.5 mg)-Given       2006 (0.5 mg)-Given        0622 (0.5 mg)-Given       1935 (0.5 mg)-Given        0741 (0.5 mg)-Given       [ ] 2000           cariprazine (VRAYLAR) capsule CAPS 1.5 mg  Dose: 1.5 mg  Freq: DAILY Route: PO  Start: 11/22/18 0800   Admin. Amount: 1 capsule (1 × 1.5 mg capsule)  Last Admin: 11/25/18 0759  Dispense Loc: [facility]        0829 (1.5 mg)-Given        0844 (1.5 mg)-Given        0837 (1.5 mg)-Given        0759 (1.5 mg)-Given           cyclobenzaprine (FLEXERIL) tablet 5 mg  Dose: 5 mg  Freq: EVERY 8 HOURS PRN Route: PO  PRN Reason: muscle spasms  Start: 11/21/18 1922   Admin. Amount: 1 tablet (1 × 5 mg  tablet)  Last Admin: 11/23/18 1504  Dispense Loc: [facility]       2038 (5 mg)-Given        2054 (5 mg)-Given        1504 (5 mg)-Given             docusate sodium (COLACE) capsule 100 mg  Dose: 100 mg  Freq: AT BEDTIME Route: PO  Start: 11/21/18 2200   Admin. Amount: 1 capsule (1 × 100 mg capsule)  Last Admin: 11/24/18 2215  Dispense Loc: [facility]       2034 (100 mg)-Given               2055 (100 mg)-Given               2159 (100 mg)-Given        2215 (100 mg)-Given        [ ] 2200           enoxaparin (LOVENOX) injection 40 mg  Dose: 40 mg  Freq: EVERY 24 HOURS Route: SC  Start: 11/21/18 1945   Admin Instructions: HOLD if platelet count falls below 50% of baseline or less than 100,000/ L and notify provider.    Admin. Amount: 40 mg = 0.4 mL Conc: 40 mg/0.4 mL  Last Admin: 11/24/18 2014  Dispense Loc: [facility] 200  Volume: 0.4 mL       2026 (40 mg)-Given        2055 (40 mg)-Given        2016 (40 mg)-Given        2014 (40 mg)-Given        [ ] 2000           escitalopram (LEXAPRO) tablet 20 mg  Dose: 20 mg  Freq: AT BEDTIME Route: PO  Start: 11/21/18 2200   Admin. Amount: 1 tablet (1 × 20 mg tablet)  Last Admin: 11/24/18 2215  Dispense Loc: [facility]       2034 (20 mg)-Given               2055 (20 mg)-Given               2158 (20 mg)-Given        2215 (20 mg)-Given        [ ] 2200           guaiFENesin-dextromethorphan (ROBITUSSIN DM) 100-10 MG/5ML syrup 5 mL  Dose: 5 mL  Freq: EVERY 4 HOURS PRN Route: PO  PRN Reason: cough  Start: 11/21/18 1928   Admin. Amount: 5 mL  Last Admin: 11/25/18 0008  Dispense Loc: [facility]  Volume: 5 mL       2044 (5 mL)-Given        2056 (5 mL)-Given        1739 (5 mL)-Given       2204 (5 mL)-Given        0607 (5 mL)-Given       1436 (5 mL)-Given        0008 (5 mL)-Given           ipratropium - albuterol 0.5 mg/2.5 mg/3 mL (DUONEB) neb solution 3 mL  Dose: 3 mL  Freq: EVERY 4 HOURS WHILE AWAKE Route: NEBULIZATION  Start: 11/21/18 1945   Admin. Amount: 3 mL  Last  Admin: 11/25/18 0740  Dispense Loc: [facility] 200  Volume: 3 mL       2047 (3 mL)-Given               0753 (3 mL)-Given       1138 (3 mL)-Given       1546 (3 mL)-Given       1930 (3 mL)-Given               0809 (3 mL)-Given       1249 (3 mL)-Given       1600 (3 mL)-Given       2006 (3 mL)-Given               0621 (3 mL)-Given       1108 (3 mL)-Given       1613 (3 mL)-Given       1934 (3 mL)-Given              2310 (3 mL)-Given        0740 (3 mL)-Given       [ ] 1200       [ ] 1600       [ ] 2000       [ ] 2200           lamoTRIgine (LaMICtal) tablet 200 mg  Dose: 200 mg  Freq: AT BEDTIME Route: PO  Start: 11/21/18 2200   Admin. Amount: 1 tablet (1 × 200 mg tablet)  Last Admin: 11/24/18 2215  Dispense Loc: [facility]       2035 (200 mg)-Given               2149 (200 mg)-Given        2159 (200 mg)-Given        2215 (200 mg)-Given        [ ] 2200           Lidocaine (LIDOCARE) 4 % Patch 2 patch  Dose: 2 patch  Freq: EVERY 24 HOURS 0800 Route: TD  Start: 11/24/18 0900   Admin Instructions: Apply patch(s) to both sides of back at about the T10 level. To prevent lidocaine toxicity, patient should be patch free for 12 hrs daily. Patches may be cut to smaller size prior to removing release liner.  NEVER APPLY HEAT OVER PATCH which increases absorption and risk of local anesthetic toxicity. Do not apply over area where liposomal bupivacaine was injected for 96 hours post injection.    Admin. Amount: 2 patch  Last Admin: 11/25/18 0758  Dispense Loc: [facility]  Infused Over: 12 Hours          0939 (2 patch)-Given [C]        0758 (2 patch)-Given           lidocaine patch in PLACE  Freq: EVERY 8 HOURS Route: TD  Start: 11/24/18 0900   Admin Instructions: Chart every shift, confirming that patch is still in place on patient (no barcode scan needed). See patch order for dose information.  NEVER APPLY HEAT OVER PATCH which will increase absorption and may lead to risk of local anesthetic toxicity. Do not apply over  area where liposomal bupivacaine injected for 96 hours.    Last Admin: 11/25/18 0800  Dispense Loc: [facility]          0944 ( )-Patch in Place [C]       1615 ( )-Patch in Place [C]               0800 ( )-Patch in Place       [ ] 1700           lidocaine patch REMOVAL  Freq: EVERY 24 HOURS 2000 Route: TD  Start: 11/24/18 2000   Admin Instructions: Patient should have a 12 hour patch free interval    Dispense Loc: [facility]          2019 ( )-Patch Removed        [ ] 2000           liothyronine (CYTOMEL) tablet 25 mcg  Dose: 25 mcg  Freq: DAILY Route: PO  Start: 11/22/18 0800   Admin. Amount: 1 tablet (1 × 25 mcg tablet)  Last Admin: 11/25/18 0759  Dispense Loc: [facility]        0954-Hold        0845 (25 mcg)-Given        0837 (25 mcg)-Given        0759 (25 mcg)-Given           lithium capsule 300 mg  Dose: 300 mg  Freq: DAILY WITH BREAKFAST Route: PO  Start: 11/24/18 0800   Admin. Amount: 1 capsule (1 × 300 mg capsule)  Last Admin: 11/25/18 0759  Dispense Loc: [facility]          0837 (300 mg)-Given        0759 (300 mg)-Given           lithium capsule 600 mg  Dose: 600 mg  Freq: EVERY EVENING Route: PO  Start: 11/23/18 1730   Admin. Amount: 2 capsule (2 × 300 mg capsule)  Last Admin: 11/24/18 1727  Dispense Loc: [facility]         1723 (600 mg)-Given        1727 (600 mg)-Given        [ ] 1730           LORazepam (ATIVAN) tablet 0.5 mg  Dose: 0.5 mg  Freq: 2 TIMES DAILY PRN Route: PO  PRN Reason: anxiety  Start: 11/21/18 1923   Admin. Amount: 1 tablet (1 × 0.5 mg tablet)  Last Admin: 11/23/18 2027  Dispense Loc: [facility] 200       2044 (0.5 mg)-Given        2057 (0.5 mg)-Given        2027 (0.5 mg)-Given             magnesium citrate solution 148 mL  Dose: 148 mL  Freq: ONCE PRN Route: PO  PRN Reason: constipation  Start: 11/21/18 1927   Admin Instructions: May repeat in one hour x 1 if insufficient results. Avoid in severe renal disease. Hold for loose stools.  This is the third  step of a three step constipation treatment.    Admin. Amount: 148 mL  Dispense Loc: [facility] 400  Administrations Remainin  Volume: 296 mL               melatonin tablet 1 mg  Dose: 1 mg  Freq: AT BEDTIME PRN Route: PO  PRN Reason: sleep  Start: 18   Admin Instructions: Do not give unless at least 6 hours of uninterrupted sleep is expected.    Admin. Amount: 1 tablet (1 × 1 mg tablet)  Last Admin: 18  Dispense Loc: [facility] 400         (1 mg)-Given              naloxone (NARCAN) injection 0.1-0.4 mg  Dose: 0.1-0.4 mg  Freq: EVERY 2 MIN PRN Route: IV  PRN Reason: opioid reversal  Start: 18 1000   Admin Instructions: For respiratory rate LESS than or EQUAL to 8.  Partial reversal dose:  0.1 mg titrated q 2 minutes for Analgesia Side Effects Monitoring Sedation Level of 3 (frequently drowsy, arousable, drifts to sleep during conversation).Full reversal dose:  0.4 mg bolus for Analgesia Side Effects Monitoring Sedation Level of 4 (somnolent, minimal or no response to stimulation).  For ordered IV doses 0.1-2mg give IVP. Give each 0.4mg over 15 seconds in emergency situations. For non-emergent situations further dilute in 9mL of NS to facilitate titration of response.    Admin. Amount: 0.1-0.4 mg = 0.25-1 mL Conc: 0.4 mg/mL  Dispense Loc: [facility] 200  Volume: 1 mL               nicotine polacrilex (COMMIT) lozenge 2-4 mg  Dose: 2-4 mg  Freq: EVERY 1 HOUR PRN Route: BU  PRN Reason: other  PRN Comment: nicotine withdrawal symptoms  Start: 18   Admin Instructions: Allow lozenge to dissolve slowly.  Do not swallow.    Not to exceed 40 mg in a 24 hour time period.    Admin. Amount: 1-2 lozenge (1-2 × 2 mg lozenge)  Dispense Loc: [email or web address]               ondansetron (ZOFRAN-ODT) ODT tab 4 mg  Dose: 4 mg  Freq: EVERY 6 HOURS PRN Route: PO  PRN Reasons: nausea,vomiting  Start: 18   Admin Instructions: This is Step 1 of nausea and vomiting management.  If  nausea not resolved in 15 minutes, go to Step 2 prochlorperazine (COMPAZINE). Do not push through foil backing. Peel back foil and gently remove. Place on tongue immediately. Administration with liquid unnecessary  With dry hands, peel back foil backing and gently remove tablet; do not push oral disintegrating tablet through foil backing; administer immediately on tongue and oral disintegrating tablet dissolves in seconds; then swallow with saliva; liquid not required.    Admin. Amount: 1 tablet (1 × 4 mg tablet)  Dispense Loc: [facility]              Or  ondansetron (ZOFRAN) injection 4 mg  Dose: 4 mg  Freq: EVERY 6 HOURS PRN Route: IV  PRN Reasons: nausea,vomiting  Start: 11/21/18 1929   Admin Instructions: This is Step 1 of nausea and vomiting management.  If nausea not resolved in 15 minutes, go to Step 2 prochlorperazine (COMPAZINE).  Irritant. For ordered IV doses 0.1-4 mg, give IV Push undiluted over 2-5 minutes.    Admin. Amount: 4 mg = 2 mL Conc: 4 mg/2 mL  Dispense Loc: [facility]  Infused Over: 2-5 Minutes  Volume: 2 mL               paliperidone (INVEGA) 24 hr tablet 6 mg  Dose: 6 mg  Freq: EVERY MORNING Route: PO  Start: 11/22/18 0800   Admin Instructions: DO NOT CRUSH.    Admin. Amount: 1 tablet (1 × 6 mg tablet)  Last Admin: 11/25/18 0759  Dispense Loc: [facility]        0831 (6 mg)-Given        0844 (6 mg)-Given        0835 (6 mg)-Given        0759 (6 mg)-Given           pantoprazole (PROTONIX) EC tablet 40 mg  Dose: 40 mg  Freq: EVERY MORNING BEFORE BREAKFAST Route: PO  Start: 11/22/18 0630   Admin Instructions: DO NOT CRUSH.  Formulary substitution for lansoprazole 30 mg    Admin. Amount: 1 tablet (1 × 40 mg tablet)  Last Admin: 11/25/18 0659  Dispense Loc: [facility]        0550 (40 mg)-Given        0631 (40 mg)-Given        0607 (40 mg)-Given        0659 (40 mg)-Given           perphenazine tablet 4 mg  Dose: 4 mg  Freq: 2 TIMES DAILY Route: PO  Start: 11/21/18 2000   Admin.  Amount: 1 tablet (1 × 4 mg tablet)  Last Admin: 11/25/18 0759  Dispense Loc: [facility]       2029 (4 mg)-Given        0954-Hold       2047 (4 mg)-Given        0846 (4 mg)-Given       2019 (4 mg)-Given        0835 (4 mg)-Given       2020 (4 mg)-Given        0759 (4 mg)-Given       [ ] 2000           polyethylene glycol (MIRALAX/GLYCOLAX) Packet 17 g  Dose: 17 g  Freq: DAILY PRN Route: PO  PRN Reason: constipation  Start: 11/21/18 1927   Admin Instructions: Give in 8oz of  water, juice, or soda. Hold for loose stools.  This is the second step of a three step constipation treatment.  1 Packet = 17 grams. Mixed prescribed dose in 8 ounces of water. Follow with 8 oz. of water.    Admin. Amount: 17 g  Dispense Loc: [facility] 200               potassium chloride (K-TAB,KLOR-CON) CR tablet 10 mEq  Dose: 10 mEq  Freq: DAILY Route: PO  Start: 11/22/18 0800   Admin Instructions: DO NOT CRUSH.    Admin. Amount: 1 tablet (1 × 10 mEq tablet)  Last Admin: 11/25/18 0758  Dispense Loc: [facility]        0829 (10 mEq)-Given        0844 (10 mEq)-Given        0830 (10 mEq)-Given        0758 (10 mEq)-Given           potassium chloride (KLOR-CON) Packet 20-40 mEq  Dose: 20-40 mEq  Freq: EVERY 2 HOURS PRN Route: ORAL OR FEED  PRN Reason: potassium supplementation  Start: 11/24/18 0743   Admin Instructions: Use if unable to tolerate tablets.  If Serum K+ 3.0-3.3, dose = 60 mEq po total dose (40 mEq x1 followed in 2 hours by 20 mEq x1). Recheck K+ level 4 hours after dose and the next AM.  If Serum K+ 2.5-2.9, dose = 80 mEq po total dose (40 mEq Q2H x2). Recheck K+ level 4 hours after dose and the next AM.  If Serum K+ less than 2.5, See IV order.  Dissolve packet contents in 4-8 ounces of cold water or juice.    Admin. Amount: 20-40 mEq  Dispense Loc: [facility]               potassium chloride 10 mEq in 100 mL intermittent infusion with 10 mg lidocaine  Dose: 10 mEq  Freq: EVERY 1 HOUR PRN Route: IV  PRN Reason:  potassium supplementation  Start: 11/24/18 0743   Admin Instructions: Infuse via PERIPHERAL LINE. Use potassium with lidocaine for pain with peripheral administration.  If Serum K+ 3.0-3.3, dose = 10 mEq/hr x4 doses (40 mEq IV total dose). Recheck K+ level 2 hours after dose and the next AM.  If Serum K+ less than 3.0, dose = 10 mEq/hr x6 doses (60 mEq IV total dose). Recheck K+ level 2 hours after dose and the next AM.    Admin. Amount: 10 mEq = 100 mL Conc: 10 mEq/100 mL  Dispense Loc: [facility]  Infused Over: 1 Hours  Volume: 100 mL               potassium chloride 10 mEq in 100 mL sterile water intermittent infusion (premix)  Dose: 10 mEq  Freq: EVERY 1 HOUR PRN Route: IV  PRN Reason: potassium supplementation  Start: 11/24/18 0743   Admin Instructions: Infuse via PERIPHERAL LINE or CENTRAL LINE. Use for central line replacement if patient weight less than 65 kg, if patient is on TPN with high potassium content or if unit does not stock 20 mEq bags.   If Serum K+ 3.0-3.3, dose = 10 mEq/hr x4 doses (40 mEq IV total dose). Recheck K+ level 2 hours after dose and the next AM.   If Serum K+ less than 3.0, dose = 10 mEq/hr x6 doses (60 mEq IV total dose). Recheck K+ level 2 hours after dose and the next AM.    Admin. Amount: 10 mEq = 100 mL Conc: 10 mEq/100 mL  Dispense Loc: [facility]  Infused Over: 60 Minutes  Volume: 100 mL               potassium chloride SA (K-DUR/KLOR-CON M) CR tablet 20-40 mEq  Dose: 20-40 mEq  Freq: EVERY 2 HOURS PRN Route: PO  PRN Reason: potassium supplementation  Start: 11/24/18 0743   Admin Instructions: Use if able to take PO.   If Serum K+ 3.0-3.3, dose = 60 mEq po total dose (40 mEq x1 followed in 2 hours by 20 mEq x1). Recheck K+ level 4 hours after dose and the next AM.  If Serum K+ 2.5-2.9, dose = 80 mEq po total dose (40 mEq Q2H x2). Recheck K+ level 4 hours after dose and the next AM.  If Serum K+ less than 2.5, See IV order.  DO NOT CRUSH    Admin. Amount: 1-2  tablet (1-2 × 20 mEq tablet)  Last Admin: 11/24/18 1030  Dispense Loc: [facility] 200          0830 (40 mEq)-Given       1030 (20 mEq)-Given            predniSONE (DELTASONE) tablet 40 mg  Dose: 40 mg  Freq: DAILY Route: PO  Start: 11/22/18 0800   Admin. Amount: 2 tablet (2 × 20 mg tablet)  Last Admin: 11/25/18 0758  Dispense Loc: [facility] 200        0829 (40 mg)-Given        0844 (40 mg)-Given        0830 (40 mg)-Given        0758 (40 mg)-Given           pregabalin (LYRICA) capsule 150 mg  Dose: 150 mg  Freq: 2 TIMES DAILY Route: PO  Start: 11/24/18 0900   Admin. Amount: 1 capsule (1 × 50 mg capsule) + 1 capsule (1 × 100 mg capsule)  Last Admin: 11/25/18 0758  Dispense Loc: [facility] 300   Mixture Administration Information:   Medication Type Amount   pregabalin 50 MG CAPS Medications 50 mg   pregabalin 100 MG CAPS Medications 100 mg                  0947 (150 mg)-Given       2015 (150 mg)-Given        0758 (150 mg)-Given       [ ] 2000           prochlorperazine (COMPAZINE) injection 10 mg  Dose: 10 mg  Freq: EVERY 6 HOURS PRN Route: IV  PRN Reasons: nausea,vomiting  Start: 11/21/18 1927   Admin Instructions: This is Step 2 of nausea and vomiting management. Give if nausea not resolved 15 minutes after giving ondansetron (ZOFRAN).  If nausea not resolved in 15 minutes, go to Step 3 metoclopramide (REGLAN), if ordered.  For ordered IV doses 0.1-10 mg, give IV Push undiluted. Each 5mg over 1 minute.    Admin. Amount: 10 mg = 2 mL Conc: 5 mg/mL  Dispense Loc: [facility] 200  Infused Over: 1-2 Minutes  Volume: 2 mL              Or  prochlorperazine (COMPAZINE) tablet 10 mg  Dose: 10 mg  Freq: EVERY 6 HOURS PRN Route: PO  PRN Reason: vomiting  Start: 11/21/18 1927   Admin Instructions: This is Step 2 of nausea and vomiting management. Give if nausea not resolved 15 minutes after giving ondansetron (ZOFRAN).  If nausea not resolved in 15 minutes, go to Step 3 metoclopramide (REGLAN), if ordered.    Admin. Amount:  1 tablet (1 × 10 mg tablet)  Dispense Loc: [facility]              Or  prochlorperazine (COMPAZINE) Suppository 25 mg  Dose: 25 mg  Freq: EVERY 12 HOURS PRN Route: RE  PRN Reasons: nausea,vomiting  Start: 11/21/18 1927   Admin Instructions: This is Step 2 of nausea and vomiting management. Give if nausea not resolved 15 minutes after giving ondansetron (ZOFRAN).  If nausea not resolved in 15 minutes, go to Step 3 metoclopramide (REGLAN), if ordered.    Admin. Amount: 1 suppository (1 × 25 mg suppository)  Dispense Loc: [facility]               propranolol (INDERAL) tablet 10 mg  Dose: 10 mg  Freq: 2 TIMES DAILY Route: PO  Start: 11/21/18 2000   Admin. Amount: 1 tablet (1 × 10 mg tablet)  Last Admin: 11/25/18 0759  Dispense Loc: [facility]       2030 (10 mg)-Given        1053 (10 mg)-Given       2046 (10 mg)-Given        0845 (10 mg)-Given       2017 (10 mg)-Given        0938 (10 mg)-Given [C]       2052 (10 mg)-Given        0759 (10 mg)-Given       [ ] 2000           sodium chloride (PF) 0.9% PF flush 3 mL  Dose: 3 mL  Freq: EVERY 8 HOURS Route: IK  Start: 11/21/18 2300   Admin Instructions: And Q1H PRN, to lock peripheral IV dormant line.    Admin. Amount: 3 mL  Last Admin: 11/24/18 2218  [facility]pense Loc: [facility]  Volume: 3 mL       2246 (3 mL)-Given        0550 (3 mL)-Given       (1532)-Not Given       2200-Hold        (0632)-Not Given       1502 (3 mL)-Given       (2349)-Not Given        0612 (3 mL)-Given       1342 (3 mL)-Given       2218 (3 mL)-Given               [ ] 1400       [ ] 2200           sodium chloride (PF) 0.9% PF flush 3 mL  Dose: 3 mL  Freq: EVERY 1 HOUR PRN Route: IK  PRN Reason: line flush  Start: 11/21/18 2245   Admin Instructions: for peripheral IV flush post IV meds    Admin. Amount: 3 mL  Dispense Loc: [facility]  Volume: 3 mL               travoprost (NICHO Free) (TRAVATAN Z) 0.004 % ophthalmic solution 1 drop  Dose: 1 drop  Freq: AT BEDTIME Route: Both Eyes  Start:  18   Admin. Amount: 1 drop  Last Admin: 18  Dispense Loc: [facility]  Volume: 2.5 mL        (1 drop)-Given                (1 drop)-Given                (1 drop)-Given         (1 drop)-Given        [ ]            ziprasidone (GEODON) capsule 160 mg  Dose: 160 mg  Freq: AT BEDTIME Route: PO  Start: 18   Admin Instructions: May cause prolongation of QT interval.  Absorption increased when given with food.    Admin. Amount: 2 capsule (2 × 80 mg capsule)  Last Admin: 18  Dispense Loc: [facility]        (160 mg)-Given         (160 mg)-Given                (160 mg)-Given         (160 mg)-Given        [ ]            ziprasidone (GEODON) capsule 40 mg  Dose: 40 mg  Freq: EVERY MORNING Route: PO  Start: 18 0800   Admin Instructions: May cause prolongation of QT interval.  Absorption increased when given with food.    Admin. Amount: 2 capsule (2 × 20 mg capsule)  Last Admin: 18  Dispense Loc: [facility]        (1100)-Not Given        0844 (40 mg)-Given        0833 (40 mg)-Given        0759 (40 mg)-Given          Completed Medications  Medications 18         Dose: 250 mg  Freq: DAILY Route: PO  Indications Comment: COPD exacerbation  Start: 18 0800   End: 18   Admin. Amount: 1 tablet (1 × 250 mg tablet)  Last Admin: 18  Dispense Loc: [facility] 200  Administrations Remainin        0829 (250 mg)-Given        0844 (250 mg)-Given        0830 (250 mg)-Given        0758 (250 mg)-Given          Discontinued Medications  Medications 18         Dose: 50 mcg  Freq: EVERY 4 HOURS PRN Route: IV  PRN Reason: moderate to severe pain  Start: 18   End: 18 1143   Admin Instructions: For ordered IV doses 1-100 mcg give IV Push undiluted over a minimum of 3-5  minutes.    Admin. Amount: 50 mcg = 1 mL Conc: 50 mcg/mL  Last Admin: 11/21/18 2243  Dispense Loc: [facility]  Volume: 2 mL       2243 (50 mcg)-Given         1143-Med Discontinued           Dose: 40 mg  Freq: 2 TIMES DAILY Route: PO  Start: 11/21/18 2000   End: 11/23/18 1142   Admin. Amount: 1 tablet (1 × 40 mg tablet)  Last Admin: 11/23/18 0844  Dispense Loc: [facility]       2026 (40 mg)-Given        0829 (40 mg)-Given       2056 (40 mg)-Given        0844 (40 mg)-Given       1142-Med Discontinued           Dose: 400 mg  Freq: 3 TIMES DAILY Route: PO  Start: 11/23/18 1400   End: 11/23/18 1151   Admin. Amount: 1 capsule (1 × 400 mg capsule)         1151-Med Discontinued           Dose: 400 mg  Freq: 3 TIMES DAILY Route: PO  Start: 11/21/18 2000   End: 11/24/18 0846   Admin. Amount: 1 capsule (1 × 400 mg capsule)  Last Admin: 11/24/18 0830  Dispense Loc: [facility]       2027 (400 mg)-Given        (0952)-Not Given [C]       1400-Hold       2000-Hold        0800-Hold       1500 (400 mg)-Given       2016 (400 mg)-Given        0830 (400 mg)-Given       0846-Med Discontinued          Dose: 300 mg  Freq: HOLD Route: PO  PRN Comment: per MD  Start: 11/23/18 0800   End: 11/23/18 1153   Admin Instructions: Take 1 capsule by mouth in the morning and 2 capsules at bedtime    Admin. Amount: 1 capsule (1 × 300 mg capsule)  Dispense Loc: [facility]         1153-Med Discontinued           Dose: 300 mg  Freq: EVERY MORNING Route: PO  Start: 11/22/18 0800   End: 11/22/18 1303   Admin Instructions: Take 1 capsule by mouth in the morning and 2 capsules at bedtime    Admin. Amount: 1 capsule (1 × 300 mg capsule)  Dispense Loc: [facility]        0953-Hold       1303-Med Discontinued    0800-Hold               Dose: 600 mg  Freq: HOLD Route: PO  PRN Comment: per MD  Start: 11/23/18 2200   End: 11/23/18 1153   Admin Instructions: Dose is 300 mg am & 600 mg pm    Admin. Amount: 1 capsule (1 × 600 mg capsule)  Dispense Loc:  [facility]         1153-[facility] Discontinued           Dose: 600 mg  Freq: AT BEDTIME Route: PO  Start: 11/21/18 2200   End: 11/22/18 1303   Admin. Amount: 1 capsule (1 × 600 mg capsule)  Last Admin: 11/21/18 2036  Dispense Loc: [facility]       2036 (600 mg)-Given               1303-Med Discontinued  2200-Hold        2200-Hold               Dose: 0.1-0.4 mg  Freq: EVERY 2 MIN PRN Route: IV  PRN Reason: opioid reversal  Start: 11/21/18 1925   End: 11/22/18 1131   Admin Instructions: For respiratory rate LESS than or EQUAL to 8.  Partial reversal dose:  0.1 mg titrated q 2 minutes for Analgesia Side Effects Monitoring Sedation Level of 3 (frequently drowsy, arousable, drifts to sleep during conversation).Full reversal dose:  0.4 mg bolus for Analgesia Side Effects Monitoring Sedation Level of 4 (somnolent, minimal or no response to stimulation).  For ordered IV doses 0.1-2mg give IVP. Give each 0.4mg over 15 seconds in emergency situations. For non-emergent situations further dilute in 9mL of NS to facilitate titration of response.    Admin. Amount: 0.1-0.4 mg = 0.25-1 mL Conc: 0.4 mg/mL  Dispense Loc: [facility]  Volume: 1 mL        1131-Med Discontinued            Dose: 500 mg  Freq: EVERY 8 HOURS PRN Route: PO  PRN Reason: moderate pain  Start: 11/21/18 1924   End: 11/23/18 1143   Admin. Amount: 1 tablet (1 × 500 mg tablet)  Dispense Loc: [facility]         1143-[facility] Discontinued           Dose: 20 mEq  Freq: EVERY 1 HOUR PRN Route: IV  PRN Reason: potassium supplementation  Start: 11/24/18 0743   End: 11/24/18 0750   Admin Instructions: Infuse via CENTRAL LINE Only. May need EKG if less than 65 kg or on TPN - Max rate is 0.3 mEq/kg/hr for patients not on EKG monitoring.   If Serum K+ 3.0-3.3, dose = 20 mEq/hr x2 doses (40 mEq IV total dose). Recheck K+ level 2 hours after dose and the next AM.  If Serum K+ less than 3.0, dose = 20 mEq/hr x3 doses (60 mEq IV total dose). Recheck K+ level 2 hours  after dose and the next AM.    Admin. Amount: 20 mEq = 50 mL Conc: 20 mEq/50 mL  Volume: 50 mL          0750-Med Discontinued          Dose: 150 mg  Freq: HOLD Route: PO  PRN Comment: until she wakes up and is less somnolent.  Start: 11/22/18 0930   End: 11/25/18 0830   Admin. Amount: 2 capsule (2 × 75 mg capsule)  Dispense Loc: [facility]           0830-Med Discontinued         Rate: 75 mL/hr   Freq: CONTINUOUS Route: IV  Last Dose: Stopped (11/23/18 0900)  Start: 11/22/18 1015   End: 11/23/18 0852   Last Admin: 11/23/18 0014  Dispense Loc: [facility] Floor Stock  Volume: 1,000 mL        1047 ( )-New Bag        0014 ( )-New Bag       0852-Med Discontinued  0900-Stopped          Medications 11/19/18 11/20/18 11/21/18 11/22/18 11/23/18 11/24/18 11/25/18

## 2018-11-21 NOTE — PROGRESS NOTES
"[facility]    Chief Complaint   Patient presents with     alf Acute       [facility] Record Number:  [id number]  Place of Service where encounter took place:  [facility] [[id number]]    HPI:    Betsy Resendiz is a 53 year old  (1965), who is being seen today for an episodic care visit.  HPI information obtained from: facility chart records, facility staff, patient report and [facility] chart review.      Patient requesting a visit today. States she feels \"terrible\". Sx's started about 4 days ago with a headache and a \"stuffy nose\". Now having dyspnea, cough, ST, and wheezing. She does not feel the nebs are help with wheezing or dyspnea. She is having difficulty sleeping due to cough. No reported fever or chills.     REVIEW OF SYSTEMS:  4 point ROS including Respiratory, CV, GI and , other than that noted in the HPI,  is negative    /77  Pulse 84  Temp 97.5  F (36.4  C)  Resp 18  Wt 258 lb 1.6 oz (117.1 kg)  SpO2 96%  BMI 44.3 kg/m2  GENERAL APPEARANCE:  Alert, lethargic  RESP:  Diffuse expiratory wheezing t/o johnathon lungs  CV:  Palpation and auscultation of heart done , rate and rhythm reg,  +1 BLE (at baseline) edema  ABDOMEN:  normal bowel sounds, soft, nontender, no hepatosplenomegaly or other masses  SKIN: pale, dry      ASSESSMENT/PLAN:     Chronic obstructive pulmonary disease, unspecified COPD type (H)  Moderate asthma, unspecified whether complicated, unspecified whether persistent  Tobacco use disorder  Acute nonintractable headache, unspecified headache type  Wheezing     Patient in no apparent distress. Orders placed at facility below. After about 30 minutes patient reporting chest \"heaviness\" with increased dyspnea despite supplemental oxygen. She is requesting to go to the ER. Discussed with SNF RN. Will send patient to ER for eval.      Orders:  1.  CBC, CMP 2 view CXR  2.  Doxycycline 100mg BID x10 days  3.  " Prednisone 20mg daily x5 days   4.  Update me with a full set of vitals    Total time spent with patient visit at the skilled nursing facility was 35 min including patient visit and discussion with staff. Greater than 50% of total time spent with counseling and coordinating care due to complex conditions    Electronically signed by:  DELL Queen CNP

## 2018-11-21 NOTE — IP AVS SNAPSHOT
"` `     [facility] SURGICAL: [phone]                                              INTERAGENCY TRANSFER FORM - NURSING   2018                    Hospital Admission Date: 2018  BECCA HANNAH   : 1965  Sex: Female        Attending Provider: Escobar Boland MD     Allergies:  Codeine, Hmg-coa-r Inhibitors, Lisinopril, No Clinical Screening - See Comments, Sulfa Drugs, Sumatriptan    Infection:  None   Service:  HOSPITALIST    Ht:  1.626 m (5' 4\")   Wt:  122.3 kg (269 lb 10 oz)   Admission Wt:  117.9 kg (260 lb)    BMI:  46.28 kg/m 2   BSA:  2.35 m 2            Patient PCP Information     Provider PCP Type    Elle Dunlap MD General      Current Code Status     Date Active Code Status Order ID Comments User Context       Prior      Code Status History     Date Active Date Inactive Code Status Order ID Comments User Context    2018  8:04 AM  Full Code [id number]  Escobar Boland MD Outpatient    2018 10:00 AM 2018  8:04 AM Full Code [id number]  Escobar Boland MD Inpatient    2018  7:30 PM 2018 10:00 AM Full Code [id number]  Monica Lee PA-C Inpatient    2018  6:51 PM 2018  7:30 PM Full Code [id number]  Ramona Jacome, DELL CNP Outpatient      Advance Directives        Scanned docmt in ACP Activity?           Yes, scanned ACP docmt        Hospital Problems as of 2018              Priority Class Noted POA    Schizoaffective disorder, bipolar type (H) Medium  10/30/2006 Yes    Asthma Medium  10/30/2006 Yes    Unspecified glaucoma Medium  10/30/2006 Yes    DM w/o complication type II (H) Medium  2008 Yes    Gastroesophageal reflux disease without esophagitis Medium  2008 Yes    Urinary retention Medium  2008 Yes    Tobacco use disorder Medium  2008 Yes    Cervical neck pain with evidence of disc disease Medium  2010 Yes    Schizoaffective disorder, unspecified type (H) Medium  2011 Yes    " Posttraumatic stress disorder Medium  10/7/2011 Yes    Chronic pain Medium  9/18/2013 Yes    SPIKE (obstructive sleep apnea) Medium  11/13/2015 Yes    Morbid obesity with BMI of 40.0-44.9, adult (H) Medium  12/8/2016 Yes    HTN (hypertension) Medium  10/28/2017 Yes    JAVIER (generalized anxiety disorder) Medium  11/21/2017 Yes    COPD (chronic obstructive pulmonary disease) (H) Medium  2/5/2018 Yes    COPD exacerbation (H) Medium  11/21/2018 Yes    * (Principal)Acute respiratory failure with hypoxia and hypercapnia (H) Medium  11/21/2018 Yes    CKD (chronic kidney disease) stage 3, GFR 30-59 ml/min (H) Medium  11/21/2018 Unknown      Non-Hospital Problems as of 11/25/2018              Priority Class Noted    Migraine headache Medium  10/30/2006    Carpal tunnel syndrome Medium  7/12/2007    Lymphedema Medium  1/31/2008    Generalized osteoarthritis Medium  1/8/2009    Pain in joint, ankle and foot Medium  1/20/2009    Conversion reaction Medium  6/4/2010    Osteoarthritis of hip Medium  3/17/2012    Rotator cuff tendinitis Medium  11/5/2012    History of encephalopathy Medium  6/4/2013    Normocytic anemia Medium  9/18/2013    Immunosuppressed status (H) Medium  9/19/2013    Ulnar neuritis Medium  1/17/2014    Bladder tumor Medium  2/7/2018    Closed fracture of shaft of right humerus Medium  5/22/2018    Auditory hallucinations Medium  9/14/2018      Immunizations     Name Date      DTaP, Unspecified 01/31/12     DTaP, Unspecified 03/01/06     FLU 6-35 months 10/27/11     FLU 6-35 months 09/17/10     Influenza (IIV3) PF 09/18/13     Influenza (IIV3) PF 09/20/12     Influenza (IIV3) PF 10/27/11     Influenza (IIV3) PF 09/17/10     Influenza (IIV3) PF 09/17/09     Influenza (IIV3) PF 10/21/08     Influenza (IIV3) PF 11/21/07     Influenza (IIV3) PF 11/11/05     Influenza (IIV3) PF 11/10/04     Influenza (IIV3) PF 12/11/03     Influenza (IIV3) PF 12/06/00     Influenza Vaccine IM 18-49 Yrs, RIV3 09/10/14     Influenza  Vaccine IM 3yrs+ 4 Valent IIV4 09/20/17     Influenza Vaccine IM 3yrs+ 4 Valent IIV4 11/12/15     Pneumococcal 23 valent 01/10/09     Pneumococcal 23 valent 03/01/06     Pneumococcal 23 valent 12/06/00     TD (ADULT, 7+) 08/05/05     TDAP Vaccine (Adacel) 01/31/12     Td (Adult), Adsorbed 05/16/03     Tdap (Adult) Unspecified Formulation 01/31/12          END      ASSESSMENT     Discharge Profile Flowsheet     EXPECTED DISCHARGE     Resources List  Skilled Nursing Facility;Transitional Care 11/23/18 1128    Expected Discharge Date  11/25/18 11/24/18 1022   Skilled Nursing Facility  [facility] [phone], Fax: [phone] 11/23/18 1128    DISCHARGE NEEDS ASSESSMENT     PAS Number  -- (bedhold) 11/23/18 1128    Anticipated Changes Related to Illness  inability to care for self 11/21/18 2141   SKIN      Equipment Currently Used at Home  shower chair;walker, rolling;wheelchair, manual 11/21/18 2141   Inspection of bony prominences  Full 11/25/18 0338    Transportation Available  agency transportation 11/23/18 1128   Inspection under devices  Full 11/25/18 0338    Primary Care Clinic Name  [facility] 11/23/18 1128   Skin WDL  ex;color;characteristics 11/25/18 0338    Primary Care MD Name  Elle Dunlap MD 11/23/18 1128   Skin Color/Characteristics  bruised (ecchymotic) 11/25/18 0338    GASTROINTESTINAL (ADULT,PEDIATRIC,OB)     Skin Temperature  warm 11/25/18 0338    GI WDL  WDL 11/25/18 0338   Skin Moisture  dry 11/25/18 0338    All Quadrants Bowel Sounds  audible and active in all quadrants 11/25/18 0338   Skin Elasticity  quick return to original state 11/25/18 0338    Last Bowel Movement  11/23/18 11/24/18 0236   Skin Integrity  abrasion(s);bruise(s) 11/25/18 0338    Passing flatus  yes 11/25/18 0338   SAFETY      COMMUNICATION ASSESSMENT     Safety WDL  WDL 11/25/18 0338    Patient's communication style  spoken language (English or Bilingual) 11/21/18 2141   Safety Factors  bed in low  "position;wheels locked;call light in reach;ID band on;upper side rails raised x 2 11/25/18 0338    FINAL RESOURCES     All Alarms  alarm(s) activated and audible 11/25/18 0338                 Assessment WDL (Within Defined Limits) Definitions           Safety WDL     Effective: 09/28/15    Row Information: <b>WDL Definition:</b> Bed in low position, wheels locked; call light in reach; upper side rails up x 2; ID band on<br> <font color=\"gray\"><i>Item=AS safety wdl>>List=AS safety wdl>>Version=F14</i></font>      Skin WDL     Effective: 09/28/15    Row Information: <b>WDL Definition:</b> Warm; dry; intact; elastic; without discoloration; pressure points without redness<br> <font color=\"gray\"><i>Item=AS skin wdl>>List=AS skin wdl>>Version=F14</i></font>      Vitals     Vital Signs Flowsheet     VITAL SIGNS     VITA COMA SCALE      Temp  98.2  F (36.8  C) 11/25/18 0738   Best Eye Response  4-->(E4) spontaneous 11/25/18 0338    Temp src  Oral 11/25/18 0738   Best Motor Response  6-->(M6) obeys commands 11/25/18 0338    Resp  14 11/25/18 0738   Best Verbal Response  5-->(V5) oriented 11/25/18 0338    Pulse  59 11/25/18 0738   Vita Coma Scale Score  15 11/25/18 0338    Heart Rate  64 11/24/18 2056   HEIGHT AND WEIGHT      Pulse/Heart Rate Source  Monitor 11/25/18 0738   Height  1.626 m (5' 4\") 11/21/18 1955    BP  130/68 11/25/18 0738   Height Method  Stated 11/21/18 1252    BP Location  Right arm 11/25/18 0738   Weight  122.3 kg (269 lb 10 oz) 11/25/18 0609    OXYGEN THERAPY     Weight Method  Bed scale 11/25/18 0609    SpO2  92 % 11/25/18 0738   BSA (Calculated - sq m)  2.31 11/21/18 1252    O2 Device  None (Room air) 11/25/18 0738   BMI (Calculated)  44.72 11/21/18 1252    Oxygen Delivery  2 LPM 11/23/18 0812   POSITIONING      PAIN/COMFORT     Body Position  independently positioning 11/25/18 0812    Patient Currently in Pain  denies 11/25/18 0310   Head of Bed (HOB)  HOB at 30 degrees 11/25/18 0338    Preferred " Pain Scale  CAPA (Clinically Aligned Pain Assessment) ([facility] Adults Only) 11/25/18 0310   Positioning/Transfer Devices  pillows;in use 11/25/18 0338    0-10 Pain Scale  3 11/24/18 2106   Chair  Upright in chair 11/25/18 0812    Pain Location  Back 11/24/18 1149   DAILY CARE      Pain Orientation  Mid;Right;Left 11/24/18 1149   Activity Management  ambulated in room 11/25/18 0812    Pain Descriptors  Aching 11/24/18 1149   Activity Assistance Provided  assistance, stand-by 11/25/18 0812    Pain Intervention(s)  Medication (See eMAR) 11/24/18 1149   Assistive Device Utilized  walker 11/25/18 0812    Response to Interventions  Relief 11/24/18 1149   Additional Documentation  Activity Device Assistance (Row) 11/23/18 1003    CLINICALLY ALIGNED PAIN ASSESSMENT (CAPA) ([facility] ADULTS ONLY)     ECG      Comfort  negligible pain 11/24/18 1541   ECG Rhythm  Sinus rhythm 11/23/18 0908    Change in Pain  getting better 11/24/18 1541   AK Interval  0.16 11/23/18 0908    Pain Control  fully effective 11/24/18 1541   QRS Interval  0.08 11/23/18 0908    Functioning  can do everything I need to 11/24/18 1541   QT Interval  0.42 11/23/18 0908    Sleep  normal sleep 11/24/18 1541   Lead Monitored  Lead II 11/23/18 0908    ANALGESIA SIDE EFFECTS MONITORING     Ectopy  None 11/23/18 0908    Side Effects Monitoring: Respiratory Quality  R 11/24/18 1541   POINT OF CARE TESTING      Side Effects Monitoring: Respiratory Depth  N 11/24/18 1541   Puncture Site  fingertip 11/23/18 0246    Side Effects Monitoring: Sedation Level  1 11/24/18 1541   Bedside Glucose (mg/dl )   108 mg/dl 11/23/18 0246            Patient Lines/Drains/Airways Status    Active LINES/DRAINS/AIRWAYS     Name: Placement date: Placement time: Site: Days: Last dressing change:    Urethral Catheter               Peripheral IV 11/21/18 Left Upper arm 11/21/18   2355   Upper arm   3     Wound 11/21/18 Inner;Right Thigh Other  (comment) ,Covered wound 11/21/18   1900   Thigh   3             Patient Lines/Drains/Airways Status    Active PICC/CVC     None            Intake/Output Detail Report     Date Intake     Output Net    Shift P.O. I.V. IV Piggyback Total Urine Total       Noc 11/23/18 2300 - 11/24/18 0659 -- -- -- -- -- -- 0    Day 11/24/18 0700 - 11/24/18 1459 720 -- -- 720 650 650 70    Pretty 11/24/18 1500 - 11/24/18 2259 240 -- -- 240 400 400 -160    Noc 11/24/18 2300 - 11/25/18 0659 -- -- -- -- -- -- 0    Day 11/25/18 0700 - 11/25/18 1459 -- -- -- -- 950 950 -950      Last Void/BM       Most Recent Value    Urine Occurrence     Stool Occurrence 1 at 11/23/2018 1308      Case Management/Discharge Planning     Case Management/Discharge Planning Flowsheet     REFERRAL INFORMATION     DISCHARGE PLANNING      Did the Initial Social Work Assessment result in a Social Work Case?  No 11/23/18 1128   Anticipated Changes Related to Illness  inability to care for self 11/21/18 2141    Reason For Consult  discharge planning 11/23/18 1128   Transportation Available  agency transportation 11/23/18 1128    Primary Care Clinic Name  [facility] 11/23/18 1128   FINAL RESOURCES      Primary Care MD Name  Elle Dunlap MD 11/23/18 1128   Equipment Currently Used at Home  shower chair;walker, rolling;wheelchair, manual 11/21/18 2141    LIVING ENVIRONMENT     Resources List  Skilled Nursing Facility;Transitional Care 11/23/18 1128    Lives With  alone;other (see comments) (currently at [facility]) 11/23/18 1128   Skilled Nursing Facility  [facility] [phone], Fax: [phone] 11/23/18 1128    Living Arrangements  apartment 11/23/18 1128   PAS Number  -- (bedhold) 11/23/18 1128    Provides Primary Care For  no one, unable/limited ability to care for self 11/21/18 2141   ABUSE RISK SCREEN      Able to Return to Prior Living Arrangements  other (see comments) (Bedhold at [facility]) 11/23/18  1128   QUESTION TO PATIENT:  Has a member of your family or a partner(now or in the past) intimidated, hurt, manipulated, or controlled you in any way?  no 11/21/18 1247    ASSESSMENT OF FAMILY/SOCIAL SUPPORT     QUESTION TO PATIENT: Do you feel safe going back to the place where you are living?  yes 11/21/18 1247    Who is your support system?  Facility resident(s)/Staff 11/23/18 1128   OBSERVATION: Is there reason to believe there has been maltreatment of a vulnerable adult (ie. Physical/Sexual/Emotional abuse, self neglect, lack of adequate food, shelter, medical care, or financial exploitation)?  no 11/21/18 1247    Description of Support System  Supportive;Involved 11/23/18 1128   OTHER      COPING/STRESS     Are you depressed or being treated for depression?  No 11/21/18 2000    Major Change/Loss/Stressor  none 11/21/18 2216   HOMICIDE RISK      EXPECTED DISCHARGE     Feels Like Hurting Others  no 11/21/18 2141    Expected Discharge Date  11/25/18 11/24/18 1022

## 2018-11-21 NOTE — IP AVS SNAPSHOT
` `     [facility] SURGICAL: [phone]                 INTERAGENCY TRANSFER FORM - NOTES (H&P, Discharge Summary, Consults, Procedures, Therapies)   2018                    Hospital Admission Date: 2018  BETSY RESENDIZ   : 1965  Sex: Female        Patient PCP Information     Provider PCP Type    Elle Dunlap MD General         History & Physicals      H&P by Monica Lee PA-C at 2018  6:51 PM     Author:  Monica Lee PA-C Service:  Hospitalist Author Type:  Physician Assistant - DAVID    Filed:  2018  7:31 PM Date of Service:  2018  6:51 PM Creation Time:  2018  6:51 PM    Status:  Attested :  Monica Lee PA-C (Physician Assistant - DAVID)    Cosigner:  Jj Shetty MD at 2018  8:48 AM        Attestation signed by Jj Shetty MD at 2018  8:48 AM        Physician Attestation   I, Jj Shetty MD, have reviewed and discussed with the advanced practice provider their history, physical and plan for Betsy Resendiz. I did not participate in a shared visit by interviewing or examining the patient and this should be billed as an advanced practice provider only visit.    Jj Shetty MD  Date of Service (when I saw the patient): I did not personally see this patient today.                               [facility]    History and Physical  Hospital Medicine       Date of Admission:  2018  Date of Service: 2018     CC: cough, wheeze, shortness of breath    Assessment & Plan   Betsy Resendiz is a 53 year old female with a past medical history significant for schizoaffective disorder and bipolar disorder, COPD, chronic pain, GERD, urinary retention with chronic Abernathy, hypertension, chronic kidney disease, and anxiety who presents on 2018 with hypoxia and respiratory failure secondary to likely COPD exacerbation.      Acute respiratory failure with  hypoxia and hypercapnia  Presents with O2 sats 88% on room air, improving with 2-3L O2. No O2 needs at baseline. Admit VBG with pH 7.33 / pCO2 70 / pO2 37 / bicarb 37. Profound wheezing and coughing on exam, most likely due to COPD exacerbation (see below). Not needing BiPAP at this point.  - Continue supplemental O2 to maintain sats > 92%  - No BiPAP currently, but may need it if she does not improve  - VBG in am      COPD exacerbation  COPD managed with Pulmicort nebs, DuoNebs, and albuterol prior to admission. Wheezing and coughing on exam. Admit chest x-ray normal other than benign granuloma in right lung. Afebrile, no leukocytosis. Patient was given nebs and 125 mg solumedrol in the emergency department.  - Scheduled DuoNebs q 4 hours while awake  - Prednisone 40 mg daily   - Start azithromycin   - Supplemental O2  - Supportive cares for cough      DM w/o complication type II  Noted per problem list. Not on any diabetic medications per medication reconciliation. Admit glucose = 97.  - Recheck glucose in the morning; consider starting insulin / sliding scale insulin if elevated      HTN (hypertension)  Lymphedema  Pressures reviewed, stable. Managed prior to admission with propranolol and lasix.  - Continue prior to admission lasix and propranolol with holding parameters      CKD (chronic kidney disease) stage 3, GFR 30-59 ml/min  Admit creatinine 1.32 (baseline 1.0 - 1.2), GFR 42 (baseline 40-56). Stable.  - Avoid nephrotoxins  - BMP in am      Urinary retention  Ongoing problem, has had chronic Abernathy since about August per patient's report. Follows with urology at the [facility], is supposed to have upcoming cystoscopy and urodynamic testing.  - Continue Abernathy      Schizoaffective disorder, bipolar type (H)  JAVIER (generalized anxiety disorder)  Posttraumatic stress disorder  Stable mood upon admission. Managed prior to admission with Vraylar, Lexapro, Lamictal, lithium, Invega, perphenazine, Geodon, and prn  Ativan.  - Continue home medication regimen      Chronic pain  Cervical neck pain with evidence of disc disease  Chronic and stable. Managed pat with gabapentin, acetaminophen, naproxen, cyclobenzaprine, and methocarbamol.  - Continue prior to admission pain regimen       Gastroesophageal reflux disease without esophagitis  Chronic and stable. Managed prior to admission with Prevacid, continue.      Unspecified glaucoma  Managed prior to admission with brimonidine, continue.      SPIKE (obstructive sleep apnea)  Uses CPAP but did not bring it with her.   - Continue home CPAP with home settings if able      Tobacco use disorder  Encourage cessation. Nicotine lozenges available.      Morbid obesity with BMI of 40.0-44.9, adult (H)  Contributes to comorbidity.      Fluids: Oral  Electrolytes: Monitor  Nutrition: consistent carbohydrate     DVT Prophylaxis: Pneumatic Compression Devices, lovenox  Code Status: Full Code - discussed directly with patient    Lines: Peripheral  Abernathy catheter: Chronic Abernathy in place upon admission, continue    Disposition: Anticipate discharge in 2+ day(s). Appropriate for inpatient care.    Discussion: Assessment & plan discussed with Dr. Jj Lee PA-C  [facility]  Pager: [phone]          Primary Care Physician   Elle Dunlap [phone]    History is obtained from the patient and review of old records via the EMR.    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     Hypokalemia 6/4/2010     Vitamin D deficiency 9/19/2012         Diagnosis Date Noted     CKD (chronic kidney disease) stage 3, GFR 30-59 ml/min (H) 11/21/2018     Priority: Medium     Auditory hallucinations 09/14/2018     Priority: Medium     Closed fracture of shaft of right humerus 05/22/2018     Priority: Medium     Bladder tumor 02/07/2018     Priority: Medium      COPD (chronic obstructive pulmonary disease) (H) 02/05/2018     Priority: Medium     JAVIER (generalized anxiety disorder) 11/21/2017     Priority: Medium     HTN (hypertension) 10/28/2017     Priority: Medium     Morbid obesity with BMI of 40.0-44.9, adult (H) 12/08/2016     Priority: Medium     SPIKE (obstructive sleep apnea) 11/13/2015     Priority: Medium     Ulnar neuritis 01/17/2014     Priority: Medium     Immunosuppressed status (H) 09/19/2013     Priority: Medium     Chronic pain 09/18/2013     Priority: Medium     Normocytic anemia 09/18/2013     Priority: Medium     History of encephalopathy 06/04/2013     Priority: Medium     Rotator cuff tendinitis 11/05/2012     Priority: Medium     Osteoarthritis of hip 03/17/2012     Priority: Medium     Posttraumatic stress disorder 10/07/2011     Priority: Medium     Schizoaffective disorder, unspecified type (H) 06/07/2011     Priority: Medium     Cervical neck pain with evidence of disc disease 07/28/2010     Priority: Medium     Conversion reaction 06/04/2010     Priority: Medium     Pain in joint, ankle and foot 01/20/2009     Priority: Medium     Generalized osteoarthritis 01/08/2009     Priority: Medium     Tobacco use disorder 11/26/2008     Priority: Medium     Urinary retention 06/06/2008     Priority: Medium     DM w/o complication type II (H) 04/09/2008     Priority: Medium     Gastroesophageal reflux disease without esophagitis 04/09/2008     Priority: Medium     Lymphedema 01/31/2008     Priority: Medium     Carpal tunnel syndrome 07/12/2007     Priority: Medium     Schizoaffective disorder, bipolar type (H) 10/30/2006     Priority: Medium     Migraine headache 10/30/2006     Priority: Medium     Asthma 10/30/2006     Priority: Medium     Unspecified glaucoma 10/30/2006     Priority: Medium        Past Surgical History     Past Surgical History:   Procedure Laterality Date     ARTHRODESIS FOOT Right 2009     BACK SURGERY      x7      BIOPSY/EXCIS  CERVICAL LESN       INCISION AND DRAINAGE  2013    Pressure ulcer     REPAIR BLADDER       THORACOTOMY      Abscess        History of Present Illness   Betsy Resendiz is a 53 year old female with the above past medical history now presents on 11/21/2018 with increased cough, wheeze, and shortness of breath. Symptoms started about 5 days prior to admission and have been progressively worsening since onset. Patient has profound wheezing that is temporarily improved with nebs. She has a cough that feels productive but she is unable to produce any sputum. She is feeling very short of breath even at rest, but is worse with any exertion.     She has difficulty laying flat, but this is not a new problem. She denies PND or worsening of her baseline edema. She denies fevers or chills. She lives in a nursing home so she may have ill exposures, but she has not had known contact with an ill person.     She has chest pain associated with her coughing, and her ribs and back are very sore. She takes shallow breaths due painful deep breathings. She denies palpitations.    She has a chronic Abernathy since August due to urinary retention. She is currently having outpatient work-up through urology at the [facility]. The remainder review of systems is negative.       Prior to Admission Medications   Prior to Admission Medications   Prescriptions Last Dose Informant Patient Reported? Taking?   ACETAMINOPHEN PO More than a month at Unknown time [facility] Yes No   Sig: Take 650 mg by mouth every 4 hours as needed for pain   CEPHALEXIN PO 11/21/2018 at 0800 [facility] Yes Yes   Sig: Take 500 mg by mouth 4 times daily   CYCLOBENZAPRINE HCL PO 11/20/2018 at 1341 Nursing Home Yes Yes   Sig: Take 5 mg by mouth every 8 hours as needed    DOXYCYCLINE HYCLATE PO  Nursing Home Yes No   Sig: Take 100 mg by mouth 2 times daily   LANsoprazole (PREVACID) 30 MG CR capsule 11/21/2018 at 0800 [facility] Yes Yes   Sig: Take 30 mg by mouth daily     LIOTHYRONINE SODIUM PO 2018 at 0800 [facility] Yes Yes   Sig: Take 25 mcg by mouth daily   LORazepam (ATIVAN PO) 2018 at 1341 Nursing Home Yes Yes   Sig: Take 0.5 mg by mouth 2 times daily as needed for anxiety   METHOCARBAMOL PO 2018 at 0800 [facility] Yes Yes   Sig: Take 500 mg by mouth 3 times daily   NAPROXEN PO 2018 at 1342 Nursing Home Yes Yes   Sig: Take 500 mg by mouth every 8 hours as needed for moderate pain   OYSTER SHELL CALCIUM PO 2018 at 0800 [facility] Yes Yes   Sig: Take 500 mg by mouth 4 times daily   POTASSIUM CHLORIDE ER PO 2018 at 0800 [facility] Yes Yes   Simeq by mouth two times a day   PREDNISONE PO  Nursing Home Yes No   Sig: Take 20 mg by mouth daily   PROPRANOLOL HCL PO 2018 at 0800 [facility] Yes Yes   Sig: Take 10 mg by mouth 2 times daily   albuterol (PROAIR HFA/PROVENTIL HFA/VENTOLIN HFA) 108 (90 BASE) MCG/ACT Inhaler More than a month at Unknown time [facility] Yes No   Sig: Inhale 2 puffs into the lungs every 4 hours as needed    brimonidine (ALPHAGAN-P) 0.15 % ophthalmic solution 2018 at 0800 [facility] Yes Yes   Sig: Place 1 drop into both eyes 2 times daily   budesonide (PULMICORT) 0.5 MG/2ML neb solution 2018 at 0800 [facility] Yes Yes   Sig: Take 0.5 mg by nebulization 2 times daily   cariprazine (VRAYLAR) 1.5 MG CAPS capsule 2018 at 0800 [facility] Yes Yes   Sig: Take 2 mg by mouth daily    cholecalciferol (VITAMIN D3) 5000 units CAPS capsule 2018 at 0800 [facility] Yes Yes   Sig: Take 5,000 Units by mouth daily   clotrimazole (LOTRIMIN) 1 % cream  Nursing Home Yes No   Sig: Apply topically 2 times daily   docusate sodium (STOOL SOFTENER) 100 MG capsule 2018 at pm Nursing Home Yes Yes   Sig: Take 100 mg by mouth At Bedtime    escitalopram (LEXAPRO) 20 MG tablet 2018 at pm Nursing Home Yes Yes   Sig: Take 1 tablet by mouth at bedtime   fluticasone (FLONASE) 50 MCG/ACT spray   Nursing Home Yes No   Sig: Spray 1 spray into both nostrils daily as needed    furosemide (LASIX) 80 MG tablet 11/21/2018 at 0800 custodial Yes Yes   Sig: Take 40 mg by mouth 2 times daily    gabapentin (NEURONTIN) 800 MG tablet 11/21/2018 at 0800 custodial Yes Yes   Sig: Take 400 mg by mouth 3 times daily    ipratropium - albuterol 0.5 mg/2.5 mg/3 mL (DUONEB) 0.5-2.5 (3) MG/3ML neb solution 11/21/2018 at 0800 custodial Yes Yes   Sig: Take 1 vial by nebulization 4 times daily   lamoTRIgine (LAMICTAL) 200 MG tablet 11/20/2018 at pm Nursing Home Yes Yes   Sig: Take 200 mg by mouth At Bedtime    lithium 300 MG capsule 11/21/2018 at 0800 custodial Yes Yes   Sig: Take 1 capsule by mouth in the morning and 2 capsules at bedtime   loperamide (IMODIUM) 2 MG capsule 11/18/2018 at 0118 custodial Yes Yes   Sig: Take 2 mg by mouth every 6 hours as needed for diarrhea   meclizine (ANTIVERT) 25 MG tablet  Nursing Home Yes No   Sig: Take 25 mg by mouth every 8 hours as needed    paliperidone (INVEGA) 6 MG 24 hr tablet 11/21/2018 at am Nursing Home Yes Yes   Sig: Take 6 mg by mouth every morning   perphenazine 4 MG tablet 11/21/2018 at 0800 custodial Yes Yes   Sig: Take 4 mg by mouth 2 times daily    pregabalin (LYRICA) 150 MG capsule 11/21/2018 at 0800 custodial Yes Yes   Sig: Take 150 mg by mouth 2 times daily    travoprost, BAK Free, (TRAVATAN Z) 0.004 % ophthalmic solution 11/20/2018 at pm Nursing Home Yes Yes   Sig: Place 1 drop into both eyes At Bedtime   ziprasidone (GEODON) 40 MG capsule 11/21/2018 at 0800 custodial Yes Yes   Sig: Take 1 capsule by mouth in the morning   ziprasidone (GEODON) 80 MG capsule 11/20/2018 at pm Nursing Home Yes Yes   Sig: Take 160 mg by mouth At Bedtime       Facility-Administered Medications: None     Allergies   Allergies   Allergen Reactions     Codeine Shortness Of Breath     Has tolerated morphine 7/2008     Hmg-Coa-R Inhibitors Other (See Comments)     Patient  "declines  Patient declines     Lisinopril Cough     No Clinical Screening - See Comments      Statin-hmg-coa reductase inhibitors     Sulfa Drugs      Mouth sore     Sumatriptan Other (See Comments)       Family History    Family History   Problem Relation Age of Onset     Other - See Comments Mother      Multiple myeloma     Diabetes Maternal Grandmother      Cancer Niece       age 12       Social History   Social History     Social History     Marital status: Single     Spouse name: N/A     Number of children: N/A     Years of education: N/A     Occupational History     Not on file.     Social History Main Topics     Smoking status: Current Every Day Smoker     Types: Cigarettes     Smokeless tobacco: Never Used     Alcohol use No     Drug use: No     Sexual activity: Not on file     Other Topics Concern     Not on file     Social History Narrative     No narrative on file       Review of Systems     A complete 10 point review of systems was negative except for items noted in the HPI/subjective.      Physical Exam   /81  Temp 98.1  F (36.7  C) (Oral)  Resp 16  Ht 1.626 m (5' 4\")  Wt 117.9 kg (260 lb)  SpO2 94%  BMI 44.63 kg/m2     Weight: 260 lbs 0 oz Body mass index is 44.63 kg/(m^2).     Constitutional: Alert, oriented x4, cooperative, appears nontoxic, speaking in full sentences, no apparent distress,    Eyes: Eyes are clear, pupils are reactive. No scleral icterus. Extroccular movements intact.     HEENT: Oropharynx is clear and moist, no lesions. Normocephalic, no evidence of cranial trauma.      Cardiovascular: Regular rhythm and rate, normal S1 and S2. No murmur, rubs, or gallops. Peripheral pulses in tact bilaterally. Bilateral trace pitting lower extremity edema. JVP difficult to assess due to body habitus.    Respiratory: Audible wheezing even without a stethoscope. Bilateral expiratory wheezing, not moving much air at the bases. Some rhonchi as well but no crackles appreciated.     GI: " Soft, non-distended. Non-tender, no rebound or guarding. No hepatosplenomegaly or masses appreciated. Normal bowel sounds.     Musculoskeletal: Without obvious deformity, normal range of motion. Normal muscle bulk and tone.     Skin: Warm and dry, no rashes or ecchymoses. No mottling of skin. Woody hyperpigmented skin on bilateral lower extremities consistent with venous stasis changes, left > right.     Neurologic: Patient moves all extremities. Cranial nerves are grossly intact.  is symmetric. Gross strength and sensation are equal bilaterally.    Genitourinary: Abernathy catheter in place draining clear yellow urine without sediment.    Data   Data reviewed today:     Recent Labs  Lab 11/21/18  1349   WBC 8.9   HGB 12.5   *         POTASSIUM 3.9   CHLORIDE 105   CO2 34*   BUN 15   CR 1.32*   ANIONGAP 3   GAURAV 8.2*   GLC 97   ALBUMIN 3.1*   PROTTOTAL 7.4   BILITOTAL 0.2   ALKPHOS 96   ALT 36   AST 21   TROPI <0.015       Recent Results (from the past 24 hour(s))   Chest XR,  PA & LAT    Narrative    XR CHEST 2 VW 11/21/2018 2:09 PM    HISTORY: Short of breath.    COMPARISON: None.    FINDINGS: Benign granuloma in the right upper lung. No airspace  consolidation, pleural effusion or pneumothorax. Normal heart size.      Impression    IMPRESSION: No acute cardiopulmonary abnormality.    ANGELES PALOMINO MD       I personally reviewed the chest x-ray image(s) showing right upper lobe granuloma, no infiltrate, opacity, or effusion.    Monica Lee PA-C  [facility]  Pager: [phone][KW1.1]        Revision History        User Key Date/Time User Provider Type Action    > KW1.1 11/21/2018  7:31 PM Monica Lee PA-C Physician Assistant - C Sign                  Discharge Summaries     No notes of this type exist for this encounter.         Consult Notes      Consults by Jazmin Lindquist RN at 11/23/2018 11:35 AM     Author:  Jazmin Lindquist RN Service:  (none)  Author Type:      Filed:  11/23/2018 11:35 AM Date of Service:  11/23/2018 11:35 AM Creation Time:  11/23/2018 11:28 AM    Status:  Signed :  Jazmin Lindquist RN ()     Consult Orders:    1. Care Transition RN/SW IP Consult [[id number]] ordered by Escobra Boland MD at 11/23/18 1046                Care Transition Initial Assessment - RN  Reason For Consult: discharge planning   Met with: Patient.    DATA   Principal Problem:    Acute respiratory failure with hypoxia and hypercapnia (H)  Active Problems:    Schizoaffective disorder, bipolar type (H)    Cervical neck pain with evidence of disc disease    Chronic pain    DM w/o complication type II (H)    Gastroesophageal reflux disease without esophagitis    Morbid obesity with BMI of 40.0-44.9, adult (H)    SPIKE (obstructive sleep apnea)    Posttraumatic stress disorder    Urinary retention    Schizoaffective disorder, unspecified type (H)    Tobacco use disorder    Asthma    Unspecified glaucoma    COPD (chronic obstructive pulmonary disease) (H)    JAVIER (generalized anxiety disorder)    HTN (hypertension)    COPD exacerbation (H)    CKD (chronic kidney disease) stage 3, GFR 30-59 ml/min (H)       Primary Care Clinic Name: [facility]  Primary Care MD Name: Elle Dunlap MD  Contact information and PCP information verified: Yes    ASSESSMENT  Cognitive Status: awake and oriented.  Resources List: Skilled Nursing Facility, Transitional Care   Lives With: alone, other (see comments) (currently at [facility])  Living Arrangements: apartment   Description of Support System: Supportive, Involved   Who is your support system?: Facility resident(s)/Staff    Insurance Concerns: No Insurance issues identified    This writer met with pt, introduced self and role. Care Transitions is consulted for discharge planning. This writer discussed discharge planning and Medicare guidelines in regards to home care, TCU and LTC.  Pt lives alone in an apartment in the community but is currently at [facility] (Phone: [phone] Fax: [phone]) TCU (bedhold). Patient is in agreement and has a goal of returning to the TCU upon discharge.  This writer sent [facility] (Phone: [phone] Fax: [phone]) updated patient information via fax to [facility] for review and called the TCU floor nurse. TCU nurse stated the the patient has a bed-hold and can return this weekend if the patient doesn't have IV medications.   Sr linkage resources provided to the patient. This writer discussed with the patient the private pay costs for transportation, patient aware. Transportation will need to be arranged by [facility].  Patient is not able to receive clinical care coordination she has an [facility] PCP. No PAS needed (bed hold).  PLAN  Return to [facility] (Phone: [phone] Fax: [phone])         Jazmin Lindquist RN Care Coordinator  [facility] [phone]  [facility] [phone][PW1.1]     Revision History        User Key Date/Time User Provider Type Action    > PW1.1 11/23/2018 11:35 AM Jazmin Lindquist RN Case Manager Sign                     Progress Notes - Physician (Notes from 11/22/18 through 11/25/18)      Progress Notes by Marylou Barone RN at 11/24/2018  5:33 PM     Author:  Barone, Marylou, RN Service:  Nursing Author Type:  Registered Nurse    Filed:  11/24/2018  5:33 PM Date of Service:  11/24/2018  5:33 PM Creation Time:  11/24/2018  5:33 PM    Status:  Signed :  Marylou Barone RN (Registered Nurse)         Skin affirmation note    Transfer nurse completed full skin assessment, Theron score and Theron interventions. This writer agrees with the initial skin assessment findings.[RP1.1]       Revision History        User Key Date/Time User Provider Type Action    > RP1.1 11/24/2018  5:33 PM Marylou Barone, RN Registered Nurse Sign            Progress Notes by Mychal Shaffer RN at  11/24/2018  5:24 PM     Author:  Mychal Shaffer RN Service:  Nursing Author Type:  Registered Nurse    Filed:  11/24/2018  5:26 PM Date of Service:  11/24/2018  5:24 PM Creation Time:  11/24/2018  5:24 PM    Status:  Signed :  Mychal Shaffer RN (Registered Nurse)         Patient transferred up from ICU. Received report from Magaly HAMILTON. Skin assessment completed by this writer and Marylou HAMILTON. Has mepilex on left glut fold, that is clean, dry and intact.[MR1.1]     Revision History        User Key Date/Time User Provider Type Action    > MR1.1 11/24/2018  5:26 PM Mychal Shaffer RN Registered Nurse Sign            Progress Notes by Trisha Lassiter RN at 11/24/2018  5:08 PM     Author:  Trisha Lassiter RN Service:  Nursing Author Type:  Registered Nurse    Filed:  11/24/2018  5:10 PM Date of Service:  11/24/2018  5:08 PM Creation Time:  11/24/2018  5:08 PM    Status:  Addendum :  Trisha Lassiter RN (Registered Nurse)         Report to Winston HAMILTON and she accepted care. Pt left via W/C for transfer to /S 2308 with NST[CM1.1] and[CM1.2] had N/C's at time of transfer.[CM1.1]     Revision History        User Key Date/Time User Provider Type Action    > [N/A] 11/24/2018  5:10 PM Trisha Lassiter RN Registered Nurse Addend     CM1.2 11/24/2018  5:09 PM Trisha Lassiter RN Registered Nurse Sign     CM1.1 11/24/2018  5:08 PM Trisha Lassiter RN Registered Nurse             Progress Notes by Jazmin Lindquist RN at 11/24/2018 10:22 AM     Author:  Jazmin Lindquist RN Service:  (none) Author Type:      Filed:  11/24/2018 10:23 AM Date of Service:  11/24/2018 10:22 AM Creation Time:  11/24/2018 10:22 AM    Status:  Signed :  Jazmin Lindquist RN ()         Discharge Planner   Discharge Plans in progress: (Bedhold) [facility] for 11/25/2018 (updated Betty)  Barriers to discharge plan: Medical stability  Follow up plan: PCP, TCU       Entered by: Jazmin  "MICHELLE Lindquist 11/24/2018 10:22 AM[PW1.1]          Revision History        User Key Date/Time User Provider Type Action    > PW1.1 11/24/2018 10:23 AM Jazmin Lindquist RN Case Manager Sign            Progress Notes by Escobar Boland MD at 11/24/2018  8:46 AM     Author:  Escobar Boland MD Service:  (none) Author Type:  Physician    Filed:  11/24/2018  8:53 AM Date of Service:  11/24/2018  8:46 AM Creation Time:  11/24/2018  8:46 AM    Status:  Signed :  Escobar Boland MD (Physician)         SUBJECTIVE:   complains of back pain with cough.  About T10 bilaterally.   Still marked cough and wheezing.  Off O2  Talking a lot last lila     ROS:4 point ROS including Respiratory, CV, GI and , other than that noted in the HPI,  is negative     OBJECTIVE:[MD1.1]   /67  Pulse 65  Temp 98.3  F (36.8  C) (Oral)  Resp 16  Ht 1.626 m (5' 4\")  Wt 114.8 kg (253 lb 1.4 oz)  SpO2 92%  BMI 43.44 kg/m2[MD1.2]    GENERAL APPEARANCE:  Sleepy again but answers questions. Ox3      RESP:prolonged expiratory phase diffuse wheezes.      CV: regular rate and rhythm,  No  murmur , edema: 1+      Abdomen: soft, nontender, no liver or spleen enlargement, no masses, BSs normal   Skin: no cyanosis, pallor, or jaundice    CMP[MD1.1]  Recent Labs  Lab 11/24/18  0453 11/23/18  0512 11/22/18  0519 11/21/18  1349   * 146* 144 142   POTASSIUM 3.1* 3.6 4.5 3.9   CHLORIDE 106 109 106 105   CO2 36* 31 34* 34*   ANIONGAP 4 6 4 3   GLC 85 93 125* 97   BUN 22 23 19 15   CR 1.36* 1.32* 1.22* 1.32*   GFRESTIMATED 41* 42* 46* 42*   GFRESTBLACK 50* 51* 56* 51*   GAURAV 7.9* 7.8* 8.4* 8.2*   PROTTOTAL 6.6* 6.3*  --  7.4   ALBUMIN 2.9* 2.8*  --  3.1*   BILITOTAL 0.2 0.2  --  0.2   ALKPHOS 69 75  --  96   AST 11 15  --  21   ALT 24 25  --  36[MD1.2]     CBC[MD1.1]  Recent Labs  Lab 11/24/18  0453 11/23/18  0632 11/22/18  0519 11/21/18  1349   WBC 9.3 8.6 9.9 8.9   RBC 3.76* 3.46* 3.66* 3.83   HGB 12.0 11.5* 12.0 12.5   HCT 37.9 35.9 37.5 " 39.4   * 104* 103* 103*   MCH 31.9 33.2* 32.8 32.6   MCHC 31.7 32.0 32.0 31.7   RDW 12.0 12.0 11.8 12.1    167 223 199[MD1.2]     INR[MD1.1]No lab results found in last 7 days.[MD1.2]  Arterial BloodGas[MD1.1]    Recent Labs  Lab 11/24/18  0453 11/23/18  1115 11/23/18  0632 11/22/18  1159   O2PER 21% 28% 32 25[MD1.2]      Venous Blood Gas[MD1.1]    Recent Labs  Lab 11/24/18  0453 11/23/18  1115 11/23/18  0632 11/22/18  1159   PHV 7.40 7.39 7.37 7.39   PCO2V 56* 55* 58* 57*   PO2V 40 63* 51* 48*   HCO3V 35* 34* 34* 35*   VON 8.3 7.1 7.0 7.5   O2PER 21% 28% 32 25[MD1.2]     Lithium 0.72  Medications[MD1.1]     azithromycin  250 mg Oral Daily     brimonidine  1 drop Both Eyes BID     budesonide  0.5 mg Nebulization BID     cariprazine  1.5 mg Oral Daily     docusate sodium  100 mg Oral At Bedtime     enoxaparin  40 mg Subcutaneous Q24H     escitalopram  20 mg Oral At Bedtime     ipratropium - albuterol 0.5 mg/2.5 mg/3 mL  3 mL Nebulization Q4H While awake     lamoTRIgine  200 mg Oral At Bedtime     lidocaine  2 patch Transdermal Q24H     lidocaine   Transdermal Q8H     lidocaine   Transdermal Q24h     liothyronine (CYTOMEL) tablet 25 mcg  25 mcg Oral Daily     lithium  300 mg Oral Daily with breakfast     lithium  600 mg Oral QPM     paliperidone  6 mg Oral QAM     pantoprazole  40 mg Oral QAM AC     perphenazine  4 mg Oral BID     potassium chloride  10 mEq Oral Daily     predniSONE  40 mg Oral Daily     pregabalin  150 mg Oral BID     propranolol (INDERAL) tablet 10 mg  10 mg Oral BID     sodium chloride (PF)  3 mL Intracatheter Q8H     travoprost (NICHO Free)  1 drop Both Eyes At Bedtime     ziprasidone  160 mg Oral At Bedtime     ziprasidone  40 mg Oral QAM[MD1.2]       Intake/Output Summary (Last 24 hours) at 11/24/18 0847  Last data filed at 11/23/18 2200   Gross per 24 hour   Intake              685 ml   Output             2975 ml   Net            -2290 ml       ASSESSMENT: PLAN:   ASSESSMENT: PLAN:    Betsy Resendiz is a 53 year old female with a past medical history significant for schizoaffective disorder and bipolar disorder, COPD, chronic pain, GERD, urinary retention with chronic Abernathy, hypertension, chronic kidney disease, and anxiety who presents on 11/21/2018 with hypoxia and respiratory failure secondary to likely COPD exacerbation.          Acute respiratory failure with hypoxia and hypercapnia  Presents with O2 sats 88% on room air, improving with 2-3L O2. No O2 needs at baseline. Admit VBG with pH 7.33 / pCO2 70 / pO2 37 / bicarb 37. Profound wheezing and coughing on exam, most likely due to COPD exacerbation (see below). Not needing BiPAP at this point.  - CO2 much better on BIPAP.  Will try off and see if CO2 remains stable     - baseline CO2 appears to be around 56.  So she has some chronic hypercapnic resp failure   - still marked cough and wheeze.  Will watch another day.            COPD exacerbation  COPD managed with Pulmicort nebs, DuoNebs, and albuterol prior to admission. Wheezing and coughing on exam. Admit chest x-ray normal other than benign granuloma in right lung. Afebrile, no leukocytosis. Patient was given nebs and 125 mg solumedrol in the emergency department.  -Nebs, Azith, prednisone, O2 supplement, better with BIPAP yesterday but will try off this AM.   -still marked wheezing.  continue nebs, steroids, Abx.       DM w/o complication type II  Noted per problem list. Not on any diabetic medications per medication reconciliation. Admit glucose = 97.  -no elevated glucose on no meds, even now on prednisone.  Will check A1 c.    -no insulin for now.   -A1c 5.4.  Not sure she really has DM or if she is just at risk due to psychotropics.            Acute encephalopathy:   - more somnolent on morning after admission, wakens to voice but falls asleep immediately   -unclear if this is all her psychotropic meds, Lithium toxicity, or the elevated CO2.   - this was likely from lithium  toxicity  - try off BIPAP and restart lithium slowly   - may also have been some Gabapentin toxicity with decreased GFR.  Held and may need to adjust dose.   - appears resolved.       Lithium toxicity  -level 1.95 , likely due to CHAPINCITO.   - level 0.65 after 24 hrs of no lithium.  GFR still <50 this AM .  Will discuss with pharmacy.    - back on her previous doses and level 0.72, so continue for now even with new decreased GFR.       HTN (hypertension)  Lymphedema  Pressures reviewed, stable. Managed prior to admission with propranolol and lasix.  - Continue prior to admission lasix and propranolol with holding parameters          CHAPINCITO on CKD (chronic kidney disease) stage 3, GFR 30-59 ml/min  Admit creatinine 1.32 (baseline 1.0 - 1.2), GFR 42 (baseline 40-56). Stable.  - back down to 1.22, which is close to baseline  - but back up to 1.32 again after good hydration and treatment of her COPD.  May be her new normal.   -will follow GFR and adjust her lithium and goyo as needed.    - seems to be stabilizing at 1.35.  This may be her new normal           Urinary retention  Ongoing problem, has had chronic Abernathy since about August per patient's report. [facility] is supposed to have upcoming cystoscopy and urodynamic testing.  - Continue Abernathy  - has positive UA but no clear evidence of infection.            Schizoaffective disorder, bipolar type (H)  JAVIER (generalized anxiety disorder)  Posttraumatic stress disorder  Stable mood upon admission. Managed prior to admission with Vraylar, Lexapro, Lamictal, lithium, Invega, perphenazine, Geodon, and prn Ativan.  - more somnolent here this AM.  Will hold her methocarbamol, but continue her other meds  - will need to adjust meds if kidney function stays            Chronic pain  Cervical neck pain with evidence of disc disease  Chronic and stable. Managed pat with gabapentin, acetaminophen, naproxen, cyclobenzaprine, and methocarbamol.  - holding  methocarbamol for the increased sedation today  - she was tapered off narcotics in the last 2-3 months.    - we have kept her off he methocarbamol.  (not a very effective muscle relaxant but very sedating) Because of new CKD will stop gabapentin and just stay on the pregabalin.            Gastroesophageal reflux disease without esophagitis  Chronic and stable. Managed prior to admission with Prevacid, continue.          Unspecified glaucoma  Managed prior to admission with brimonidine, continue.          SPIKE (obstructive sleep apnea)  Uses CPAP but did not bring it with her.   - Continue home CPAP with home settings if able          Tobacco use disorder  - smokes 1/2 PPD           Morbid obesity with BMI of 40.0-44.9, adult (H)  Contributes to comorbidity.          Fluids:IVF yest but will stop today.   Electrolytes: Monitor  Nutrition: consistent carbohydrate       DVT Prophylaxis: Pneumatic Compression Devices, lovenox  Code Status: Full Code - discussed directly with patient  Discussion:   Watch one more day.  Back to LTC in AM.                    [MD1.1]            Revision History        User Key Date/Time User Provider Type Action    > MD1.2 11/24/2018  8:53 AM Escobar Boland MD Physician Sign     MD1.1 11/24/2018  8:46 AM Escobar Boland MD Physician             Progress Notes by Nataly Handley RN at 11/24/2018  6:15 AM     Author:  Nataly Handley RN Service:  ICU Author Type:  Registered Nurse    Filed:  11/24/2018  6:17 AM Date of Service:  11/24/2018  6:15 AM Creation Time:  11/24/2018  6:15 AM    Status:  Signed :  Nataly Handley RN (Registered Nurse)         Congested cough with mild exp wheezes this morning, not bringing up much mucus. Has been on room air all night spot checked o2 sats 89-95%, RR wnl. Robitussin and neb given this morning.[CC1.1]     Revision History        User Key Date/Time User Provider Type Action    > CC1.1 11/24/2018  6:17 AM Nataly Handley RN Registered  "Nurse Sign            Progress Notes by Nataly Handley RN at 11/23/2018  8:57 PM     Author:  Nataly Handley RN Service:  ICU Author Type:  Registered Nurse    Filed:  11/23/2018  8:59 PM Date of Service:  11/23/2018  8:57 PM Creation Time:  11/23/2018  8:57 PM    Status:  Signed :  Nataly Handley RN (Registered Nurse)         Patient wheezy after neb treatment, requests Ativan for anxiety. Med given, wheezing stopped a short time after. Pt continues with an intermittent loose cough. O2 sat on RA is 91%, RR 18. Monitor.[CC1.1]     Revision History        User Key Date/Time User Provider Type Action    > CC1.1 11/23/2018  8:59 PM Nataly Handley RN Registered Nurse Sign            ED Provider Notes by Bartolome Cota MD at 11/21/2018 12:43 PM     Author:  Bartolome Cota MD Service:  Emergency Medicine Author Type:  Physician    Filed:  11/23/2018  6:21 PM Date of Service:  11/21/2018 12:43 PM Creation Time:  11/21/2018 12:49 PM    Status:  Signed :  Bartolome Cota MD (Physician)           History     Chief Complaint   Patient presents with     Cough     coming from Nursing home for evaluation of cough and congestion      HPI  Betsy Resendiz is a 53 year old female with history of COPD who presents from [facility] via EMS to the emergency department for evaluation of cough and congestion.  Patient has reported non-productive cough for 1 week per EMS.  In house nursing facility doctor considered prednisone treatment although ultimately decided to have her further evaluated.  Staff noted patient to have increased fatigue with low oxygen saturation, starting her on o2 at facility.  She has a wheeze.  She has painful hard coughing and states she feels she \"has a brick on her back\".    Problem List:    Patient Active Problem List    Diagnosis Date Noted     Auditory hallucinations 09/14/2018     Priority: Medium     Closed fracture of shaft of right humerus 05/22/2018    "  Priority: Medium     Bladder tumor 02/07/2018     Priority: Medium     COPD (chronic obstructive pulmonary disease) (H) 02/05/2018     Priority: Medium     JAVIER (generalized anxiety disorder) 11/21/2017     Priority: Medium     HTN (hypertension) 10/28/2017     Priority: Medium     Morbid obesity with BMI of 40.0-44.9, adult (H) 12/08/2016     Priority: Medium     SPIKE (obstructive sleep apnea) 11/13/2015     Priority: Medium     Ulnar neuritis 01/17/2014     Priority: Medium     Immunosuppressed status (H) 09/19/2013     Priority: Medium     Chronic pain 09/18/2013     Priority: Medium     Normocytic anemia 09/18/2013     Priority: Medium     History of encephalopathy 06/04/2013     Priority: Medium     Rotator cuff tendinitis 11/05/2012     Priority: Medium     Osteoarthritis of hip 03/17/2012     Priority: Medium     Posttraumatic stress disorder 10/07/2011     Priority: Medium     Schizoaffective disorder, unspecified type (H) 06/07/2011     Priority: Medium     Cervical neck pain with evidence of disc disease 07/28/2010     Priority: Medium     Hypokalemia 06/04/2010     Priority: Medium     Conversion reaction 06/04/2010     Priority: Medium     Pain in joint, ankle and foot 01/20/2009     Priority: Medium     Generalized osteoarthritis 01/08/2009     Priority: Medium     Tobacco use disorder 11/26/2008     Priority: Medium     Urinary retention 06/06/2008     Priority: Medium     DM w/o complication type II (H) 04/09/2008     Priority: Medium     Gastroesophageal reflux disease without esophagitis 04/09/2008     Priority: Medium     Lymphedema 01/31/2008     Priority: Medium     Carpal tunnel syndrome 07/12/2007     Priority: Medium     Schizoaffective disorder, bipolar type (H) 10/30/2006     Priority: Medium     Migraine headache 10/30/2006     Priority: Medium     Asthma 10/30/2006     Priority: Medium     Unspecified glaucoma 10/30/2006     Priority: Medium        Past Medical History:    Past Medical  History:   Diagnosis Date     Atrophy of muscle of left lower leg 11/3/2015     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    Family History:    No family history on file.    Social History:  Marital Status:  Single [1]  Social History   Substance Use Topics     Smoking status: Current Every Day Smoker     Types: Cigarettes     Smokeless tobacco: Never Used     Alcohol use Not on file        Medications:      ACETAMINOPHEN PO   albuterol (PROAIR HFA/PROVENTIL HFA/VENTOLIN HFA) 108 (90 BASE) MCG/ACT Inhaler   brimonidine (ALPHAGAN-P) 0.15 % ophthalmic solution   budesonide (PULMICORT) 1 MG/2ML SUSP neb solution   cariprazine (VRAYLAR) 1.5 MG CAPS capsule   CEPHALEXIN PO   cholecalciferol (VITAMIN D3) 5000 units CAPS capsule   clotrimazole (LOTRIMIN) 1 % cream   CYCLOBENZAPRINE HCL PO   docusate sodium (STOOL SOFTENER) 100 MG capsule   DOXYCYCLINE HYCLATE PO   escitalopram (LEXAPRO) 20 MG tablet   fluticasone (FLONASE) 50 MCG/ACT spray   furosemide (LASIX) 80 MG tablet   gabapentin (NEURONTIN) 800 MG tablet   ipratropium - albuterol 0.5 mg/2.5 mg/3 mL (DUONEB) 0.5-2.5 (3) MG/3ML neb solution   lamoTRIgine (LAMICTAL) 200 MG tablet   LANsoprazole (PREVACID) 30 MG CR capsule   LIOTHYRONINE SODIUM PO   lithium 300 MG capsule   loperamide (IMODIUM) 2 MG capsule   LORazepam (ATIVAN PO)   meclizine (ANTIVERT) 25 MG tablet   METHOCARBAMOL PO   NAPROXEN PO   OYSTER SHELL CALCIUM PO   paliperidone (INVEGA) 6 MG 24 hr tablet   perphenazine 4 MG tablet   POTASSIUM CHLORIDE ER PO   PREDNISONE PO   pregabalin (LYRICA) 150 MG capsule   PROPRANOLOL HCL PO   travoprost, NICHO Free, (TRAVATAN Z) 0.004 % ophthalmic solution   ziprasidone (GEODON) 40 MG capsule   ziprasidone (GEODON) 80 MG capsule         Review of Systems   Constitutional: Positive for activity change,[AH1.1] appetite change[DD1.1] and fatigue. Negative for[AH1.1] chills[DD1.1]  and[AH1.1] fever[DD1.1].   HENT: Positive for[AH1.1] congestion[DD1.1]. Negative for[AH1.1] sore throat[DD1.1] and[AH1.1] trouble swallowing[DD1.1].    Eyes: Negative for[AH1.1] visual disturbance[DD1.1].   Respiratory: Positive for cough and wheezing.    Cardiovascular: Negative for chest pain.   Gastrointestinal: Negative for[AH1.1] abdominal pain[DD1.1],[AH1.1] nausea[DD1.1] and[AH1.1] vomiting[DD1.1].   Genitourinary: Negative for[AH1.1] decreased urine volume[DD1.1] and[AH1.1] dysuria[DD1.1].   Musculoskeletal: Negative for[AH1.1] back pain[DD1.1] and[AH1.1] neck pain[DD1.1].   Skin: Negative for[AH1.1] rash[DD1.1].   Neurological: Negative for[AH1.1] dizziness[DD1.1],[AH1.1] weakness[DD1.1],[AH1.1] light-headedness[DD1.1],[AH1.1] numbness[DD1.1] and[AH1.1] headaches[DD1.1].   Hematological:[AH1.1] Does not bruise/bleed easily[DD1.1].   Psychiatric/Behavioral: Negative for[AH1.1] confusion[DD1.1].   All other systems reviewed and are negative.      Physical Exam          Physical Exam   Constitutional: She appears[AH1.1] well-developed[DD1.1] and[AH1.1] well-nourished[DD1.1].[AH1.1]   Mild respiratory distress[DD1.1]   Eyes:[AH1.1] Conjunctivae[DD1.1] are normal.   Psychiatric: She has a[AH1.1] normal mood and affect[DD1.1].[AH1.1]   Nursing note[DD1.1] and[AH1.1] vitals[DD1.1] reviewed.    HENT: Oral mucosa moist. No lesions.  Neck: Supple  Pulmonary/Chest: Lungs are coarse expiratory wheeze bilaterally.  Cardiovascular: Heart is regular rate and rhythm. No murmur.  Abdomen: Soft, non-distended, non-tender.   Musculoskeletal: Moving all extremities well. Mild peripheral edema bilateral lower extremities  Neurological: Alert. No focal neurologic deficit.   Skin: No rash.   ED Course     ED Course     Procedures[AH1.1]               EKG Interpretation:      Interpreted by Bartolome Cota  Rhythm: normal sinus   Rate: normal  Axis: normal  Ectopy: none  Conduction: normal  ST Segments/ T Waves: No ST-T wave  changes  Q Waves: none  Comparison to prior: No old EKG available    Clinical Impression: normal EKG[DD1.1]          Critical Care time:[AH1.1]  30 minutes for management of hypoxia.[DD1.1]               Results for orders placed or performed during the hospital encounter of 11/21/18 (from the past 24 hour(s))   CBC with platelets, differential   Result Value Ref Range    WBC 8.9 4.0 - 11.0 10e9/L    RBC Count 3.83 3.8 - 5.2 10e12/L    Hemoglobin 12.5 11.7 - 15.7 g/dL    Hematocrit 39.4 35.0 - 47.0 %     (H) 78 - 100 fl    MCH 32.6 26.5 - 33.0 pg    MCHC 31.7 31.5 - 36.5 g/dL    RDW 12.1 10.0 - 15.0 %    Platelet Count 199 150 - 450 10e9/L    Diff Method Automated Method     % Neutrophils 65.4 %    % Lymphocytes 15.2 %    % Monocytes 14.8 %    % Eosinophils 3.5 %    % Basophils 0.7 %    % Immature Granulocytes 0.4 %    Nucleated RBCs 0 0 /100    Absolute Neutrophil 5.8 1.6 - 8.3 10e9/L    Absolute Lymphocytes 1.4 0.8 - 5.3 10e9/L    Absolute Monocytes 1.3 0.0 - 1.3 10e9/L    Absolute Eosinophils 0.3 0.0 - 0.7 10e9/L    Absolute Basophils 0.1 0.0 - 0.2 10e9/L    Abs Immature Granulocytes 0.0 0 - 0.4 10e9/L    Absolute Nucleated RBC 0.0    Comprehensive metabolic panel   Result Value Ref Range    Sodium 142 133 - 144 mmol/L    Potassium 3.9 3.4 - 5.3 mmol/L    Chloride 105 94 - 109 mmol/L    Carbon Dioxide 34 (H) 20 - 32 mmol/L    Anion Gap 3 3 - 14 mmol/L    Glucose 97 70 - 99 mg/dL    Urea Nitrogen 15 7 - 30 mg/dL    Creatinine 1.32 (H) 0.52 - 1.04 mg/dL    GFR Estimate 42 (L) >60 mL/min/1.7m2    GFR Estimate If Black 51 (L) >60 mL/min/1.7m2    Calcium 8.2 (L) 8.5 - 10.1 mg/dL    Bilirubin Total 0.2 0.2 - 1.3 mg/dL    Albumin 3.1 (L) 3.4 - 5.0 g/dL    Protein Total 7.4 6.8 - 8.8 g/dL    Alkaline Phosphatase 96 40 - 150 U/L    ALT 36 0 - 50 U/L    AST 21 0 - 45 U/L   Troponin I   Result Value Ref Range    Troponin I ES <0.015 0.000 - 0.045 ug/L   NT pro BNP   Result Value Ref Range    N-Terminal Pro BNP  Inpatient 78 0 - 900 pg/mL   Blood gas venous   Result Value Ref Range    Ph Venous 7.33 7.32 - 7.43 pH    PCO2 Venous 70 (H) 40 - 50 mm Hg    PO2 Venous 37 25 - 47 mm Hg    Bicarbonate Venous 37 (H) 21 - 28 mmol/L    Base Excess Venous 8.4 mmol/L    FIO2 2l    Chest XR,  PA & LAT    Narrative    XR CHEST 2 VW 11/21/2018 2:09 PM    HISTORY: Short of breath.    COMPARISON: None.    FINDINGS: Benign granuloma in the right upper lung. No airspace  consolidation, pleural effusion or pneumothorax. Normal heart size.      Impression    IMPRESSION: No acute cardiopulmonary abnormality.    ANGELES PALOMINO MD[DD1.2]       Medications - No data to display    Assessments & Plan (with Medical Decision Making)[AH1.1] records were reviewed.  Patient was given a DuoNeb and placed on oxygen secondary to be satting 8889%.  Labs were obtained.  A chest x-ray was ordered.  White count was 8.9 hemoglobin 12.5 platelet count 199.  There is no left shift.  Comprehensive metabolic panel significant for creatinine of 1.32 with a GFR of 42.  Bicarb was 34.  Creatinine was slightly increased from previous.  Troponin was within normal limits.  ProBNP was not elevated.  Venous blood gas revealed a pH of 7.33 CO2 elevated 70 O2 of 37 bicarb 37.  EKG with normal sinus rhythm and nonspecific ST-T wave changes no old EKG for comparison.  Patient's chest x-ray revealed no obvious infiltrate although patient is a large person and the chest x-ray is of moderate quality.  On reexamination patient is breathing better but still wheezing a second DuoNeb was given to the patient.  Solu-Medrol has been given to the patient.  Patient was again observed for some time and continued to have a expiratory wheeze.  It was decided to admit the patient for further evaluation and care this appears to be a COPD exacerbation.  I discussed the case with Katiana West guard.  We discussed possible antibiotic therapy but it was decided to hold off on this at this time.   Patient is going to be admitted as a border to the ICU.  She is oxygenating well on 2 L at this point and does not appear to be in any acute distress.  She she is comfortable with admission.[DD1.3]  She may need BiPAP this evening if symptoms do not improve.[DD1.4]     I have reviewed the nursing notes.    I have reviewed the findings, diagnosis, plan and need for follow up with the patient.       New Prescriptions    No medications on file[AH1.1]       Final diagnoses:   COPD exacerbation (H)   Acute respiratory failure with hypoxia and hypercapnia (H)[DD1.1]     This document serves as a record of the services and decisions personally performed and made by Bartolome Cota MD. It was created on HIS/HER behalf by Jennifer Jensen, a trained medical scribe. The creation of this document is based the provider's statements to the medical scribe.  Jennifer Jensen 12:49 PM 11/21/2018    Provider:   The information in this document, created by the medical scribe for me, accurately reflects the services I personally performed and the decisions made by me. I have reviewed and approved this document for accuracy prior to leaving the patient care area.  Bartolome Cota MD 12:49 PM 11/21/2018 11/21/2018   [facility] EMERGENCY DEPARTMENT[AH1.1]     Bartolome Cota MD  11/23/18 1821  [DD1.5]     Revision History        User Key Date/Time User Provider Type Action    > DD1.5 11/23/2018  6:21 PM Bartolome Cota MD Physician Sign     DD1.1 11/23/2018  6:15 PM Bartolome Cota MD Physician Share     DD1.4 11/23/2018  6:14 PM Bartolome Cota MD Physician Share     DD1.2 11/21/2018  5:41 PM Bartolome Cota MD Physician Share     DD1.3 11/21/2018  4:50 PM Bartolome Cota MD Physician      AH1.1 11/21/2018  1:06 PM Jennifer Jensen Share            Progress Notes by Bushweiler, Nichole C, RN at 11/23/2018  5:57 PM     Author:  Bushweiler, Nichole C, RN Service:  (none) Author Type:   "Registered Nurse    Filed:  11/23/2018  5:58 PM Date of Service:  11/23/2018  5:57 PM Creation Time:  11/23/2018  5:57 PM    Status:  Signed :  Bushweiler, Nichole C, RN (Registered Nurse)         Pt having a nagging, frequent cough this evening. Given PRN robitussin.[NB1.1]      Revision History        User Key Date/Time User Provider Type Action    > NB1.1 11/23/2018  5:58 PM Bushweiler, Nichole C, RN Registered Nurse Sign            Progress Notes by Jazmin Lindquist RN at 11/23/2018 10:46 AM     Author:  Jazmin Lindquist RN Service:  (none) Author Type:      Filed:  11/23/2018 10:47 AM Date of Service:  11/23/2018 10:46 AM Creation Time:  11/23/2018 10:46 AM    Status:  Signed :  Jazmin Lindquist RN ()         Discharge Planner   Discharge Plans in progress: ([place]) [facility] (Phone: [phone] Fax: [phone])  Barriers to discharge plan: Medical stability  Follow up plan: TCU       Entered by: Jazmin Lindquist 11/23/2018 10:46 AM[PW1.1]          Revision History        User Key Date/Time User Provider Type Action    > PW1.1 11/23/2018 10:47 AM Jazmin Lindquist RN Case Manager Sign            Progress Notes by Bushweiler, Nichole C, RN at 11/23/2018  9:52 AM     Author:  Bushweiler, Nichole C, RN Service:  (none) Author Type:  Registered Nurse    Filed:  11/23/2018 10:04 AM Date of Service:  11/23/2018  9:52 AM Creation Time:  11/23/2018  9:49 AM    Status:  Signed :  Bushweiler, Nichole C, RN (Registered Nurse)         Pt sat up in the chair for breakfast, transfers with walker and SBA. Pt was sleepy but oriented x 4 and able to engage in conversations this morning. Only had a couple bites of breakfast but states that pretty \"normal\" for her. Transported to xray at 0940 for chest xray via wheelchair with ICU RN. Washed up at bedside when she returned and then requested to lay down. Denies any pain at this time. Has a frequent, good/nonproductive " "cough. LS diminished with exp wheezes present, pt denies any SOB. Alarms activated and audible. Call light within reach.[NB1.1]      Revision History        User Key Date/Time User Provider Type Action    > NB1.1 11/23/2018 10:04 AM Bushweiler, Nichole C, RN Registered Nurse Sign            Progress Notes by Escobar Boland MD at 11/23/2018  7:46 AM     Author:  Escobar Boland MD Service:  (none) Author Type:  Physician    Filed:  11/23/2018  8:18 AM Date of Service:  11/23/2018  7:46 AM Creation Time:  11/23/2018  7:46 AM    Status:  Signed :  Escobar Boland MD (Physician)         SUBJECTIVE:[MD1.1]   More awake last lila, a little more sleepy this AM but not like the obtunded status of yest AM.   Some cough,  non-producive,   Not SOB now.    Was on BIPAP all night and CO2 much better.[MD1.2]       ROS:4 point ROS including Respiratory, CV, GI and , other than that noted in the HPI,  is negative     OBJECTIVE:[MD1.1]   /77 (BP Location: Right arm)  Pulse 82  Temp 98.7  F (37.1  C) (Oral)  Resp 14  Ht 1.626 m (5' 4\")  Wt 117 kg (257 lb 15 oz)  SpO2 98%  BMI 44.27 kg/m2[MD1.3]    GENERAL APPEARANCE:[MD1.1]  Still sleepy but answers all questions.[MD1.2]      RESP:[MD1.1]diffuse wheezes[MD1.2]      CV: regular rate and rhythm,[MD1.1]  No[MD1.2]  murmur , edema:[MD1.1] none[MD1.2]       Abdomen: soft, nontender, no liver or spleen enlargement, no masses, BSs normal   Skin: no cyanosis, pallor, or jaundice    CMP[MD1.1]  Recent Labs  Lab 11/23/18  0512 11/22/18  0519 11/21/18  1349   * 144 142   POTASSIUM 3.6 4.5 3.9   CHLORIDE 109 106 105   CO2 31 34* 34*   ANIONGAP 6 4 3   GLC 93 125* 97   BUN 23 19 15   CR 1.32* 1.22* 1.32*   GFRESTIMATED 42* 46* 42*   GFRESTBLACK 51* 56* 51*   GAURAV 7.8* 8.4* 8.2*   PROTTOTAL 6.3*  --  7.4   ALBUMIN 2.8*  --  3.1*   BILITOTAL 0.2  --  0.2   ALKPHOS 75  --  96   AST 15  --  21   ALT 25  --  36[MD1.3]     CBC[MD1.1]  Recent Labs  Lab 11/23/18  0632 " 11/22/18  0519 11/21/18  1349   WBC 8.6 9.9 8.9   RBC 3.46* 3.66* 3.83   HGB 11.5* 12.0 12.5   HCT 35.9 37.5 39.4   * 103* 103*   MCH 33.2* 32.8 32.6   MCHC 32.0 32.0 31.7   RDW 12.0 11.8 12.1    223 199[MD1.3]     INR[MD1.1]No lab results found in last 7 days.[MD1.3]  Arterial BloodGas[MD1.1]    Recent Labs  Lab 11/23/18  0632 11/22/18  1159 11/22/18  0825 11/22/18  0519   O2PER 32 25 28 34%[MD1.3]      Venous Blood Gas[MD1.1]    Recent Labs  Lab 11/23/18  0632 11/22/18  1159 11/22/18  0825 11/22/18  0519   PHV 7.37 7.39 7.32 7.30*   PCO2V 58* 57* 70* 70*   PO2V 51* 48* 30 61*   HCO3V 34* 35* 36* 35*   VON 7.0 7.5 7.3 5.2   O2PER 32 25 28 34%[MD1.3]     Lithium= 0.65      Medications[MD1.1]     azithromycin  250 mg Oral Daily     brimonidine  1 drop Both Eyes BID     budesonide  0.5 mg Nebulization BID     cariprazine  1.5 mg Oral Daily     docusate sodium  100 mg Oral At Bedtime     enoxaparin  40 mg Subcutaneous Q24H     escitalopram  20 mg Oral At Bedtime     furosemide  40 mg Oral BID     gabapentin  400 mg Oral TID     ipratropium - albuterol 0.5 mg/2.5 mg/3 mL  3 mL Nebulization Q4H While awake     lamoTRIgine  200 mg Oral At Bedtime     liothyronine (CYTOMEL) tablet 25 mcg  25 mcg Oral Daily     paliperidone  6 mg Oral QAM     pantoprazole  40 mg Oral QAM AC     perphenazine  4 mg Oral BID     potassium chloride  10 mEq Oral Daily     predniSONE  40 mg Oral Daily     propranolol (INDERAL) tablet 10 mg  10 mg Oral BID     sodium chloride (PF)  3 mL Intracatheter Q8H     travoprost (NICHO Free)  1 drop Both Eyes At Bedtime     ziprasidone  160 mg Oral At Bedtime     ziprasidone  40 mg Oral QAM[MD1.4]       Intake/Output Summary (Last 24 hours) at 11/23/18 0747  Last data filed at 11/23/18 0600   Gross per 24 hour   Intake          3141.25 ml   Output             2300 ml   Net           841.25 ml         ASSESSMENT: PLAN:   Betsy Resendiz is a 53 year old female with a past medical history  significant for schizoaffective disorder and bipolar disorder, COPD, chronic pain, GERD, urinary retention with chronic Abernathy, hypertension, chronic kidney disease, and anxiety who presents on 11/21/2018 with hypoxia and respiratory failure secondary to likely COPD exacerbation.          Acute respiratory failure with hypoxia and hypercapnia  Presents with O2 sats 88% on room air, improving with 2-3L O2. No O2 needs at baseline. Admit VBG with pH 7.33 / pCO2 70 / pO2 37 / bicarb 37. Profound wheezing and coughing on exam, most likely due to COPD exacerbation (see below). Not needing BiPAP at this point.  -[MD1.1] CO2 much better on BIPAP.  Will try off and see if CO2 remains stable[MD1.2]             COPD exacerbation  COPD managed with Pulmicort nebs, DuoNebs, and albuterol prior to admission. Wheezing and coughing on exam. Admit chest x-ray normal other than benign granuloma in right lung. Afebrile, no leukocytosis. Patient was given nebs and 125 mg solumedrol in the emergency department.  -Nebs, Azith, prednisone, O2 supplement,[MD1.1] better with BIPAP yesterday but will try off this AM.[MD1.2]       DM w/o complication type II  Noted per problem list. Not on any diabetic medications per medication reconciliation. Admit glucose = 97.  -no elevated glucose on no meds, even now on prednisone.  Will check A1 c.    -no insulin for now.          Acute encephalopathy:   - more somnolent on morning after admission, wakens to voice but falls asleep immediately   -unclear if this is all her psychotropic meds, Lithium toxicity, or the elevated CO2.   -[MD1.1] this was likely from lithium toxicity  - try off BIPAP and restart lithium slowly   - may also have been some Gabapentin toxicity with decreased GFR.  Held and may need to adjust dose.[MD1.2]      Lithium toxicity  -level 1.95 , likely due to CHAPINCITO.   -[MD1.1] level 0.65 after 24 hrs of no lithium.  GFR still <50 this AM .  Will discuss with pharmacy.[MD1.2]        HTN  (hypertension)  Lymphedema  Pressures reviewed, stable. Managed prior to admission with propranolol and lasix.  - Continue prior to admission lasix and propranolol with holding parameters        [MD1.1]  CHAPINCITO on[MD1.2] CKD (chronic kidney disease) stage 3, GFR 30-59 ml/min  Admit creatinine 1.32 (baseline 1.0 - 1.2), GFR 42 (baseline 40-56). Stable.  - back down to 1.22, which is close to baseline  -[MD1.1] but back up to 1.32 again after good hydration and treatment of her COPD.  May be her new normal.   -will follow GFR and adjust her lithium and goyo as needed.[MD1.2]            Urinary retention  Ongoing problem, has had chronic Abernathy since about August per patient's report. Follows with urology at the [facility], is supposed to have upcoming cystoscopy and urodynamic testing.  - Continue Abernathy[MD1.1]  - has positive UA but no clear evidence of infection.[MD1.2]            Schizoaffective disorder, bipolar type (H)  JAVIER (generalized anxiety disorder)  Posttraumatic stress disorder  Stable mood upon admission. Managed prior to admission with Vraylar, Lexapro, Lamictal, lithium, Invega, perphenazine, Geodon, and prn Ativan.  - more somnolent here this AM.  Will hold her methocarbamol, but continue her other meds  -[MD1.1] will need to adjust meds if kidney function stays[MD1.2]            Chronic pain  Cervical neck pain with evidence of disc disease  Chronic and stable. Managed pat with gabapentin, acetaminophen, naproxen, cyclobenzaprine, and methocarbamol.  - holding methocarbamol for the increased sedation today  - she was tapered off narcotics in the last 2-3 months.            Gastroesophageal reflux disease without esophagitis  Chronic and stable. Managed prior to admission with Prevacid, continue.          Unspecified glaucoma  Managed prior to admission with brimonidine, continue.          SPIKE (obstructive sleep apnea)  Uses CPAP but did not bring it with her.   - Continue home CPAP with home settings if  "able          Tobacco use disorder  - smokes 1/2 PPD           Morbid obesity with BMI of 40.0-44.9, adult (H)  Contributes to comorbidity.          Fluids:[MD1.1]IVF yest but will stop today.[MD1.2]   Electrolytes: Monitor  Nutrition: consistent carbohydrate       DVT Prophylaxis: Pneumatic Compression Devices, lovenox  Code Status: Full Code - discussed directly with patient  Discussion:[MD1.1]   Stop bipap and if CO2 stable, transfer to floor.   Adjust lithium dose with new reduced kidney function.[MD1.2]       [MD1.1]     Revision History        User Key Date/Time User Provider Type Action    > MD1.4 11/23/2018  8:18 AM Escobar Boland MD Physician Sign     MD1.2 11/23/2018  8:09 AM Escobar Boland MD Physician      MD1.3 11/23/2018  7:47 AM Escobar Boland MD Physician      MD1.1 11/23/2018  7:46 AM Escobar Boland MD Physician             Progress Notes by Escobar Boland MD at 11/22/2018  7:07 AM     Author:  Escobar Boland MD Service:  (none) Author Type:  Physician    Filed:  11/23/2018  7:07 AM Date of Service:  11/22/2018  7:07 AM Creation Time:  11/22/2018  7:07 AM    Status:  Addendum :  Escobar Boland MD (Physician)         SUBJECTIVE:[MD1.1]   Very sleepy this AM.   Hard to get any answers   Does follow commands  Per her NH, she is usually alert, Ox3, in charge of her own care.  Goes out to smoke in a WC 10 times/day, does tend to sleep in mornings.[MD1.2]         ROS:4 point ROS including Respiratory, CV, GI and , other than that noted in the HPI,  is negative     OBJECTIVE:[MD1.1]   BP (!) 154/101 (BP Location: Right arm)  Pulse 82  Temp 98.1  F (36.7  C) (Oral)  Resp 20  Ht 1.626 m (5' 4\")  Wt 117.8 kg (259 lb 11.2 oz)  SpO2 95%  BMI 44.58 kg/m2[MD1.3]    GENERAL APPEARANCE:[MD1.1]  Sleepy, keeps eyes closed until she is asked to open, but then closes again.  Follows commands, repeats sentences well, knows where she has been living.[MD1.2]       " RESP:[MD1.1]diffuse wheezes, prolonged expiratory phase, still needing 2 L.[MD1.2]       CV: regular rate and rhythm,[MD1.1]  No[MD1.2]  murmur , edema:[MD1.1] none[MD1.2]       Abdomen: soft, nontender, no liver or spleen enlargement, no masses, BSs normal   Skin: no cyanosis, pallor, or jaundice     CMP[MD1.1]  Recent Labs  Lab 11/22/18  0519 11/21/18  1349    142   POTASSIUM 4.5 3.9   CHLORIDE 106 105   CO2 34* 34*   ANIONGAP 4 3   * 97   BUN 19 15   CR 1.22* 1.32*   GFRESTIMATED 46* 42*   GFRESTBLACK 56* 51*   GAURAV 8.4* 8.2*   PROTTOTAL  --  7.4   ALBUMIN  --  3.1*   BILITOTAL  --  0.2   ALKPHOS  --  96   AST  --  21   ALT  --  36[MD1.3]     CBC[MD1.1]  Recent Labs  Lab 11/22/18  0519 11/21/18  1349   WBC 9.9 8.9   RBC 3.66* 3.83   HGB 12.0 12.5   HCT 37.5 39.4   * 103*   MCH 32.8 32.6   MCHC 32.0 31.7   RDW 11.8 12.1    199[MD1.3]     INR[MD1.1]No lab results found in last 7 days.[MD1.3]  Arterial BloodGas[MD1.1]    Recent Labs  Lab 11/22/18  0519 11/21/18  1349   O2PER 34% 2l[MD1.3]      Venous Blood Gas[MD1.1]    Recent Labs  Lab 11/22/18  0519 11/21/18  1349   PHV 7.30* 7.33   PCO2V 70* 70*   PO2V 61* 37   HCO3V 35* 37*   VON 5.2 8.4   O2PER 34% 2l[MD1.3]       Medications[MD1.1]     azithromycin  250 mg Oral Daily     brimonidine  1 drop Both Eyes BID     budesonide  0.5 mg Nebulization BID     cariprazine  1.5 mg Oral Daily     docusate sodium  100 mg Oral At Bedtime     enoxaparin  40 mg Subcutaneous Q24H     escitalopram  20 mg Oral At Bedtime     furosemide  40 mg Oral BID     gabapentin  400 mg Oral TID     ipratropium - albuterol 0.5 mg/2.5 mg/3 mL  3 mL Nebulization Q4H While awake     lamoTRIgine  200 mg Oral At Bedtime     liothyronine (CYTOMEL) tablet 25 mcg  25 mcg Oral Daily     lithium  300 mg Oral QAM     lithium  600 mg Oral At Bedtime     paliperidone  6 mg Oral QAM     pantoprazole  40 mg Oral QAM AC     perphenazine  4 mg Oral BID     potassium chloride  10 mEq  Oral Daily     predniSONE  40 mg Oral Daily     pregabalin  150 mg Oral BID     propranolol (INDERAL) tablet 10 mg  10 mg Oral BID     sodium chloride (PF)  3 mL Intracatheter Q8H     travoprost (NICHO Free)  1 drop Both Eyes At Bedtime     ziprasidone  160 mg Oral At Bedtime     ziprasidone  40 mg Oral QAM[MD1.4]       Intake/Output Summary (Last 24 hours) at 11/22/18 0708  Last data filed at 11/22/18 0600   Gross per 24 hour   Intake              250 ml   Output             1400 ml   Net            -1150 ml         ASSESSMENT: PLAN:   Assessment & Plan     Betsy Resendiz is a 53 year old female with a past medical history significant for schizoaffective disorder and bipolar disorder, COPD, chronic pain, GERD, urinary retention with chronic Abernathy, hypertension, chronic kidney disease, and anxiety who presents on 11/21/2018 with hypoxia and respiratory failure secondary to likely COPD exacerbation.        Acute respiratory failure with hypoxia and hypercapnia  Presents with O2 sats 88% on room air, improving with 2-3L O2. No O2 needs at baseline. Admit VBG with pH 7.33 / pCO2 70 / pO2 37 / bicarb 37. Profound wheezing and coughing on exam, most likely due to COPD exacerbation (see below). Not needing BiPAP at this point.  -[MD1.1] CO2 appears up from baseline.  Will recheck again this AM and if going up, would try some BIPAP.[MD1.2]          COPD exacerbation  COPD managed with Pulmicort nebs, DuoNebs, and albuterol prior to admission. Wheezing and coughing on exam. Admit chest x-ray normal other than benign granuloma in right lung. Afebrile, no leukocytosis. Patient was given nebs and 125 mg solumedrol in the emergency department.  -[MD1.1]Nebs, Azith, prednisone, O2 supplement, BIPAP if CO2 rising.[MD1.2]       DM w/o complication type II  Noted per problem list. Not on any diabetic medications per medication reconciliation. Admit glucose = 97.  -[MD1.1]no elevated glucose on no meds, even now on prednisone.  Will  check A1 c.    -no insulin for now.[MD1.2]      [MD1.1]  Acute encephalopathy:   - more somnolent on morning after admission, wakens to voice but falls asleep immediately   -unclear if this is all her psychotropic meds, Lithium toxicity, or the elevated CO2.   - will try some bipap, hold lithium per pharmacy.      Lithium toxicity  -level 1.95 , likely due to CHAPINCITO.   - hold for now. Restart after discussion with pharmacy[MD1.5]      HTN (hypertension)  Lymphedema  Pressures reviewed, stable. Managed prior to admission with propranolol and lasix.  - Continue prior to admission lasix and propranolol with holding parameters        CKD (chronic kidney disease) stage 3, GFR 30-59 ml/min  Admit creatinine 1.32 (baseline 1.0 - 1.2), GFR 42 (baseline 40-56). Stable.  -[MD1.1] back down to 1.22, which is close to baseline  - will check lithium level, as this is very sensitive to changes in GFR.[MD1.2]         Urinary retention  Ongoing problem, has had chronic Abernathy since about August per patient's report. [facility] , is supposed to have upcoming cystoscopy and urodynamic testing.  - Continue Abernathy        Schizoaffective disorder, bipolar type (H)  JAVIER (generalized anxiety disorder)  Posttraumatic stress disorder  Stable mood upon admission. Managed prior to admission with Vraylar, Lexapro, Lamictal, lithium, Invega, perphenazine, Geodon, and prn Ativan.  -[MD1.1] more somnolent here this AM.  Will hold her methocarbamol, but continue her other meds  - check lithium level with the mild CHAPINCITO.[MD1.2]          Chronic pain  Cervical neck pain with evidence of disc disease  Chronic and stable. Managed pat with gabapentin, acetaminophen, naproxen, cyclobenzaprine, and methocarbamol.  -[MD1.1] holding methocarbamol for the increased sedation today  - she was tapered off narcotics in the last 2-3 months.[MD1.2]          Gastroesophageal reflux disease without esophagitis  Chronic and stable. Managed prior to  admission with Prevacid, continue.        Unspecified glaucoma  Managed prior to admission with brimonidine, continue.        SPIKE (obstructive sleep apnea)  Uses CPAP but did not bring it with her.   - Continue home CPAP with home settings if able        Tobacco use disorder[MD1.1]  - smokes 1/2 PPD[MD1.2]         Morbid obesity with BMI of 40.0-44.9, adult (H)  Contributes to comorbidity.        Fluids: Oral  Electrolytes: Monitor  Nutrition: consistent carbohydrate      DVT Prophylaxis: Pneumatic Compression Devices, lovenox  Code Status: Full Code - discussed directly with patient[MD1.1]  Discussion:   More sleepy, unclear if this is her morning baseline but doesn't sound like it.  May be from her acute hypercapnia.  Will recheck VBG and try bipap if climbing or if mental status not clearing.    Check lithium level as this is very labile in the setting of CHAPINCITO.[MD1.2]     [MD1.1]       Revision History        User Key Date/Time User Provider Type Action    > [N/A] 11/23/2018  7:07 AM Escobar Boland MD Physician Addend     MD1.5 11/22/2018 10:01 AM Escobar Boland MD Physician Incomplete Revision     MD1.4 11/22/2018  8:12 AM Escobar Boland MD Physician Sign     MD1.2 11/22/2018  7:57 AM Escobar Boland MD Physician      MD1.3 11/22/2018  7:08 AM Escobar Boland MD Physician      MD1.1 11/22/2018  7:07 AM Escobar Boland MD Physician             Progress Notes by Jacey Lawson PT at 11/22/2018  2:45 PM     Author:  Jacey Lawson PT Service:  (none) Author Type:  Physical Therapist    Filed:  11/22/2018  2:46 PM Date of Service:  11/22/2018  2:45 PM Creation Time:  11/22/2018  2:45 PM    Status:  Signed :  Jacey Lawson PT (Physical Therapist)         Discharge Planner PT   Patient plan for discharge: TCU   Current status: Pt transfers sit <> supine with SBA and mod effort, sit <> stand with SBA. Amb with RW and CGA 40' with slow fernanda. Performs LE exs x 10.  Barriers to return to prior  living situation: lives alone, decr megan for activity   Recommendations for discharge: TCU  Rationale for recommendations: lives alone, decr megan for activity       Entered by: Jacey Lawson 11/22/2018 2:45 PM     Physical Therapy Evaluation       11/22/18 1400   Quick Adds   Type of Visit Initial PT Evaluation   Living Environment   Lives With alone   Living Arrangements apartment   Home Accessibility no concerns   Living Environment Comment Pt has been in the NH since June and plans to return there from here. She still has her apt that she will return to after she fully recovers.   Functional Level Prior   Ambulation 3-->assistive equipment and person   Transferring 3-->assistive equipment and person   Toileting 3-->assistive equipment and person   Bathing 2-->assistive person   Dressing 2-->assistive person   Eating 0-->independent   Communication 0-->understands/communicates without difficulty   Swallowing 0-->swallows foods/liquids without difficulty   Cognition 1 - attention or memory deficits   Fall history within last six months no   Which of the above functional risks had a recent onset or change? ambulation;transferring   Prior Functional Level Comment (at care facility since June 2018)   General Information   Onset of Illness/Injury or Date of Surgery - Date 11/21/18   Referring Physician Dr Boland   Patient/Family Goals Statement plans to return to NH to recover before returning home   Pertinent History of Current Problem (include personal factors and/or comorbidities that impact the POC) Per H&P:  Betsy Resendiz is a 53 year old female with the above past medical history now presents on 11/21/2018 with increased cough, wheeze, and shortness of breath. Symptoms started about 5 days prior to admission and have been progressively worsening since onset. Patient has profound wheezing that is temporarily improved with nebs. She has a cough that feels productive but she is unable to produce any sputum. She is  feeling very short of breath even at rest, but is worse with any exertion. She has difficulty laying flat, but this is not a new problem. She denies PND or worsening of her baseline edema. She denies fevers or chills. She lives in a nursing home so she may have ill exposures, but she has not had known contact with an ill person. She has chest pain associated with her coughing, and her ribs and back are very sore. She takes shallow breaths due painful deep breathings. She denies palpitations.  She has a chronic Abernathy since August due to urinary retention. She is currently having outpatient work-up through urology at the [facility]. The remainder review of systems is negative.    Cognitive Status Examination   Orientation orientation to person, place and time   Level of Consciousness alert   Follows Commands and Answers Questions 100% of the time   Personal Safety and Judgment intact   Pain Assessment   Patient Currently in Pain Yes, see Vital Sign flowsheet  (c/o chest and back pain from coughing)   Range of Motion (ROM)   ROM Comment WFL   Strength   Strength Comments WFL   Gait   Gait Comments Pt transfers sit <> supine with SBA and mod effort, sit <> stand with SBA. Amb with RW and CGA 40' with slow fernanda.   Balance   Balance Comments good with RW   General Therapy Interventions   Planned Therapy Interventions strengthening;gait training   Clinical Impression   Criteria for Skilled Therapeutic Intervention yes, treatment indicated   PT Diagnosis deconditioned   Influenced by the following impairments pain and weakness   Functional limitations due to impairments mobility and gait   Clinical Presentation Stable/Uncomplicated   Clinical Presentation Rationale clinical judgement    Clinical Decision Making (Complexity) Low complexity   Therapy Frequency` daily   Predicted Duration of Therapy Intervention (days/wks) 5 days   Anticipated Discharge Disposition Transitional Care Facility   Risk & Benefits of therapy have  "been explained Yes   Patient, Family & other staff in agreement with plan of care Yes   [facility] AM-PAC TM \"6 Clicks\"   2016, Trustees of [facility], under license to [employer].  All rights reserved.   6 Clicks Short Forms Basic Mobility Inpatient Short Form   [facility] AM-PAC  \"6 Clicks\" V.2 Basic Mobility Inpatient Short Form   1. Turning from your back to your side while in a flat bed without using bedrails? 3 - A Little   2. Moving from lying on your back to sitting on the side of a flat bed without using bedrails? 3 - A Little   3. Moving to and from a bed to a chair (including a wheelchair)? 3 - A Little   4. Standing up from a chair using your arms (e.g., wheelchair, or bedside chair)? 3 - A Little   5. To walk in hospital room? 3 - A Little   6. Climbing 3-5 steps with a railing? 2 - A Lot   Basic Mobility Raw Score (Score out of 24.Lower scores equate to lower levels of function) 17   Total Evaluation Time   Total Evaluation Time (Minutes) 15     See Care Plan for Goals.    Jacey Lawson PT[KJ1.1]         Revision History        User Key Date/Time User Provider Type Action    > KJ1.1 11/22/2018  2:46 PM Jacey Lawson, PT Physical Therapist Sign            Progress Notes by Regina Winchester RN at 11/22/2018 12:35 PM     Author:  Regina Winchester, RN Service:  (none) Author Type:  Registered Nurse    Filed:  11/22/2018 12:42 PM Date of Service:  11/22/2018 12:35 PM Creation Time:  11/22/2018 12:35 PM    Status:  Signed :  Regina Winchester RN (Registered Nurse)         Patient up to chair with walker and  S B A .Encourage patient activity, is steady on feet and good strength. Patient  Continues to sleep most of AM ,wakes easily to voice however and swallows meds w/o problem.Placed on bipap this am and off for meals, on  R/A, oxygen saturation 92%. Still has expiratory wheeze, bilat.Will continue to encourage activity and monitor.[JL1.1]     Revision History        User Key Date/Time User " Provider Type Action    > JL1.1 11/22/2018 12:42 PM Regina Winchester RN Registered Nurse Sign            Progress Notes by Margaret Banda RT at 11/22/2018 10:37 AM     Author:  Margaret Banda RT Service:  Respiratory Therapy Author Type:  Respiratory Therapist    Filed:  11/22/2018 10:37 AM Date of Service:  11/22/2018 10:37 AM Creation Time:  11/22/2018 10:37 AM    Status:  Signed :  Margaret Banda RT (Respiratory Therapist)         Placed on BIPAP 14/5 and 25% for hypoventilation. VBG pending. Continue to monitor.[SP1.1]     Revision History        User Key Date/Time User Provider Type Action    > SP1.1 11/22/2018 10:37 AM Margaret Banda RT Respiratory Therapist Sign            Progress Notes by Johanson, Cindy, RN at 11/22/2018  2:00 AM     Author:  Johanson, Cindy, RN Service:  ICU Author Type:  Registered Nurse    Filed:  11/22/2018  4:17 AM Date of Service:  11/22/2018  2:00 AM Creation Time:  11/22/2018  4:14 AM    Status:  Signed :  Johanson, Cindy, RN (Registered Nurse)         Since IV Fentanyl has settled to resting, rare cough remains on 3 LPM of oxygen and maintaining saturations in the mid to low 90's.  Previous audible wheezing not noted now.  Continue to monitor closely.[CJ1.1]      Revision History        User Key Date/Time User Provider Type Action    > CJ1.1 11/22/2018  4:17 AM Johanson, Cindy, RN Registered Nurse Sign            Progress Notes by Johanson, Cindy, RN at 11/21/2018 10:45 PM     Author:  Johanson, Cindy, RN Service:  ICU Author Type:  Registered Nurse    Filed:  11/22/2018  4:14 AM Date of Service:  11/21/2018 10:45 PM Creation Time:  11/22/2018  4:05 AM    Status:  Signed :  Johanson, Cindy, RN (Registered Nurse)         Complains of pain to back and chest from coughing, did medicate with Tylenol with other evening medications at 2030 along with cough syrup, scheduled medications of Neurontin, Robaxin, and Ativan for rest with no relief.  When attemptted to  "medicate with Fentanyl IV, discovered IV site catheter bent and damaged unable to use site. Three different staff attempted to locate new site after multiple attempts IV placed to left upper forearm by ED Rn.  Resettled to rest.  Iv site bruised but flushes well and has good blood return.[CJ1.1]     Revision History        User Key Date/Time User Provider Type Action    > CJ1.1 11/22/2018  4:14 AM Johanson, Cindy, RN Registered Nurse Sign            Progress Notes by Johanson, Cindy, RN at 11/21/2018  7:00 PM     Author:  Johanson, Cindy, RN Service:  ICU Author Type:  Registered Nurse    Filed:  11/22/2018  4:04 AM Date of Service:  11/21/2018  7:00 PM Creation Time:  11/22/2018  3:58 AM    Status:  Signed :  Johanson, Cindy, RN (Registered Nurse)         [place] [facility] ADMISSION NOTE    Patient admitted to room 1004 at approximately 1900 via cart from emergency room. Patient was accompanied by transport tech and nurse.     Verbal SBAR report received from JOY Etienne who received report from ED RN prior to patient arrival.     Patient trasferred to bed via air lacy. Patient alert and oriented X 3. The patient is not having any pain. 0-10 Pain Scale: 10. Admission vital signs: Blood pressure 142/81, pulse 82, temperature 97.9  F (36.6  C), temperature source Oral, resp. rate 22, height 1.626 m (5' 4\"), weight 118.2 kg (260 lb 9.3 oz), SpO2 94 %. Patient was oriented to plan of care, call light, bed controls, tv, telephone, bathroom and visiting hours.     Risk Assessment    The following safety risks were identified during admission: fall. Yellow risk band applied: YES.     Skin Initial Assessment    This writer admitted this patient and completed a full skin assessment and Theron score in the Adult PCS flowsheet. Appropriate interventions initiated as needed.     Secondary skin check completed by JOY Etienne.    Skin  Inspection of bony prominences: Full  Inspection under devices: Full  Skin WDL:  WDL except, " characteristics, color  Skin Color/Characteristics: bruised (ecchymotic), mottled  Skin Temperature: warm  Skin Moisture: dry  Skin Elasticity: slow return to original state  Skin Integrity: wound(s) to inner right thigh, covered by dressing, bruise(s) to inner aspect of right upper arm. Red areas to inner thigh left and outer thigh from leg bag strap, red areas at tight areas of clothes bra straps and underside of such waistband of pants.    Theron Risk Assessment  Sensory Perception: 3-->slightly limited  Moisture: 3-->occasionally moist  Activity: 3-->walks occasionally  Mobility: 3-->slightly limited  Nutrition: 3-->adequate  Friction and Shear: 2-->potential problem  Theron Score: 17  Bed Support Surface: Atmos Air mattress  Reassessed using Bed Algorithm: No  Theron Intervention(s) Implemented: antiembolic/splint/device removed for skin assessment, assistive lifting/lateral transfer device, draw sheets, encouraged fluids, heels suspended, HOB elevated 30 degrees or less, incontinence care    Cindy Johanson[CJ1.1]       Revision History        User Key Date/Time User Provider Type Action    > CJ1.1 11/22/2018  4:04 AM Johanson, Cindy, RN Registered Nurse Sign                  Procedure Notes     No notes of this type exist for this encounter.         Progress Notes - Therapies (Notes from 11/22/18 through 11/25/18)      Progress Notes by Jacey Lawson PT at 11/22/2018  2:45 PM     Author:  Jacey Lawson PT Service:  (none) Author Type:  Physical Therapist    Filed:  11/22/2018  2:46 PM Date of Service:  11/22/2018  2:45 PM Creation Time:  11/22/2018  2:45 PM    Status:  Signed :  Jacey Lawson PT (Physical Therapist)         Discharge Planner PT   Patient plan for discharge: TCU   Current status: Pt transfers sit <> supine with SBA and mod effort, sit <> stand with SBA. Amb with RW and CGA 40' with slow fernanda. Performs LE exs x 10.  Barriers to return to prior living situation: lives alone, decr megan for  activity   Recommendations for discharge: TCU  Rationale for recommendations: lives alone, decr megan for activity       Entered by: Jacey Lawson 11/22/2018 2:45 PM     Physical Therapy Evaluation       11/22/18 1400   Quick Adds   Type of Visit Initial PT Evaluation   Living Environment   Lives With alone   Living Arrangements apartment   Home Accessibility no concerns   Living Environment Comment Pt has been in the NH since June and plans to return there from here. She still has her apt that she will return to after she fully recovers.   Functional Level Prior   Ambulation 3-->assistive equipment and person   Transferring 3-->assistive equipment and person   Toileting 3-->assistive equipment and person   Bathing 2-->assistive person   Dressing 2-->assistive person   Eating 0-->independent   Communication 0-->understands/communicates without difficulty   Swallowing 0-->swallows foods/liquids without difficulty   Cognition 1 - attention or memory deficits   Fall history within last six months no   Which of the above functional risks had a recent onset or change? ambulation;transferring   Prior Functional Level Comment (at care facility since June 2018)   General Information   Onset of Illness/Injury or Date of Surgery - Date 11/21/18   Referring Physician Dr Boland   Patient/Family Goals Statement plans to return to NH to recover before returning home   Pertinent History of Current Problem (include personal factors and/or comorbidities that impact the POC) Per H&P:  Betsy Resendiz is a 53 year old female with the above past medical history now presents on 11/21/2018 with increased cough, wheeze, and shortness of breath. Symptoms started about 5 days prior to admission and have been progressively worsening since onset. Patient has profound wheezing that is temporarily improved with nebs. She has a cough that feels productive but she is unable to produce any sputum. She is feeling very short of breath even at rest, but is  worse with any exertion. She has difficulty laying flat, but this is not a new problem. She denies PND or worsening of her baseline edema. She denies fevers or chills. She lives in a nursing home so she may have ill exposures, but she has not had known contact with an ill person. She has chest pain associated with her coughing, and her ribs and back are very sore. She takes shallow breaths due painful deep breathings. She denies palpitations.  She has a chronic Abernathy since August due to urinary retention. She is currently having outpatient work-up through urology at the [facility]. The remainder review of systems is negative.    Cognitive Status Examination   Orientation orientation to person, place and time   Level of Consciousness alert   Follows Commands and Answers Questions 100% of the time   Personal Safety and Judgment intact   Pain Assessment   Patient Currently in Pain Yes, see Vital Sign flowsheet  (c/o chest and back pain from coughing)   Range of Motion (ROM)   ROM Comment WFL   Strength   Strength Comments WFL   Gait   Gait Comments Pt transfers sit <> supine with SBA and mod effort, sit <> stand with SBA. Amb with RW and CGA 40' with slow fernanda.   Balance   Balance Comments good with RW   General Therapy Interventions   Planned Therapy Interventions strengthening;gait training   Clinical Impression   Criteria for Skilled Therapeutic Intervention yes, treatment indicated   PT Diagnosis deconditioned   Influenced by the following impairments pain and weakness   Functional limitations due to impairments mobility and gait   Clinical Presentation Stable/Uncomplicated   Clinical Presentation Rationale clinical judgement    Clinical Decision Making (Complexity) Low complexity   Therapy Frequency` daily   Predicted Duration of Therapy Intervention (days/wks) 5 days   Anticipated Discharge Disposition Transitional Care Facility   Risk & Benefits of therapy have been explained Yes   Patient, Family & other staff  "in agreement with plan of care Yes   [facility] TM \"6 Clicks\"   2016, Trustees of [facility], under license to [employer].  All rights reserved.   6 Clicks Short Forms Basic Mobility Inpatient Short Form   [facility]  \"6 Clicks\" V.2 Basic Mobility Inpatient Short Form   1. Turning from your back to your side while in a flat bed without using bedrails? 3 - A Little   2. Moving from lying on your back to sitting on the side of a flat bed without using bedrails? 3 - A Little   3. Moving to and from a bed to a chair (including a wheelchair)? 3 - A Little   4. Standing up from a chair using your arms (e.g., wheelchair, or bedside chair)? 3 - A Little   5. To walk in hospital room? 3 - A Little   6. Climbing 3-5 steps with a railing? 2 - A Lot   Basic Mobility Raw Score (Score out of 24.Lower scores equate to lower levels of function) 17   Total Evaluation Time   Total Evaluation Time (Minutes) 15     See Care Plan for Goals.    Jacey Lawson PT[KJ1.1]         Revision History        User Key Date/Time User Provider Type Action    > KJ1.1 11/22/2018  2:46 PM Jacey Lawson, PT Physical Therapist Sign            Progress Notes by Margaret Banda RT at 11/22/2018 10:37 AM     Author:  Margaret Banda RT Service:  Respiratory Therapy Author Type:  Respiratory Therapist    Filed:  11/22/2018 10:37 AM Date of Service:  11/22/2018 10:37 AM Creation Time:  11/22/2018 10:37 AM    Status:  Signed :  Margaret Banda RT (Respiratory Therapist)         Placed on BIPAP 14/5 and 25% for hypoventilation. VBG pending. Continue to monitor.[SP1.1]     Revision History        User Key Date/Time User Provider Type Action    > SP1.1 11/22/2018 10:37 AM Margaret Banda RT Respiratory Therapist Sign            "

## 2018-11-21 NOTE — ED NOTES
From [facility] home. Has had cough for past week. No fever. Hx of COPD. Increased weakness and decreased energy past few days. Nonproductive cough, and wheezing. Chest hurts with coughing. Upper back pain. Denies anterior chest pain.

## 2018-11-21 NOTE — LETTER
"    11/21/2018        RE: Betsy Resendiz  [address]  [place] [address]        [facility]    Chief Complaint   Patient presents with     senior care Acute       [facility] Medical Record Number:  [id number]  Place of Service where encounter took place:  [facility] [[id number]]    HPI:    Betsy Resendiz is a 53 year old  (1965), who is being seen today for an episodic care visit.  HPI information obtained from: facility chart records, facility staff, patient report and [facility] chart review.      Patient requesting a visit today. States she feels \"terrible\". Sx's started about 4 days ago with a headache and a \"stuffy nose\". Now having dyspnea, cough, ST, and wheezing. She does not feel the nebs are help with wheezing or dyspnea. She is having difficulty sleeping due to cough. No reported fever or chills.     REVIEW OF SYSTEMS:  4 point ROS including Respiratory, CV, GI and , other than that noted in the HPI,  is negative    /77  Pulse 84  Temp 97.5  F (36.4  C)  Resp 18  Wt 258 lb 1.6 oz (117.1 kg)  SpO2 96%  BMI 44.3 kg/m2  GENERAL APPEARANCE:  Alert, lethargic  RESP:  Diffuse expiratory wheezing t/o johnathon lungs  CV:  Palpation and auscultation of heart done , rate and rhythm reg,  +1 BLE (at baseline) edema  ABDOMEN:  normal bowel sounds, soft, nontender, no hepatosplenomegaly or other masses  SKIN: pale, dry      ASSESSMENT/PLAN:     Chronic obstructive pulmonary disease, unspecified COPD type (H)  Moderate asthma, unspecified whether complicated, unspecified whether persistent  Tobacco use disorder  Acute nonintractable headache, unspecified headache type  Wheezing     Patient in no apparent distress. Orders placed at facility below. After about 30 minutes patient reporting chest \"heaviness\" with increased dyspnea despite supplemental oxygen. She is requesting to go to the ER. Discussed with SNF RN. Will send patient to " ER for eval.      Orders:  1.  CBC, CMP 2 view CXR  2.  Doxycycline 100mg BID x10 days  3.  Prednisone 20mg daily x5 days   4.  Update me with a full set of vitals    Total time spent with patient visit at the [facility] nursing facility was 35 min including patient visit and discussion with staff. Greater than 50% of total time spent with counseling and coordinating care due to complex conditions    Electronically signed by:  DELL Queen CNP      Sincerely,        DELL Queen CNP

## 2018-11-21 NOTE — IP AVS SNAPSHOT
"          [facility] SURGICAL: [phone]                                              INTERAGENCY TRANSFER FORM - LAB / IMAGING / EKG / EMG RESULTS   2018                    Hospital Admission Date: 2018  BECCA HANNAH   : 1965  Sex: Female        Attending Provider: Escobar Boland MD     Allergies:  Codeine, Hmg-coa-r Inhibitors, Lisinopril, No Clinical Screening - See Comments, Sulfa Drugs, Sumatriptan    Infection:  None   Service:  HOSPITALIST    Ht:  1.626 m (5' 4\")   Wt:  122.3 kg (269 lb 10 oz)   Admission Wt:  117.9 kg (260 lb)    BMI:  46.28 kg/m 2   BSA:  2.35 m 2            Patient PCP Information     Provider PCP Type    Elle Dunlap MD General         Lab Results - 3 Days      [place] level [[id number]]  Resulted: 18, Result status: Final result    Ordering provider: Escobar Boland MD  18 0000 Resulting lab: [facility]    Specimen Information    Type Source Collected On   Blood  18 0546          Components       Value Reference Range Flag Lab   Lithium Level 0.80 0.60 - 1.20 mmol/L  59            Comprehensive metabolic panel [[id number]] (Abnormal)  Resulted: 18, Result status: Final result    Ordering provider: Escobar Boland MD  18 0000 Resulting lab: [facility]    Specimen Information    Type Source Collected On   Blood  18 0546          Components       Value Reference Range Flag Lab   Sodium 146 133 - 144 mmol/L H 59   Potassium 3.6 3.4 - 5.3 mmol/L  59   Chloride 110 94 - 109 mmol/L H 59   Carbon Dioxide 29 20 - 32 mmol/L  59   Anion Gap 7 3 - 14 mmol/L  59   Glucose 92 70 - 99 mg/dL  59   Urea Nitrogen 21 7 - 30 mg/dL  59   Creatinine 1.29 0.52 - 1.04 mg/dL H 59   GFR Estimate 43 >60 mL/min/1.7m2 L 59   Comment:  Non  GFR Calc   GFR Estimate If Black 53 >60 mL/min/1.7m2 L 59   Comment:  African American GFR Calc   Calcium 7.9 8.5 - 10.1 mg/dL L 59 "   Bilirubin Total 0.3 0.2 - 1.3 mg/dL  59   Albumin 2.8 3.4 - 5.0 g/dL L 59   Protein Total 6.3 6.8 - 8.8 g/dL L 59   Alkaline Phosphatase 70 40 - 150 U/L  59   ALT 19 0 - 50 U/L  59   AST 14 0 - 45 U/L  59            Blood gas venous [[id number]] (Abnormal)  Resulted: 11/25/18 0557, Result status: Final result    Ordering provider: Escobar Boland MD  11/25/18 0000 Resulting lab: [facility]    Specimen Information    Type Source Collected On   Blood  11/25/18 0546          Components       Value Reference Range Flag Lab   Ph Venous 7.40 7.32 - 7.43 pH  59   PCO2 Venous 52 40 - 50 mm Hg H 59   PO2 Venous 47 25 - 47 mm Hg  59   Bicarbonate Venous 32 21 - 28 mmol/L H 59   Base Excess Venous 5.9 mmol/L  59   Comment:  Abnormal Result, Ref range: -7.7 to 1.9   FIO2 21   59            Potassium [[id number]]  Resulted: 11/24/18 1714, Result status: Final result    Ordering provider: Escobar Boland MD  11/24/18 1459 Resulting lab: [facility]    Specimen Information    Type Source Collected On   Blood  11/24/18 1659          Components       Value Reference Range Flag Lab   Potassium 3.9 3.4 - 5.3 mmol/L  59            Blood gas venous [[id number]] (Abnormal)  Resulted: 11/24/18 0528, Result status: Final result    Ordering provider: Escobar Boland MD  11/24/18 0000 Resulting lab: [facility]    Specimen Information    Type Source Collected On   Blood  11/24/18 0453          Components       Value Reference Range Flag Lab   Ph Venous 7.40 7.32 - 7.43 pH  59   PCO2 Venous 56 40 - 50 mm Hg H 59   PO2 Venous 40 25 - 47 mm Hg  59   Bicarbonate Venous 35 21 - 28 mmol/L H 59   Base Excess Venous 8.3 mmol/L  59   Comment:  Abnormal Result, Ref range: -7.7 to 1.9   FIO2 21%   59            Comprehensive metabolic panel [[id number]] (Abnormal)  Resulted: 11/24/18 0523, Result status: Final result    Ordering provider: Escobar Boland MD  11/24/18 0000 Resulting lab:  [facility]    Specimen Information    Type Source Collected On   Blood  11/24/18 0453          Components       Value Reference Range Flag Lab   Sodium 146 133 - 144 mmol/L H 59   Potassium 3.1 3.4 - 5.3 mmol/L L 59   Chloride 106 94 - 109 mmol/L  59   Carbon Dioxide 36 20 - 32 mmol/L H 59   Anion Gap 4 3 - 14 mmol/L  59   Glucose 85 70 - 99 mg/dL  59   Urea Nitrogen 22 7 - 30 mg/dL  59   Creatinine 1.36 0.52 - 1.04 mg/dL H 59   GFR Estimate 41 >60 mL/min/1.7m2 L 59   Comment:  Non  GFR Calc   GFR Estimate If Black 50 >60 mL/min/1.7m2 L 59   Comment:  African American GFR Calc   Calcium 7.9 8.5 - 10.1 mg/dL L 59   Bilirubin Total 0.2 0.2 - 1.3 mg/dL  59   Albumin 2.9 3.4 - 5.0 g/dL L 59   Protein Total 6.6 6.8 - 8.8 g/dL L 59   Alkaline Phosphatase 69 40 - 150 U/L  59   ALT 24 0 - 50 U/L  59   AST 11 0 - 45 U/L  59            Lithium level [[id number]]  Resulted: 11/24/18 0523, Result status: Final result    Ordering provider: Escobar Boland MD  11/24/18 0000 Resulting lab: [facility]    Specimen Information    Type Source Collected On   Blood  11/24/18 0453          Components       Value Reference Range Flag Lab   Lithium Level 0.71 0.60 - 1.20 mmol/L  59            CBC with platelets [[id number]] (Abnormal)  Resulted: 11/24/18 0505, Result status: Final result    Ordering provider: Escobar Boland MD  11/24/18 0000 Resulting lab: [facility]    Specimen Information    Type Source Collected On   Blood  11/24/18 0453          Components       Value Reference Range Flag Lab   WBC 9.3 4.0 - 11.0 10e9/L  59   RBC Count 3.76 3.8 - 5.2 10e12/L L 59   Hemoglobin 12.0 11.7 - 15.7 g/dL  59   Hematocrit 37.9 35.0 - 47.0 %  59    78 - 100 fl H 59   MCH 31.9 26.5 - 33.0 pg  59   MCHC 31.7 31.5 - 36.5 g/dL  59   RDW 12.0 10.0 - 15.0 %  59   Platelet Count 198 150 - 450 10e9/L  59            Hemoglobin A1c [[id number]]  Resulted: 11/23/18 1158,  Result status: Final result    Ordering provider: Escobar Boland MD  11/23/18 1006 Resulting lab: [facility]    Specimen Information    Type Source Collected On   Blood  11/23/18 0632          Components       Value Reference Range Flag Lab   Hemoglobin A1C 5.4 0 - 5.6 %  59   Comment:         Normal <5.7% Prediabetes 5.7-6.4%  Diabetes 6.5% or higher - adopted from ADA   consensus guidelines.              CBC with platelets [[id number]]  Resulted: 11/23/18 1148, Result status: In process    Ordering provider: Ramona Jacome APRN CNP  11/23/18 0600 Resulting lab: [facility]    Specimen Information    Type Source Collected On     11/23/18 0600            Comprehensive metabolic panel [[id number]]  Resulted: 11/23/18 1148, Result status: In process    Ordering provider: Ramona Jacome APRN CNP  11/23/18 0600 Resulting lab: [facility]    Specimen Information    Type Source Collected On     11/23/18 0600            Blood gas venous [[id number]] (Abnormal)  Resulted: 11/23/18 1125, Result status: Final result    Ordering provider: Escobar Boland MD  11/23/18 0809 Resulting lab: [facility]    Specimen Information    Type Source Collected On   Blood  11/23/18 1115          Components       Value Reference Range Flag Lab   Ph Venous 7.39 7.32 - 7.43 pH  59   PCO2 Venous 55 40 - 50 mm Hg H 59   PO2 Venous 63 25 - 47 mm Hg H 59   Bicarbonate Venous 34 21 - 28 mmol/L H 59   Base Excess Venous 7.1 mmol/L  59   Comment:  Abnormal Result, Ref range: -7.7 to 1.9   FIO2 28%   59            Blood gas venous [[id number]] (Abnormal)  Resulted: 11/23/18 0642, Result status: Final result    Ordering provider: Jj Shetty MD  11/23/18 0540 Resulting lab: [facility]    Specimen Information    Type Source Collected On     11/23/18 0632          Components       Value Reference Range Flag Lab   Ph Venous 7.37 7.32 - 7.43 pH  59   PCO2 Venous 58 40 - 50 mm Hg H 59   PO2 Venous  51 25 - 47 mm Hg H 59   Bicarbonate Venous 34 21 - 28 mmol/L H 59   Base Excess Venous 7.0 mmol/L  59   Comment:  Abnormal Result, Ref range: -7.7 to 1.9   FIO2 32   59            CBC with platelets [[id number]] (Abnormal)  Resulted: 11/23/18 0641, Result status: Final result    Ordering provider: Jj Shetty MD  11/23/18 0545 Resulting lab: [facility]    Specimen Information    Type Source Collected On     11/23/18 0632          Components       Value Reference Range Flag Lab   WBC 8.6 4.0 - 11.0 10e9/L  59   RBC Count 3.46 3.8 - 5.2 10e12/L L 59   Hemoglobin 11.5 11.7 - 15.7 g/dL L 59   Hematocrit 35.9 35.0 - 47.0 %  59    78 - 100 fl H 59   MCH 33.2 26.5 - 33.0 pg H 59   MCHC 32.0 31.5 - 36.5 g/dL  59   RDW 12.0 10.0 - 15.0 %  59   Platelet Count 167 150 - 450 10e9/L  59            Lithium level [[id number]]  Resulted: 11/23/18 0552, Result status: Final result    Ordering provider: Escobar Boland MD  11/23/18 0000 Resulting lab: [facility]    Specimen Information    Type Source Collected On   Blood  11/23/18 0512          Components       Value Reference Range Flag Lab   Lithium Level 0.65 0.60 - 1.20 mmol/L  59            Comprehensive metabolic panel [[id number]] (Abnormal)  Resulted: 11/23/18 0552, Result status: Final result    Ordering provider: Escobar Boland MD  11/23/18 0000 Resulting lab: [facility]    Specimen Information    Type Source Collected On   Blood  11/23/18 0512          Components       Value Reference Range Flag Lab   Sodium 146 133 - 144 mmol/L H 59   Potassium 3.6 3.4 - 5.3 mmol/L  59   Chloride 109 94 - 109 mmol/L  59   Carbon Dioxide 31 20 - 32 mmol/L  59   Anion Gap 6 3 - 14 mmol/L  59   Glucose 93 70 - 99 mg/dL  59   Urea Nitrogen 23 7 - 30 mg/dL  59   Creatinine 1.32 0.52 - 1.04 mg/dL H 59   GFR Estimate 42 >60 mL/min/1.7m2 L 59   Comment:  Non  GFR Calc   GFR Estimate If Black 51 >60  mL/min/1.7m2 L 59   Comment:  African American GFR Calc   Calcium 7.8 8.5 - 10.1 mg/dL L 59   Bilirubin Total 0.2 0.2 - 1.3 mg/dL  59   Albumin 2.8 3.4 - 5.0 g/dL L 59   Protein Total 6.3 6.8 - 8.8 g/dL L 59   Alkaline Phosphatase 75 40 - 150 U/L  59   ALT 25 0 - 50 U/L  59   AST 15 0 - 45 U/L  59            CBC with platelets [[id number]]  Resulted: 11/23/18 0512, Result status: In process    Ordering provider: Escobar Boland MD  11/23/18 0000 Resulting lab: [facility]    Specimen Information    Type Source Collected On   Blood  11/23/18 0512            Blood gas venous [[id number]]  Resulted: 11/23/18 0512, Result status: In process    Ordering provider: Escobar Boland MD  11/23/18 0000 Resulting lab: [facility]    Specimen Information    Type Source Collected On   Blood  11/23/18 0512            Glucose by meter [[id number]] (Abnormal)  Resulted: 11/23/18 0243, Result status: Final result    Ordering provider: Jj Shetty MD  11/23/18 0230 Resulting lab: POINT OF CARE TEST, GLUCOSE    Specimen Information    Type Source Collected On     11/23/18 0230          Components       Value Reference Range Flag Lab   Glucose 108 70 - 99 mg/dL H 170            Glucose by meter [[id number]] (Abnormal)  Resulted: 11/23/18 0202, Result status: Final result    Ordering provider: Jj Shetty MD  11/22/18 2148 Resulting lab: POINT OF CARE TEST, GLUCOSE    Specimen Information    Type Source Collected On     11/22/18 2148          Components       Value Reference Range Flag Lab   Glucose 145 70 - 99 mg/dL H 170            Methicillin Resist/Sens S. aureus PCR [[id number]]  Resulted: 11/22/18 1946, Result status: Final result    Ordering provider: Escobar Boland MD  11/22/18 1000 Resulting lab: [facility]    Specimen Information    Type Source Collected On   Nares  11/22/18 1011          Components       Value Reference Range Flag Lab   Specimen Description Nares   59   Methicillin  Resist/Sens S. aureus PCR Negative NEG^Negative  51   Comment:         MRSA Negative: SA Negative  MRSA and Staphylococcus aureus target DNA not   detected, presumed negative for MRSA and SA colonization or the number of   bacteria present may be below the limit of detection for the assay. FDA   approved assay performed using [employer] GeneXpert(R) real-time PCR.              Blood gas venous [[id number]] (Abnormal)  Resulted: 11/22/18 1241, Result status: Final result    Ordering provider: Escobar Boland MD  11/22/18 1139 Resulting lab: [facility]    Specimen Information    Type Source Collected On   Blood  11/22/18 1159          Components       Value Reference Range Flag Lab   Ph Venous 7.39 7.32 - 7.43 pH  59   PCO2 Venous 57 40 - 50 mm Hg H 59   PO2 Venous 48 25 - 47 mm Hg H 59   Bicarbonate Venous 35 21 - 28 mmol/L H 59   Base Excess Venous 7.5 mmol/L  59   Comment:  Abnormal Result, Ref range: -7.7 to 1.9   FIO2 25   59            Blood gas venous [[id number]] (Abnormal)  Resulted: 11/22/18 0901, Result status: Final result    Ordering provider: Escobar Boland MD  11/22/18 5306 Resulting lab: [facility]    Specimen Information    Type Source Collected On   Blood  11/22/18 0825          Components       Value Reference Range Flag Lab   Ph Venous 7.32 7.32 - 7.43 pH  59   PCO2 Venous 70 40 - 50 mm Hg H 59   PO2 Venous 30 25 - 47 mm Hg  59   Bicarbonate Venous 36 21 - 28 mmol/L H 59   Base Excess Venous 7.3 mmol/L  59   Comment:  Abnormal Result, Ref range: -7.7 to 1.9   FIO2 28   59            Lithium level [[id number]] (Abnormal)  Resulted: 11/22/18 0850, Result status: Final result    Ordering provider: Escobar Boland MD  11/22/18 4540 Resulting lab: [facility]    Specimen Information    Type Source Collected On   Blood  11/22/18 0580          Components       Value Reference Range Flag Lab   Lithium Level 1.95 0.60 - 1.20 mmol/L H 59             Basic metabolic panel [[id number]] (Abnormal)  Resulted: 11/22/18 0621, Result status: Final result    Ordering provider: Bartolome Cota MD  11/22/18 0001 Resulting lab: [facility]    Specimen Information    Type Source Collected On   Blood  11/22/18 0519          Components       Value Reference Range Flag Lab   Sodium 144 133 - 144 mmol/L  59   Potassium 4.5 3.4 - 5.3 mmol/L  59   Chloride 106 94 - 109 mmol/L  59   Carbon Dioxide 34 20 - 32 mmol/L H 59   Anion Gap 4 3 - 14 mmol/L  59   Glucose 125 70 - 99 mg/dL H 59   Urea Nitrogen 19 7 - 30 mg/dL  59   Creatinine 1.22 0.52 - 1.04 mg/dL H 59   GFR Estimate 46 >60 mL/min/1.7m2 L 59   Comment:  Non  GFR Calc   GFR Estimate If Black 56 >60 mL/min/1.7m2 L 59   Comment:  African American GFR Calc   Calcium 8.4 8.5 - 10.1 mg/dL L 59            Blood gas venous [[id number]] (Abnormal)  Resulted: 11/22/18 0606, Result status: Final result    Ordering provider: Monica Lee PA-C  11/22/18 0001 Resulting lab: [facility]    Specimen Information    Type Source Collected On   Blood  11/22/18 0519          Components       Value Reference Range Flag Lab   Ph Venous 7.30 7.32 - 7.43 pH L 59   PCO2 Venous 70 40 - 50 mm Hg H 59   PO2 Venous 61 25 - 47 mm Hg H 59   Bicarbonate Venous 35 21 - 28 mmol/L H 59   Base Excess Venous 5.2 mmol/L  59   Comment:  Abnormal Result, Ref range: -7.7 to 1.9   FIO2 34%   59            CBC with platelets differential [[id number]] (Abnormal)  Resulted: 11/22/18 0601, Result status: Final result    Ordering provider: Bartolome Cota MD  11/22/18 0001 Resulting lab: [facility]    Specimen Information    Type Source Collected On   Blood  11/22/18 0519          Components       Value Reference Range Flag Lab   WBC 9.9 4.0 - 11.0 10e9/L  59   RBC Count 3.66 3.8 - 5.2 10e12/L L 59   Hemoglobin 12.0 11.7 - 15.7 g/dL  59   Hematocrit 37.5 35.0 - 47.0 %  59   MCV  103 78 - 100 fl H 59   MCH 32.8 26.5 - 33.0 pg  59   MCHC 32.0 31.5 - 36.5 g/dL  59   RDW 11.8 10.0 - 15.0 %  59   Platelet Count 223 150 - 450 10e9/L  59   Diff Method Automated Method   59   % Neutrophils 84.2 %  59   % Lymphocytes 8.0 %  59   % Monocytes 7.2 %  59   % Eosinophils 0.1 %  59   % Basophils 0.1 %  59   % Immature Granulocytes 0.4 %  59   Nucleated RBCs 0 0 /100  59   Absolute Neutrophil 8.3 1.6 - 8.3 10e9/L  59   Absolute Lymphocytes 0.8 0.8 - 5.3 10e9/L  59   Absolute Monocytes 0.7 0.0 - 1.3 10e9/L  59   Absolute Eosinophils 0.0 0.0 - 0.7 10e9/L  59   Absolute Basophils 0.0 0.0 - 0.2 10e9/L  59   Abs Immature Granulocytes 0.0 0 - 0.4 10e9/L  59   Absolute Nucleated RBC 0.0   59            Testing Performed By     Lab - Abbreviation Name Director Address Valid Date Range    45 - PVO032 [facility] Unknown Unknown 01/28/02 0000 - Present    51 - Unknown [facility] Unknown [address] [id number] 12/31/14 1010 - Present    59 - Unknown [facility] Unknown [address] [facility] [id number] 12/31/14 1006 - Present    170 - Unknown POINT OF CARE TEST, GLUCOSE Unknown Unknown 10/31/11 1114 - Present            Unresulted Labs (24h ago through future)    Start       Ordered    11/24/18 0600  Platelet count  (Pharmacological Prophylaxis - enoxaparin (LOVENOX) *Use only if creatinine clearance is greater than 30 mL/min)  EVERY THREE DAYS,   Routine     Comments:  Repeat every 3 days while on VTE prophylaxis.  Notify provider and hold enoxaparin if platelet count falls by 50% of baseline. If no result is listed, this lab has not been done the past 365 days. LATEST LAB RESULT: Platelet Count (10e9/L)       Date                     Value                 11/21/2018               199              ----------    11/23/18 1007    11/24/18 0000  Creatinine  (Pharmacological Prophylaxis - enoxaparin (LOVENOX) *Use only if creatinine clearance is greater than  "30 mL/min)  EVERY THREE DAYS,   Routine     Comments:  Repeat every 3 days while on VTE prophylaxis.    11/23/18 1007    Unscheduled  Potassium  (Potassium Replacement - \"Standard\" - For K levels less than 3.4 mmol/L - UU,UR,UA,RH,SH,PH,WY )  CONDITIONAL (SPECIFY),   Routine     Comments:  Obtain Potassium Level for these conditions:  *IF no potassium result within 24 hours before initiation of order set, draw potassium level with next lab collect.    *2 HOURS AFTER last IV potassium replacement dose and 4 hours after an oral replacement dose.  *Next morning after potassium dose.     Repeat Potassium Replacement if necessary.    11/24/18 0743         Imaging Results - 3 Days      XR Chest 2 Views [[id number]]  Resulted: 11/23/18 1021, Result status: Final result    Ordering provider: Escobar Boland MD  11/23/18 0809 Resulted by: Leonie Butt MD    Performed: 11/23/18 0942 - 11/23/18 0943 Resulting lab: RADIOLOGY RESULTS    Narrative:       CHEST TWO VIEWS  11/23/2018 9:43 AM     HISTORY: 53-year-old woman with history of COPD.       Impression:       IMPRESSION: Since November 21, 2018, heart size is normal. Minimal  left basilar opacity suggestive of atelectasis. No pleural effusion,  pneumothorax, or abnormal area of consolidation. Previously identified  bibasilar opacities slightly improved. Right upper lobe granuloma  unchanged.    LEONIE BUTT MD      Testing Performed By     Lab - Abbreviation Name Director Address Valid Date Range    104 - Rad [facility] RADIOLOGY RESULTS Unknown Unknown 02/16/05 1553 - Present            Encounter-Level Documents:     There are no encounter-level documents.      Order-Level Documents:     There are no order-level documents.      "

## 2018-11-21 NOTE — IP AVS SNAPSHOT
"[facility] SURGICAL: [phone]                                              INTERAGENCY TRANSFER FORM - PHYSICIAN ORDERS   2018                    Hospital Admission Date: 2018  BECCA HANNAH   : 1965  Sex: Female        Attending Provider: Escobar Boland MD     Allergies:  Codeine, Hmg-coa-r Inhibitors, Lisinopril, No Clinical Screening - See Comments, Sulfa Drugs, Sumatriptan    Infection:  None   Service:  HOSPITALIST    Ht:  1.626 m (5' 4\")   Wt:  122.3 kg (269 lb 10 oz)   Admission Wt:  117.9 kg (260 lb)    BMI:  46.28 kg/m 2   BSA:  2.35 m 2            Patient PCP Information     Provider PCP Type    Elle Dunlap MD General      ED Clinical Impression     Diagnosis Description Comment Added By Time Added    COPD exacerbation (H) [J44.1] COPD exacerbation (H) [J44.1]  Bartolome Cota MD 2018  4:49 PM    Acute respiratory failure with hypoxia and hypercapnia (H) [J96.01, J96.02] Acute respiratory failure with hypoxia and hypercapnia (H) [J96.01, J96.02]  Bartolome Cota MD 2018  6:15 PM      Hospital Problems as of 2018              Priority Class Noted POA    Schizoaffective disorder, bipolar type (H) Medium  10/30/2006 Yes    Asthma Medium  10/30/2006 Yes    Unspecified glaucoma Medium  10/30/2006 Yes    DM w/o complication type II (H) Medium  2008 Yes    Gastroesophageal reflux disease without esophagitis Medium  2008 Yes    Urinary retention Medium  2008 Yes    Tobacco use disorder Medium  2008 Yes    Cervical neck pain with evidence of disc disease Medium  2010 Yes    Schizoaffective disorder, unspecified type (H) Medium  2011 Yes    Posttraumatic stress disorder Medium  10/7/2011 Yes    Chronic pain Medium  2013 Yes    SPIKE (obstructive sleep apnea) Medium  2015 Yes    Morbid obesity with BMI of 40.0-44.9, adult (H) Medium  2016 Yes    HTN (hypertension) Medium  10/28/2017 Yes    JAVIER " (generalized anxiety disorder) Medium  11/21/2017 Yes    COPD (chronic obstructive pulmonary disease) (H) Medium  2/5/2018 Yes    COPD exacerbation (H) Medium  11/21/2018 Yes    * (Principal)Acute respiratory failure with hypoxia and hypercapnia (H) Medium  11/21/2018 Yes    CKD (chronic kidney disease) stage 3, GFR 30-59 ml/min (H) Medium  11/21/2018 Unknown      Non-Hospital Problems as of 11/25/2018              Priority Class Noted    Migraine headache Medium  10/30/2006    Carpal tunnel syndrome Medium  7/12/2007    Lymphedema Medium  1/31/2008    Generalized osteoarthritis Medium  1/8/2009    Pain in joint, ankle and foot Medium  1/20/2009    Conversion reaction Medium  6/4/2010    Osteoarthritis of hip Medium  3/17/2012    Rotator cuff tendinitis Medium  11/5/2012    History of encephalopathy Medium  6/4/2013    Normocytic anemia Medium  9/18/2013    Immunosuppressed status (H) Medium  9/19/2013    Ulnar neuritis Medium  1/17/2014    Bladder tumor Medium  2/7/2018    Closed fracture of shaft of right humerus Medium  5/22/2018    Auditory hallucinations Medium  9/14/2018      Code Status History     Date Active Date Inactive Code Status Order ID Comments User Context    11/25/2018  8:04 AM  Full Code [id number]  Escobar Boland MD Outpatient    11/22/2018 10:00 AM 11/25/2018  8:04 AM Full Code [id number]  Escobar Boland MD Inpatient    11/21/2018  7:30 PM 11/22/2018 10:00 AM Full Code [id number]  Monica Lee PA-C Inpatient    7/2/2018  6:51 PM 11/21/2018  7:30 PM Full Code [id number]  Ramona Jacome APRN CNP Outpatient         Medication Review      START taking        Dose / Directions Comments    guaiFENesin-dextromethorphan 100-10 MG/5ML syrup   Commonly known as:  ROBITUSSIN DM        Dose:  5 mL   Take 5 mLs by mouth every 4 hours as needed for cough   Quantity:  560 mL   Refills:  0        predniSONE 20 MG tablet   Commonly known as:  DELTASONE        Dose:  40 mg   Take 2 tablets (40 mg)  by mouth daily   Quantity:  6 tablet   Refills:  0          CONTINUE these medications which have NOT CHANGED        Dose / Directions Comments    ACETAMINOPHEN PO        Dose:  650 mg   Take 650 mg by mouth every 4 hours as needed for pain   Refills:  0        albuterol 108 (90 Base) MCG/ACT inhaler   Commonly known as:  PROAIR HFA/PROVENTIL HFA/VENTOLIN HFA        Dose:  2 puff   Inhale 2 puffs into the lungs every 4 hours as needed   Refills:  0        ATIVAN PO        Dose:  0.5 mg   Take 0.5 mg by mouth 2 times daily as needed for anxiety   Refills:  0        brimonidine 0.15 % ophthalmic solution   Commonly known as:  ALPHAGAN-P        Dose:  1 drop   Place 1 drop into both eyes 2 times daily   Refills:  0        budesonide 0.5 MG/2ML neb solution   Commonly known as:  PULMICORT        Dose:  0.5 mg   Take 0.5 mg by nebulization 2 times daily   Refills:  0        cariprazine 1.5 MG Caps capsule   Commonly known as:  VRAYLAR        Dose:  2 mg   Take 2 mg by mouth daily   Refills:  0        cholecalciferol 5000 units (125 mcg) Caps capsule   Commonly known as:  vitamin D3        Dose:  5000 Units   Take 5,000 Units by mouth daily   Refills:  0        clotrimazole 1 % cream   Commonly known as:  LOTRIMIN        Apply topically 2 times daily   Refills:  0        CYCLOBENZAPRINE HCL PO        Dose:  5 mg   Take 5 mg by mouth every 8 hours as needed   Refills:  0        escitalopram 20 MG tablet   Commonly known as:  LEXAPRO        Take 1 tablet by mouth at bedtime   Refills:  0        furosemide 80 MG tablet   Commonly known as:  LASIX        Dose:  40 mg   Take 40 mg by mouth 2 times daily   Refills:  0        ipratropium - albuterol 0.5 mg/2.5 mg/3 mL 0.5-2.5 (3) MG/3ML neb solution   Commonly known as:  DUONEB        Dose:  1 vial   Take 1 vial by nebulization 4 times daily   Refills:  0        lamoTRIgine 200 MG tablet   Commonly known as:  LaMICtal        Dose:  200 mg   Take 200 mg by mouth At Bedtime    Refills:  0        LANsoprazole 30 MG CR capsule   Commonly known as:  PREVACID        Dose:  30 mg   Take 30 mg by mouth daily   Refills:  0        LIOTHYRONINE SODIUM PO        Dose:  25 mcg   Take 25 mcg by mouth daily   Refills:  0        lithium 300 MG capsule        Take 1 capsule by mouth in the morning and 2 capsules at bedtime   Refills:  0        loperamide 2 MG capsule   Commonly known as:  IMODIUM        Dose:  2 mg   Take 2 mg by mouth every 6 hours as needed for diarrhea   Refills:  0        METHOCARBAMOL PO        Dose:  500 mg   Take 500 mg by mouth 3 times daily   Refills:  0        OYSTER SHELL CALCIUM PO        Dose:  500 mg   Take 500 mg by mouth 4 times daily   Refills:  0        paliperidone 6 MG 24 hr tablet   Commonly known as:  INVEGA        Dose:  6 mg   Take 6 mg by mouth every morning   Refills:  0        perphenazine 4 MG tablet        Dose:  4 mg   Take 4 mg by mouth 2 times daily   Refills:  0        POTASSIUM CHLORIDE ER PO        30meq by mouth two times a day   Refills:  0        pregabalin 150 MG capsule   Commonly known as:  LYRICA        Dose:  150 mg   Take 150 mg by mouth 2 times daily   Refills:  0        PROPRANOLOL HCL PO        Dose:  10 mg   Take 10 mg by mouth 2 times daily   Refills:  0        STOOL SOFTENER 100 MG capsule   Generic drug:  docusate sodium        Dose:  100 mg   Take 100 mg by mouth At Bedtime   Refills:  0        travoprost (BAK Free) 0.004 % ophthalmic solution   Commonly known as:  TRAVATAN Z        Dose:  1 drop   Place 1 drop into both eyes At Bedtime   Refills:  0        * ziprasidone 80 MG capsule   Commonly known as:  GEODON        Dose:  160 mg   Take 160 mg by mouth At Bedtime   Refills:  0        * ziprasidone 40 MG capsule   Commonly known as:  GEODON        Take 1 capsule by mouth in the morning   Refills:  0        * Notice:  This list has 2 medication(s) that are the same as other medications prescribed for you. Read the directions  carefully, and ask your doctor or other care provider to review them with you.      STOP taking     CEPHALEXIN PO           gabapentin 800 MG tablet   Commonly known as:  NEURONTIN           NAPROXEN PO                     Further instructions from your care team       For the COD  -prednisone 40 mg daily for 3 more days-  - continue the nebs 4 times/day     For the kidney disease  -seems to be a new loss of kidney function which did not improve after 4 days here  -need to stop the napoxen  -would stop the gabapentin, mostly because it seems duplicative with the pregabalin, but also it tends to accummulate in the setting of kidney disease  -need to check lithium level more often as kidneys start to decline    For the lithium toxicity  -would recheck lithium level in the next week  -would check lithium level any time she gets sick.    Would reconsider methocarbamol.  It is more sedating than an actual muscle relaxant.      Would recheck with MD in the next week.    FOLLOW UP APPT WITH DR. MARROQUIN @ [facility] ([phone])  11/28/18 @ 2:20PM    After Care     Activity - Up with assistive device           Advance Diet as Tolerated       Follow this diet upon discharge: Orders Placed This Encounter      Moderate Consistent CHO Diet       General info for SNF       Length of Stay Estimate: Long Term Care  Condition at Discharge: Stable  Level of care:skilled   Rehabilitation Potential: Poor  Admission H&P remains valid and up-to-date: Yes  Recent Chemotherapy: N/A  Use Nursing Home Standing Orders: Yes       Glucose monitor nursing POCT       Before meals and at bedtime       Mantoux instructions       Give two-step Mantoux (PPD) Per Facility Policy Yes             Referrals     Physical Therapy Adult Consult       Evaluate and treat as clinically indicated.    Reason:  Weakness             Your next 10 appointments already scheduled     Jan 02, 2019  4:00 PM CST   (Arrive by 3:45 PM)   Cystoscopy with  Trisha Tamayo MD   [facility] ([facility])    [address] [place] [id number]   [phone]            Feb 13, 2019  7:00 AM CST   (Arrive by 6:45 AM)   Urodynamics with Cris Isaacs PA-C   [facility] ([facility])    [address] [place] [id number]   [phone]              Statement of Approval     Ordered          11/25/18 0809  I have reviewed and agree with all the recommendations and orders detailed in this document.  EFFECTIVE NOW     Approved and electronically signed by:  Escobar Boland MD

## 2018-11-22 ENCOUNTER — APPOINTMENT (OUTPATIENT)
Dept: PHYSICAL THERAPY | Facility: CLINIC | Age: 53
DRG: 189 | End: 2018-11-22
Payer: COMMERCIAL

## 2018-11-22 LAB
ANION GAP SERPL CALCULATED.3IONS-SCNC: 4 MMOL/L (ref 3–14)
BASE EXCESS BLDV CALC-SCNC: 5.2 MMOL/L
BASE EXCESS BLDV CALC-SCNC: 7.3 MMOL/L
BASE EXCESS BLDV CALC-SCNC: 7.5 MMOL/L
BASOPHILS # BLD AUTO: 0 10E9/L (ref 0–0.2)
BASOPHILS NFR BLD AUTO: 0.1 %
BUN SERPL-MCNC: 19 MG/DL (ref 7–30)
CALCIUM SERPL-MCNC: 8.4 MG/DL (ref 8.5–10.1)
CHLORIDE SERPL-SCNC: 106 MMOL/L (ref 94–109)
CO2 SERPL-SCNC: 34 MMOL/L (ref 20–32)
CREAT SERPL-MCNC: 1.22 MG/DL (ref 0.52–1.04)
DIFFERENTIAL METHOD BLD: ABNORMAL
EOSINOPHIL # BLD AUTO: 0 10E9/L (ref 0–0.7)
EOSINOPHIL NFR BLD AUTO: 0.1 %
ERYTHROCYTE [DISTWIDTH] IN BLOOD BY AUTOMATED COUNT: 11.8 % (ref 10–15)
GFR SERPL CREATININE-BSD FRML MDRD: 46 ML/MIN/1.7M2
GLUCOSE SERPL-MCNC: 125 MG/DL (ref 70–99)
HCO3 BLDV-SCNC: 35 MMOL/L (ref 21–28)
HCO3 BLDV-SCNC: 35 MMOL/L (ref 21–28)
HCO3 BLDV-SCNC: 36 MMOL/L (ref 21–28)
HCT VFR BLD AUTO: 37.5 % (ref 35–47)
HGB BLD-MCNC: 12 G/DL (ref 11.7–15.7)
IMM GRANULOCYTES # BLD: 0 10E9/L (ref 0–0.4)
IMM GRANULOCYTES NFR BLD: 0.4 %
LITHIUM SERPL-SCNC: 1.95 MMOL/L (ref 0.6–1.2)
LYMPHOCYTES # BLD AUTO: 0.8 10E9/L (ref 0.8–5.3)
LYMPHOCYTES NFR BLD AUTO: 8 %
MCH RBC QN AUTO: 32.8 PG (ref 26.5–33)
MCHC RBC AUTO-ENTMCNC: 32 G/DL (ref 31.5–36.5)
MCV RBC AUTO: 103 FL (ref 78–100)
MONOCYTES # BLD AUTO: 0.7 10E9/L (ref 0–1.3)
MONOCYTES NFR BLD AUTO: 7.2 %
MRSA DNA SPEC QL NAA+PROBE: NEGATIVE
NEUTROPHILS # BLD AUTO: 8.3 10E9/L (ref 1.6–8.3)
NEUTROPHILS NFR BLD AUTO: 84.2 %
NRBC # BLD AUTO: 0 10*3/UL
NRBC BLD AUTO-RTO: 0 /100
O2/TOTAL GAS SETTING VFR VENT: 25 %
O2/TOTAL GAS SETTING VFR VENT: 28 %
O2/TOTAL GAS SETTING VFR VENT: ABNORMAL %
PCO2 BLDV: 57 MM HG (ref 40–50)
PCO2 BLDV: 70 MM HG (ref 40–50)
PCO2 BLDV: 70 MM HG (ref 40–50)
PH BLDV: 7.3 PH (ref 7.32–7.43)
PH BLDV: 7.32 PH (ref 7.32–7.43)
PH BLDV: 7.39 PH (ref 7.32–7.43)
PLATELET # BLD AUTO: 223 10E9/L (ref 150–450)
PO2 BLDV: 30 MM HG (ref 25–47)
PO2 BLDV: 48 MM HG (ref 25–47)
PO2 BLDV: 61 MM HG (ref 25–47)
POTASSIUM SERPL-SCNC: 4.5 MMOL/L (ref 3.4–5.3)
RBC # BLD AUTO: 3.66 10E12/L (ref 3.8–5.2)
SODIUM SERPL-SCNC: 144 MMOL/L (ref 133–144)
SPECIMEN SOURCE: NORMAL
WBC # BLD AUTO: 9.9 10E9/L (ref 4–11)

## 2018-11-22 PROCEDURE — 87640 STAPH A DNA AMP PROBE: CPT | Performed by: FAMILY MEDICINE

## 2018-11-22 PROCEDURE — 36415 COLL VENOUS BLD VENIPUNCTURE: CPT | Performed by: EMERGENCY MEDICINE

## 2018-11-22 PROCEDURE — 94640 AIRWAY INHALATION TREATMENT: CPT

## 2018-11-22 PROCEDURE — 25000132 ZZH RX MED GY IP 250 OP 250 PS 637: Performed by: PHYSICIAN ASSISTANT

## 2018-11-22 PROCEDURE — 40000274 ZZH STATISTIC RCP CONSULT EA 30 MIN

## 2018-11-22 PROCEDURE — 94640 AIRWAY INHALATION TREATMENT: CPT | Mod: 76

## 2018-11-22 PROCEDURE — 99233 SBSQ HOSP IP/OBS HIGH 50: CPT | Performed by: FAMILY MEDICINE

## 2018-11-22 PROCEDURE — 94660 CPAP INITIATION&MGMT: CPT

## 2018-11-22 PROCEDURE — 80048 BASIC METABOLIC PNL TOTAL CA: CPT | Performed by: EMERGENCY MEDICINE

## 2018-11-22 PROCEDURE — 25000128 H RX IP 250 OP 636: Performed by: PHYSICIAN ASSISTANT

## 2018-11-22 PROCEDURE — 97110 THERAPEUTIC EXERCISES: CPT | Mod: GP

## 2018-11-22 PROCEDURE — 99207 ZZC CDG-MDM COMPONENT: MEETS MODERATE - UP CODED: CPT | Performed by: FAMILY MEDICINE

## 2018-11-22 PROCEDURE — 85025 COMPLETE CBC W/AUTO DIFF WBC: CPT | Performed by: EMERGENCY MEDICINE

## 2018-11-22 PROCEDURE — 82803 BLOOD GASES ANY COMBINATION: CPT | Performed by: PHYSICIAN ASSISTANT

## 2018-11-22 PROCEDURE — 80178 ASSAY OF LITHIUM: CPT | Performed by: FAMILY MEDICINE

## 2018-11-22 PROCEDURE — 25000125 ZZHC RX 250: Performed by: PHYSICIAN ASSISTANT

## 2018-11-22 PROCEDURE — 82803 BLOOD GASES ANY COMBINATION: CPT | Performed by: FAMILY MEDICINE

## 2018-11-22 PROCEDURE — 40000275 ZZH STATISTIC RCP TIME EA 10 MIN

## 2018-11-22 PROCEDURE — 25000132 ZZH RX MED GY IP 250 OP 250 PS 637: Performed by: FAMILY MEDICINE

## 2018-11-22 PROCEDURE — 00000146 ZZHCL STATISTIC GLUCOSE BY METER IP

## 2018-11-22 PROCEDURE — 87641 MR-STAPH DNA AMP PROBE: CPT | Performed by: FAMILY MEDICINE

## 2018-11-22 PROCEDURE — 97116 GAIT TRAINING THERAPY: CPT | Mod: GP

## 2018-11-22 PROCEDURE — 36415 COLL VENOUS BLD VENIPUNCTURE: CPT | Performed by: FAMILY MEDICINE

## 2018-11-22 PROCEDURE — 97161 PT EVAL LOW COMPLEX 20 MIN: CPT | Mod: GP

## 2018-11-22 PROCEDURE — 20000003 ZZH R&B ICU

## 2018-11-22 PROCEDURE — 40000193 ZZH STATISTIC PT WARD VISIT

## 2018-11-22 PROCEDURE — 25000128 H RX IP 250 OP 636: Performed by: FAMILY MEDICINE

## 2018-11-22 RX ORDER — LITHIUM CARBONATE 300 MG/1
300 CAPSULE ORAL
Status: DISCONTINUED | OUTPATIENT
Start: 2018-11-23 | End: 2018-11-23 | Stop reason: CLARIF

## 2018-11-22 RX ORDER — NALOXONE HYDROCHLORIDE 0.4 MG/ML
.1-.4 INJECTION, SOLUTION INTRAMUSCULAR; INTRAVENOUS; SUBCUTANEOUS
Status: DISCONTINUED | OUTPATIENT
Start: 2018-11-22 | End: 2018-11-25 | Stop reason: HOSPADM

## 2018-11-22 RX ORDER — SODIUM CHLORIDE 9 MG/ML
INJECTION, SOLUTION INTRAVENOUS CONTINUOUS
Status: DISCONTINUED | OUTPATIENT
Start: 2018-11-22 | End: 2018-11-23

## 2018-11-22 RX ORDER — LITHIUM CARBONATE 600 MG/1
600 CAPSULE ORAL
Status: DISCONTINUED | OUTPATIENT
Start: 2018-11-23 | End: 2018-11-23 | Stop reason: CLARIF

## 2018-11-22 RX ORDER — PREGABALIN 75 MG/1
150 CAPSULE ORAL
Status: DISCONTINUED | OUTPATIENT
Start: 2018-11-22 | End: 2018-11-25

## 2018-11-22 RX ADMIN — FUROSEMIDE 40 MG: 40 TABLET ORAL at 08:29

## 2018-11-22 RX ADMIN — ACETAMINOPHEN 650 MG: 325 TABLET, FILM COATED ORAL at 20:52

## 2018-11-22 RX ADMIN — BUDESONIDE 0.5 MG: 0.5 INHALANT RESPIRATORY (INHALATION) at 07:54

## 2018-11-22 RX ADMIN — POTASSIUM CHLORIDE 10 MEQ: 750 TABLET, FILM COATED, EXTENDED RELEASE ORAL at 08:29

## 2018-11-22 RX ADMIN — BRIMONIDINE TARTRATE 1 DROP: 2 SOLUTION OPHTHALMIC at 20:54

## 2018-11-22 RX ADMIN — BRIMONIDINE TARTRATE 1 DROP: 2 SOLUTION OPHTHALMIC at 10:01

## 2018-11-22 RX ADMIN — IPRATROPIUM BROMIDE AND ALBUTEROL SULFATE 3 ML: .5; 3 SOLUTION RESPIRATORY (INHALATION) at 07:53

## 2018-11-22 RX ADMIN — ESCITALOPRAM OXALATE 20 MG: 20 TABLET ORAL at 20:55

## 2018-11-22 RX ADMIN — IPRATROPIUM BROMIDE AND ALBUTEROL SULFATE 3 ML: .5; 3 SOLUTION RESPIRATORY (INHALATION) at 11:38

## 2018-11-22 RX ADMIN — PANTOPRAZOLE SODIUM 40 MG: 40 TABLET, DELAYED RELEASE ORAL at 05:50

## 2018-11-22 RX ADMIN — BUDESONIDE 0.5 MG: 0.5 INHALANT RESPIRATORY (INHALATION) at 19:30

## 2018-11-22 RX ADMIN — GUAIFENESIN AND DEXTROMETHORPHAN 5 ML: 100; 10 SYRUP ORAL at 20:56

## 2018-11-22 RX ADMIN — PALIPERIDONE 6 MG: 6 TABLET, EXTENDED RELEASE ORAL at 08:31

## 2018-11-22 RX ADMIN — SODIUM CHLORIDE: 9 INJECTION, SOLUTION INTRAVENOUS at 10:47

## 2018-11-22 RX ADMIN — AZITHROMYCIN MONOHYDRATE 250 MG: 250 TABLET ORAL at 08:29

## 2018-11-22 RX ADMIN — ENOXAPARIN SODIUM 40 MG: 40 INJECTION SUBCUTANEOUS at 20:55

## 2018-11-22 RX ADMIN — PROPRANOLOL HYDROCHLORIDE 10 MG: 10 TABLET ORAL at 10:53

## 2018-11-22 RX ADMIN — ACETAMINOPHEN 650 MG: 325 TABLET, FILM COATED ORAL at 12:50

## 2018-11-22 RX ADMIN — IPRATROPIUM BROMIDE AND ALBUTEROL SULFATE 3 ML: .5; 3 SOLUTION RESPIRATORY (INHALATION) at 15:46

## 2018-11-22 RX ADMIN — PROPRANOLOL HYDROCHLORIDE 10 MG: 10 TABLET ORAL at 20:46

## 2018-11-22 RX ADMIN — FUROSEMIDE 40 MG: 40 TABLET ORAL at 20:56

## 2018-11-22 RX ADMIN — PERPHENAZINE 4 MG: 2 TABLET, FILM COATED ORAL at 20:47

## 2018-11-22 RX ADMIN — ZIPRASIDONE HCL 160 MG: 80 CAPSULE ORAL at 20:47

## 2018-11-22 RX ADMIN — TRAVOPROST 1 DROP: 0.04 SOLUTION/ DROPS OPHTHALMIC at 20:54

## 2018-11-22 RX ADMIN — Medication 1 MG: at 20:57

## 2018-11-22 RX ADMIN — LAMOTRIGINE 200 MG: 200 TABLET ORAL at 21:49

## 2018-11-22 RX ADMIN — IPRATROPIUM BROMIDE AND ALBUTEROL SULFATE 3 ML: .5; 3 SOLUTION RESPIRATORY (INHALATION) at 19:30

## 2018-11-22 RX ADMIN — CARIPRAZINE 1.5 MG: 1.5 CAPSULE, GELATIN COATED ORAL at 08:29

## 2018-11-22 RX ADMIN — PREDNISONE 40 MG: 20 TABLET ORAL at 08:29

## 2018-11-22 RX ADMIN — LORAZEPAM 0.5 MG: 0.5 TABLET ORAL at 20:57

## 2018-11-22 RX ADMIN — DOCUSATE SODIUM 100 MG: 100 CAPSULE, LIQUID FILLED ORAL at 20:55

## 2018-11-22 RX ADMIN — CYCLOBENZAPRINE HYDROCHLORIDE 5 MG: 5 TABLET, FILM COATED ORAL at 20:54

## 2018-11-22 ASSESSMENT — PAIN DESCRIPTION - DESCRIPTORS
DESCRIPTORS: ACHING
DESCRIPTORS: ACHING;DISCOMFORT;DULL
DESCRIPTORS: ACHING;DISCOMFORT

## 2018-11-22 ASSESSMENT — ACTIVITIES OF DAILY LIVING (ADL)
ADLS_ACUITY_SCORE: 24
ADLS_ACUITY_SCORE: 24
ADLS_ACUITY_SCORE: 21
ADLS_ACUITY_SCORE: 23

## 2018-11-22 NOTE — PROGRESS NOTES
Complains of pain to back and chest from coughing, did medicate with Tylenol with other evening medications at 2030 along with cough syrup, scheduled medications of Neurontin, Robaxin, and Ativan for rest with no relief.  When attemptted to medicate with Fentanyl IV, discovered IV site catheter bent and damaged unable to use site. Three different staff attempted to locate new site after multiple attempts IV placed to left upper forearm by ED Rn.  Resettled to rest.  Iv site bruised but flushes well and has good blood return.

## 2018-11-22 NOTE — H&P
[facility]    History and Physical  Hospital Medicine       Date of Admission:  11/21/2018  Date of Service: 11/21/2018     CC: cough, wheeze, shortness of breath    Assessment & Plan   Betsy Resendiz is a 53 year old female with a past medical history significant for schizoaffective disorder and bipolar disorder, COPD, chronic pain, GERD, urinary retention with chronic Abernathy, hypertension, chronic kidney disease, and anxiety who presents on 11/21/2018 with hypoxia and respiratory failure secondary to likely COPD exacerbation.      Acute respiratory failure with hypoxia and hypercapnia  Presents with O2 sats 88% on room air, improving with 2-3L O2. No O2 needs at baseline. Admit VBG with pH 7.33 / pCO2 70 / pO2 37 / bicarb 37. Profound wheezing and coughing on exam, most likely due to COPD exacerbation (see below). Not needing BiPAP at this point.  - Continue supplemental O2 to maintain sats > 92%  - No BiPAP currently, but may need it if she does not improve  - VBG in am      COPD exacerbation  COPD managed with Pulmicort nebs, DuoNebs, and albuterol prior to admission. Wheezing and coughing on exam. Admit chest x-ray normal other than benign granuloma in right lung. Afebrile, no leukocytosis. Patient was given nebs and 125 mg solumedrol in the emergency department.  - Scheduled DuoNebs q 4 hours while awake  - Prednisone 40 mg daily   - Start azithromycin   - Supplemental O2  - Supportive cares for cough      DM w/o complication type II  Noted per problem list. Not on any diabetic medications per medication reconciliation. Admit glucose = 97.  - Recheck glucose in the morning; consider starting insulin / sliding scale insulin if elevated      HTN (hypertension)  Lymphedema  Pressures reviewed, stable. Managed prior to admission with propranolol and lasix.  - Continue prior to admission lasix and propranolol with holding parameters      CKD (chronic kidney disease) stage 3, GFR 30-59  ml/min  Admit creatinine 1.32 (baseline 1.0 - 1.2), GFR 42 (baseline 40-56). Stable.  - Avoid nephrotoxins  - BMP in am      Urinary retention  Ongoing problem, has had chronic Abernathy since about August per patient's report. [facility] is supposed to have upcoming cystoscopy and urodynamic testing.  - Continue Abernathy      Schizoaffective disorder, bipolar type (H)  JAVIER (generalized anxiety disorder)  Posttraumatic stress disorder  Stable mood upon admission. Managed prior to admission with Vraylar, Lexapro, Lamictal, lithium, Invega, perphenazine, Geodon, and prn Ativan.  - Continue home medication regimen      Chronic pain  Cervical neck pain with evidence of disc disease  Chronic and stable. Managed pat with gabapentin, acetaminophen, naproxen, cyclobenzaprine, and methocarbamol.  - Continue prior to admission pain regimen       Gastroesophageal reflux disease without esophagitis  Chronic and stable. Managed prior to admission with Prevacid, continue.      Unspecified glaucoma  Managed prior to admission with brimonidine, continue.      SPIKE (obstructive sleep apnea)  Uses CPAP but did not bring it with her.   - Continue home CPAP with home settings if able      Tobacco use disorder  Encourage cessation. Nicotine lozenges available.      Morbid obesity with BMI of 40.0-44.9, adult (H)  Contributes to comorbidity.      Fluids: Oral  Electrolytes: Monitor  Nutrition: consistent carbohydrate     DVT Prophylaxis: Pneumatic Compression Devices, lovenox  Code Status: Full Code - discussed directly with patient    Lines: Peripheral  Abernathy catheter: Chronic Abernathy in place upon admission, continue    Disposition: Anticipate discharge in 2+ day(s). Appropriate for inpatient care.    Discussion: Assessment & plan discussed with Dr. Jj Lee PA-C  [facility]  Pager: [phone]          Primary Care Physician   Elle Dunlap [phone]    History is  obtained from the patient and review of old records via the EMR.    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     Hypokalemia 6/4/2010     Vitamin D deficiency 9/19/2012         Diagnosis Date Noted     CKD (chronic kidney disease) stage 3, GFR 30-59 ml/min (H) 11/21/2018     Priority: Medium     Auditory hallucinations 09/14/2018     Priority: Medium     Closed fracture of shaft of right humerus 05/22/2018     Priority: Medium     Bladder tumor 02/07/2018     Priority: Medium     COPD (chronic obstructive pulmonary disease) (H) 02/05/2018     Priority: Medium     JAVIER (generalized anxiety disorder) 11/21/2017     Priority: Medium     HTN (hypertension) 10/28/2017     Priority: Medium     Morbid obesity with BMI of 40.0-44.9, adult (H) 12/08/2016     Priority: Medium     SPIKE (obstructive sleep apnea) 11/13/2015     Priority: Medium     Ulnar neuritis 01/17/2014     Priority: Medium     Immunosuppressed status (H) 09/19/2013     Priority: Medium     Chronic pain 09/18/2013     Priority: Medium     Normocytic anemia 09/18/2013     Priority: Medium     History of encephalopathy 06/04/2013     Priority: Medium     Rotator cuff tendinitis 11/05/2012     Priority: Medium     Osteoarthritis of hip 03/17/2012     Priority: Medium     Posttraumatic stress disorder 10/07/2011     Priority: Medium     Schizoaffective disorder, unspecified type (H) 06/07/2011     Priority: Medium     Cervical neck pain with evidence of disc disease 07/28/2010     Priority: Medium     Conversion reaction 06/04/2010     Priority: Medium     Pain in joint, ankle and foot 01/20/2009     Priority: Medium     Generalized osteoarthritis 01/08/2009     Priority: Medium     Tobacco use disorder 11/26/2008     Priority: Medium     Urinary retention 06/06/2008     Priority: Medium     DM w/o complication type II (H) 04/09/2008     Priority:  Medium     Gastroesophageal reflux disease without esophagitis 04/09/2008     Priority: Medium     Lymphedema 01/31/2008     Priority: Medium     Carpal tunnel syndrome 07/12/2007     Priority: Medium     Schizoaffective disorder, bipolar type (H) 10/30/2006     Priority: Medium     Migraine headache 10/30/2006     Priority: Medium     Asthma 10/30/2006     Priority: Medium     Unspecified glaucoma 10/30/2006     Priority: Medium        Past Surgical History     Past Surgical History:   Procedure Laterality Date     ARTHRODESIS FOOT Right 2009     BACK SURGERY      x7      BIOPSY/EXCIS CERVICAL LESN       INCISION AND DRAINAGE  2013    Pressure ulcer     REPAIR BLADDER       THORACOTOMY      Abscess        History of Present Illness   Betsy Resendiz is a 53 year old female with the above past medical history now presents on 11/21/2018 with increased cough, wheeze, and shortness of breath. Symptoms started about 5 days prior to admission and have been progressively worsening since onset. Patient has profound wheezing that is temporarily improved with nebs. She has a cough that feels productive but she is unable to produce any sputum. She is feeling very short of breath even at rest, but is worse with any exertion.     She has difficulty laying flat, but this is not a new problem. She denies PND or worsening of her baseline edema. She denies fevers or chills. She lives in a nursing home so she may have ill exposures, but she has not had known contact with an ill person.     She has chest pain associated with her coughing, and her ribs and back are very sore. She takes shallow breaths due painful deep breathings. She denies palpitations.    She has a chronic Abernathy since August due to urinary retention. She is currently having outpatient work-up through urology at the [facility]. The remainder review of systems is negative.       Prior to Admission Medications   Prior to Admission Medications   Prescriptions Last Dose  Informant Patient Reported? Taking?   ACETAMINOPHEN PO More than a month at Unknown time retirement Yes No   Sig: Take 650 mg by mouth every 4 hours as needed for pain   CEPHALEXIN PO 2018 at 08 retirement Yes Yes   Sig: Take 500 mg by mouth 4 times daily   CYCLOBENZAPRINE HCL PO 2018 at [address] [place] [address] Yes Yes   Sig: Take 5 mg by mouth every 8 hours as needed    DOXYCYCLINE HYCLATE PO  Nursing Home Yes No   Sig: Take 100 mg by mouth 2 times daily   LANsoprazole (PREVACID) 30 MG CR capsule 2018 at [address] [place] [address] Yes Yes   Sig: Take 30 mg by mouth daily    LIOTHYRONINE SODIUM PO 2018 at [address] [place] [address] Yes Yes   Sig: Take 25 mcg by mouth daily   LORazepam (ATIVAN PO) 2018 at [address] [place] [address] Yes Yes   Sig: Take 0.5 mg by mouth 2 times daily as needed for anxiety   METHOCARBAMOL PO 2018 at [address] [place] [address] Yes Yes   Sig: Take 500 mg by mouth 3 times daily   NAPROXEN PO 2018 at [address] [place] [address] Yes Yes   Sig: Take 500 mg by mouth every 8 hours as needed for moderate pain   OYSTER SHELL CALCIUM PO 2018 at [address] [place] [address] Yes Yes   Sig: Take 500 mg by mouth 4 times daily   POTASSIUM CHLORIDE ER PO 2018 at [address] [place] [address] Yes Yes   Simeq by mouth two times a day   PREDNISONE PO  Nursing Home Yes No   Sig: Take 20 mg by mouth daily   PROPRANOLOL HCL PO 2018 at [address] [place] [address] Yes Yes   Sig: Take 10 mg by mouth 2 times daily   albuterol (PROAIR HFA/PROVENTIL HFA/VENTOLIN HFA) 108 (90 BASE) MCG/ACT Inhaler More than a month at Unknown time retirement Yes No   Sig: Inhale 2 puffs into the lungs every 4 hours as needed    brimonidine (ALPHAGAN-P) 0.15 % ophthalmic solution 2018 at [address] [place] [address] Yes Yes   Sig: Place 1 drop into both eyes 2 times daily   budesonide (PULMICORT) 0.5 MG/2ML neb solution 2018 at [address] [place] [address] Yes Yes   Sig: Take 0.5 mg by nebulization 2 times daily   cariprazine (VRAYLAR) 1.5 MG CAPS capsule  11/21/2018 at 0800 [facility] [place] Yes Yes   Sig: Take 2 mg by mouth daily    cholecalciferol (VITAMIN D3) 5000 units CAPS capsule 11/21/2018 at 0800 [facility] Yes Yes   Sig: Take 5,000 Units by mouth daily   clotrimazole (LOTRIMIN) 1 % cream  Nursing Home Yes No   Sig: Apply topically 2 times daily   docusate sodium (STOOL SOFTENER) 100 MG capsule 11/20/2018 at pm Nursing Home Yes Yes   Sig: Take 100 mg by mouth At Bedtime    escitalopram (LEXAPRO) 20 MG tablet 11/20/2018 at pm Nursing Home Yes Yes   Sig: Take 1 tablet by mouth at bedtime   fluticasone (FLONASE) 50 MCG/ACT spray  Nursing Home Yes No   Sig: Spray 1 spray into both nostrils daily as needed    furosemide (LASIX) 80 MG tablet 11/21/2018 at 0800 [facility] Yes Yes   Sig: Take 40 mg by mouth 2 times daily    gabapentin (NEURONTIN) 800 MG tablet 11/21/2018 at 0800 [facility] Yes Yes   Sig: Take 400 mg by mouth 3 times daily    ipratropium - albuterol 0.5 mg/2.5 mg/3 mL (DUONEB) 0.5-2.5 (3) MG/3ML neb solution 11/21/2018 at 0800 [facility] Yes Yes   Sig: Take 1 vial by nebulization 4 times daily   lamoTRIgine (LAMICTAL) 200 MG tablet 11/20/2018 at pm Nursing Home Yes Yes   Sig: Take 200 mg by mouth At Bedtime    lithium 300 MG capsule 11/21/2018 at 0800 [facility] Yes Yes   Sig: Take 1 capsule by mouth in the morning and 2 capsules at bedtime   loperamide (IMODIUM) 2 MG capsule 11/18/2018 at 0118 [facility] Yes Yes   Sig: Take 2 mg by mouth every 6 hours as needed for diarrhea   meclizine (ANTIVERT) 25 MG tablet  Nursing Home Yes No   Sig: Take 25 mg by mouth every 8 hours as needed    paliperidone (INVEGA) 6 MG 24 hr tablet 11/21/2018 at am Nursing Home Yes Yes   Sig: Take 6 mg by mouth every morning   perphenazine 4 MG tablet 11/21/2018 at 0800 [facility] Yes Yes   Sig: Take 4 mg by mouth 2 times daily    pregabalin (LYRICA) 150 MG capsule 11/21/2018 at 0800 [facility] Yes Yes   Sig: Take 150 mg by mouth 2 times daily    travoprost, BAK  "Free, (TRAVATAN Z) 0.004 % ophthalmic solution 2018 at pm Nursing Home Yes Yes   Sig: Place 1 drop into both eyes At Bedtime   ziprasidone (GEODON) 40 MG capsule 2018 at 0800 custodial Yes Yes   Sig: Take 1 capsule by mouth in the morning   ziprasidone (GEODON) 80 MG capsule 2018 at pm Nursing Home Yes Yes   Sig: Take 160 mg by mouth At Bedtime       Facility-Administered Medications: None     Allergies   Allergies   Allergen Reactions     Codeine Shortness Of Breath     Has tolerated morphine 2008     Hmg-Coa-R Inhibitors Other (See Comments)     Patient declines  Patient declines     Lisinopril Cough     No Clinical Screening - See Comments      Statin-hmg-coa reductase inhibitors     Sulfa Drugs      Mouth sore     Sumatriptan Other (See Comments)       Family History    Family History   Problem Relation Age of Onset     Other - See Comments Mother      Multiple myeloma     Diabetes Maternal Grandmother      Cancer Niece       age 12       Social History   Social History     Social History     Marital status: Single     Spouse name: N/A     Number of children: N/A     Years of education: N/A     Occupational History     Not on file.     Social History Main Topics     Smoking status: Current Every Day Smoker     Types: Cigarettes     Smokeless tobacco: Never Used     Alcohol use No     Drug use: No     Sexual activity: Not on file     Other Topics Concern     Not on file     Social History Narrative     No narrative on file       Review of Systems     A complete 10 point review of systems was negative except for items noted in the HPI/subjective.      Physical Exam   /81  Temp 98.1  F (36.7  C) (Oral)  Resp 16  Ht 1.626 m (5' 4\")  Wt 117.9 kg (260 lb)  SpO2 94%  BMI 44.63 kg/m2     Weight: 260 lbs 0 oz Body mass index is 44.63 kg/(m^2).     Constitutional: Alert, oriented x4, cooperative, appears nontoxic, speaking in full sentences, no apparent distress,    Eyes: Eyes are " clear, pupils are reactive. No scleral icterus. Extroccular movements intact.     HEENT: Oropharynx is clear and moist, no lesions. Normocephalic, no evidence of cranial trauma.      Cardiovascular: Regular rhythm and rate, normal S1 and S2. No murmur, rubs, or gallops. Peripheral pulses in tact bilaterally. Bilateral trace pitting lower extremity edema. JVP difficult to assess due to body habitus.    Respiratory: Audible wheezing even without a stethoscope. Bilateral expiratory wheezing, not moving much air at the bases. Some rhonchi as well but no crackles appreciated.     GI: Soft, non-distended. Non-tender, no rebound or guarding. No hepatosplenomegaly or masses appreciated. Normal bowel sounds.     Musculoskeletal: Without obvious deformity, normal range of motion. Normal muscle bulk and tone.     Skin: Warm and dry, no rashes or ecchymoses. No mottling of skin. Woody hyperpigmented skin on bilateral lower extremities consistent with venous stasis changes, left > right.     Neurologic: Patient moves all extremities. Cranial nerves are grossly intact.  is symmetric. Gross strength and sensation are equal bilaterally.    Genitourinary: Abernathy catheter in place draining clear yellow urine without sediment.    Data   Data reviewed today:     Recent Labs  Lab 11/21/18  1349   WBC 8.9   HGB 12.5   *         POTASSIUM 3.9   CHLORIDE 105   CO2 34*   BUN 15   CR 1.32*   ANIONGAP 3   GAURAV 8.2*   GLC 97   ALBUMIN 3.1*   PROTTOTAL 7.4   BILITOTAL 0.2   ALKPHOS 96   ALT 36   AST 21   TROPI <0.015       Recent Results (from the past 24 hour(s))   Chest XR,  PA & LAT    Narrative    XR CHEST 2 VW 11/21/2018 2:09 PM    HISTORY: Short of breath.    COMPARISON: None.    FINDINGS: Benign granuloma in the right upper lung. No airspace  consolidation, pleural effusion or pneumothorax. Normal heart size.      Impression    IMPRESSION: No acute cardiopulmonary abnormality.    ANGELES PALOMINO MD       I personally  reviewed the chest x-ray image(s) showing right upper lobe granuloma, no infiltrate, opacity, or effusion.    Monica Lee PA-C  [facility]  Pager: [phone]

## 2018-11-22 NOTE — PROGRESS NOTES
"[place] [facility] ADMISSION NOTE    Patient admitted to room 1004 at approximately 1900 via cart from emergency room. Patient was accompanied by transport tech and nurse.     Verbal SBAR report received from JOY Etienne who received report from ED RN prior to patient arrival.     Patient trasferred to bed via air lacy. Patient alert and oriented X 3. The patient is not having any pain. 0-10 Pain Scale: 10. Admission vital signs: Blood pressure 142/81, pulse 82, temperature 97.9  F (36.6  C), temperature source Oral, resp. rate 22, height 1.626 m (5' 4\"), weight 118.2 kg (260 lb 9.3 oz), SpO2 94 %. Patient was oriented to plan of care, call light, bed controls, tv, telephone, bathroom and visiting hours.     Risk Assessment    The following safety risks were identified during admission: fall. Yellow risk band applied: YES.     Skin Initial Assessment    This writer admitted this patient and completed a full skin assessment and Theron score in the Adult PCS flowsheet. Appropriate interventions initiated as needed.     Secondary skin check completed by JOY Etienne.    Skin  Inspection of bony prominences: Full  Inspection under devices: Full  Skin WDL:  WDL except, characteristics, color  Skin Color/Characteristics: bruised (ecchymotic), mottled  Skin Temperature: warm  Skin Moisture: dry  Skin Elasticity: slow return to original state  Skin Integrity: wound(s) to inner right thigh, covered by dressing, bruise(s) to inner aspect of right upper arm. Red areas to inner thigh left and outer thigh from leg bag strap, red areas at tight areas of clothes bra straps and underside of such waistband of pants.    Theron Risk Assessment  Sensory Perception: 3-->slightly limited  Moisture: 3-->occasionally moist  Activity: 3-->walks occasionally  Mobility: 3-->slightly limited  Nutrition: 3-->adequate  Friction and Shear: 2-->potential problem  Theron Score: 17  Bed Support Surface: Atmos Air mattress  Reassessed using Bed Algorithm: No  Theron " Intervention(s) Implemented: antiembolic/splint/device removed for skin assessment, assistive lifting/lateral transfer device, draw sheets, encouraged fluids, heels suspended, HOB elevated 30 degrees or less, incontinence care    Cindy Johanson

## 2018-11-22 NOTE — PROGRESS NOTES
Since IV Fentanyl has settled to resting, rare cough remains on 3 LPM of oxygen and maintaining saturations in the mid to low 90's.  Previous audible wheezing not noted now.  Continue to monitor closely.

## 2018-11-22 NOTE — PROGRESS NOTES
Discharge Planner PT   Patient plan for discharge: TCU   Current status: Pt transfers sit <> supine with SBA and mod effort, sit <> stand with SBA. Amb with RW and CGA 40' with slow fernanda. Performs LE exs x 10.  Barriers to return to prior living situation: lives alone, decr megan for activity   Recommendations for discharge: TCU  Rationale for recommendations: lives alone, decr megan for activity       Entered by: Jacey Lawson 11/22/2018 2:45 PM     Physical Therapy Evaluation       11/22/18 1400   Quick Adds   Type of Visit Initial PT Evaluation   Living Environment   Lives With alone   Living Arrangements apartment   Home Accessibility no concerns   Living Environment Comment Pt has been in the NH since June and plans to return there from here. She still has her apt that she will return to after she fully recovers.   Functional Level Prior   Ambulation 3-->assistive equipment and person   Transferring 3-->assistive equipment and person   Toileting 3-->assistive equipment and person   Bathing 2-->assistive person   Dressing 2-->assistive person   Eating 0-->independent   Communication 0-->understands/communicates without difficulty   Swallowing 0-->swallows foods/liquids without difficulty   Cognition 1 - attention or memory deficits   Fall history within last six months no   Which of the above functional risks had a recent onset or change? ambulation;transferring   Prior Functional Level Comment (at care facility since June 2018)   General Information   Onset of Illness/Injury or Date of Surgery - Date 11/21/18   Referring Physician Dr Boland   Patient/Family Goals Statement plans to return to NH to recover before returning home   Pertinent History of Current Problem (include personal factors and/or comorbidities that impact the POC) Per H&P:  Betsy Resendiz is a 53 year old female with the above past medical history now presents on 11/21/2018 with increased cough, wheeze, and shortness of breath. Symptoms started  about 5 days prior to admission and have been progressively worsening since onset. Patient has profound wheezing that is temporarily improved with nebs. She has a cough that feels productive but she is unable to produce any sputum. She is feeling very short of breath even at rest, but is worse with any exertion. She has difficulty laying flat, but this is not a new problem. She denies PND or worsening of her baseline edema. She denies fevers or chills. She lives in a nursing home so she may have ill exposures, but she has not had known contact with an ill person. She has chest pain associated with her coughing, and her ribs and back are very sore. She takes shallow breaths due painful deep breathings. She denies palpitations.  She has a chronic Abernathy since August due to urinary retention. She is currently having outpatient work-up through urology at the  of . The remainder review of systems is negative.    Cognitive Status Examination   Orientation orientation to person, place and time   Level of Consciousness alert   Follows Commands and Answers Questions 100% of the time   Personal Safety and Judgment intact   Pain Assessment   Patient Currently in Pain Yes, see Vital Sign flowsheet  (c/o chest and back pain from coughing)   Range of Motion (ROM)   ROM Comment WFL   Strength   Strength Comments WFL   Gait   Gait Comments Pt transfers sit <> supine with SBA and mod effort, sit <> stand with SBA. Amb with RW and CGA 40' with slow fernanda.   Balance   Balance Comments good with RW   General Therapy Interventions   Planned Therapy Interventions strengthening;gait training   Clinical Impression   Criteria for Skilled Therapeutic Intervention yes, treatment indicated   PT Diagnosis deconditioned   Influenced by the following impairments pain and weakness   Functional limitations due to impairments mobility and gait   Clinical Presentation Stable/Uncomplicated   Clinical Presentation Rationale clinical judgement   "  Clinical Decision Making (Complexity) Low complexity   Therapy Frequency` daily   Predicted Duration of Therapy Intervention (days/wks) 5 days   Anticipated Discharge Disposition Transitional Care Facility   Risk & Benefits of therapy have been explained Yes   Patient, Family & other staff in agreement with plan of care Yes   [facility] TM \"6 Clicks\"   2016, Trustees of [facility], under license to [employer].  All rights reserved.   6 Clicks Short Forms Basic Mobility Inpatient Short Form   [facility] AM-PAC  \"6 Clicks\" V.2 Basic Mobility Inpatient Short Form   1. Turning from your back to your side while in a flat bed without using bedrails? 3 - A Little   2. Moving from lying on your back to sitting on the side of a flat bed without using bedrails? 3 - A Little   3. Moving to and from a bed to a chair (including a wheelchair)? 3 - A Little   4. Standing up from a chair using your arms (e.g., wheelchair, or bedside chair)? 3 - A Little   5. To walk in hospital room? 3 - A Little   6. Climbing 3-5 steps with a railing? 2 - A Lot   Basic Mobility Raw Score (Score out of 24.Lower scores equate to lower levels of function) 17   Total Evaluation Time   Total Evaluation Time (Minutes) 15     See Care Plan for Goals.    Jacey Lawson PT      "

## 2018-11-22 NOTE — PROGRESS NOTES
"SUBJECTIVE:   Very sleepy this AM.   Hard to get any answers   Does follow commands  Per her NH, she is usually alert, Ox3, in charge of her own care.  Goes out to smoke in a WC 10 times/day, does tend to sleep in mornings.         ROS:4 point ROS including Respiratory, CV, GI and , other than that noted in the HPI,  is negative     OBJECTIVE:   BP (!) 154/101 (BP Location: Right arm)  Pulse 82  Temp 98.1  F (36.7  C) (Oral)  Resp 20  Ht 1.626 m (5' 4\")  Wt 117.8 kg (259 lb 11.2 oz)  SpO2 95%  BMI 44.58 kg/m2    GENERAL APPEARANCE:  Sleepy, keeps eyes closed until she is asked to open, but then closes again.  Follows commands, repeats sentences well, knows where she has been living.       RESP:diffuse wheezes, prolonged expiratory phase, still needing 2 L.       CV: regular rate and rhythm,  No  murmur , edema: none       Abdomen: soft, nontender, no liver or spleen enlargement, no masses, BSs normal   Skin: no cyanosis, pallor, or jaundice     CMP  Recent Labs  Lab 11/22/18 0519 11/21/18  1349    142   POTASSIUM 4.5 3.9   CHLORIDE 106 105   CO2 34* 34*   ANIONGAP 4 3   * 97   BUN 19 15   CR 1.22* 1.32*   GFRESTIMATED 46* 42*   GFRESTBLACK 56* 51*   GAURAV 8.4* 8.2*   PROTTOTAL  --  7.4   ALBUMIN  --  3.1*   BILITOTAL  --  0.2   ALKPHOS  --  96   AST  --  21   ALT  --  36     CBC  Recent Labs  Lab 11/22/18  0519 11/21/18  1349   WBC 9.9 8.9   RBC 3.66* 3.83   HGB 12.0 12.5   HCT 37.5 39.4   * 103*   MCH 32.8 32.6   MCHC 32.0 31.7   RDW 11.8 12.1    199     INRNo lab results found in last 7 days.  Arterial BloodGas    Recent Labs  Lab 11/22/18  0519 11/21/18  1349   O2PER 34% 2l      Venous Blood Gas    Recent Labs  Lab 11/22/18  0519 11/21/18  1349   PHV 7.30* 7.33   PCO2V 70* 70*   PO2V 61* 37   HCO3V 35* 37*   VON 5.2 8.4   O2PER 34% 2l       Medications     azithromycin  250 mg Oral Daily     brimonidine  1 drop Both Eyes BID     budesonide  0.5 mg Nebulization BID     " cariprazine  1.5 mg Oral Daily     docusate sodium  100 mg Oral At Bedtime     enoxaparin  40 mg Subcutaneous Q24H     escitalopram  20 mg Oral At Bedtime     furosemide  40 mg Oral BID     gabapentin  400 mg Oral TID     ipratropium - albuterol 0.5 mg/2.5 mg/3 mL  3 mL Nebulization Q4H While awake     lamoTRIgine  200 mg Oral At Bedtime     liothyronine (CYTOMEL) tablet 25 mcg  25 mcg Oral Daily     lithium  300 mg Oral QAM     lithium  600 mg Oral At Bedtime     paliperidone  6 mg Oral QAM     pantoprazole  40 mg Oral QAM AC     perphenazine  4 mg Oral BID     potassium chloride  10 mEq Oral Daily     predniSONE  40 mg Oral Daily     pregabalin  150 mg Oral BID     propranolol (INDERAL) tablet 10 mg  10 mg Oral BID     sodium chloride (PF)  3 mL Intracatheter Q8H     travoprost (NICHO Free)  1 drop Both Eyes At Bedtime     ziprasidone  160 mg Oral At Bedtime     ziprasidone  40 mg Oral QAM       Intake/Output Summary (Last 24 hours) at 11/22/18 0708  Last data filed at 11/22/18 0600   Gross per 24 hour   Intake              250 ml   Output             1400 ml   Net            -1150 ml         ASSESSMENT: PLAN:   Assessment & Plan     Betsy Resendiz is a 53 year old female with a past medical history significant for schizoaffective disorder and bipolar disorder, COPD, chronic pain, GERD, urinary retention with chronic Abernathy, hypertension, chronic kidney disease, and anxiety who presents on 11/21/2018 with hypoxia and respiratory failure secondary to likely COPD exacerbation.        Acute respiratory failure with hypoxia and hypercapnia  Presents with O2 sats 88% on room air, improving with 2-3L O2. No O2 needs at baseline. Admit VBG with pH 7.33 / pCO2 70 / pO2 37 / bicarb 37. Profound wheezing and coughing on exam, most likely due to COPD exacerbation (see below). Not needing BiPAP at this point.  - CO2 appears up from baseline.  Will recheck again this AM and if going up, would try some BIPAP.          COPD  exacerbation  COPD managed with Pulmicort nebs, DuoNebs, and albuterol prior to admission. Wheezing and coughing on exam. Admit chest x-ray normal other than benign granuloma in right lung. Afebrile, no leukocytosis. Patient was given nebs and 125 mg solumedrol in the emergency department.  -Nebs, Azith, prednisone, O2 supplement, BIPAP if CO2 rising.       DM w/o complication type II  Noted per problem list. Not on any diabetic medications per medication reconciliation. Admit glucose = 97.  -no elevated glucose on no meds, even now on prednisone.  Will check A1 c.    -no insulin for now.        Acute encephalopathy:   - more somnolent on morning after admission, wakens to voice but falls asleep immediately   -unclear if this is all her psychotropic meds, Lithium toxicity, or the elevated CO2.   - will try some bipap, hold lithium per pharmacy.      Lithium toxicity  -level 1.95 , likely due to CHAPINCITO.   - hold for now. Restart after discussion with pharmacy      HTN (hypertension)  Lymphedema  Pressures reviewed, stable. Managed prior to admission with propranolol and lasix.  - Continue prior to admission lasix and propranolol with holding parameters        CKD (chronic kidney disease) stage 3, GFR 30-59 ml/min  Admit creatinine 1.32 (baseline 1.0 - 1.2), GFR 42 (baseline 40-56). Stable.  - back down to 1.22, which is close to baseline  - will check lithium level, as this is very sensitive to changes in GFR.         Urinary retention  Ongoing problem, has had chronic Abernathy since about August per patient's report. Follows with urology at the [facility], is supposed to have upcoming cystoscopy and urodynamic testing.  - Continue Abernathy        Schizoaffective disorder, bipolar type (H)  JAVIER (generalized anxiety disorder)  Posttraumatic stress disorder  Stable mood upon admission. Managed prior to admission with Vraylar, Lexapro, Lamictal, lithium, Invega, perphenazine, Geodon, and prn Ativan.  - more somnolent here this AM.   Will hold her methocarbamol, but continue her other meds  - check lithium level with the mild CHAPINCITO.          Chronic pain  Cervical neck pain with evidence of disc disease  Chronic and stable. Managed pat with gabapentin, acetaminophen, naproxen, cyclobenzaprine, and methocarbamol.  - holding methocarbamol for the increased sedation today  - she was tapered off narcotics in the last 2-3 months.          Gastroesophageal reflux disease without esophagitis  Chronic and stable. Managed prior to admission with Prevacid, continue.        Unspecified glaucoma  Managed prior to admission with brimonidine, continue.        SPIKE (obstructive sleep apnea)  Uses CPAP but did not bring it with her.   - Continue home CPAP with home settings if able        Tobacco use disorder  - smokes 1/2 PPD         Morbid obesity with BMI of 40.0-44.9, adult (H)  Contributes to comorbidity.        Fluids: Oral  Electrolytes: Monitor  Nutrition: consistent carbohydrate      DVT Prophylaxis: Pneumatic Compression Devices, lovenox  Code Status: Full Code - discussed directly with patient  Discussion:   More sleepy, unclear if this is her morning baseline but doesn't sound like it.  May be from her acute hypercapnia.  Will recheck VBG and try bipap if climbing or if mental status not clearing.    Check lithium level as this is very labile in the setting of CHAPINCITO.

## 2018-11-22 NOTE — PROGRESS NOTES
Patient up to chair with walker and  S B A .Encourage patient activity, is steady on feet and good strength. Patient  Continues to sleep most of AM ,wakes easily to voice however and swallows meds w/o problem.Placed on bipap this am and off for meals, on  R/A, oxygen saturation 92%. Still has expiratory wheeze, bilat.Will continue to encourage activity and monitor.

## 2018-11-22 NOTE — PLAN OF CARE
Problem: ARDS (Acute Resp Distress Syndrome) (Adult)  Goal: Signs and Symptoms of Listed Potential Problems Will be Absent, Minimized or Managed (ARDS)  Signs and symptoms of listed potential problems will be absent, minimized or managed by discharge/transition of care (reference ARDS (Acute Resp Distress Syndrome) (Adult) CPG).   Outcome: Improving  No audible wheezing noted this am.  Resting very comfortably, very somnolent this am will not awaken for am medications although patient is on a lot of mediations for mental health issues and also with complaints of pain did receive a dose of Fentanyl IV last night, will try again later.  Lung sounds clear &  diminished to bases, have not heard her cough for most of the night.  Continue to monitor.

## 2018-11-23 ENCOUNTER — APPOINTMENT (OUTPATIENT)
Dept: GENERAL RADIOLOGY | Facility: CLINIC | Age: 53
DRG: 189 | End: 2018-11-23
Attending: FAMILY MEDICINE
Payer: COMMERCIAL

## 2018-11-23 ENCOUNTER — APPOINTMENT (OUTPATIENT)
Dept: PHYSICAL THERAPY | Facility: CLINIC | Age: 53
DRG: 189 | End: 2018-11-23
Payer: COMMERCIAL

## 2018-11-23 LAB
ALBUMIN SERPL-MCNC: 2.8 G/DL (ref 3.4–5)
ALP SERPL-CCNC: 75 U/L (ref 40–150)
ALT SERPL W P-5'-P-CCNC: 25 U/L (ref 0–50)
ANION GAP SERPL CALCULATED.3IONS-SCNC: 6 MMOL/L (ref 3–14)
AST SERPL W P-5'-P-CCNC: 15 U/L (ref 0–45)
BASE EXCESS BLDV CALC-SCNC: 7 MMOL/L
BASE EXCESS BLDV CALC-SCNC: 7.1 MMOL/L
BILIRUB SERPL-MCNC: 0.2 MG/DL (ref 0.2–1.3)
BUN SERPL-MCNC: 23 MG/DL (ref 7–30)
CALCIUM SERPL-MCNC: 7.8 MG/DL (ref 8.5–10.1)
CHLORIDE SERPL-SCNC: 109 MMOL/L (ref 94–109)
CO2 SERPL-SCNC: 31 MMOL/L (ref 20–32)
CREAT SERPL-MCNC: 1.32 MG/DL (ref 0.52–1.04)
ERYTHROCYTE [DISTWIDTH] IN BLOOD BY AUTOMATED COUNT: 12 % (ref 10–15)
GFR SERPL CREATININE-BSD FRML MDRD: 42 ML/MIN/1.7M2
GLUCOSE BLDC GLUCOMTR-MCNC: 108 MG/DL (ref 70–99)
GLUCOSE BLDC GLUCOMTR-MCNC: 145 MG/DL (ref 70–99)
GLUCOSE SERPL-MCNC: 93 MG/DL (ref 70–99)
HBA1C MFR BLD: 5.4 % (ref 0–5.6)
HCO3 BLDV-SCNC: 34 MMOL/L (ref 21–28)
HCO3 BLDV-SCNC: 34 MMOL/L (ref 21–28)
HCT VFR BLD AUTO: 35.9 % (ref 35–47)
HGB BLD-MCNC: 11.5 G/DL (ref 11.7–15.7)
LITHIUM SERPL-SCNC: 0.65 MMOL/L (ref 0.6–1.2)
MCH RBC QN AUTO: 33.2 PG (ref 26.5–33)
MCHC RBC AUTO-ENTMCNC: 32 G/DL (ref 31.5–36.5)
MCV RBC AUTO: 104 FL (ref 78–100)
O2/TOTAL GAS SETTING VFR VENT: 32 %
O2/TOTAL GAS SETTING VFR VENT: ABNORMAL %
PCO2 BLDV: 55 MM HG (ref 40–50)
PCO2 BLDV: 58 MM HG (ref 40–50)
PH BLDV: 7.37 PH (ref 7.32–7.43)
PH BLDV: 7.39 PH (ref 7.32–7.43)
PLATELET # BLD AUTO: 167 10E9/L (ref 150–450)
PO2 BLDV: 51 MM HG (ref 25–47)
PO2 BLDV: 63 MM HG (ref 25–47)
POTASSIUM SERPL-SCNC: 3.6 MMOL/L (ref 3.4–5.3)
PROT SERPL-MCNC: 6.3 G/DL (ref 6.8–8.8)
RBC # BLD AUTO: 3.46 10E12/L (ref 3.8–5.2)
SODIUM SERPL-SCNC: 146 MMOL/L (ref 133–144)
WBC # BLD AUTO: 8.6 10E9/L (ref 4–11)

## 2018-11-23 PROCEDURE — 25000128 H RX IP 250 OP 636: Performed by: FAMILY MEDICINE

## 2018-11-23 PROCEDURE — 82803 BLOOD GASES ANY COMBINATION: CPT | Performed by: FAMILY MEDICINE

## 2018-11-23 PROCEDURE — 25000132 ZZH RX MED GY IP 250 OP 250 PS 637: Performed by: PHYSICIAN ASSISTANT

## 2018-11-23 PROCEDURE — 25000132 ZZH RX MED GY IP 250 OP 250 PS 637: Performed by: FAMILY MEDICINE

## 2018-11-23 PROCEDURE — 80178 ASSAY OF LITHIUM: CPT | Performed by: FAMILY MEDICINE

## 2018-11-23 PROCEDURE — 97116 GAIT TRAINING THERAPY: CPT | Mod: GP | Performed by: PHYSICAL THERAPIST

## 2018-11-23 PROCEDURE — 94660 CPAP INITIATION&MGMT: CPT

## 2018-11-23 PROCEDURE — 36415 COLL VENOUS BLD VENIPUNCTURE: CPT | Performed by: FAMILY MEDICINE

## 2018-11-23 PROCEDURE — 94640 AIRWAY INHALATION TREATMENT: CPT | Mod: 76

## 2018-11-23 PROCEDURE — 99233 SBSQ HOSP IP/OBS HIGH 50: CPT | Performed by: FAMILY MEDICINE

## 2018-11-23 PROCEDURE — 12000011 ZZH R&B MS OVERFLOW

## 2018-11-23 PROCEDURE — 25000125 ZZHC RX 250: Performed by: PHYSICIAN ASSISTANT

## 2018-11-23 PROCEDURE — 83036 HEMOGLOBIN GLYCOSYLATED A1C: CPT | Performed by: FAMILY MEDICINE

## 2018-11-23 PROCEDURE — 94640 AIRWAY INHALATION TREATMENT: CPT

## 2018-11-23 PROCEDURE — 71046 X-RAY EXAM CHEST 2 VIEWS: CPT

## 2018-11-23 PROCEDURE — 80053 COMPREHEN METABOLIC PANEL: CPT | Performed by: FAMILY MEDICINE

## 2018-11-23 PROCEDURE — 97110 THERAPEUTIC EXERCISES: CPT | Mod: GP | Performed by: PHYSICAL THERAPIST

## 2018-11-23 PROCEDURE — 85027 COMPLETE CBC AUTOMATED: CPT | Performed by: FAMILY MEDICINE

## 2018-11-23 PROCEDURE — 40000275 ZZH STATISTIC RCP TIME EA 10 MIN

## 2018-11-23 PROCEDURE — 25000128 H RX IP 250 OP 636: Performed by: PHYSICIAN ASSISTANT

## 2018-11-23 PROCEDURE — 00000146 ZZHCL STATISTIC GLUCOSE BY METER IP

## 2018-11-23 PROCEDURE — 40000193 ZZH STATISTIC PT WARD VISIT: Performed by: PHYSICAL THERAPIST

## 2018-11-23 RX ORDER — LITHIUM CARBONATE 300 MG/1
600 CAPSULE ORAL EVERY EVENING
Status: DISCONTINUED | OUTPATIENT
Start: 2018-11-23 | End: 2018-11-25 | Stop reason: HOSPADM

## 2018-11-23 RX ORDER — LITHIUM CARBONATE 300 MG/1
300 CAPSULE ORAL
Status: DISCONTINUED | OUTPATIENT
Start: 2018-11-24 | End: 2018-11-25 | Stop reason: HOSPADM

## 2018-11-23 RX ORDER — GABAPENTIN 400 MG/1
400 CAPSULE ORAL 3 TIMES DAILY
Status: DISCONTINUED | OUTPATIENT
Start: 2018-11-23 | End: 2018-11-23

## 2018-11-23 RX ADMIN — ESCITALOPRAM OXALATE 20 MG: 20 TABLET ORAL at 21:58

## 2018-11-23 RX ADMIN — BRIMONIDINE TARTRATE 1 DROP: 2 SOLUTION OPHTHALMIC at 20:19

## 2018-11-23 RX ADMIN — IPRATROPIUM BROMIDE AND ALBUTEROL SULFATE 3 ML: .5; 3 SOLUTION RESPIRATORY (INHALATION) at 20:06

## 2018-11-23 RX ADMIN — PANTOPRAZOLE SODIUM 40 MG: 40 TABLET, DELAYED RELEASE ORAL at 06:31

## 2018-11-23 RX ADMIN — PREDNISONE 40 MG: 20 TABLET ORAL at 08:44

## 2018-11-23 RX ADMIN — GABAPENTIN 400 MG: 400 CAPSULE ORAL at 15:00

## 2018-11-23 RX ADMIN — LIOTHYRONINE SODIUM 25 MCG: 25 TABLET ORAL at 08:45

## 2018-11-23 RX ADMIN — PERPHENAZINE 4 MG: 2 TABLET, FILM COATED ORAL at 20:19

## 2018-11-23 RX ADMIN — IPRATROPIUM BROMIDE AND ALBUTEROL SULFATE 3 ML: .5; 3 SOLUTION RESPIRATORY (INHALATION) at 12:49

## 2018-11-23 RX ADMIN — GABAPENTIN 400 MG: 400 CAPSULE ORAL at 20:16

## 2018-11-23 RX ADMIN — CYCLOBENZAPRINE HYDROCHLORIDE 5 MG: 5 TABLET, FILM COATED ORAL at 15:04

## 2018-11-23 RX ADMIN — PERPHENAZINE 4 MG: 2 TABLET, FILM COATED ORAL at 08:46

## 2018-11-23 RX ADMIN — GUAIFENESIN AND DEXTROMETHORPHAN 5 ML: 100; 10 SYRUP ORAL at 22:04

## 2018-11-23 RX ADMIN — LITHIUM CARBONATE 600 MG: 300 CAPSULE, GELATIN COATED ORAL at 17:23

## 2018-11-23 RX ADMIN — PROPRANOLOL HYDROCHLORIDE 10 MG: 10 TABLET ORAL at 20:17

## 2018-11-23 RX ADMIN — PROPRANOLOL HYDROCHLORIDE 10 MG: 10 TABLET ORAL at 08:45

## 2018-11-23 RX ADMIN — DOCUSATE SODIUM 100 MG: 100 CAPSULE, LIQUID FILLED ORAL at 21:59

## 2018-11-23 RX ADMIN — PALIPERIDONE 6 MG: 6 TABLET, EXTENDED RELEASE ORAL at 08:44

## 2018-11-23 RX ADMIN — AZITHROMYCIN MONOHYDRATE 250 MG: 250 TABLET ORAL at 08:44

## 2018-11-23 RX ADMIN — ZIPRASIDONE HYDROCHLORIDE 40 MG: 20 CAPSULE ORAL at 08:44

## 2018-11-23 RX ADMIN — CARIPRAZINE 1.5 MG: 1.5 CAPSULE, GELATIN COATED ORAL at 08:44

## 2018-11-23 RX ADMIN — ZIPRASIDONE HCL 160 MG: 80 CAPSULE ORAL at 22:00

## 2018-11-23 RX ADMIN — ENOXAPARIN SODIUM 40 MG: 40 INJECTION SUBCUTANEOUS at 20:16

## 2018-11-23 RX ADMIN — BUDESONIDE 0.5 MG: 0.5 INHALANT RESPIRATORY (INHALATION) at 08:11

## 2018-11-23 RX ADMIN — BRIMONIDINE TARTRATE 1 DROP: 2 SOLUTION OPHTHALMIC at 08:46

## 2018-11-23 RX ADMIN — LORAZEPAM 0.5 MG: 0.5 TABLET ORAL at 20:27

## 2018-11-23 RX ADMIN — LAMOTRIGINE 200 MG: 200 TABLET ORAL at 21:59

## 2018-11-23 RX ADMIN — TRAVOPROST 1 DROP: 0.04 SOLUTION/ DROPS OPHTHALMIC at 21:58

## 2018-11-23 RX ADMIN — GUAIFENESIN AND DEXTROMETHORPHAN 5 ML: 100; 10 SYRUP ORAL at 17:39

## 2018-11-23 RX ADMIN — BUDESONIDE 0.5 MG: 0.5 INHALANT RESPIRATORY (INHALATION) at 20:06

## 2018-11-23 RX ADMIN — IPRATROPIUM BROMIDE AND ALBUTEROL SULFATE 3 ML: .5; 3 SOLUTION RESPIRATORY (INHALATION) at 08:09

## 2018-11-23 RX ADMIN — IPRATROPIUM BROMIDE AND ALBUTEROL SULFATE 3 ML: .5; 3 SOLUTION RESPIRATORY (INHALATION) at 16:00

## 2018-11-23 RX ADMIN — SODIUM CHLORIDE: 9 INJECTION, SOLUTION INTRAVENOUS at 00:14

## 2018-11-23 RX ADMIN — POTASSIUM CHLORIDE 10 MEQ: 750 TABLET, FILM COATED, EXTENDED RELEASE ORAL at 08:44

## 2018-11-23 RX ADMIN — FUROSEMIDE 40 MG: 40 TABLET ORAL at 08:44

## 2018-11-23 ASSESSMENT — ENCOUNTER SYMPTOMS
WEAKNESS: 0
TROUBLE SWALLOWING: 0
VOMITING: 0
NUMBNESS: 0
HEADACHES: 0
ABDOMINAL PAIN: 0
SORE THROAT: 0
FEVER: 0
APPETITE CHANGE: 1
NECK PAIN: 0
LIGHT-HEADEDNESS: 0
BACK PAIN: 0
CHILLS: 0
DYSURIA: 0
NAUSEA: 0
DIZZINESS: 0
BRUISES/BLEEDS EASILY: 0
CONFUSION: 0

## 2018-11-23 ASSESSMENT — ACTIVITIES OF DAILY LIVING (ADL)
ADLS_ACUITY_SCORE: 24
ADLS_ACUITY_SCORE: 23
ADLS_ACUITY_SCORE: 23
ADLS_ACUITY_SCORE: 24
ADLS_ACUITY_SCORE: 24
ADLS_ACUITY_SCORE: 23

## 2018-11-23 NOTE — PROGRESS NOTES
Discharge Planner   Discharge Plans in progress: ([place]) [facility] (Phone: [phone] Fax: [phone])  Barriers to discharge plan: Medical stability  Follow up plan: TCU       Entered by: Jazmin Lindquist 11/23/2018 10:46 AM

## 2018-11-23 NOTE — PROGRESS NOTES
"SUBJECTIVE:   More awake last lila, a little more sleepy this AM but not like the obtunded status of yest AM.   Some cough,  non-producive,   Not SOB now.    Was on BIPAP all night and CO2 much better.       ROS:4 point ROS including Respiratory, CV, GI and , other than that noted in the HPI,  is negative     OBJECTIVE:   /77 (BP Location: Right arm)  Pulse 82  Temp 98.7  F (37.1  C) (Oral)  Resp 14  Ht 1.626 m (5' 4\")  Wt 117 kg (257 lb 15 oz)  SpO2 98%  BMI 44.27 kg/m2    GENERAL APPEARANCE:  Still sleepy but answers all questions.      RESP:diffuse wheezes      CV: regular rate and rhythm,  No  murmur , edema: none       Abdomen: soft, nontender, no liver or spleen enlargement, no masses, BSs normal   Skin: no cyanosis, pallor, or jaundice    CMP  Recent Labs  Lab 11/23/18  0512 11/22/18  0519 11/21/18  1349   * 144 142   POTASSIUM 3.6 4.5 3.9   CHLORIDE 109 106 105   CO2 31 34* 34*   ANIONGAP 6 4 3   GLC 93 125* 97   BUN 23 19 15   CR 1.32* 1.22* 1.32*   GFRESTIMATED 42* 46* 42*   GFRESTBLACK 51* 56* 51*   GAURAV 7.8* 8.4* 8.2*   PROTTOTAL 6.3*  --  7.4   ALBUMIN 2.8*  --  3.1*   BILITOTAL 0.2  --  0.2   ALKPHOS 75  --  96   AST 15  --  21   ALT 25  --  36     CBC  Recent Labs  Lab 11/23/18  0632 11/22/18  0519 11/21/18  1349   WBC 8.6 9.9 8.9   RBC 3.46* 3.66* 3.83   HGB 11.5* 12.0 12.5   HCT 35.9 37.5 39.4   * 103* 103*   MCH 33.2* 32.8 32.6   MCHC 32.0 32.0 31.7   RDW 12.0 11.8 12.1    223 199     INRNo lab results found in last 7 days.  Arterial BloodGas    Recent Labs  Lab 11/23/18  0632 11/22/18  1159 11/22/18  0825 11/22/18  0519   O2PER 32 25 28 34%      Venous Blood Gas    Recent Labs  Lab 11/23/18  0632 11/22/18  1159 11/22/18  0825 11/22/18  0519   PHV 7.37 7.39 7.32 7.30*   PCO2V 58* 57* 70* 70*   PO2V 51* 48* 30 61*   HCO3V 34* 35* 36* 35*   VON 7.0 7.5 7.3 5.2   O2PER 32 25 28 34%     Lithium= 0.65      Medications     azithromycin  250 mg Oral Daily     brimonidine  " 1 drop Both Eyes BID     budesonide  0.5 mg Nebulization BID     cariprazine  1.5 mg Oral Daily     docusate sodium  100 mg Oral At Bedtime     enoxaparin  40 mg Subcutaneous Q24H     escitalopram  20 mg Oral At Bedtime     furosemide  40 mg Oral BID     gabapentin  400 mg Oral TID     ipratropium - albuterol 0.5 mg/2.5 mg/3 mL  3 mL Nebulization Q4H While awake     lamoTRIgine  200 mg Oral At Bedtime     liothyronine (CYTOMEL) tablet 25 mcg  25 mcg Oral Daily     paliperidone  6 mg Oral QAM     pantoprazole  40 mg Oral QAM AC     perphenazine  4 mg Oral BID     potassium chloride  10 mEq Oral Daily     predniSONE  40 mg Oral Daily     propranolol (INDERAL) tablet 10 mg  10 mg Oral BID     sodium chloride (PF)  3 mL Intracatheter Q8H     travoprost (NICHO Free)  1 drop Both Eyes At Bedtime     ziprasidone  160 mg Oral At Bedtime     ziprasidone  40 mg Oral QAM       Intake/Output Summary (Last 24 hours) at 11/23/18 0747  Last data filed at 11/23/18 0600   Gross per 24 hour   Intake          3141.25 ml   Output             2300 ml   Net           841.25 ml         ASSESSMENT: PLAN:   Betsy Resendiz is a 53 year old female with a past medical history significant for schizoaffective disorder and bipolar disorder, COPD, chronic pain, GERD, urinary retention with chronic Abernathy, hypertension, chronic kidney disease, and anxiety who presents on 11/21/2018 with hypoxia and respiratory failure secondary to likely COPD exacerbation.          Acute respiratory failure with hypoxia and hypercapnia  Presents with O2 sats 88% on room air, improving with 2-3L O2. No O2 needs at baseline. Admit VBG with pH 7.33 / pCO2 70 / pO2 37 / bicarb 37. Profound wheezing and coughing on exam, most likely due to COPD exacerbation (see below). Not needing BiPAP at this point.  - CO2 much better on BIPAP.  Will try off and see if CO2 remains stable             COPD exacerbation  COPD managed with Pulmicort nebs, DuoNebs, and albuterol prior to  admission. Wheezing and coughing on exam. Admit chest x-ray normal other than benign granuloma in right lung. Afebrile, no leukocytosis. Patient was given nebs and 125 mg solumedrol in the emergency department.  -Nebs, Azith, prednisone, O2 supplement, better with BIPAP yesterday but will try off this AM.       DM w/o complication type II  Noted per problem list. Not on any diabetic medications per medication reconciliation. Admit glucose = 97.  -no elevated glucose on no meds, even now on prednisone.  Will check A1 c.    -no insulin for now.          Acute encephalopathy:   - more somnolent on morning after admission, wakens to voice but falls asleep immediately   -unclear if this is all her psychotropic meds, Lithium toxicity, or the elevated CO2.   - this was likely from lithium toxicity  - try off BIPAP and restart lithium slowly   - may also have been some Gabapentin toxicity with decreased GFR.  Held and may need to adjust dose.      Lithium toxicity  -level 1.95 , likely due to CHAPINCITO.   - level 0.65 after 24 hrs of no lithium.  GFR still <50 this AM .  Will discuss with pharmacy.        HTN (hypertension)  Lymphedema  Pressures reviewed, stable. Managed prior to admission with propranolol and lasix.  - Continue prior to admission lasix and propranolol with holding parameters          CHAPINCITO on CKD (chronic kidney disease) stage 3, GFR 30-59 ml/min  Admit creatinine 1.32 (baseline 1.0 - 1.2), GFR 42 (baseline 40-56). Stable.  - back down to 1.22, which is close to baseline  - but back up to 1.32 again after good hydration and treatment of her COPD.  May be her new normal.   -will follow GFR and adjust her lithium and goyo as needed.            Urinary retention  Ongoing problem, has had chronic Abernathy since about August per patient's report. Follows with urology at the [facility], is supposed to have upcoming cystoscopy and urodynamic testing.  - Continue Abernathy  - has positive UA but no clear evidence of infection.             Schizoaffective disorder, bipolar type (H)  JAVIER (generalized anxiety disorder)  Posttraumatic stress disorder  Stable mood upon admission. Managed prior to admission with Vraylar, Lexapro, Lamictal, lithium, Invega, perphenazine, Geodon, and prn Ativan.  - more somnolent here this AM.  Will hold her methocarbamol, but continue her other meds  - will need to adjust meds if kidney function stays            Chronic pain  Cervical neck pain with evidence of disc disease  Chronic and stable. Managed pat with gabapentin, acetaminophen, naproxen, cyclobenzaprine, and methocarbamol.  - holding methocarbamol for the increased sedation today  - she was tapered off narcotics in the last 2-3 months.            Gastroesophageal reflux disease without esophagitis  Chronic and stable. Managed prior to admission with Prevacid, continue.          Unspecified glaucoma  Managed prior to admission with brimonidine, continue.          SPIKE (obstructive sleep apnea)  Uses CPAP but did not bring it with her.   - Continue home CPAP with home settings if able          Tobacco use disorder  - smokes 1/2 PPD           Morbid obesity with BMI of 40.0-44.9, adult (H)  Contributes to comorbidity.          Fluids:IVF yest but will stop today.   Electrolytes: Monitor  Nutrition: consistent carbohydrate       DVT Prophylaxis: Pneumatic Compression Devices, lovenox  Code Status: Full Code - discussed directly with patient  Discussion:   Stop bipap and if CO2 stable, transfer to floor.   Adjust lithium dose with new reduced kidney function.

## 2018-11-23 NOTE — CONSULTS
Care Transition Initial Assessment - RN  Reason For Consult: discharge planning   Met with: Patient.    DATA   Principal Problem:    Acute respiratory failure with hypoxia and hypercapnia (H)  Active Problems:    Schizoaffective disorder, bipolar type (H)    Cervical neck pain with evidence of disc disease    Chronic pain    DM w/o complication type II (H)    Gastroesophageal reflux disease without esophagitis    Morbid obesity with BMI of 40.0-44.9, adult (H)    SPIKE (obstructive sleep apnea)    Posttraumatic stress disorder    Urinary retention    Schizoaffective disorder, unspecified type (H)    Tobacco use disorder    Asthma    Unspecified glaucoma    COPD (chronic obstructive pulmonary disease) (H)    JAVIER (generalized anxiety disorder)    HTN (hypertension)    COPD exacerbation (H)    CKD (chronic kidney disease) stage 3, GFR 30-59 ml/min (H)       Primary Care Clinic Name: [facility]  Primary Care MD Name: Elle Dunlap MD  Contact information and PCP information verified: Yes    ASSESSMENT  Cognitive Status: awake and oriented.  Resources List: Skilled Nursing Facility, Transitional Care   Lives With: alone, other (see comments) ([facility])  Living Arrangements: apartment   Description of Support System: Supportive, Involved   Who is your support system?: Facility resident(s)/Staff    Insurance Concerns: No Insurance issues identified    This writer met with pt, introduced self and role. Care Transitions is consulted for discharge planning. This writer discussed discharge planning and Medicare guidelines in regards to home care, TCU and LTC. Pt lives alone in an apartment in the community but is currently at [facility] (Phone: [phone] Fax: [phone]) TCU (bedhold). Patient is in agreement and has a goal of returning to the TCU upon discharge.  This writer sent [facility] (Phone: [phone] Fax: [phone]) updated patient  information via fax to [facility] for review and called the TCU floor nurse. TCU nurse stated the the patient has a bed-hold and can return this weekend if the patient doesn't have IV medications.   Sr linkage resources provided to the patient. This writer discussed with the patient the private pay costs for transportation, patient aware. Transportation will need to be arranged by [facility].  Patient is not able to receive clinical care coordination she has an [facility] PCP. No PAS needed (bed hold).  PLAN  Return to [facility] (Phone: [phone] Fax: [phone])         Jazmin Lindquist RN Care Coordinator  [facility] [phone]  [facility] [phone]

## 2018-11-23 NOTE — PLAN OF CARE
Problem: ARDS (Acute Resp Distress Syndrome) (Adult)  Goal: Signs and Symptoms of Listed Potential Problems Will be Absent, Minimized or Managed (ARDS)  Signs and symptoms of listed potential problems will be absent, minimized or managed by discharge/transition of care (reference ARDS (Acute Resp Distress Syndrome) (Adult) CPG).   Outcome: Improving  Patient awoke this am feeling refreshed , requested to be off Bipap now.  Saturations in the high 80's and low 90's did place on oxygen 2 LPM/ NC.  Requested to have usual Ativan, flexeril and Melatonin at bedtime for backpain.  Also Tylenol and Robitussin given as per her request.  Have not heard her cough thru the night.  In good spirits, did advance diet to Mod CHO.  Continues to have some wheezing but much improved from 24 hours ago.  Continue to monitor closely.

## 2018-11-23 NOTE — PLAN OF CARE
Problem: Patient Care Overview  Goal: Plan of Care/Patient Progress Review  Discharge Planner PT   Patient plan for discharge: TCU    Current status: Pt transfers sit <> supine with SBA  effortful, sit <> stand with SBA. Amb with RW and CGA 30 with slow fernanda  Pt on RA, spo2 91-93% at rest, 94% followign activity; participated w/ strengthening ex    Barriers to return to prior living situation: medical stability    Recommendations for discharge: return to TCU     Rationale for recommendations: see above       Entered by: Nikole Graham 11/23/2018 2:35 PM

## 2018-11-23 NOTE — PROGRESS NOTES
"Pt sat up in the chair for breakfast, transfers with walker and SBA. Pt was sleepy but oriented x 4 and able to engage in conversations this morning. Only had a couple bites of breakfast but states that pretty \"normal\" for her. Transported to xray at 0940 for chest xray via wheelchair with ICU RN. Washed up at bedside when she returned and then requested to lay down. Denies any pain at this time. Has a frequent, good/nonproductive cough. LS diminished with exp wheezes present, pt denies any SOB. Alarms activated and audible. Call light within reach.   "

## 2018-11-24 ENCOUNTER — APPOINTMENT (OUTPATIENT)
Dept: PHYSICAL THERAPY | Facility: CLINIC | Age: 53
DRG: 189 | End: 2018-11-24
Payer: COMMERCIAL

## 2018-11-24 LAB
ALBUMIN SERPL-MCNC: 2.9 G/DL (ref 3.4–5)
ALP SERPL-CCNC: 69 U/L (ref 40–150)
ALT SERPL W P-5'-P-CCNC: 24 U/L (ref 0–50)
ANION GAP SERPL CALCULATED.3IONS-SCNC: 4 MMOL/L (ref 3–14)
AST SERPL W P-5'-P-CCNC: 11 U/L (ref 0–45)
BASE EXCESS BLDV CALC-SCNC: 8.3 MMOL/L
BILIRUB SERPL-MCNC: 0.2 MG/DL (ref 0.2–1.3)
BUN SERPL-MCNC: 22 MG/DL (ref 7–30)
CALCIUM SERPL-MCNC: 7.9 MG/DL (ref 8.5–10.1)
CHLORIDE SERPL-SCNC: 106 MMOL/L (ref 94–109)
CO2 SERPL-SCNC: 36 MMOL/L (ref 20–32)
CREAT SERPL-MCNC: 1.36 MG/DL (ref 0.52–1.04)
ERYTHROCYTE [DISTWIDTH] IN BLOOD BY AUTOMATED COUNT: 12 % (ref 10–15)
GFR SERPL CREATININE-BSD FRML MDRD: 41 ML/MIN/1.7M2
GLUCOSE SERPL-MCNC: 85 MG/DL (ref 70–99)
HCO3 BLDV-SCNC: 35 MMOL/L (ref 21–28)
HCT VFR BLD AUTO: 37.9 % (ref 35–47)
HGB BLD-MCNC: 12 G/DL (ref 11.7–15.7)
LITHIUM SERPL-SCNC: 0.71 MMOL/L (ref 0.6–1.2)
MCH RBC QN AUTO: 31.9 PG (ref 26.5–33)
MCHC RBC AUTO-ENTMCNC: 31.7 G/DL (ref 31.5–36.5)
MCV RBC AUTO: 101 FL (ref 78–100)
O2/TOTAL GAS SETTING VFR VENT: ABNORMAL %
PCO2 BLDV: 56 MM HG (ref 40–50)
PH BLDV: 7.4 PH (ref 7.32–7.43)
PLATELET # BLD AUTO: 198 10E9/L (ref 150–450)
PO2 BLDV: 40 MM HG (ref 25–47)
POTASSIUM SERPL-SCNC: 3.1 MMOL/L (ref 3.4–5.3)
POTASSIUM SERPL-SCNC: 3.9 MMOL/L (ref 3.4–5.3)
PROT SERPL-MCNC: 6.6 G/DL (ref 6.8–8.8)
RBC # BLD AUTO: 3.76 10E12/L (ref 3.8–5.2)
SODIUM SERPL-SCNC: 146 MMOL/L (ref 133–144)
WBC # BLD AUTO: 9.3 10E9/L (ref 4–11)

## 2018-11-24 PROCEDURE — 80178 ASSAY OF LITHIUM: CPT | Performed by: FAMILY MEDICINE

## 2018-11-24 PROCEDURE — 25000132 ZZH RX MED GY IP 250 OP 250 PS 637: Performed by: FAMILY MEDICINE

## 2018-11-24 PROCEDURE — 94640 AIRWAY INHALATION TREATMENT: CPT

## 2018-11-24 PROCEDURE — 25000132 ZZH RX MED GY IP 250 OP 250 PS 637: Performed by: PHYSICIAN ASSISTANT

## 2018-11-24 PROCEDURE — 82803 BLOOD GASES ANY COMBINATION: CPT | Performed by: FAMILY MEDICINE

## 2018-11-24 PROCEDURE — 40000193 ZZH STATISTIC PT WARD VISIT

## 2018-11-24 PROCEDURE — 97110 THERAPEUTIC EXERCISES: CPT | Mod: GP

## 2018-11-24 PROCEDURE — 85027 COMPLETE CBC AUTOMATED: CPT | Performed by: FAMILY MEDICINE

## 2018-11-24 PROCEDURE — 84132 ASSAY OF SERUM POTASSIUM: CPT | Performed by: FAMILY MEDICINE

## 2018-11-24 PROCEDURE — 97116 GAIT TRAINING THERAPY: CPT | Mod: GP

## 2018-11-24 PROCEDURE — 99233 SBSQ HOSP IP/OBS HIGH 50: CPT | Performed by: FAMILY MEDICINE

## 2018-11-24 PROCEDURE — 25000128 H RX IP 250 OP 636: Performed by: PHYSICIAN ASSISTANT

## 2018-11-24 PROCEDURE — 94640 AIRWAY INHALATION TREATMENT: CPT | Mod: 76

## 2018-11-24 PROCEDURE — 12000000 ZZH R&B MED SURG/OB

## 2018-11-24 PROCEDURE — 25000125 ZZHC RX 250: Performed by: PHYSICIAN ASSISTANT

## 2018-11-24 PROCEDURE — 36415 COLL VENOUS BLD VENIPUNCTURE: CPT | Performed by: FAMILY MEDICINE

## 2018-11-24 PROCEDURE — 80053 COMPREHEN METABOLIC PANEL: CPT | Performed by: FAMILY MEDICINE

## 2018-11-24 RX ORDER — POTASSIUM CL/LIDO/0.9 % NACL 10MEQ/0.1L
10 INTRAVENOUS SOLUTION, PIGGYBACK (ML) INTRAVENOUS
Status: DISCONTINUED | OUTPATIENT
Start: 2018-11-24 | End: 2018-11-25 | Stop reason: HOSPADM

## 2018-11-24 RX ORDER — POTASSIUM CHLORIDE 1.5 G/1.58G
20-40 POWDER, FOR SOLUTION ORAL
Status: DISCONTINUED | OUTPATIENT
Start: 2018-11-24 | End: 2018-11-25 | Stop reason: HOSPADM

## 2018-11-24 RX ORDER — POTASSIUM CHLORIDE 29.8 MG/ML
20 INJECTION INTRAVENOUS
Status: DISCONTINUED | OUTPATIENT
Start: 2018-11-24 | End: 2018-11-24 | Stop reason: CLARIF

## 2018-11-24 RX ORDER — POTASSIUM CHLORIDE 7.45 MG/ML
10 INJECTION INTRAVENOUS
Status: DISCONTINUED | OUTPATIENT
Start: 2018-11-24 | End: 2018-11-25 | Stop reason: HOSPADM

## 2018-11-24 RX ORDER — LIDOCAINE 4 G/G
2 PATCH TOPICAL
Status: DISCONTINUED | OUTPATIENT
Start: 2018-11-24 | End: 2018-11-25 | Stop reason: HOSPADM

## 2018-11-24 RX ORDER — POTASSIUM CHLORIDE 1500 MG/1
20-40 TABLET, EXTENDED RELEASE ORAL
Status: DISCONTINUED | OUTPATIENT
Start: 2018-11-24 | End: 2018-11-25 | Stop reason: HOSPADM

## 2018-11-24 RX ADMIN — LITHIUM CARBONATE 300 MG: 300 CAPSULE, GELATIN COATED ORAL at 08:37

## 2018-11-24 RX ADMIN — GUAIFENESIN AND DEXTROMETHORPHAN 5 ML: 100; 10 SYRUP ORAL at 06:07

## 2018-11-24 RX ADMIN — LIDOCAINE 2 PATCH: 560 PATCH PERCUTANEOUS; TOPICAL; TRANSDERMAL at 09:39

## 2018-11-24 RX ADMIN — IPRATROPIUM BROMIDE AND ALBUTEROL SULFATE 3 ML: .5; 3 SOLUTION RESPIRATORY (INHALATION) at 19:34

## 2018-11-24 RX ADMIN — AZITHROMYCIN MONOHYDRATE 250 MG: 250 TABLET ORAL at 08:30

## 2018-11-24 RX ADMIN — PREDNISONE 40 MG: 20 TABLET ORAL at 08:30

## 2018-11-24 RX ADMIN — ACETAMINOPHEN 650 MG: 325 TABLET, FILM COATED ORAL at 21:05

## 2018-11-24 RX ADMIN — LITHIUM CARBONATE 600 MG: 300 CAPSULE, GELATIN COATED ORAL at 17:27

## 2018-11-24 RX ADMIN — ZIPRASIDONE HYDROCHLORIDE 40 MG: 20 CAPSULE ORAL at 08:33

## 2018-11-24 RX ADMIN — BUDESONIDE 0.5 MG: 0.5 INHALANT RESPIRATORY (INHALATION) at 19:35

## 2018-11-24 RX ADMIN — PALIPERIDONE 6 MG: 6 TABLET, EXTENDED RELEASE ORAL at 08:35

## 2018-11-24 RX ADMIN — PREGABALIN 150 MG: 100 CAPSULE ORAL at 20:15

## 2018-11-24 RX ADMIN — PERPHENAZINE 4 MG: 2 TABLET, FILM COATED ORAL at 20:20

## 2018-11-24 RX ADMIN — LAMOTRIGINE 200 MG: 200 TABLET ORAL at 22:15

## 2018-11-24 RX ADMIN — IPRATROPIUM BROMIDE AND ALBUTEROL SULFATE 3 ML: .5; 3 SOLUTION RESPIRATORY (INHALATION) at 23:10

## 2018-11-24 RX ADMIN — ESCITALOPRAM OXALATE 20 MG: 20 TABLET ORAL at 22:15

## 2018-11-24 RX ADMIN — BRIMONIDINE TARTRATE 1 DROP: 2 SOLUTION OPHTHALMIC at 20:14

## 2018-11-24 RX ADMIN — ENOXAPARIN SODIUM 40 MG: 40 INJECTION SUBCUTANEOUS at 20:14

## 2018-11-24 RX ADMIN — PROPRANOLOL HYDROCHLORIDE 10 MG: 10 TABLET ORAL at 09:38

## 2018-11-24 RX ADMIN — ZIPRASIDONE HCL 160 MG: 80 CAPSULE ORAL at 22:42

## 2018-11-24 RX ADMIN — IPRATROPIUM BROMIDE AND ALBUTEROL SULFATE 3 ML: .5; 3 SOLUTION RESPIRATORY (INHALATION) at 06:21

## 2018-11-24 RX ADMIN — IPRATROPIUM BROMIDE AND ALBUTEROL SULFATE 3 ML: .5; 3 SOLUTION RESPIRATORY (INHALATION) at 11:08

## 2018-11-24 RX ADMIN — POTASSIUM CHLORIDE 40 MEQ: 1500 TABLET, EXTENDED RELEASE ORAL at 08:30

## 2018-11-24 RX ADMIN — PREGABALIN 150 MG: 100 CAPSULE ORAL at 09:47

## 2018-11-24 RX ADMIN — PANTOPRAZOLE SODIUM 40 MG: 40 TABLET, DELAYED RELEASE ORAL at 06:07

## 2018-11-24 RX ADMIN — BRIMONIDINE TARTRATE 1 DROP: 2 SOLUTION OPHTHALMIC at 08:32

## 2018-11-24 RX ADMIN — IPRATROPIUM BROMIDE AND ALBUTEROL SULFATE 3 ML: .5; 3 SOLUTION RESPIRATORY (INHALATION) at 16:13

## 2018-11-24 RX ADMIN — POTASSIUM CHLORIDE 10 MEQ: 750 TABLET, FILM COATED, EXTENDED RELEASE ORAL at 08:30

## 2018-11-24 RX ADMIN — PERPHENAZINE 4 MG: 2 TABLET, FILM COATED ORAL at 08:35

## 2018-11-24 RX ADMIN — CARIPRAZINE 1.5 MG: 1.5 CAPSULE, GELATIN COATED ORAL at 08:37

## 2018-11-24 RX ADMIN — BUDESONIDE 0.5 MG: 0.5 INHALANT RESPIRATORY (INHALATION) at 06:22

## 2018-11-24 RX ADMIN — POTASSIUM CHLORIDE 20 MEQ: 1500 TABLET, EXTENDED RELEASE ORAL at 10:30

## 2018-11-24 RX ADMIN — LIOTHYRONINE SODIUM 25 MCG: 25 TABLET ORAL at 08:37

## 2018-11-24 RX ADMIN — TRAVOPROST 1 DROP: 0.04 SOLUTION/ DROPS OPHTHALMIC at 22:18

## 2018-11-24 RX ADMIN — PROPRANOLOL HYDROCHLORIDE 10 MG: 10 TABLET ORAL at 20:52

## 2018-11-24 RX ADMIN — DOCUSATE SODIUM 100 MG: 100 CAPSULE, LIQUID FILLED ORAL at 22:15

## 2018-11-24 RX ADMIN — GUAIFENESIN AND DEXTROMETHORPHAN 5 ML: 100; 10 SYRUP ORAL at 14:36

## 2018-11-24 RX ADMIN — GABAPENTIN 400 MG: 400 CAPSULE ORAL at 08:30

## 2018-11-24 ASSESSMENT — ACTIVITIES OF DAILY LIVING (ADL)
ADLS_ACUITY_SCORE: 23

## 2018-11-24 ASSESSMENT — PAIN DESCRIPTION - DESCRIPTORS: DESCRIPTORS: ACHING

## 2018-11-24 NOTE — PROGRESS NOTES
Congested cough with mild exp wheezes this morning, not bringing up much mucus. Has been on room air all night spot checked o2 sats 89-95%, RR wnl. Robitussin and neb given this morning.

## 2018-11-24 NOTE — PLAN OF CARE
"Problem: ARDS (Acute Resp Distress Syndrome) (Adult)  Goal: Signs and Symptoms of Listed Potential Problems Will be Absent, Minimized or Managed (ARDS)  Signs and symptoms of listed potential problems will be absent, minimized or managed by discharge/transition of care (reference ARDS (Acute Resp Distress Syndrome) (Adult) CPG).   Outcome: Improving  Denies SOA at rest and SAO2 has been low to mid 90's on room air. VSS, afebrile. Has congested fair unproductive cough. Relief of cough with robitussin DM po. States Relief of mid back pain \"from coughing\" per pt. after lidocaine patches applied.      "

## 2018-11-24 NOTE — PROGRESS NOTES
"SUBJECTIVE:   complains of back pain with cough.  About T10 bilaterally.   Still marked cough and wheezing.  Off O2  Talking a lot last lila     ROS:4 point ROS including Respiratory, CV, GI and , other than that noted in the HPI,  is negative     OBJECTIVE:   /67  Pulse 65  Temp 98.3  F (36.8  C) (Oral)  Resp 16  Ht 1.626 m (5' 4\")  Wt 114.8 kg (253 lb 1.4 oz)  SpO2 92%  BMI 43.44 kg/m2    GENERAL APPEARANCE:  Sleepy again but answers questions. Ox3      RESP:prolonged expiratory phase diffuse wheezes.      CV: regular rate and rhythm,  No  murmur , edema: 1+      Abdomen: soft, nontender, no liver or spleen enlargement, no masses, BSs normal   Skin: no cyanosis, pallor, or jaundice    CMP  Recent Labs  Lab 11/24/18  0453 11/23/18  0512 11/22/18  0519 11/21/18  1349   * 146* 144 142   POTASSIUM 3.1* 3.6 4.5 3.9   CHLORIDE 106 109 106 105   CO2 36* 31 34* 34*   ANIONGAP 4 6 4 3   GLC 85 93 125* 97   BUN 22 23 19 15   CR 1.36* 1.32* 1.22* 1.32*   GFRESTIMATED 41* 42* 46* 42*   GFRESTBLACK 50* 51* 56* 51*   GAURAV 7.9* 7.8* 8.4* 8.2*   PROTTOTAL 6.6* 6.3*  --  7.4   ALBUMIN 2.9* 2.8*  --  3.1*   BILITOTAL 0.2 0.2  --  0.2   ALKPHOS 69 75  --  96   AST 11 15  --  21   ALT 24 25  --  36     CBC  Recent Labs  Lab 11/24/18  0453 11/23/18  0632 11/22/18  0519 11/21/18  1349   WBC 9.3 8.6 9.9 8.9   RBC 3.76* 3.46* 3.66* 3.83   HGB 12.0 11.5* 12.0 12.5   HCT 37.9 35.9 37.5 39.4   * 104* 103* 103*   MCH 31.9 33.2* 32.8 32.6   MCHC 31.7 32.0 32.0 31.7   RDW 12.0 12.0 11.8 12.1    167 223 199     INRNo lab results found in last 7 days.  Arterial BloodGas    Recent Labs  Lab 11/24/18  0453 11/23/18  1115 11/23/18  0632 11/22/18  1159   O2PER 21% 28% 32 25      Venous Blood Gas    Recent Labs  Lab 11/24/18  0453 11/23/18  1115 11/23/18  0632 11/22/18  1159   PHV 7.40 7.39 7.37 7.39   PCO2V 56* 55* 58* 57*   PO2V 40 63* 51* 48*   HCO3V 35* 34* 34* 35*   VON 8.3 7.1 7.0 7.5   O2PER 21% 28% 32 25 "     Lithium 0.72  Medications     azithromycin  250 mg Oral Daily     brimonidine  1 drop Both Eyes BID     budesonide  0.5 mg Nebulization BID     cariprazine  1.5 mg Oral Daily     docusate sodium  100 mg Oral At Bedtime     enoxaparin  40 mg Subcutaneous Q24H     escitalopram  20 mg Oral At Bedtime     ipratropium - albuterol 0.5 mg/2.5 mg/3 mL  3 mL Nebulization Q4H While awake     lamoTRIgine  200 mg Oral At Bedtime     lidocaine  2 patch Transdermal Q24H     lidocaine   Transdermal Q8H     lidocaine   Transdermal Q24h     liothyronine (CYTOMEL) tablet 25 mcg  25 mcg Oral Daily     lithium  300 mg Oral Daily with breakfast     lithium  600 mg Oral QPM     paliperidone  6 mg Oral QAM     pantoprazole  40 mg Oral QAM AC     perphenazine  4 mg Oral BID     potassium chloride  10 mEq Oral Daily     predniSONE  40 mg Oral Daily     pregabalin  150 mg Oral BID     propranolol (INDERAL) tablet 10 mg  10 mg Oral BID     sodium chloride (PF)  3 mL Intracatheter Q8H     travoprost (NICHO Free)  1 drop Both Eyes At Bedtime     ziprasidone  160 mg Oral At Bedtime     ziprasidone  40 mg Oral QAM       Intake/Output Summary (Last 24 hours) at 11/24/18 0847  Last data filed at 11/23/18 2200   Gross per 24 hour   Intake              685 ml   Output             2975 ml   Net            -2290 ml       ASSESSMENT: PLAN:   ASSESSMENT: PLAN:   Betsy Resendiz is a 53 year old female with a past medical history significant for schizoaffective disorder and bipolar disorder, COPD, chronic pain, GERD, urinary retention with chronic Abernathy, hypertension, chronic kidney disease, and anxiety who presents on 11/21/2018 with hypoxia and respiratory failure secondary to likely COPD exacerbation.          Acute respiratory failure with hypoxia and hypercapnia  Presents with O2 sats 88% on room air, improving with 2-3L O2. No O2 needs at baseline. Admit VBG with pH 7.33 / pCO2 70 / pO2 37 / bicarb 37. Profound wheezing and coughing on exam, most  likely due to COPD exacerbation (see below). Not needing BiPAP at this point.  - CO2 much better on BIPAP.  Will try off and see if CO2 remains stable     - baseline CO2 appears to be around 56.  So she has some chronic hypercapnic resp failure   - still marked cough and wheeze.  Will watch another day.            COPD exacerbation  COPD managed with Pulmicort nebs, DuoNebs, and albuterol prior to admission. Wheezing and coughing on exam. Admit chest x-ray normal other than benign granuloma in right lung. Afebrile, no leukocytosis. Patient was given nebs and 125 mg solumedrol in the emergency department.  -Nebs, Azith, prednisone, O2 supplement, better with BIPAP yesterday but will try off this AM.   -still marked wheezing.  continue nebs, steroids, Abx.       DM w/o complication type II  Noted per problem list. Not on any diabetic medications per medication reconciliation. Admit glucose = 97.  -no elevated glucose on no meds, even now on prednisone.  Will check A1 c.    -no insulin for now.   -A1c 5.4.  Not sure she really has DM or if she is just at risk due to psychotropics.            Acute encephalopathy:   - more somnolent on morning after admission, wakens to voice but falls asleep immediately   -unclear if this is all her psychotropic meds, Lithium toxicity, or the elevated CO2.   - this was likely from lithium toxicity  - try off BIPAP and restart lithium slowly   - may also have been some Gabapentin toxicity with decreased GFR.  Held and may need to adjust dose.   - appears resolved.       Lithium toxicity  -level 1.95 , likely due to CHAPINCITO.   - level 0.65 after 24 hrs of no lithium.  GFR still <50 this AM .  Will discuss with pharmacy.    - back on her previous doses and level 0.72, so continue for now even with new decreased GFR.       HTN (hypertension)  Lymphedema  Pressures reviewed, stable. Managed prior to admission with propranolol and lasix.  - Continue prior to admission lasix and propranolol with  holding parameters          CHAPINCITO on CKD (chronic kidney disease) stage 3, GFR 30-59 ml/min  Admit creatinine 1.32 (baseline 1.0 - 1.2), GFR 42 (baseline 40-56). Stable.  - back down to 1.22, which is close to baseline  - but back up to 1.32 again after good hydration and treatment of her COPD.  May be her new normal.   -will follow GFR and adjust her lithium and goyo as needed.    - seems to be stabilizing at 1.35.  This may be her new normal           Urinary retention  Ongoing problem, has had chronic Abernathy since about August per patient's report. [facility] , is supposed to have upcoming cystoscopy and urodynamic testing.  - Continue Abernathy  - has positive UA but no clear evidence of infection.            Schizoaffective disorder, bipolar type (H)  JAVIER (generalized anxiety disorder)  Posttraumatic stress disorder  Stable mood upon admission. Managed prior to admission with Vraylar, Lexapro, Lamictal, lithium, Invega, perphenazine, Geodon, and prn Ativan.  - more somnolent here this AM.  Will hold her methocarbamol, but continue her other meds  - will need to adjust meds if kidney function stays            Chronic pain  Cervical neck pain with evidence of disc disease  Chronic and stable. Managed pat with gabapentin, acetaminophen, naproxen, cyclobenzaprine, and methocarbamol.  - holding methocarbamol for the increased sedation today  - she was tapered off narcotics in the last 2-3 months.    - we have kept her off he methocarbamol.  (not a very effective muscle relaxant but very sedating) Because of new CKD will stop gabapentin and just stay on the pregabalin.            Gastroesophageal reflux disease without esophagitis  Chronic and stable. Managed prior to admission with Prevacid, continue.          Unspecified glaucoma  Managed prior to admission with brimonidine, continue.          SPIKE (obstructive sleep apnea)  Uses CPAP but did not bring it with her.   - Continue home CPAP with home  settings if able          Tobacco use disorder  - smokes 1/2 PPD           Morbid obesity with BMI of 40.0-44.9, adult (H)  Contributes to comorbidity.          Fluids:IVF yest but will stop today.   Electrolytes: Monitor  Nutrition: consistent carbohydrate       DVT Prophylaxis: Pneumatic Compression Devices, lovenox  Code Status: Full Code - discussed directly with patient  Discussion:   Watch one more day.  Back to LTC in AM.

## 2018-11-24 NOTE — PROGRESS NOTES
Patient transferred up from ICU. Received report from Magaly HAMILTON. Skin assessment completed by this writer and Marylou HAMILTON. Has mepilex on left glut fold, that is clean, dry and intact.

## 2018-11-24 NOTE — PROGRESS NOTES
Patient wheezy after neb treatment, requests Ativan for anxiety. Med given, wheezing stopped a short time after. Pt continues with an intermittent loose cough. O2 sat on RA is 91%, RR 18. Monitor.

## 2018-11-24 NOTE — PROGRESS NOTES
Report to Winston HAMILTON and she accepted care. Pt left via W/C for transfer to /[facility] with NST and had N/C's at time of transfer.

## 2018-11-24 NOTE — PROGRESS NOTES
Skin affirmation note    Transfer nurse completed full skin assessment, Theron score and Theron interventions. This writer agrees with the initial skin assessment findings.

## 2018-11-24 NOTE — PROGRESS NOTES
Discharge Planner   Discharge Plans in progress: ([facility]) [facility] for 11/25/2018 (updated Betty)  Barriers to discharge plan: Medical stability  Follow up plan: PCP, TCU       Entered by: Jazmin Lindquist 11/24/2018 10:22 AM

## 2018-11-24 NOTE — PLAN OF CARE
Problem: Patient Care Overview  Goal: Plan of Care/Patient Progress Review  Outcome: Improving  Problem: Patient Care Overview  Goal: Plan of Care/Patient Progress Review  Discharge Planner PT   Patient plan for discharge: TCU     Current status: Pt transfers sit <> supine with SBA, minimal effort;  sit <> stand with SBA. Amb with RW and CGA 90' with slow,steady fernanda  Pt on RA, spo2 91-93%; participated w/ strengthening ex, stretching     Barriers to return to prior living situation: medical stability     Recommendations for discharge: return to TCU      Rationale for recommendations: see above            Entered by: Alethea Wilkinson 11/24/2018 1:56 PM

## 2018-11-25 VITALS
SYSTOLIC BLOOD PRESSURE: 130 MMHG | OXYGEN SATURATION: 92 % | BODY MASS INDEX: 46.03 KG/M2 | TEMPERATURE: 98.2 F | HEART RATE: 59 BPM | WEIGHT: 269.62 LBS | RESPIRATION RATE: 14 BRPM | HEIGHT: 64 IN | DIASTOLIC BLOOD PRESSURE: 68 MMHG

## 2018-11-25 LAB
ALBUMIN SERPL-MCNC: 2.8 G/DL (ref 3.4–5)
ALP SERPL-CCNC: 70 U/L (ref 40–150)
ALT SERPL W P-5'-P-CCNC: 19 U/L (ref 0–50)
ANION GAP SERPL CALCULATED.3IONS-SCNC: 7 MMOL/L (ref 3–14)
AST SERPL W P-5'-P-CCNC: 14 U/L (ref 0–45)
BASE EXCESS BLDV CALC-SCNC: 5.9 MMOL/L
BILIRUB SERPL-MCNC: 0.3 MG/DL (ref 0.2–1.3)
BUN SERPL-MCNC: 21 MG/DL (ref 7–30)
CALCIUM SERPL-MCNC: 7.9 MG/DL (ref 8.5–10.1)
CHLORIDE SERPL-SCNC: 110 MMOL/L (ref 94–109)
CO2 SERPL-SCNC: 29 MMOL/L (ref 20–32)
CREAT SERPL-MCNC: 1.29 MG/DL (ref 0.52–1.04)
GFR SERPL CREATININE-BSD FRML MDRD: 43 ML/MIN/1.7M2
GLUCOSE SERPL-MCNC: 92 MG/DL (ref 70–99)
HCO3 BLDV-SCNC: 32 MMOL/L (ref 21–28)
LITHIUM SERPL-SCNC: 0.8 MMOL/L (ref 0.6–1.2)
O2/TOTAL GAS SETTING VFR VENT: 21 %
PCO2 BLDV: 52 MM HG (ref 40–50)
PH BLDV: 7.4 PH (ref 7.32–7.43)
PO2 BLDV: 47 MM HG (ref 25–47)
POTASSIUM SERPL-SCNC: 3.6 MMOL/L (ref 3.4–5.3)
PROT SERPL-MCNC: 6.3 G/DL (ref 6.8–8.8)
SODIUM SERPL-SCNC: 146 MMOL/L (ref 133–144)

## 2018-11-25 PROCEDURE — 40000275 ZZH STATISTIC RCP TIME EA 10 MIN

## 2018-11-25 PROCEDURE — 25000132 ZZH RX MED GY IP 250 OP 250 PS 637: Performed by: PHYSICIAN ASSISTANT

## 2018-11-25 PROCEDURE — 25000132 ZZH RX MED GY IP 250 OP 250 PS 637: Performed by: FAMILY MEDICINE

## 2018-11-25 PROCEDURE — 25000125 ZZHC RX 250: Performed by: PHYSICIAN ASSISTANT

## 2018-11-25 PROCEDURE — 80053 COMPREHEN METABOLIC PANEL: CPT | Performed by: FAMILY MEDICINE

## 2018-11-25 PROCEDURE — 99239 HOSP IP/OBS DSCHRG MGMT >30: CPT | Performed by: FAMILY MEDICINE

## 2018-11-25 PROCEDURE — 94640 AIRWAY INHALATION TREATMENT: CPT

## 2018-11-25 PROCEDURE — 80178 ASSAY OF LITHIUM: CPT | Performed by: FAMILY MEDICINE

## 2018-11-25 PROCEDURE — 36415 COLL VENOUS BLD VENIPUNCTURE: CPT | Performed by: FAMILY MEDICINE

## 2018-11-25 PROCEDURE — 82803 BLOOD GASES ANY COMBINATION: CPT | Performed by: FAMILY MEDICINE

## 2018-11-25 RX ORDER — PREDNISONE 20 MG/1
40 TABLET ORAL DAILY
Qty: 6 TABLET | Refills: 0 | Status: SHIPPED | OUTPATIENT
Start: 2018-11-25 | End: 2018-11-26

## 2018-11-25 RX ORDER — GUAIFENESIN/DEXTROMETHORPHAN 100-10MG/5
5 SYRUP ORAL EVERY 4 HOURS PRN
Qty: 560 ML | Refills: 0 | Status: SHIPPED | OUTPATIENT
Start: 2018-11-25 | End: 2019-02-04

## 2018-11-25 RX ORDER — LIDOCAINE 4 G/G
2 PATCH TOPICAL EVERY 24 HOURS
DISCHARGE
Start: 2018-11-25 | End: 2018-11-26

## 2018-11-25 RX ORDER — LORAZEPAM 0.5 MG/1
0.5 TABLET ORAL 2 TIMES DAILY
Qty: 60 TABLET | Refills: 0 | Status: SHIPPED | OUTPATIENT
Start: 2018-11-25 | End: 2018-11-26

## 2018-11-25 RX ADMIN — PREGABALIN 150 MG: 100 CAPSULE ORAL at 07:58

## 2018-11-25 RX ADMIN — PERPHENAZINE 4 MG: 2 TABLET, FILM COATED ORAL at 07:59

## 2018-11-25 RX ADMIN — PALIPERIDONE 6 MG: 6 TABLET, EXTENDED RELEASE ORAL at 07:59

## 2018-11-25 RX ADMIN — IPRATROPIUM BROMIDE AND ALBUTEROL SULFATE 3 ML: .5; 3 SOLUTION RESPIRATORY (INHALATION) at 07:40

## 2018-11-25 RX ADMIN — GUAIFENESIN AND DEXTROMETHORPHAN 5 ML: 100; 10 SYRUP ORAL at 00:08

## 2018-11-25 RX ADMIN — BUDESONIDE 0.5 MG: 0.5 INHALANT RESPIRATORY (INHALATION) at 07:41

## 2018-11-25 RX ADMIN — PANTOPRAZOLE SODIUM 40 MG: 40 TABLET, DELAYED RELEASE ORAL at 06:59

## 2018-11-25 RX ADMIN — ZIPRASIDONE HYDROCHLORIDE 40 MG: 20 CAPSULE ORAL at 07:59

## 2018-11-25 RX ADMIN — PROPRANOLOL HYDROCHLORIDE 10 MG: 10 TABLET ORAL at 07:59

## 2018-11-25 RX ADMIN — LITHIUM CARBONATE 300 MG: 300 CAPSULE, GELATIN COATED ORAL at 07:59

## 2018-11-25 RX ADMIN — AZITHROMYCIN MONOHYDRATE 250 MG: 250 TABLET ORAL at 07:58

## 2018-11-25 RX ADMIN — LIDOCAINE 2 PATCH: 560 PATCH PERCUTANEOUS; TOPICAL; TRANSDERMAL at 07:58

## 2018-11-25 RX ADMIN — BRIMONIDINE TARTRATE 1 DROP: 2 SOLUTION OPHTHALMIC at 08:00

## 2018-11-25 RX ADMIN — PREDNISONE 40 MG: 20 TABLET ORAL at 07:58

## 2018-11-25 RX ADMIN — LIOTHYRONINE SODIUM 25 MCG: 25 TABLET ORAL at 07:59

## 2018-11-25 RX ADMIN — CARIPRAZINE 1.5 MG: 1.5 CAPSULE, GELATIN COATED ORAL at 07:59

## 2018-11-25 RX ADMIN — POTASSIUM CHLORIDE 10 MEQ: 750 TABLET, FILM COATED, EXTENDED RELEASE ORAL at 07:58

## 2018-11-25 ASSESSMENT — ACTIVITIES OF DAILY LIVING (ADL)
ADLS_ACUITY_SCORE: 23

## 2018-11-25 NOTE — DISCHARGE SUMMARY
[facility] Hospitalist Discharge Summary    Betsy Resendiz MRN# [id number]   Age: 53 year old YOB: 1965     Date of Admission:  11/21/2018  Date of Discharge::  11/25/2018 10:38 AM  Admitting Physician:  Jj Shetty MD  Discharge Physician:  Escobar Boland MD  Primary Physician: Elle Dunlap       Home clinic:                Discharge Diagnosis:   Principle diagnosis: acute hypercapnic resp failure due to COPD exacerbation      Secondary diagnoses:  Acute encephalopathy due to lithium toxicity  CHAPINCITO on CKD  BIpolar disease/schizoaffective disorder.    Chronic urinary retention   DM2     Discharge Instructions:   For the COD  -prednisone 40 mg daily for 3 more days-  - continue the nebs 4 times/day     For the kidney disease  -seems to be a new loss of kidney function which did not improve after 4 days here  -need to stop the napoxen  -would stop the gabapentin, mostly because it seems duplicative with the pregabalin, but also it tends to accummulate in the setting of kidney disease  -need to check lithium level more often as kidneys start to decline    For the lithium toxicity  -would recheck lithium level in the next week  -would check lithium level any time she gets sick.    Would reconsider methocarbamol.  It is more sedating than an actual muscle relaxant.      Would recheck with MD in the next week.        Follow up with primary care provider in 7 days        Procedures:       Results for orders placed or performed during the hospital encounter of 11/21/18   Chest XR,  PA & LAT    Narrative    XR CHEST 2 VW 11/21/2018 2:09 PM    HISTORY: Short of breath.    COMPARISON: None.    FINDINGS: Benign granuloma in the right upper lung. No airspace  consolidation, pleural effusion or pneumothorax. Normal heart size.      Impression    IMPRESSION: No acute cardiopulmonary abnormality.    ANGELES PALOMINO MD   XR Chest 2 Views    Narrative    CHEST TWO VIEWS  11/23/2018 9:43 AM     HISTORY:  53-year-old woman with history of COPD.       Impression    IMPRESSION: Since November 21, 2018, heart size is normal. Minimal  left basilar opacity suggestive of atelectasis. No pleural effusion,  pneumothorax, or abnormal area of consolidation. Previously identified  bibasilar opacities slightly improved. Right upper lobe granuloma  unchanged.    LEONIE PENDLETON MD                    Allergies:      Allergies   Allergen Reactions     Codeine Shortness Of Breath     Has tolerated morphine 7/2008     Hmg-Coa-R Inhibitors Other (See Comments)     Patient declines  Patient declines     Lisinopril Cough     No Clinical Screening - See Comments      Statin-hmg-coa reductase inhibitors     Sulfa Drugs      Mouth sore     Sumatriptan Other (See Comments)                  Discharge Medications:     Current Discharge Medication List      START taking these medications    Details   guaiFENesin-dextromethorphan (ROBITUSSIN DM) 100-10 MG/5ML syrup Take 5 mLs by mouth every 4 hours as needed for cough  Qty: 560 mL, Refills: 0    Associated Diagnoses: COPD exacerbation (H)      predniSONE (DELTASONE) 20 MG tablet Take 2 tablets (40 mg) by mouth daily  Qty: 6 tablet, Refills: 0    Associated Diagnoses: COPD exacerbation (H)         CONTINUE these medications which have NOT CHANGED    Details   brimonidine (ALPHAGAN-P) 0.15 % ophthalmic solution Place 1 drop into both eyes 2 times daily      budesonide (PULMICORT) 0.5 MG/2ML neb solution Take 0.5 mg by nebulization 2 times daily      cariprazine (VRAYLAR) 1.5 MG CAPS capsule Take 2 mg by mouth daily       cholecalciferol (VITAMIN D3) 5000 units CAPS capsule Take 5,000 Units by mouth daily      CYCLOBENZAPRINE HCL PO Take 5 mg by mouth every 8 hours as needed       docusate sodium (STOOL SOFTENER) 100 MG capsule Take 100 mg by mouth At Bedtime       escitalopram (LEXAPRO) 20 MG tablet Take 1 tablet by mouth at bedtime      furosemide (LASIX) 80 MG tablet Take 40 mg by mouth 2 times  daily       ipratropium - albuterol 0.5 mg/2.5 mg/3 mL (DUONEB) 0.5-2.5 (3) MG/3ML neb solution Take 1 vial by nebulization 4 times daily      lamoTRIgine (LAMICTAL) 200 MG tablet Take 200 mg by mouth At Bedtime       LANsoprazole (PREVACID) 30 MG CR capsule Take 30 mg by mouth daily       LIOTHYRONINE SODIUM PO Take 25 mcg by mouth daily      lithium 300 MG capsule Take 1 capsule by mouth in the morning and 2 capsules at bedtime      loperamide (IMODIUM) 2 MG capsule Take 2 mg by mouth every 6 hours as needed for diarrhea      LORazepam (ATIVAN PO) Take 0.5 mg by mouth 2 times daily as needed for anxiety      METHOCARBAMOL PO Take 500 mg by mouth 3 times daily      OYSTER SHELL CALCIUM PO Take 500 mg by mouth 4 times daily      paliperidone (INVEGA) 6 MG 24 hr tablet Take 6 mg by mouth every morning      perphenazine 4 MG tablet Take 4 mg by mouth 2 times daily       POTASSIUM CHLORIDE ER PO 30meq by mouth two times a day      pregabalin (LYRICA) 150 MG capsule Take 150 mg by mouth 2 times daily       PROPRANOLOL HCL PO Take 10 mg by mouth 2 times daily      travoprost, BAK Free, (TRAVATAN Z) 0.004 % ophthalmic solution Place 1 drop into both eyes At Bedtime      !! ziprasidone (GEODON) 40 MG capsule Take 1 capsule by mouth in the morning      !! ziprasidone (GEODON) 80 MG capsule Take 160 mg by mouth At Bedtime       ACETAMINOPHEN PO Take 650 mg by mouth every 4 hours as needed for pain      albuterol (PROAIR HFA/PROVENTIL HFA/VENTOLIN HFA) 108 (90 BASE) MCG/ACT Inhaler Inhale 2 puffs into the lungs every 4 hours as needed       clotrimazole (LOTRIMIN) 1 % cream Apply topically 2 times daily       !! - Potential duplicate medications found. Please discuss with provider.      STOP taking these medications       CEPHALEXIN PO Comments:   Reason for Stopping:         gabapentin (NEURONTIN) 800 MG tablet Comments:   Reason for Stopping:         NAPROXEN PO Comments:   Reason for Stopping:                      Consultations:   None           Brief History of Presenting Illness:   Betsy Resendiz is a 53 year old female with the above past medical history now presents on 11/21/2018 with increased cough, wheeze, and shortness of breath. Symptoms started about 5 days prior to admission and have been progressively worsening since onset. Patient has profound wheezing that is temporarily improved with nebs. She has a cough that feels productive but she is unable to produce any sputum. She is feeling very short of breath even at rest, but is worse with any exertion.      She has difficulty laying flat, but this is not a new problem. She denies PND or worsening of her baseline edema. She denies fevers or chills. She lives in a nursing home so she may have ill exposures, but she has not had known contact with an ill person.      She has chest pain associated with her coughing, and her ribs and back are very sore. She takes shallow breaths due painful deep breathings. She denies palpitations.     She has a chronic Abernathy since August due to urinary retention. She is currently having outpatient work-up through urology at the [facility]. The remainder review of systems is negative.              Hospital Course:   Betsy Resendiz is a 53 year old female with a past medical history significant for schizoaffective disorder and bipolar disorder, COPD, chronic pain, GERD, urinary retention with chronic Abernathy, hypertension, chronic kidney disease, and anxiety who presents on 11/21/2018 with hypoxia and respiratory failure secondary to likely COPD exacerbation.          Acute respiratory failure with hypoxia and hypercapnia  Presents with O2 sats 88% on room air, improving with 2-3L O2. No O2 needs at baseline. Admit VBG with pH 7.33 / pCO2 70 / pO2 37 / bicarb 37. Profound wheezing and coughing on exam, most likely due to COPD exacerbation (see below). Not needing BiPAP at this point.  - CO2 much better on BIPAP.  Will try off and see if CO2  remains stable     - baseline CO2 appears to be around 56.  So she has some chronic hypercapnic resp failure   - still marked cough and wheeze.  Will watch another day.            COPD exacerbation  COPD managed with Pulmicort nebs, DuoNebs, and albuterol prior to admission. Wheezing and coughing on exam. Admit chest x-ray normal other than benign granuloma in right lung. Afebrile, no leukocytosis. Patient was given nebs and 125 mg solumedrol in the emergency department.  -Nebs, Azith, prednisone, O2 supplement, better with BIPAP yesterday but will try off this AM.   -still marked wheezing.  continue nebs, steroids, Abx.       DM w/o complication type II  Noted per problem list. Not on any diabetic medications per medication reconciliation. Admit glucose = 97.  -no elevated glucose on no meds, even now on prednisone.  Will check A1 c.    -no insulin for now.   -A1c 5.4.  Not sure she really has DM or if she is just at risk due to psychotropics.            Acute encephalopathy:   - more somnolent on morning after admission, wakens to voice but falls asleep immediately   -unclear if this is all her psychotropic meds, Lithium toxicity, or the elevated CO2.   - this was likely from lithium toxicity  - try off BIPAP and restart lithium slowly   - may also have been some Gabapentin toxicity with decreased GFR.  Held and may need to adjust dose.   - appears resolved.       Lithium toxicity  -level 1.95 , likely due to CHAPINCITO.   - level 0.65 after 24 hrs of no lithium.  GFR still <50 this AM .  Will discuss with pharmacy.    - back on her previous doses and level 0.72, so continue for now even with new decreased GFR.       HTN (hypertension)  Lymphedema  Pressures reviewed, stable. Managed prior to admission with propranolol and lasix.  - Continue prior to admission lasix and propranolol with holding parameters          CHAPINCITO on CKD (chronic kidney disease) stage 3, GFR 30-59 ml/min  Admit creatinine 1.32 (baseline 1.0 - 1.2),  GFR 42 (baseline 40-56). Stable.  - back down to 1.22, which is close to baseline  - but back up to 1.32 again after good hydration and treatment of her COPD.  May be her new normal.   -will follow GFR and adjust her lithium and goyo as needed.    - seems to be stabilizing at 1.35.  This may be her new normal           Urinary retention  Ongoing problem, has had chronic Abernathy since about August per patient's report. [facility] is supposed to have upcoming cystoscopy and urodynamic testing.  - Continue Abernathy  - has positive UA but no clear evidence of infection.            Schizoaffective disorder, bipolar type (H)  JAVIER (generalized anxiety disorder)  Posttraumatic stress disorder  Stable mood upon admission. Managed prior to admission with Vraylar, Lexapro, Lamictal, lithium, Invega, perphenazine, Geodon, and prn Ativan.  - more somnolent here this AM.  Will hold her methocarbamol, but continue her other meds  - will need to adjust meds if kidney function stays            Chronic pain  Cervical neck pain with evidence of disc disease  Chronic and stable. Managed pat with gabapentin, acetaminophen, naproxen, cyclobenzaprine, and methocarbamol.  - holding methocarbamol for the increased sedation today  - she was tapered off narcotics in the last 2-3 months.    - we have kept her off he methocarbamol.  (not a very effective muscle relaxant but very sedating) Because of new CKD will stop gabapentin and just stay on the pregabalin.            Gastroesophageal reflux disease without esophagitis  Chronic and stable. Managed prior to admission with Prevacid, continue.          Unspecified glaucoma  Managed prior to admission with brimonidine, continue.          SPIKE (obstructive sleep apnea)  Uses CPAP but did not bring it with her.   - Continue home CPAP with home settings if able          Tobacco use disorder  - smokes 1/2 PPD           Morbid obesity with BMI of 40.0-44.9, adult (H)  Contributes  "to comorbidity.                Discharge Exam:   OBJECTIVE:   /68 (BP Location: Right arm)  Pulse 59  Temp 98.2  F (36.8  C) (Oral)  Resp 14  Ht 1.626 m (5' 4\")  Wt 122.3 kg (269 lb 10 oz)  SpO2 92%  BMI 46.28 kg/m2    GENERAL APPEARANCE:  Alert, NAD      RESP:slightly prolonged expiratory phase and rare wheezes      CV: regular rate and rhythm,  No  murmur , edema: none       Abdomen: soft, nontender, no liver or spleen enlargement, no masses, BSs normal   Skin: no cyanosis, pallor, or jaundice             Pending Tests at Discharge:     Unresulted Labs Ordered in the Past 30 Days of this Admission     No orders found from 9/22/2018 to 11/22/2018.                   Discharge Disposition:   Discharged to nursing home      Attestation:  Amount of time performed on this discharge : 45 minutes.    Escobar Boland MD       "

## 2018-11-25 NOTE — DISCHARGE INSTRUCTIONS
For the COD  -prednisone 40 mg daily for 3 more days-  - continue the nebs 4 times/day     For the kidney disease  -seems to be a new loss of kidney function which did not improve after 4 days here  -need to stop the napoxen  -would stop the gabapentin, mostly because it seems duplicative with the pregabalin, but also it tends to accummulate in the setting of kidney disease  -need to check lithium level more often as kidneys start to decline    For the lithium toxicity  -would recheck lithium level in the next week  -would check lithium level any time she gets sick.    Would reconsider methocarbamol.  It is more sedating than an actual muscle relaxant.      Would recheck with MD in the next week.    FOLLOW UP APPT WITH DR. MARROQUIN @ [facility] ([phone])  11/28/18 @ 2:20PM

## 2018-11-25 NOTE — PROGRESS NOTES
[facility] DISCHARGE NOTE    Patient discharged to nursing home at 10:36 AM via wheel chair. Accompanied by other:Transport person and staff. Discharge instructions reviewed with nursing staff/Paola at [place], opportunity offered to ask questions. Prescriptions - None ordered for discharge. All belongings sent with patient.    Renae Coon RN

## 2018-11-25 NOTE — PROGRESS NOTES
Physical Therapy Discharge Summary    Reason for therapy discharge:    Discharged to transitional care facility.    Progress towards therapy goal(s). See goals on Care Plan in [facility] electronic health record for goal details.  Goals partially met.  Barriers to achieving goals:   limited tolerance for therapy and discharge from facility.    Therapy recommendation(s):    Continued therapy is recommended.  Rationale/Recommendations:  To build up functional capacity.       Please Contact me with any questions or concerns. Thank you for for patience and cooperation.     Esteban Haynes PT, DPT  Flexible Workforce Physical Therapist   [facility]  [email or web address]

## 2018-11-25 NOTE — PROGRESS NOTES
GASPER Laird called and wanted to clarify cariprazine because it was ordered 1.5mg but to give 2mg dose. She was getting 1.5mg inpatient. Dr Boland ordered 1[facility]5mg [facility]

## 2018-11-25 NOTE — PLAN OF CARE
"Problem: Patient Care Overview  Goal: Plan of Care/Patient Progress Review  Outcome: Improving  Pt is on room air O2sat in 90's, lung sounds have expiratory wheeze, clears well after neb tx. Pt states \"feeling much better\". Had a headache from coughing so much, takes PRN guaifenesin for congested cough. Eating and drinking fine. Brother called last night to check in which the pt really appreciated. [facility] [place]. Had a shower last evening. Pleasant and cooperative with cares.      "

## 2018-11-25 NOTE — PROGRESS NOTES
Name: Betsy Resendiz    MRN#: [id number]    Reason for Hospitalization: COPD exacerbation (H) [J44.1]    Discharge Date: 11/25/18    Patient/Family Response to DC Plan: in agreement    Transportation: [facility] [phone] at 10:30 am/ voucher    [employer]: declined    Care Coordinator Hand off completed: No    Other Providers (Care Coordinator, County Services, PCA Services etc.):  No    Future Appointments : Future Appointments  Date Time Provider Department Center   11/25/2018 9:00 AM Esteban Haynes PT [facility]   1/2/2019 4:00 PM Trisha Tamayo MD [facility]   2/13/2019 7:00 AM Cris Isaacs PA-C [facility]       Discharge Disposition: transitional care unit, [facility] (Phone: [phone] Fax: [phone])    Jazmin Lindquist RN Care Coordinator  [facility] [phone]  [facility] [phone]

## 2018-11-26 ENCOUNTER — NURSING HOME VISIT (OUTPATIENT)
Dept: GERIATRICS | Facility: CLINIC | Age: 53
End: 2018-11-26
Payer: COMMERCIAL

## 2018-11-26 VITALS
WEIGHT: 258.1 LBS | RESPIRATION RATE: 18 BRPM | HEART RATE: 61 BPM | TEMPERATURE: 98 F | SYSTOLIC BLOOD PRESSURE: 132 MMHG | DIASTOLIC BLOOD PRESSURE: 77 MMHG | BODY MASS INDEX: 44.3 KG/M2 | OXYGEN SATURATION: 96 %

## 2018-11-26 DIAGNOSIS — J45.909 MODERATE ASTHMA, UNSPECIFIED WHETHER COMPLICATED, UNSPECIFIED WHETHER PERSISTENT: ICD-10-CM

## 2018-11-26 DIAGNOSIS — F25.9 SCHIZOAFFECTIVE DISORDER, UNSPECIFIED TYPE (H): ICD-10-CM

## 2018-11-26 DIAGNOSIS — G89.4 CHRONIC PAIN SYNDROME: ICD-10-CM

## 2018-11-26 DIAGNOSIS — F17.200 TOBACCO USE DISORDER: ICD-10-CM

## 2018-11-26 DIAGNOSIS — N18.30 CKD (CHRONIC KIDNEY DISEASE) STAGE 3, GFR 30-59 ML/MIN (H): ICD-10-CM

## 2018-11-26 DIAGNOSIS — I89.0 LYMPHEDEMA: ICD-10-CM

## 2018-11-26 DIAGNOSIS — M50.90 CERVICAL NECK PAIN WITH EVIDENCE OF DISC DISEASE: ICD-10-CM

## 2018-11-26 DIAGNOSIS — J44.9 CHRONIC OBSTRUCTIVE PULMONARY DISEASE, UNSPECIFIED COPD TYPE (H): Primary | ICD-10-CM

## 2018-11-26 PROCEDURE — 99310 SBSQ NF CARE HIGH MDM 45: CPT | Performed by: NURSE PRACTITIONER

## 2018-11-26 RX ORDER — FLUTICASONE PROPIONATE 50 MCG
1 SPRAY, SUSPENSION (ML) NASAL DAILY PRN
COMMUNITY
End: 2019-02-04

## 2018-11-26 NOTE — PROGRESS NOTES
[place] [facility]  PRIMARY CARE PROVIDER AND CLINIC:  [facility] Dunlap [facility] [address] [facility] 5*  Chief Complaint   Patient presents with     Hospital F/U     [place] Medical Record Number:  [id number]  Place of [facility] where encounter took place[facility] (S) [[id number]]    HPI:    Betsy Resendiz is a 53 year old  (1965),admitted to the above facility from  [facility].  Hospital stay 11/21/18  through 11/25/18.  Admitted to this facility for  rehab, medical management and nursing care.  HPI information obtained from: facility chart records, facility staff, patient report and [facility] chart review.      Seeing patient today for a hospital follow up.   Patient was admitted to [facility] from 11/21-11/25/18 for respiratory failure and COPD exacerbation. Patient started on prednisone. Is receiving nebs QID. Lithium level was also noted to be elevated and this was held until level normalized.Gabapentin was also discontinued due to reduced kidney function and lethargy. Due to ongoing lethargy hospitalist also recommends to stop methocarbamol.     Met with patient in her room. She has been up ambulating today. She states she feels she needs to ambulate to get stronger. She denies CP or dyspnea. She feels her wheezing has improved. She continues to have back pain. She feels her back pain is slightly better with lidocaine patch. She would like to continue these.       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  PAST SURGICAL  HISTORY:  has a past surgical history that includes back surgery; Arthrodesis foot (Right, 2009); biopsy/excis cervical lesn; Thoracotomy; Repair bladder; and Incision and drainage (2013).  FAMILY HISTORY: family history includes Cancer in her niece; Diabetes in her maternal grandmother; Other - See Comments in her mother.  SOCIAL HISTORY:  reports that she has been smoking Cigarettes.  She has never used smokeless tobacco. She reports that she does not drink alcohol or use illicit drugs.    Post Discharge Medication Reconciliation Status: discharge medications reconciled, continue medications without change.  Current Outpatient Prescriptions   Medication Sig Dispense Refill     ACETAMINOPHEN PO Take 650 mg by mouth every 4 hours as needed for pain       albuterol (PROAIR HFA/PROVENTIL HFA/VENTOLIN HFA) 108 (90 BASE) MCG/ACT Inhaler Inhale 2 puffs into the lungs every 4 hours as needed        brimonidine (ALPHAGAN-P) 0.15 % ophthalmic solution Place 1 drop into both eyes 2 times daily       budesonide (PULMICORT) 0.5 MG/2ML neb solution Take 0.5 mg by nebulization 2 times daily       cariprazine (VRAYLAR) 1.5 MG CAPS capsule Take 2 mg by mouth daily        cholecalciferol (VITAMIN D3) 5000 units CAPS capsule Take 5,000 Units by mouth daily       clotrimazole (LOTRIMIN) 1 % cream Apply topically 2 times daily       CYCLOBENZAPRINE HCL PO Take 5 mg by mouth every 8 hours as needed        docusate sodium (STOOL SOFTENER) 100 MG capsule Take 100 mg by mouth At Bedtime        DOXYCYCLINE HYCLATE PO Take 100 mg by mouth 2 times daily       escitalopram (LEXAPRO) 20 MG tablet Take 1 tablet by mouth at bedtime       fluticasone (FLONASE) 50 MCG/ACT spray Spray 1 spray into both nostrils daily as needed for rhinitis or allergies       furosemide (LASIX) 80 MG tablet Take 40 mg by mouth 2 times daily        guaiFENesin-dextromethorphan (ROBITUSSIN DM) 100-10 MG/5ML syrup Take 5 mLs by mouth every 4 hours as needed for cough  560 mL 0     ipratropium - albuterol 0.5 mg/2.5 mg/3 mL (DUONEB) 0.5-2.5 (3) MG/3ML neb solution Take 1 vial by nebulization 4 times daily       lamoTRIgine (LAMICTAL) 200 MG tablet Take 200 mg by mouth At Bedtime        LANsoprazole (PREVACID) 30 MG CR capsule Take 30 mg by mouth daily        LIOTHYRONINE SODIUM PO Take 25 mcg by mouth daily       lithium 300 MG capsule Take 1 capsule by mouth in the morning and 2 capsules at bedtime       loperamide (IMODIUM) 2 MG capsule Take 2 mg by mouth every 6 hours as needed for diarrhea       LORAZEPAM PO Take 0.5 mg by mouth 2 times daily as needed for anxiety       MECLIZINE HCL PO Take 25 mg by mouth every 8 hours as needed for dizziness       OYSTER SHELL CALCIUM PO Take 500 mg by mouth 4 times daily       paliperidone (INVEGA) 6 MG 24 hr tablet Take 6 mg by mouth every morning       perphenazine 4 MG tablet Take 4 mg by mouth 2 times daily        POTASSIUM CHLORIDE ER PO 30meq by mouth two times a day       PREDNISONE PO Take 20 mg by mouth daily       pregabalin (LYRICA) 150 MG capsule Take 150 mg by mouth 2 times daily        PROPRANOLOL HCL PO Take 10 mg by mouth 2 times daily       travoprost, BAK Free, (TRAVATAN Z) 0.004 % ophthalmic solution Place 1 drop into both eyes At Bedtime       ziprasidone (GEODON) 40 MG capsule Take 1 capsule by mouth in the morning       ziprasidone (GEODON) 80 MG capsule Take 160 mg by mouth At Bedtime          ROS:  4 point ROS including Respiratory, CV, GI and , other than that noted in the HPI,  is negative    Exam:  /77  Pulse 61  Temp 98  F (36.7  C)  Resp 18  Wt 258 lb 1.6 oz (117.1 kg)  SpO2 96%  BMI 44.3 kg/m2  GENERAL APPEARANCE:  Alert, in no distress  RESP:  respiratory effort and palpation of chest normal, auscultation of lungs diminished , no respiratory distress  CV:  Palpation and auscultation of heart done , rate and rhythm regular, no murmur, +1 BLE peripheral edema  ABDOMEN:  normal bowel sounds, soft,  nontender, no hepatosplenomegaly or other masses  M/S:   Gait and station steady with 4WW, Digits and nails at baseline  SKIN:  Inspection and Palpation of skin and subcutaneous tissue no rashes or lesions to exposed skin  NEURO: 2-12 in normal limits and at patient's baseline  PSYCH:  insight and judgement, memory fair , affect and mood normal    Lab/Diagnostic data:     CBC RESULTS:   Recent Labs   Lab Test  11/24/18   0453  11/23/18   0632   WBC  9.3  8.6   RBC  3.76*  3.46*   HGB  12.0  11.5*   HCT  37.9  35.9   MCV  101*  104*   MCH  31.9  33.2*   MCHC  31.7  32.0   RDW  12.0  12.0   PLT  198  167       Last Basic Metabolic Panel:  Recent Labs   Lab Test  11/25/18   0546  11/24/18   1659  11/24/18   0453   NA  146*   --   146*   POTASSIUM  3.6  3.9  3.1*   CHLORIDE  110*   --   106   GAURAV  7.9*   --   7.9*   CO2  29   --   36*   BUN  21   --   22   CR  1.29*   --   1.36*   GLC  92   --   85       Liver Function Studies -   Recent Labs   Lab Test  11/25/18   0546  11/24/18   0453   PROTTOTAL  6.3*  6.6*   ALBUMIN  2.8*  2.9*   BILITOTAL  0.3  0.2   ALKPHOS  70  69   AST  14  11   ALT  19  24       TSH   Date Value Ref Range Status   08/03/2018 1.40 0.40 - 4.00 mU/L Final   ]    Lab Results   Component Value Date    A1C 5.4 11/23/2018    A1C 5.4 08/03/2018       ASSESSMENT/PLAN:  Chronic obstructive pulmonary disease, unspecified COPD type (H)  Moderate asthma, unspecified whether complicated, unspecified whether persistent  - due to complete prednisone in 3 days  - continue QID duonebs and doxy  - breathing has improved    Tobacco use disorder  - Patient has not smoked for 5 days  - she is ready to quit. Continue to encourage cessation    Schizoaffective disorder, unspecified type (H)  - moods stable with no sedation  - Will check labs. Continue lithium and will check level    Cervical neck pain with evidence of disc disease  Chronic pain syndrome  - with neuropathic pain  - patient due to schedule surgery for  disc surgery  - will order lidocaine patches.  - plan to stop methocarbamol due to lethargy    CKD (chronic kidney disease) stage 3, GFR 30-59 ml/min (H)  GFR Estimate   Date Value Ref Range Status   11/25/2018 43 (L) >60 mL/min/1.7m2 Final     Comment:     Non  GFR Calc   11/24/2018 41 (L) >60 mL/min/1.7m2 Final     Comment:     Non  GFR Calc   11/23/2018 42 (L) >60 mL/min/1.7m2 Final     Comment:     Non  GFR Calc   11/22/2018 46 (L) >60 mL/min/1.7m2 Final     Comment:     Non  GFR Calc   11/21/2018 42 (L) >60 mL/min/1.7m2 Final     Comment:     Non  GFR Calc       Chronic pain syndrome  - has sucessfully weaned off of narcotics  - was weaned off of gabapentin during hospitalization. Will need to update psych provider re lethargy and reduced kidney function    Lymphedema  - no hx of HF  - edema may improve off of gabapentin  - consider tapering off lasix       Orders:  1.  CBC, CMP, lithium level  2.  Lidocaine topical patch 5%--2 patches to upper back, on 12hrs, off 12hrs  3.  discontinue methocarbamol  4.  Weigh patient daily.  Update NP if wt >3lbs in one day or >5lbs in one week    Total time spent with patient visit at the skilled nursing facility was 35 min including patient visit and review of past records. Greater than 50% of total time spent with counseling and coordinating care due to complex conditions    Electronically signed by:  DELL Queen CNP

## 2018-11-26 NOTE — LETTER
11/26/2018        RE: Betsy Resendiz  [address]  [place] [address]        [place] [facility]  PRIMARY CARE PROVIDER AND CLINIC:  [facility] [address] [facility] 5*  Chief Complaint   Patient presents with     Hospital F/U     [place] Medical Record Number:  [id number]  Place of Service where encounter took place:  [facility] (S) [[id number]]    HPI:    Betsy Resendiz is a 53 year old  (1965),admitted to the above facility from  [facility].  Hospital stay 11/21/18  through 11/25/18.  Admitted to this facility for  rehab, medical management and nursing care.  HPI information obtained from: facility chart records, facility staff, patient report and [facility] chart review.      Seeing patient today for a hospital follow up.   Patient was admitted to [facility] from 11/21-11/25/18 for respiratory failure and COPD exacerbation. Patient started on prednisone. Is receiving nebs QID. Lithium level was also noted to be elevated and this was held until level normalized.Gabapentin was also discontinued due to reduced kidney function and lethargy. Due to ongoing lethargy hospitalist also recommends to stop methocarbamol.     Met with patient in her room. She has been up ambulating today. She states she feels she needs to ambulate to get stronger. She denies CP or dyspnea. She feels her wheezing has improved. She continues to have back pain. She feels her back pain is slightly better with lidocaine patch. She would like to continue these.       CODE STATUS/ADVANCE DIRECTIVES DISCUSSION:   CPR/Full code   Patient's living condition: lives in a nursing home    ALLERGIES:Codeine; Hmg-coa-r inhibitors; Lisinopril; No clinical screening - see comments; Sulfa drugs; and Sumatriptan  PAST MEDICAL HISTORY:  has a past medical history of Atrophy of muscle of left lower leg (11/3/2015); Bullosis  diabeticorum (H) (1/22/2014); Hypokalemia (6/4/2010); and Vitamin D deficiency (9/19/2012).  PAST SURGICAL HISTORY:  has a past surgical history that includes back surgery; Arthrodesis foot (Right, 2009); biopsy/excis cervical lesn; Thoracotomy; Repair bladder; and Incision and drainage (2013).  FAMILY HISTORY: family history includes Cancer in her niece; Diabetes in her maternal grandmother; Other - See Comments in her mother.  SOCIAL HISTORY:  reports that she has been smoking Cigarettes.  She has never used smokeless tobacco. She reports that she does not drink alcohol or use illicit drugs.    Post Discharge Medication Reconciliation Status: discharge medications reconciled, continue medications without change.  Current Outpatient Prescriptions   Medication Sig Dispense Refill     ACETAMINOPHEN PO Take 650 mg by mouth every 4 hours as needed for pain       albuterol (PROAIR HFA/PROVENTIL HFA/VENTOLIN HFA) 108 (90 BASE) MCG/ACT Inhaler Inhale 2 puffs into the lungs every 4 hours as needed        brimonidine (ALPHAGAN-P) 0.15 % ophthalmic solution Place 1 drop into both eyes 2 times daily       budesonide (PULMICORT) 0.5 MG/2ML neb solution Take 0.5 mg by nebulization 2 times daily       cariprazine (VRAYLAR) 1.5 MG CAPS capsule Take 2 mg by mouth daily        cholecalciferol (VITAMIN D3) 5000 units CAPS capsule Take 5,000 Units by mouth daily       clotrimazole (LOTRIMIN) 1 % cream Apply topically 2 times daily       CYCLOBENZAPRINE HCL PO Take 5 mg by mouth every 8 hours as needed        docusate sodium (STOOL SOFTENER) 100 MG capsule Take 100 mg by mouth At Bedtime        DOXYCYCLINE HYCLATE PO Take 100 mg by mouth 2 times daily       escitalopram (LEXAPRO) 20 MG tablet Take 1 tablet by mouth at bedtime       fluticasone (FLONASE) 50 MCG/ACT spray Spray 1 spray into both nostrils daily as needed for rhinitis or allergies       furosemide (LASIX) 80 MG tablet Take 40 mg by mouth 2 times daily         guaiFENesin-dextromethorphan (ROBITUSSIN DM) 100-10 MG/5ML syrup Take 5 mLs by mouth every 4 hours as needed for cough 560 mL 0     ipratropium - albuterol 0.5 mg/2.5 mg/3 mL (DUONEB) 0.5-2.5 (3) MG/3ML neb solution Take 1 vial by nebulization 4 times daily       lamoTRIgine (LAMICTAL) 200 MG tablet Take 200 mg by mouth At Bedtime        LANsoprazole (PREVACID) 30 MG CR capsule Take 30 mg by mouth daily        LIOTHYRONINE SODIUM PO Take 25 mcg by mouth daily       lithium 300 MG capsule Take 1 capsule by mouth in the morning and 2 capsules at bedtime       loperamide (IMODIUM) 2 MG capsule Take 2 mg by mouth every 6 hours as needed for diarrhea       LORAZEPAM PO Take 0.5 mg by mouth 2 times daily as needed for anxiety       MECLIZINE HCL PO Take 25 mg by mouth every 8 hours as needed for dizziness       OYSTER SHELL CALCIUM PO Take 500 mg by mouth 4 times daily       paliperidone (INVEGA) 6 MG 24 hr tablet Take 6 mg by mouth every morning       perphenazine 4 MG tablet Take 4 mg by mouth 2 times daily        POTASSIUM CHLORIDE ER PO 30meq by mouth two times a day       PREDNISONE PO Take 20 mg by mouth daily       pregabalin (LYRICA) 150 MG capsule Take 150 mg by mouth 2 times daily        PROPRANOLOL HCL PO Take 10 mg by mouth 2 times daily       travoprost, BAK Free, (TRAVATAN Z) 0.004 % ophthalmic solution Place 1 drop into both eyes At Bedtime       ziprasidone (GEODON) 40 MG capsule Take 1 capsule by mouth in the morning       ziprasidone (GEODON) 80 MG capsule Take 160 mg by mouth At Bedtime          ROS:  4 point ROS including Respiratory, CV, GI and , other than that noted in the HPI,  is negative    Exam:  /77  Pulse 61  Temp 98  F (36.7  C)  Resp 18  Wt 258 lb 1.6 oz (117.1 kg)  SpO2 96%  BMI 44.3 kg/m2  GENERAL APPEARANCE:  Alert, in no distress  RESP:  respiratory effort and palpation of chest normal, auscultation of lungs diminished , no respiratory distress  CV:  Palpation and  auscultation of heart done , rate and rhythm regular, no murmur, +1 BLE peripheral edema  ABDOMEN:  normal bowel sounds, soft, nontender, no hepatosplenomegaly or other masses  M/S:   Gait and station steady with 4WW, Digits and nails at baseline  SKIN:  Inspection and Palpation of skin and subcutaneous tissue no rashes or lesions to exposed skin  NEURO: 2-12 in normal limits and at patient's baseline  PSYCH:  insight and judgement, memory fair , affect and mood normal    Lab/Diagnostic data:     CBC RESULTS:   Recent Labs   Lab Test  11/24/18   0453  11/23/18   0632   WBC  9.3  8.6   RBC  3.76*  3.46*   HGB  12.0  11.5*   HCT  37.9  35.9   MCV  101*  104*   MCH  31.9  33.2*   MCHC  31.7  32.0   RDW  12.0  12.0   PLT  198  167       Last Basic Metabolic Panel:  Recent Labs   Lab Test  11/25/18   0546  11/24/18   1659  11/24/18   0453   NA  146*   --   146*   POTASSIUM  3.6  3.9  3.1*   CHLORIDE  110*   --   106   GAURAV  7.9*   --   7.9*   CO2  29   --   36*   BUN  21   --   22   CR  1.29*   --   1.36*   GLC  92   --   85       Liver Function Studies -   Recent Labs   Lab Test  11/25/18   0546  11/24/18   0453   PROTTOTAL  6.3*  6.6*   ALBUMIN  2.8*  2.9*   BILITOTAL  0.3  0.2   ALKPHOS  70  69   AST  14  11   ALT  19  24       TSH   Date Value Ref Range Status   08/03/2018 1.40 0.40 - 4.00 mU/L Final   ]    Lab Results   Component Value Date    A1C 5.4 11/23/2018    A1C 5.4 08/03/2018       ASSESSMENT/PLAN:  Chronic obstructive pulmonary disease, unspecified COPD type (H)  Moderate asthma, unspecified whether complicated, unspecified whether persistent  - due to complete prednisone in 3 days  - continue QID duonebs and doxy  - breathing has improved    Tobacco use disorder  - Patient has not smoked for 5 days  - she is ready to quit. Continue to encourage cessation    Schizoaffective disorder, unspecified type (H)  - moods stable with no sedation  - Will check labs. Continue lithium and will check level    Cervical  neck pain with evidence of disc disease  Chronic pain syndrome  - with neuropathic pain  - patient due to schedule surgery for disc surgery  - will order lidocaine patches.  - plan to stop methocarbamol due to lethargy    CKD (chronic kidney disease) stage 3, GFR 30-59 ml/min (H)  GFR Estimate   Date Value Ref Range Status   11/25/2018 43 (L) >60 mL/min/1.7m2 Final     Comment:     Non  GFR Calc   11/24/2018 41 (L) >60 mL/min/1.7m2 Final     Comment:     Non  GFR Calc   11/23/2018 42 (L) >60 mL/min/1.7m2 Final     Comment:     Non  GFR Calc   11/22/2018 46 (L) >60 mL/min/1.7m2 Final     Comment:     Non  GFR Calc   11/21/2018 42 (L) >60 mL/min/1.7m2 Final     Comment:     Non  GFR Calc       Chronic pain syndrome  - has sucessfully weaned off of narcotics  - was weaned off of gabapentin during hospitalization. Will need to update psych provider re lethargy and reduced kidney function    Lymphedema  - no hx of HF  - edema may improve off of gabapentin  - consider tapering off lasix       Orders:  1.  CBC, CMP, lithium level  2.  Lidocaine topical patch 5%--2 patches to upper back, on 12hrs, off 12hrs  3.  discontinue methocarbamol  4.  Weigh patient daily.  Update NP if wt >3lbs in one day or >5lbs in one week    Total time spent with patient visit at the skilled nursing facility was 35 min including patient visit and review of past records. Greater than 50% of total time spent with counseling and coordinating care due to complex conditions    Electronically signed by:  DELL Queen CNP                    Sincerely,        DELL Queen CNP

## 2018-11-29 ENCOUNTER — HOSPITAL LABORATORY (OUTPATIENT)
Facility: OTHER | Age: 53
End: 2018-11-29

## 2018-11-29 LAB
ALBUMIN SERPL-MCNC: 3.5 G/DL (ref 3.4–5)
ALP SERPL-CCNC: 102 U/L (ref 40–150)
ALT SERPL W P-5'-P-CCNC: 30 U/L (ref 0–50)
ANION GAP SERPL CALCULATED.3IONS-SCNC: 5 MMOL/L (ref 3–14)
AST SERPL W P-5'-P-CCNC: 8 U/L (ref 0–45)
BILIRUB SERPL-MCNC: 0.6 MG/DL (ref 0.2–1.3)
BUN SERPL-MCNC: 20 MG/DL (ref 7–30)
CALCIUM SERPL-MCNC: 9.1 MG/DL (ref 8.5–10.1)
CHLORIDE SERPL-SCNC: 103 MMOL/L (ref 94–109)
CO2 SERPL-SCNC: 32 MMOL/L (ref 20–32)
CREAT SERPL-MCNC: 1.25 MG/DL (ref 0.52–1.04)
ERYTHROCYTE [DISTWIDTH] IN BLOOD BY AUTOMATED COUNT: 12.5 % (ref 10–15)
GFR SERPL CREATININE-BSD FRML MDRD: 45 ML/MIN/1.7M2
GLUCOSE SERPL-MCNC: 105 MG/DL (ref 70–99)
HCT VFR BLD AUTO: 41.7 % (ref 35–47)
HGB BLD-MCNC: 13.2 G/DL (ref 11.7–15.7)
LITHIUM SERPL-SCNC: 0.77 MMOL/L (ref 0.6–1.2)
MCH RBC QN AUTO: 31.8 PG (ref 26.5–33)
MCHC RBC AUTO-ENTMCNC: 31.7 G/DL (ref 31.5–36.5)
MCV RBC AUTO: 101 FL (ref 78–100)
PLATELET # BLD AUTO: 232 10E9/L (ref 150–450)
POTASSIUM SERPL-SCNC: 3.6 MMOL/L (ref 3.4–5.3)
PROT SERPL-MCNC: 7.7 G/DL (ref 6.8–8.8)
RBC # BLD AUTO: 4.15 10E12/L (ref 3.8–5.2)
SODIUM SERPL-SCNC: 140 MMOL/L (ref 133–144)
WBC # BLD AUTO: 11.1 10E9/L (ref 4–11)

## 2018-12-03 ENCOUNTER — NURSING HOME VISIT (OUTPATIENT)
Dept: GERIATRICS | Facility: CLINIC | Age: 53
End: 2018-12-03
Payer: COMMERCIAL

## 2018-12-03 VITALS
DIASTOLIC BLOOD PRESSURE: 77 MMHG | OXYGEN SATURATION: 97 % | BODY MASS INDEX: 44.01 KG/M2 | RESPIRATION RATE: 18 BRPM | TEMPERATURE: 97.6 F | SYSTOLIC BLOOD PRESSURE: 132 MMHG | HEART RATE: 85 BPM | WEIGHT: 256.4 LBS

## 2018-12-03 DIAGNOSIS — L02.92 FURUNCLE OF SKIN OR SUBCUTANEOUS TISSUE: Primary | ICD-10-CM

## 2018-12-03 PROCEDURE — 99308 SBSQ NF CARE LOW MDM 20: CPT | Performed by: NURSE PRACTITIONER

## 2018-12-03 NOTE — LETTER
12/3/2018        RE: Betsy Resendiz  [address]  [place] [address]        [place] [facility]    Chief Complaint   Patient presents with     skilled nursing Acute       [place] Medical Record Number:  [id number]  Place of Service where encounter took place[facility] [[id number]]    HPI:    Betsy Resendiz is a 53 year old  (1965), who is being seen today for an episodic care visit.  HPI information obtained from: facility chart records, facility staff and patient report.    Seeing patient today for an acute visit.   Nsg reports a lesion on patient's buttock  Per patient slight pain near lesion    REVIEW OF SYSTEMS:  4 point ROS including Respiratory, CV, GI and , other than that noted in the HPI,  is negative    /77  Pulse 85  Temp 97.6  F (36.4  C)  Resp 18  Wt 256 lb 6.4 oz (116.3 kg)  SpO2 97%  BMI 44.01 kg/m2  GENERAL APPEARANCE:  Alert, in no distress  SKIN: dime sized erythematous lesion, non fluctuant, no surrounding skin erythema    ASSESSMENT/PLAN:  Furuncle of skin or subcutaneous tissue     - staff to warm pack TID  - Staff to update if this does not resolve may need I & D     Orders:  1.  Warm pack TID until lesion resolves.  Update NP if worsens.      Electronically signed by:  DELL Queen CNP      Sincerely,        DELL Queen CNP

## 2018-12-03 NOTE — PROGRESS NOTES
[place] [facility]    Chief Complaint   Patient presents with     group home Acute       [place] Medical Record Number:  [id number]  Place of Service where encounter took place[facility] (S) [[id number]]    HPI:    Betsy Resendiz is a 53 year old  (1965), who is being seen today for an episodic care visit.  HPI information obtained from: facility chart records, facility staff and patient report.    Seeing patient today for an acute visit.   Nsg reports a lesion on patient's buttock  Per patient slight pain near lesion    REVIEW OF SYSTEMS:  4 point ROS including Respiratory, CV, GI and , other than that noted in the HPI,  is negative    /77  Pulse 85  Temp 97.6  F (36.4  C)  Resp 18  Wt 256 lb 6.4 oz (116.3 kg)  SpO2 97%  BMI 44.01 kg/m2  GENERAL APPEARANCE:  Alert, in no distress  SKIN: dime sized erythematous lesion, non fluctuant, no surrounding skin erythema    ASSESSMENT/PLAN:  Furuncle of skin or subcutaneous tissue     - staff to warm pack TID  - Staff to update if this does not resolve may need I & D     Orders:  1.  Warm pack TID until lesion resolves.  Update NP if worsens.      Electronically signed by:  DELL Queen CNP

## 2018-12-17 ENCOUNTER — NURSING HOME VISIT (OUTPATIENT)
Dept: GERIATRICS | Facility: CLINIC | Age: 53
End: 2018-12-17
Payer: COMMERCIAL

## 2018-12-17 VITALS
BODY MASS INDEX: 45.49 KG/M2 | TEMPERATURE: 98 F | WEIGHT: 265 LBS | RESPIRATION RATE: 16 BRPM | SYSTOLIC BLOOD PRESSURE: 115 MMHG | DIASTOLIC BLOOD PRESSURE: 17 MMHG | HEART RATE: 71 BPM | OXYGEN SATURATION: 95 %

## 2018-12-17 DIAGNOSIS — I10 HYPERTENSION, UNSPECIFIED TYPE: ICD-10-CM

## 2018-12-17 DIAGNOSIS — F25.0 SCHIZOAFFECTIVE DISORDER, BIPOLAR TYPE (H): ICD-10-CM

## 2018-12-17 DIAGNOSIS — E66.01 MORBID OBESITY WITH BMI OF 40.0-44.9, ADULT (H): ICD-10-CM

## 2018-12-17 DIAGNOSIS — E11.9 TYPE 2 DIABETES MELLITUS WITHOUT COMPLICATION, WITHOUT LONG-TERM CURRENT USE OF INSULIN (H): ICD-10-CM

## 2018-12-17 DIAGNOSIS — G89.4 CHRONIC PAIN SYNDROME: ICD-10-CM

## 2018-12-17 DIAGNOSIS — Z97.8 INDWELLING FOLEY CATHETER PRESENT: ICD-10-CM

## 2018-12-17 DIAGNOSIS — Z01.818 PRE-OP EXAM: ICD-10-CM

## 2018-12-17 DIAGNOSIS — J45.909 MODERATE ASTHMA, UNSPECIFIED WHETHER COMPLICATED, UNSPECIFIED WHETHER PERSISTENT: ICD-10-CM

## 2018-12-17 DIAGNOSIS — K21.9 GASTROESOPHAGEAL REFLUX DISEASE WITHOUT ESOPHAGITIS: ICD-10-CM

## 2018-12-17 DIAGNOSIS — N18.30 CKD (CHRONIC KIDNEY DISEASE) STAGE 3, GFR 30-59 ML/MIN (H): ICD-10-CM

## 2018-12-17 DIAGNOSIS — J44.9 CHRONIC OBSTRUCTIVE PULMONARY DISEASE, UNSPECIFIED COPD TYPE (H): ICD-10-CM

## 2018-12-17 DIAGNOSIS — R94.31 ABNORMAL ELECTROCARDIOGRAM: ICD-10-CM

## 2018-12-17 DIAGNOSIS — M50.90 CERVICAL NECK PAIN WITH EVIDENCE OF DISC DISEASE: Primary | ICD-10-CM

## 2018-12-17 PROCEDURE — 99310 SBSQ NF CARE HIGH MDM 45: CPT | Performed by: NURSE PRACTITIONER

## 2018-12-17 RX ORDER — POLYETHYLENE GLYCOL 3350 17 G
2 POWDER IN PACKET (EA) ORAL
COMMUNITY

## 2018-12-17 NOTE — PROGRESS NOTES
PRE-OP EVALUATION:    [facility] Record Number:  [id number]  Place of Service where encounter took place:  [facility] [[id number]]  Today's date: 2018    [facility]  [address] [address]  [phone]  Dept: [phone]    PRE-OP EVALUATION:  Today's date: 2018    Betsy Resendiz (: 1965) presents for pre-operative evaluation assessment as requested by Dr. Clement Puentes MD.  She requires evaluation and anesthesia risk assessment prior to undergoing surgery/procedure for treatment of neck pain .    Proposed Surgery/ Procedure: C5-6 Anterior Discectomy Fusion, Plating  Date of Surgery/ Procedure:  2018  Time of Surgery/ Procedure: 5:30am  Hospital/Surgical Facility: [facility]  Fax number for surgical facility: [phone]  Primary Physician: Elle Dunlap  Type of Anesthesia Anticipated: to be determined    Patient has a Health Care Directive or Living Will:  YES-Full Code     1. NO - Do you have a history of heart attack, stroke, stent, bypass or surgery on an artery in the head, neck, heart or legs?  2. YES - Do you ever have any pain or discomfort in your chest?  3. YES - Do you have a history of  Heart Failure?  4. Yes/at baseline - Are you troubled by shortness of breath when: walking on the level, up a slight hill or at night?  5. NO - Do you currently have a cold, bronchitis or other respiratory infection?  6. Yes/at baseline - Do you have a cough, shortness of breath or wheezing?  7. NO - Do you sometimes get pains in the calves of your legs when you walk?  8. NO - Do you or anyone in your family have previous history of blood clots?  9. NO - Do you or does anyone in your family have a serious bleeding problem such as prolonged bleeding following surgeries or cuts?  10. NO - Have you ever had problems with anemia or been told to take iron pills?  11. NO - Have you had any  abnormal blood loss such as black, tarry or bloody stools, or abnormal vaginal bleeding?  12. NO - Have you ever had a blood transfusion?  13. NO - Have you or any of your relatives ever had problems with anesthesia?  14. yes - Do you have sleep apnea, excessive snoring or daytime drowsiness?  15. NO - Do you have any prosthetic heart valves?  16. NO - Do you have prosthetic joints?  17. NO - Is there any chance that you may be pregnant?      HPI:     HPI related to upcoming procedure:   Patient aware of upcoming surgery. Has ongoing neck and arm pain.   She feels she is breathing at baseline. She denies CP. No reported fever or chills. Appetite at baseline. Per staff moods labile recently.   Patient has not slept well for the past few nocs. States her mattress has been replaced and slept well last oc.       ASTHMA - Patient has a longstanding history of moderate-severe Asthma . Patient has been doing well overall noting BOATENG and continues on medication regimen consisting of albuterol/budesonide without adverse reactions or side effects.                                                                                                                                               .                                                                                           .  DEPRESSION - Patient has a long history of Depression of moderate severity requiring medication for control with recent symptoms being stable..Current symptoms of depression include none.                                                                                                                                                 CARDIAC: patient reporting intermittent chest pain with activity. States she is not sure if it is related to her neck pain. Pain improves with rest.                                    .    MEDICAL HISTORY:     Patient Active Problem List    Diagnosis Date Noted     COPD exacerbation (H) 11/21/2018     Priority: Medium      Acute respiratory failure with hypoxia and hypercapnia (H) 11/21/2018     Priority: Medium     CKD (chronic kidney disease) stage 3, GFR 30-59 ml/min (H) 11/21/2018     Priority: Medium     Auditory hallucinations 09/14/2018     Priority: Medium     Closed fracture of shaft of right humerus 05/22/2018     Priority: Medium     Bladder tumor 02/07/2018     Priority: Medium     COPD (chronic obstructive pulmonary disease) (H) 02/05/2018     Priority: Medium     JAVIER (generalized anxiety disorder) 11/21/2017     Priority: Medium     HTN (hypertension) 10/28/2017     Priority: Medium     Morbid obesity with BMI of 40.0-44.9, adult (H) 12/08/2016     Priority: Medium     SPIKE (obstructive sleep apnea) 11/13/2015     Priority: Medium     Ulnar neuritis 01/17/2014     Priority: Medium     Immunosuppressed status (H) 09/19/2013     Priority: Medium     Chronic pain 09/18/2013     Priority: Medium     Normocytic anemia 09/18/2013     Priority: Medium     History of encephalopathy 06/04/2013     Priority: Medium     Rotator cuff tendinitis 11/05/2012     Priority: Medium     Osteoarthritis of hip 03/17/2012     Priority: Medium     Posttraumatic stress disorder 10/07/2011     Priority: Medium     Schizoaffective disorder, unspecified type (H) 06/07/2011     Priority: Medium     Cervical neck pain with evidence of disc disease 07/28/2010     Priority: Medium     Conversion reaction 06/04/2010     Priority: Medium     Pain in joint, ankle and foot 01/20/2009     Priority: Medium     Generalized osteoarthritis 01/08/2009     Priority: Medium     Tobacco use disorder 11/26/2008     Priority: Medium     Urinary retention 06/06/2008     Priority: Medium     DM w/o complication type II (H) 04/09/2008     Priority: Medium     Gastroesophageal reflux disease without esophagitis 04/09/2008     Priority: Medium     Lymphedema 01/31/2008     Priority: Medium     Carpal tunnel syndrome 07/12/2007     Priority: Medium     Schizoaffective  disorder, bipolar type (H) 10/30/2006     Priority: Medium     Migraine headache 10/30/2006     Priority: Medium     Asthma 10/30/2006     Priority: Medium     Unspecified glaucoma 10/30/2006     Priority: Medium      Past Medical History:   Diagnosis Date     Atrophy of muscle of left lower leg 11/3/2015     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     Hypokalemia 6/4/2010     Vitamin D deficiency 9/19/2012     Past Surgical History:   Procedure Laterality Date     ARTHRODESIS FOOT Right 2009     BACK SURGERY      x7      BIOPSY/EXCIS CERVICAL LESN       INCISION AND DRAINAGE  2013    Pressure ulcer     REPAIR BLADDER       THORACOTOMY      Abscess     Current Outpatient Medications   Medication Sig Dispense Refill     ACETAMINOPHEN PO Take 650 mg by mouth every 4 hours as needed for pain       albuterol (PROAIR HFA/PROVENTIL HFA/VENTOLIN HFA) 108 (90 BASE) MCG/ACT Inhaler Inhale 2 puffs into the lungs every 4 hours as needed        brimonidine (ALPHAGAN-P) 0.15 % ophthalmic solution Place 1 drop into both eyes 2 times daily       budesonide (PULMICORT) 0.5 MG/2ML neb solution Take 0.5 mg by nebulization 2 times daily       cariprazine (VRAYLAR) 1.5 MG CAPS capsule Take 2 mg by mouth daily        cholecalciferol (VITAMIN D3) 5000 units CAPS capsule Take 5,000 Units by mouth daily       clotrimazole (LOTRIMIN) 1 % cream Apply topically 2 times daily       CYCLOBENZAPRINE HCL PO Take 5 mg by mouth every 8 hours as needed        docusate sodium (STOOL SOFTENER) 100 MG capsule Take 100 mg by mouth At Bedtime        escitalopram (LEXAPRO) 20 MG tablet Take 1 tablet by mouth at bedtime       fluticasone (FLONASE) 50 MCG/ACT spray Spray 1 spray into both nostrils daily as needed for rhinitis or allergies       furosemide (LASIX) 80 MG tablet Take 40 mg by mouth 2 times daily        guaiFENesin-dextromethorphan (ROBITUSSIN DM) 100-10 MG/5ML syrup Take 5 mLs by mouth every 4 hours as needed for cough 560 mL 0     ipratropium -  albuterol 0.5 mg/2.5 mg/3 mL (DUONEB) 0.5-2.5 (3) MG/3ML neb solution Take 1 vial by nebulization 4 times daily       lamoTRIgine (LAMICTAL) 200 MG tablet Take 200 mg by mouth At Bedtime        LANsoprazole (PREVACID) 30 MG CR capsule Take 30 mg by mouth daily        Lidocaine-Menthol 4.5-5 % PTCH Externally apply topically 2 times daily       LIOTHYRONINE SODIUM PO Take 25 mcg by mouth daily       lithium 300 MG capsule Take 1 capsule by mouth in the morning and 2 capsules at bedtime       loperamide (IMODIUM) 2 MG capsule Take 2 mg by mouth every 6 hours as needed for diarrhea       LORAZEPAM PO Take 0.5 mg by mouth 2 times daily as needed for anxiety       MECLIZINE HCL PO Take 25 mg by mouth every 8 hours as needed for dizziness       nicotine (COMMIT) 2 MG lozenge Place 2 mg inside cheek every 3 hours as needed for smoking cessation       OYSTER SHELL CALCIUM PO Take 500 mg by mouth 4 times daily       paliperidone (INVEGA) 6 MG 24 hr tablet Take 6 mg by mouth every morning       perphenazine 4 MG tablet Take 4 mg by mouth 2 times daily        POTASSIUM CHLORIDE ER PO 30meq by mouth two times a day       pregabalin (LYRICA) 150 MG capsule Take 150 mg by mouth 2 times daily        PROPRANOLOL HCL PO Take 10 mg by mouth 2 times daily       travoprost, BAK Free, (TRAVATAN Z) 0.004 % ophthalmic solution Place 1 drop into both eyes At Bedtime       ziprasidone (GEODON) 40 MG capsule Take 1 capsule by mouth in the morning       ziprasidone (GEODON) 80 MG capsule Take 160 mg by mouth At Bedtime        OTC products: None, except as noted above    Allergies   Allergen Reactions     Codeine Shortness Of Breath     Has tolerated morphine 7/2008     Hmg-Coa-R Inhibitors Other (See Comments)     Patient declines  Patient declines     Lisinopril Cough     No Clinical Screening - See Comments      Statin-hmg-coa reductase inhibitors  Statin-hmg-coa reductase inhibitors     Sulfa Drugs      Mouth sore     Sumatriptan Other  (See Comments)      Latex Allergy: NO    Social History     Tobacco Use     Smoking status: Current Every Day Smoker     Types: Cigarettes     Smokeless tobacco: Never Used   Substance Use Topics     Alcohol use: No     History   Drug Use No       REVIEW OF SYSTEMS:   10 point ROS of systems including Constitutional, Eyes, Respiratory, Cardiovascular, Gastroenterology, Genitourinary, Integumentary, Musculoskeletal, Psychiatric were all negative except for pertinent positives noted in my HPI.    EXAM:   BP (!) 115/17   Pulse 71   Temp 98  F (36.7  C)   Resp 16   Wt 120.2 kg (265 lb)   SpO2 95%   BMI 45.49 kg/m    GENERAL APPEARANCE:  Alert, in no distress, oriented, morbidly obese  RESP:  respiratory effort and palpation of chest normal, lungs clear to auscultation , no respiratory distress  CV:  Palpation and auscultation of heart done , regular rate and rhythm, no murmur, rub, or gallop  ABDOMEN:  normal bowel sounds, soft, nontender, no hepatosplenomegaly or other masses  :    mac with clear yellow urine  M/S:   Gait and station normal  Digits and nails normal  SKIN:  Inspection of skin and subcutaneous tissue baseline  NEURO:   Cranial nerves 2-12 are normal tested and grossly at patient's baseline  PSYCH:  oriented X 3, normal insight, judgement and memory    DIAGNOSTICS:   EKG: Sinus Rhythm with     CBC RESULTS:   Recent Labs   Lab Test 12/18/18  0825 11/29/18  0910   WBC 9.5 11.1*   RBC 3.90 4.15   HGB 12.6 13.2   HCT 39.8 41.7   * 101*   MCH 32.3 31.8   MCHC 31.7 31.7   RDW 12.7 12.5    232       Last Basic Metabolic Panel:  Recent Labs   Lab Test 12/18/18  0825 11/29/18  0910    140   POTASSIUM 4.1 3.6   CHLORIDE 106 103   GAURAV 8.7 9.1   CO2 31 32   BUN 15 20   CR 1.21* 1.25*   * 105*       Liver Function Studies -   Recent Labs   Lab Test 11/29/18  0910 11/25/18  0546   PROTTOTAL 7.7 6.3*   ALBUMIN 3.5 2.8*   BILITOTAL 0.6 0.3   ALKPHOS 102 70   AST 8 14   ALT 30  19       TSH   Date Value Ref Range Status   08/03/2018 1.40 0.40 - 4.00 mU/L Final   ]    Lab Results   Component Value Date    A1C 5.4 11/23/2018    A1C 5.4 08/03/2018         IMPRESSION:   Reason for surgery/procedure: C5-6 Anterior Discectomy Fusion, Plating  Diagnosis/reason for consult: neck pain    The proposed surgical procedure is considered INTERMEDIATE risk.    REVISED CARDIAC RISK INDEX  The patient has the following serious cardiovascular risks for perioperative complications such as (MI, PE, VFib and 3  AV Block):  High risk surgery (>5% cardiac complication risk)  INTERPRETATION: 2 risks: Class III (moderate risk - 6.6% complication rate)    The patient has the following additional risks for perioperative complications:  No identified additional risks  The ASCVD Risk score ([place] COOMBS Jr., et al., 2013) failed to calculate for the following reasons:    Cannot find a previous HDL lab    Cannot find a previous total cholesterol lab  High tolerance to opioid analgesics due to previous long term narcotics  Morbid obesity      ICD-10-CM    1. Cervical neck pain with evidence of disc disease M50.90    2. Pre-op exam Z01.818    3. Chronic pain syndrome G89.4    4. Moderate asthma, unspecified whether complicated, unspecified whether persistent J45.909    5. Chronic obstructive pulmonary disease, unspecified COPD type (H) J44.9    6. Gastroesophageal reflux disease without esophagitis K21.9    7. Type 2 diabetes mellitus without complication, without long-term current use of insulin (H) E11.9    8. Hypertension, unspecified type I10    9. CKD (chronic kidney disease) stage 3, GFR 30-59 ml/min (H) N18.3    10. Schizoaffective disorder, bipolar type (H) F25.0        ORDERS:     - Patient to see Cardiology ASAP for cardiac eval prior to surgery with prolonged QTC and reports of chest pain  - Will have pharmacist review psych mediations with prolonged QTC  - Surgery to be rescheduled. Attempted to contact [facility] and [facility] anesthesia dept to review with no answer.       Electronically signed by:  DELL Queen CNP      1. EKG, CBC, CMP, INR dx: Pre Op    Signed Electronically by: DELL Queen CNP

## 2018-12-17 NOTE — LETTER
2018        RE: Betsy Resendiz  [address]  [facility] [address]              SPRE-OP EVALUATION:    [place] Medical Record Number:  [id number]  Place of Service where encounter took place:  [facility] [[id number]]  Today's date: 2018    [facility]  [address] [place] [id number] [phone]  Dept: [phone]    PRE-OP EVALUATION:  Today's date: 2018    Betsy Echavarrias (: 1965) presents for pre-operative evaluation assessment as requested by Dr. Clement Puentes MD.  She requires evaluation and anesthesia risk assessment prior to undergoing surgery/procedure for treatment of neck pain .    Proposed Surgery/ Procedure: C5-6 Anterior Discectomy Fusion, Plating  Date of Surgery/ Procedure:  2018  Time of Surgery/ Procedure: 5:30am  Hospital/Surgical Facility: [facility]  Fax number for surgical facility: [phone]  Primary Physician: Elle Dunlap  Type of Anesthesia Anticipated: to be determined    Patient has a Health Care Directive or Living Will:  YES-Full Code     1. NO - Do you have a history of heart attack, stroke, stent, bypass or surgery on an artery in the head, neck, heart or legs?  2. YES - Do you ever have any pain or discomfort in your chest?  3. YES - Do you have a history of  Heart Failure?  4. Yes/at baseline - Are you troubled by shortness of breath when: walking on the level, up a slight hill or at night?  5. NO - Do you currently have a cold, bronchitis or other respiratory infection?  6. Yes/at baseline - Do you have a cough, shortness of breath or wheezing?  7. NO - Do you sometimes get pains in the calves of your legs when you walk?  8. NO - Do you or anyone in your family have previous history of blood clots?  9. NO - Do you or does anyone in your family have a serious bleeding problem such as prolonged bleeding following surgeries or cuts?  10. NO  - Have you ever had problems with anemia or been told to take iron pills?  11. NO - Have you had any abnormal blood loss such as black, tarry or bloody stools, or abnormal vaginal bleeding?  12. NO - Have you ever had a blood transfusion?  13. NO - Have you or any of your relatives ever had problems with anesthesia?  14. yes - Do you have sleep apnea, excessive snoring or daytime drowsiness?  15. NO - Do you have any prosthetic heart valves?  16. NO - Do you have prosthetic joints?  17. NO - Is there any chance that you may be pregnant?      HPI:     HPI related to upcoming procedure:   Patient aware of upcoming surgery. Has ongoing neck and arm pain.   She feels she is breathing at baseline. She denies CP. No reported fever or chills. Appetite at baseline. Per staff moods labile recently.   Patient has not slept well for the past few nocs. States her mattress has been replaced and slept well last oc.       ASTHMA - Patient has a longstanding history of moderate-severe Asthma . Patient has been doing well overall noting BOATENG and continues on medication regimen consisting of albuterol/budesonide without adverse reactions or side effects.                                                                                                                                               .                                                                                           .  DEPRESSION - Patient has a long history of Depression of moderate severity requiring medication for control with recent symptoms being stable..Current symptoms of depression include none.                                                                                                                                                 CARDIAC: patient reporting intermittent chest pain with activity. States she is not sure if it is related to her neck pain. Pain improves with rest.                                    .    MEDICAL HISTORY:     Patient Active  Problem List    Diagnosis Date Noted     COPD exacerbation (H) 11/21/2018     Priority: Medium     Acute respiratory failure with hypoxia and hypercapnia (H) 11/21/2018     Priority: Medium     CKD (chronic kidney disease) stage 3, GFR 30-59 ml/min (H) 11/21/2018     Priority: Medium     Auditory hallucinations 09/14/2018     Priority: Medium     Closed fracture of shaft of right humerus 05/22/2018     Priority: Medium     Bladder tumor 02/07/2018     Priority: Medium     COPD (chronic obstructive pulmonary disease) (H) 02/05/2018     Priority: Medium     JAVIER (generalized anxiety disorder) 11/21/2017     Priority: Medium     HTN (hypertension) 10/28/2017     Priority: Medium     Morbid obesity with BMI of 40.0-44.9, adult (H) 12/08/2016     Priority: Medium     SIPKE (obstructive sleep apnea) 11/13/2015     Priority: Medium     Ulnar neuritis 01/17/2014     Priority: Medium     Immunosuppressed status (H) 09/19/2013     Priority: Medium     Chronic pain 09/18/2013     Priority: Medium     Normocytic anemia 09/18/2013     Priority: Medium     History of encephalopathy 06/04/2013     Priority: Medium     Rotator cuff tendinitis 11/05/2012     Priority: Medium     Osteoarthritis of hip 03/17/2012     Priority: Medium     Posttraumatic stress disorder 10/07/2011     Priority: Medium     Schizoaffective disorder, unspecified type (H) 06/07/2011     Priority: Medium     Cervical neck pain with evidence of disc disease 07/28/2010     Priority: Medium     Conversion reaction 06/04/2010     Priority: Medium     Pain in joint, ankle and foot 01/20/2009     Priority: Medium     Generalized osteoarthritis 01/08/2009     Priority: Medium     Tobacco use disorder 11/26/2008     Priority: Medium     Urinary retention 06/06/2008     Priority: Medium     DM w/o complication type II (H) 04/09/2008     Priority: Medium     Gastroesophageal reflux disease without esophagitis 04/09/2008     Priority: Medium     Lymphedema 01/31/2008      Priority: Medium     Carpal tunnel syndrome 07/12/2007     Priority: Medium     Schizoaffective disorder, bipolar type (H) 10/30/2006     Priority: Medium     Migraine headache 10/30/2006     Priority: Medium     Asthma 10/30/2006     Priority: Medium     Unspecified glaucoma 10/30/2006     Priority: Medium      Past Medical History:   Diagnosis Date     Atrophy of muscle of left lower leg 11/3/2015     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     Hypokalemia 6/4/2010     Vitamin D deficiency 9/19/2012     Past Surgical History:   Procedure Laterality Date     ARTHRODESIS FOOT Right 2009     BACK SURGERY      x7      BIOPSY/EXCIS CERVICAL LESN       INCISION AND DRAINAGE  2013    Pressure ulcer     REPAIR BLADDER       THORACOTOMY      Abscess     Current Outpatient Medications   Medication Sig Dispense Refill     ACETAMINOPHEN PO Take 650 mg by mouth every 4 hours as needed for pain       albuterol (PROAIR HFA/PROVENTIL HFA/VENTOLIN HFA) 108 (90 BASE) MCG/ACT Inhaler Inhale 2 puffs into the lungs every 4 hours as needed        brimonidine (ALPHAGAN-P) 0.15 % ophthalmic solution Place 1 drop into both eyes 2 times daily       budesonide (PULMICORT) 0.5 MG/2ML neb solution Take 0.5 mg by nebulization 2 times daily       cariprazine (VRAYLAR) 1.5 MG CAPS capsule Take 2 mg by mouth daily        cholecalciferol (VITAMIN D3) 5000 units CAPS capsule Take 5,000 Units by mouth daily       clotrimazole (LOTRIMIN) 1 % cream Apply topically 2 times daily       CYCLOBENZAPRINE HCL PO Take 5 mg by mouth every 8 hours as needed        docusate sodium (STOOL SOFTENER) 100 MG capsule Take 100 mg by mouth At Bedtime        escitalopram (LEXAPRO) 20 MG tablet Take 1 tablet by mouth at bedtime       fluticasone (FLONASE) 50 MCG/ACT spray Spray 1 spray into both nostrils daily as needed for rhinitis or allergies       furosemide (LASIX) 80 MG tablet Take 40 mg by mouth 2 times daily        guaiFENesin-dextromethorphan (ROBITUSSIN DM) 100-10  MG/5ML syrup Take 5 mLs by mouth every 4 hours as needed for cough 560 mL 0     ipratropium - albuterol 0.5 mg/2.5 mg/3 mL (DUONEB) 0.5-2.5 (3) MG/3ML neb solution Take 1 vial by nebulization 4 times daily       lamoTRIgine (LAMICTAL) 200 MG tablet Take 200 mg by mouth At Bedtime        LANsoprazole (PREVACID) 30 MG CR capsule Take 30 mg by mouth daily        Lidocaine-Menthol 4.5-5 % PTCH Externally apply topically 2 times daily       LIOTHYRONINE SODIUM PO Take 25 mcg by mouth daily       lithium 300 MG capsule Take 1 capsule by mouth in the morning and 2 capsules at bedtime       loperamide (IMODIUM) 2 MG capsule Take 2 mg by mouth every 6 hours as needed for diarrhea       LORAZEPAM PO Take 0.5 mg by mouth 2 times daily as needed for anxiety       MECLIZINE HCL PO Take 25 mg by mouth every 8 hours as needed for dizziness       nicotine (COMMIT) 2 MG lozenge Place 2 mg inside cheek every 3 hours as needed for smoking cessation       OYSTER SHELL CALCIUM PO Take 500 mg by mouth 4 times daily       paliperidone (INVEGA) 6 MG 24 hr tablet Take 6 mg by mouth every morning       perphenazine 4 MG tablet Take 4 mg by mouth 2 times daily        POTASSIUM CHLORIDE ER PO 30meq by mouth two times a day       pregabalin (LYRICA) 150 MG capsule Take 150 mg by mouth 2 times daily        PROPRANOLOL HCL PO Take 10 mg by mouth 2 times daily       travoprost, BAK Free, (TRAVATAN Z) 0.004 % ophthalmic solution Place 1 drop into both eyes At Bedtime       ziprasidone (GEODON) 40 MG capsule Take 1 capsule by mouth in the morning       ziprasidone (GEODON) 80 MG capsule Take 160 mg by mouth At Bedtime        OTC products: None, except as noted above    Allergies   Allergen Reactions     Codeine Shortness Of Breath     Has tolerated morphine 7/2008     Hmg-Coa-R Inhibitors Other (See Comments)     Patient declines  Patient declines     Lisinopril Cough     No Clinical Screening - See Comments      Statin-hmg-coa reductase  inhibitors  Statin-hmg-coa reductase inhibitors     Sulfa Drugs      Mouth sore     Sumatriptan Other (See Comments)      Latex Allergy: NO    Social History     Tobacco Use     Smoking status: Current Every Day Smoker     Types: Cigarettes     Smokeless tobacco: Never Used   Substance Use Topics     Alcohol use: No     History   Drug Use No       REVIEW OF SYSTEMS:   10 point ROS of systems including Constitutional, Eyes, Respiratory, Cardiovascular, Gastroenterology, Genitourinary, Integumentary, Musculoskeletal, Psychiatric were all negative except for pertinent positives noted in my HPI.    EXAM:   BP (!) 115/17   Pulse 71   Temp 98  F (36.7  C)   Resp 16   Wt 120.2 kg (265 lb)   SpO2 95%   BMI 45.49 kg/m     GENERAL APPEARANCE:  Alert, in no distress, oriented, morbidly obese  RESP:  respiratory effort and palpation of chest normal, lungs clear to auscultation , no respiratory distress  CV:  Palpation and auscultation of heart done , regular rate and rhythm, no murmur, rub, or gallop  ABDOMEN:  normal bowel sounds, soft, nontender, no hepatosplenomegaly or other masses  :    mac with clear yellow urine  M/S:   Gait and station normal  Digits and nails normal  SKIN:  Inspection of skin and subcutaneous tissue baseline  NEURO:   Cranial nerves 2-12 are normal tested and grossly at patient's baseline  PSYCH:  oriented X 3, normal insight, judgement and memory    DIAGNOSTICS:   EKG: Sinus Rhythm with     CBC RESULTS:   Recent Labs   Lab Test 12/18/18  0825 11/29/18  0910   WBC 9.5 11.1*   RBC 3.90 4.15   HGB 12.6 13.2   HCT 39.8 41.7   * 101*   MCH 32.3 31.8   MCHC 31.7 31.7   RDW 12.7 12.5    232       Last Basic Metabolic Panel:  Recent Labs   Lab Test 12/18/18  0825 11/29/18  0910    140   POTASSIUM 4.1 3.6   CHLORIDE 106 103   GAURAV 8.7 9.1   CO2 31 32   BUN 15 20   CR 1.21* 1.25*   * 105*       Liver Function Studies -   Recent Labs   Lab Test 11/29/18  0910  "11/25/18  0546   PROTTOTAL 7.7 6.3*   ALBUMIN 3.5 2.8*   BILITOTAL 0.6 0.3   ALKPHOS 102 70   AST 8 14   ALT 30 19       TSH   Date Value Ref Range Status   08/03/2018 1.40 0.40 - 4.00 mU/L Final   ]    Lab Results   Component Value Date    A1C 5.4 11/23/2018    A1C 5.4 08/03/2018         IMPRESSION:   Reason for surgery/procedure: C5-6 Anterior Discectomy Fusion, Plating  Diagnosis/reason for consult: neck pain    The proposed surgical procedure is considered INTERMEDIATE risk.    REVISED CARDIAC RISK INDEX  The patient has the following serious cardiovascular risks for perioperative complications such as (MI, PE, VFib and 3  AV Block):  {PREOP REVISED CARDIAC INDEX (RCI):833360:p:\"No serious cardiac risks\"}  INTERPRETATION: {REVISED CARDIAC RISK INTERPRETATION:093961}    The patient has the following additional risks for perioperative complications:  No identified additional risks  The ASCVD Risk score (Harvinder COOMBS Jr., et al., 2013) failed to calculate for the following reasons:    Cannot find a previous HDL lab    Cannot find a previous total cholesterol lab  High tolerance to opioid analgesics due to previous long term narcotics  Morbid obesity      ICD-10-CM    1. Cervical neck pain with evidence of disc disease M50.90    2. Pre-op exam Z01.818    3. Chronic pain syndrome G89.4    4. Moderate asthma, unspecified whether complicated, unspecified whether persistent J45.909    5. Chronic obstructive pulmonary disease, unspecified COPD type (H) J44.9    6. Gastroesophageal reflux disease without esophagitis K21.9    7. Type 2 diabetes mellitus without complication, without long-term current use of insulin (H) E11.9    8. Hypertension, unspecified type I10    9. CKD (chronic kidney disease) stage 3, GFR 30-59 ml/min (H) N18.3    10. Schizoaffective disorder, bipolar type (H) F25.0        ORDERS:     - Patient to see Cardiology ASAP for cardiac eval prior to surgery with prolonged QTC and reports of chest pain  - Will " have pharmacist review psych mediations with prolonged QTC  - Surgery to be rescheduled. Attempted to contact [place] [facility] and [facility] anesthesia dept to review with no answer.       Electronically signed by:  DELL Queen CNP               1. EKG, CBC, CMP, INR dx: Pre Op    Signed Electronically by: DELL Queen CNP        incerely,        DELL Queen CNP

## 2018-12-18 ENCOUNTER — HOSPITAL LABORATORY (OUTPATIENT)
Facility: OTHER | Age: 53
End: 2018-12-18

## 2018-12-18 ENCOUNTER — APPOINTMENT (OUTPATIENT)
Dept: LAB | Facility: CLINIC | Age: 53
End: 2018-12-18
Payer: COMMERCIAL

## 2018-12-18 LAB
ANION GAP SERPL CALCULATED.3IONS-SCNC: 7 MMOL/L (ref 3–14)
BUN SERPL-MCNC: 15 MG/DL (ref 7–30)
CALCIUM SERPL-MCNC: 8.7 MG/DL (ref 8.5–10.1)
CHLORIDE SERPL-SCNC: 106 MMOL/L (ref 94–109)
CO2 SERPL-SCNC: 31 MMOL/L (ref 20–32)
CREAT SERPL-MCNC: 1.21 MG/DL (ref 0.52–1.04)
ERYTHROCYTE [DISTWIDTH] IN BLOOD BY AUTOMATED COUNT: 12.7 % (ref 10–15)
GFR SERPL CREATININE-BSD FRML MDRD: 47 ML/MIN/1.7M2
GLUCOSE SERPL-MCNC: 129 MG/DL (ref 70–99)
HCT VFR BLD AUTO: 39.8 % (ref 35–47)
HGB BLD-MCNC: 12.6 G/DL (ref 11.7–15.7)
INR PPP: 0.96 (ref 0.86–1.14)
MCH RBC QN AUTO: 32.3 PG (ref 26.5–33)
MCHC RBC AUTO-ENTMCNC: 31.7 G/DL (ref 31.5–36.5)
MCV RBC AUTO: 102 FL (ref 78–100)
PLATELET # BLD AUTO: 215 10E9/L (ref 150–450)
POTASSIUM SERPL-SCNC: 4.1 MMOL/L (ref 3.4–5.3)
RBC # BLD AUTO: 3.9 10E12/L (ref 3.8–5.2)
SODIUM SERPL-SCNC: 144 MMOL/L (ref 133–144)
WBC # BLD AUTO: 9.5 10E9/L (ref 4–11)

## 2018-12-19 ENCOUNTER — NURSING HOME VISIT (OUTPATIENT)
Dept: GERIATRICS | Facility: CLINIC | Age: 53
End: 2018-12-19
Payer: COMMERCIAL

## 2018-12-19 ENCOUNTER — PRE VISIT (OUTPATIENT)
Dept: UROLOGY | Facility: CLINIC | Age: 53
End: 2018-12-19

## 2018-12-19 VITALS
DIASTOLIC BLOOD PRESSURE: 86 MMHG | TEMPERATURE: 98 F | BODY MASS INDEX: 45.49 KG/M2 | WEIGHT: 265 LBS | SYSTOLIC BLOOD PRESSURE: 154 MMHG | OXYGEN SATURATION: 95 % | RESPIRATION RATE: 18 BRPM | HEART RATE: 71 BPM

## 2018-12-19 DIAGNOSIS — J44.9 CHRONIC OBSTRUCTIVE PULMONARY DISEASE, UNSPECIFIED COPD TYPE (H): ICD-10-CM

## 2018-12-19 DIAGNOSIS — R07.89 OTHER CHEST PAIN: ICD-10-CM

## 2018-12-19 DIAGNOSIS — R94.31 ABNORMAL ELECTROCARDIOGRAM: ICD-10-CM

## 2018-12-19 DIAGNOSIS — M50.90 CERVICAL NECK PAIN WITH EVIDENCE OF DISC DISEASE: Primary | ICD-10-CM

## 2018-12-19 PROCEDURE — 99207 ZZC CDG-PROCEDURE CODE ADDED/ADJ: CPT | Performed by: NURSE PRACTITIONER

## 2018-12-19 NOTE — LETTER
12/19/2018        RE: Betsy Resendiz  [address]  [facility] [address]        [place] [facility]  NON-FACE TO FACE PROLONGED SERVICE    Betsy Resendiz 1965    Date of Related Face to Face Service: care coordination    INTENT OF SERVICE  This is an extended evaluation of patient's specific problem.  The service relates to a recent or future E/M visit.  Documentation supports medical necessity, relates to ongoing patient management and documents start times and stop times.    Regarding the following diagnoses:     Cervical neck pain with evidence of disc disease  Chronic obstructive pulmonary disease, unspecified COPD type (H)  Abnormal electrocardiogram  Other chest pain,   * I reviewed records for patient's complicated medical history.  (Start time 9:15  Stop time 10:50)    ASSESSMENT/PLAN     Cervical neck pain with evidence of disc disease  Chronic obstructive pulmonary disease, unspecified COPD type (H)  Abnormal electrocardiogram  Other chest pain    Seeing patient due to multiple chronic conditions. Having chest pressure with abnormal EKG. Pharmacy reviewed medication list with multiple meds possibly contributing to prolonged QTC. Pharmacy recommends reducing geodon and/or lexapro. Patient's flexeril also likely contributing. Patient is following with outside psych provider and does patient does not want psych medications adjusted at this time. Patient also reporting chronic, severe pain. We had previously successfully tapered her off of narcotics. She is concerned with her surgery date advanced about her pain relief. At this time I do not recommend starting narcotic medication with her multiple medications and chronic pain. She historically becomes very sedated with narcotics. Recommend heat/ice and massage.     Patient will have a stress test and see cardiology prior to having surgery.     TIME  Total time spent with Non-Face to Face prolonged service 35 minutes.      Electronically signed by:  DELL Queen CNP                Sincerely,        DELL Queen CNP

## 2018-12-19 NOTE — PROGRESS NOTES
Reason for visit: Cystoscopy     Relevant information: urinary retention, question of a prior bladder tumor    Records/imaging/labs: UC was not scheduled 7-10 days prior    Pt called: no need for a call    Rooming: dip

## 2018-12-19 NOTE — PROGRESS NOTES
[place] [facility]  NON-FACE TO FACE PROLONGED SERVICE    Betsy Resendiz 1965    Date of Related Face to Face Service: care coordination    INTENT OF SERVICE  This is an extended evaluation of patient's specific problem.  The service relates to a recent or future E/M visit.  Documentation supports medical necessity, relates to ongoing patient management and documents start times and stop times.    Regarding the following diagnoses:     Cervical neck pain with evidence of disc disease  Chronic obstructive pulmonary disease, unspecified COPD type (H)  Abnormal electrocardiogram  Other chest pain,   * I reviewed records for patient's complicated medical history.  (Start time 9:15  Stop time 10:50)    ASSESSMENT/PLAN     Cervical neck pain with evidence of disc disease  Chronic obstructive pulmonary disease, unspecified COPD type (H)  Abnormal electrocardiogram  Other chest pain    Seeing patient due to multiple chronic conditions. Having chest pressure with abnormal EKG. Pharmacy reviewed medication list with multiple meds possibly contributing to prolonged QTC. Pharmacy recommends reducing geodon and/or lexapro. Patient's flexeril also likely contributing. Patient is following with outside psych provider and does patient does not want psych medications adjusted at this time. Patient also reporting chronic, severe pain. We had previously successfully tapered her off of narcotics. She is concerned with her surgery date advanced about her pain relief. At this time I do not recommend starting narcotic medication with her multiple medications and chronic pain. She historically becomes very sedated with narcotics. Recommend heat/ice and massage.     Patient will have a stress test and see cardiology prior to having surgery.     TIME  Total time spent with Non-Face to Face prolonged service 35 minutes.     Electronically signed by:  DELL Queen CNP

## 2018-12-20 ENCOUNTER — OFFICE VISIT (OUTPATIENT)
Dept: CARDIOLOGY | Facility: CLINIC | Age: 53
End: 2018-12-20
Payer: COMMERCIAL

## 2018-12-20 ENCOUNTER — HOSPITAL LABORATORY (OUTPATIENT)
Facility: OTHER | Age: 53
End: 2018-12-20

## 2018-12-20 ENCOUNTER — TELEPHONE (OUTPATIENT)
Dept: GERIATRICS | Facility: CLINIC | Age: 53
End: 2018-12-20

## 2018-12-20 VITALS
HEIGHT: 64 IN | WEIGHT: 264.9 LBS | BODY MASS INDEX: 45.23 KG/M2 | HEART RATE: 80 BPM | SYSTOLIC BLOOD PRESSURE: 122 MMHG | OXYGEN SATURATION: 95 % | DIASTOLIC BLOOD PRESSURE: 72 MMHG

## 2018-12-20 DIAGNOSIS — R94.31 ABNORMAL ELECTROCARDIOGRAM: Primary | ICD-10-CM

## 2018-12-20 DIAGNOSIS — R52 PAIN: Primary | ICD-10-CM

## 2018-12-20 LAB
CHOLEST SERPL-MCNC: 150 MG/DL
HDLC SERPL-MCNC: 45 MG/DL
LDLC SERPL CALC-MCNC: 73 MG/DL
NONHDLC SERPL-MCNC: 105 MG/DL
TRIGL SERPL-MCNC: 162 MG/DL

## 2018-12-20 PROCEDURE — 99204 OFFICE O/P NEW MOD 45 MIN: CPT | Performed by: INTERNAL MEDICINE

## 2018-12-20 RX ORDER — ACETAMINOPHEN 500 MG
1000 TABLET ORAL 3 TIMES DAILY
Qty: 540 TABLET | Refills: 3
Start: 2018-12-20 | End: 2019-12-20

## 2018-12-20 RX ORDER — TRAMADOL HYDROCHLORIDE 50 MG/1
50 TABLET ORAL EVERY 4 HOURS PRN
Qty: 10 TABLET | Refills: 0
Start: 2018-12-20 | End: 2019-02-15

## 2018-12-20 ASSESSMENT — MIFFLIN-ST. JEOR: SCORE: 1801.58

## 2018-12-20 NOTE — TELEPHONE ENCOUNTER
"NUrsing calling to report that patient is having increased neck pain.  Patient was scheduled to have neck surgery however this has been postponed as patient had an abnormal EKG and Cardiology has ordered a stess test (being done 12/27/18)    Patient currently is on Lyrica 150 mg BID, (ativan 0.5 mg BID for anxiety), Flexerill 5 mg q8 hours PRN - patient reports this helps \"a little\" but not much.  She also has Tylenol 650 mg q4 hours PRN.     Patient has codeine allergy - reported to nursing that she has taken Vicodin and Percocet in the past without issue.    Noted history of opioid dependence     Orders/Plan:  1. Tylenol 1000 mg po TID scheduled. (DC PRN Tylenol)  2. Tramadol 50 mg q4 hours PRN   Pharmacy called for #60 tabs, 0 refills.     Judy Lane, CNP   "

## 2018-12-20 NOTE — LETTER
12/20/2018      Elle Dunlap MD  [facility] [address] [address]      RE: Betsy Resendiz       Dear Colleague,    I had the pleasure of seeing Betsy Resendiz in the [facility].    Service Date: 12/20/2018      REASON FOR CARDIOLOGY VISIT:  Abnormal EKG.      HISTORY OF PRESENT ILLNESS:  Ms. Resendiz is a pleasant 53-year-old female with morbid obesity, COPD, tobacco abuse, which she states quit about 3 weeks ago but prior to that 30 years of 1-pack-per day, history of bipolar disorder, schizoaffective disorder who was admitted with acute encephalopathy due to lithium toxicity recently, and also acute hypercapnic respiratory failure due to COPD exacerbation.  She has chronic urinary retention with chronic indwelling Abernathy catheter, borderline diabetes according to patient, chronical disease, stage III.  The patient is here because of abnormal EKG.  I had an EKG done last month that shows sinus rhythm with a possible Q-wave in lead 3, left anterior fascicular block with poor R-wave progression in precordial leads.  The patient tells me healthwise she feels okay.  She never had any history of coronary artery disease diagnosed or any history of MI or stroke.  She does not have any hypertension or unknown dyslipidemia.  Her father had probable coronary disease in his 70s.  She has a sibling brother.  No known coronary artery disease in the brother.  As noted above, she has a significant history of tobacco abuse and recently quit.  It looks like she had a stress test done many years ago in 2006.  Mild reduction in mid to distal anterior wall count was seen, likely due to breast tissue attenuation artifact.  Otherwise, test was normal.      The patient denies any chest discomfort.  On repeat questioning, she continues to deny any chest discomfort, tightness at rest or with physical activity.  She is somewhat limited in ambulation due to arthritis.  In fact, she  was scheduled for a herniated cervical disk surgery which has been canceled for now pending cardiology evaluation it looks like.  She does use a walker.  She is able to walk across a [employer].  She does not get any chest discomfort with physical activity.  If she does exert more she can get a little short of breath.  This is longstanding stable.  It looks like she also had an echocardiogram done earlier this year.  I can see the report in Care Everywhere.  LV function was noted to be 70-75% of ejection fraction, RV function was normal.  No significant valve disease.      PHYSICAL EXAMINATION:     VITALS:  Blood pressure 122/72, heart rate 80 regular, weight 264 pounds, BMI 45.57.   GENERAL:  The patient appears pleasant, comfortable.   NECK:  Normal JVP, no bruit.   CARDIOVASCULAR:  S1, S2 normal, no murmur, rub or gallop.   PULMONARY:  Lungs are clear to auscultation bilaterally.   GI SYSTEM:  Abdomen soft, nontender, obese.   EXTREMITIES:  No pitting pedal edema.     NEUROLOGIC:  Alert, oriented x3.   PSYCHIATRIC:  Normal affect.   SKIN:  No obvious rash.   HEENT:  No pallor or icterus.        IMPRESSION AND PLAN:  A very pleasant 53-year-old female with serious disorders of tobacco abuse, which she quit recently, other comorbidities of morbid obesity, bipolar disorder, schizoaffective disorder, chronic kidney disease stage III, borderline diabetes according to the patient, COPD and recent hospitalization for hypercapnic respiratory failure due to COPD exacerbation who is now here for evaluation due to abnormal EKG.  Clinically, she does not appear to have any typical symptoms of angina, although she is somewhat limited in her ambulation.  Given CAD risk factors and a somewhat abnormal EKG, I think it is prudent to rule out coronary artery disease.  We discussed the option of coronary angiogram versus stress testing.  At this time, given lack of typical symptoms, risks and benefits do not favor an invasive  approach.  I discussed the option of a Lexiscan nuclear stress test versus cardiac MRI stress perfusion.  Given her body habitus, cardiac MRIS stress perfusion was my preferred choice, but the patient would like to avoid going down to [place] to get this test done and would like to do a nuclear stress test.  In the past, nuclear stress test has shown fixed anterior apical count reduction likely because of breast tissue attenuation artifact.  Further recommendation will depend upon the results of the Lexiscan stress test in case it is normal or only mild ischemia is seen.  Given lack of symptoms, I think it will be reasonable to proceed with the cervical herniated disk surgery from a cardiac standpoint of view.  If we do see some evidence of ischemia, I would recommend the patient be started on a statin like Lipitor 40-80 mg q. day and a baby aspirin.  I am also setting up a tentative followup in about 3-4 weeks' time.         RADHA LINDSAY MD             D: 2018   T: 2018   MT: bernie      Name:     BECCA HANNAH   MRN:      [id number]        Account:      [id number]   :      1965           Service Date: 2018      Document: [id number]         Outpatient Encounter Medications as of 2018   Medication Sig Dispense Refill     albuterol (PROAIR HFA/PROVENTIL HFA/VENTOLIN HFA) 108 (90 BASE) MCG/ACT Inhaler Inhale 2 puffs into the lungs every 4 hours as needed        brimonidine (ALPHAGAN-P) 0.15 % ophthalmic solution Place 1 drop into both eyes 2 times daily       budesonide (PULMICORT) 0.5 MG/2ML neb solution Take 0.5 mg by nebulization 2 times daily       cariprazine (VRAYLAR) 1.5 MG CAPS capsule Take 2 mg by mouth daily        cholecalciferol (VITAMIN D3) 5000 units CAPS capsule Take 5,000 Units by mouth daily       clotrimazole (LOTRIMIN) 1 % cream Apply topically 2 times daily       CYCLOBENZAPRINE HCL PO Take 5 mg by mouth every 8 hours as needed        docusate sodium (STOOL SOFTENER)  100 MG capsule Take 100 mg by mouth At Bedtime        escitalopram (LEXAPRO) 20 MG tablet Take 1 tablet by mouth at bedtime       fluticasone (FLONASE) 50 MCG/ACT spray Spray 1 spray into both nostrils daily as needed for rhinitis or allergies       furosemide (LASIX) 80 MG tablet Take 40 mg by mouth 2 times daily        guaiFENesin-dextromethorphan (ROBITUSSIN DM) 100-10 MG/5ML syrup Take 5 mLs by mouth every 4 hours as needed for cough 560 mL 0     ipratropium - albuterol 0.5 mg/2.5 mg/3 mL (DUONEB) 0.5-2.5 (3) MG/3ML neb solution Take 1 vial by nebulization 4 times daily       lamoTRIgine (LAMICTAL) 200 MG tablet Take 200 mg by mouth At Bedtime        LANsoprazole (PREVACID) 30 MG CR capsule Take 30 mg by mouth daily        Lidocaine-Menthol 4.5-5 % PTCH Externally apply topically 2 times daily       LIOTHYRONINE SODIUM PO Take 25 mcg by mouth daily       lithium 300 MG capsule Take 1 capsule by mouth in the morning and 2 capsules at bedtime       loperamide (IMODIUM) 2 MG capsule Take 2 mg by mouth every 6 hours as needed for diarrhea       LORAZEPAM PO Take 0.5 mg by mouth 2 times daily as needed for anxiety       MECLIZINE HCL PO Take 25 mg by mouth every 8 hours as needed for dizziness       nicotine (COMMIT) 2 MG lozenge Place 2 mg inside cheek every 3 hours as needed for smoking cessation       OYSTER SHELL CALCIUM PO Take 500 mg by mouth 4 times daily       paliperidone (INVEGA) 6 MG 24 hr tablet Take 6 mg by mouth every morning       perphenazine 4 MG tablet Take 4 mg by mouth 2 times daily        POTASSIUM CHLORIDE ER PO 30meq by mouth two times a day       pregabalin (LYRICA) 150 MG capsule Take 150 mg by mouth 2 times daily        PROPRANOLOL HCL PO Take 10 mg by mouth 2 times daily       travoprost, BAK Free, (TRAVATAN Z) 0.004 % ophthalmic solution Place 1 drop into both eyes At Bedtime       ziprasidone (GEODON) 40 MG capsule Take 1 capsule by mouth in the morning       ziprasidone (GEODON) 80 MG  capsule Take 160 mg by mouth At Bedtime        [DISCONTINUED] ACETAMINOPHEN PO Take 650 mg by mouth every 4 hours as needed for pain       No facility-administered encounter medications on file as of 12/20/2018.        Again, thank you for allowing me to participate in the care of your patient.      Sincerely,    Jose Francisco Valerio MD     [facility]

## 2018-12-20 NOTE — PROGRESS NOTES
HPI and Plan:   See dictation(#[id number])    Orders Placed This Encounter   Procedures     NM Lexiscan stress test (nuc card)     Follow-Up with Cardiac Advanced Practice Provider       No orders of the defined types were placed in this encounter.      There are no discontinued medications.      Encounter Diagnosis   Name Primary?     Abnormal electrocardiogram Yes       CURRENT MEDICATIONS:  Current Outpatient Medications   Medication Sig Dispense Refill     ACETAMINOPHEN PO Take 650 mg by mouth every 4 hours as needed for pain       albuterol (PROAIR HFA/PROVENTIL HFA/VENTOLIN HFA) 108 (90 BASE) MCG/ACT Inhaler Inhale 2 puffs into the lungs every 4 hours as needed        brimonidine (ALPHAGAN-P) 0.15 % ophthalmic solution Place 1 drop into both eyes 2 times daily       budesonide (PULMICORT) 0.5 MG/2ML neb solution Take 0.5 mg by nebulization 2 times daily       cariprazine (VRAYLAR) 1.5 MG CAPS capsule Take 2 mg by mouth daily        cholecalciferol (VITAMIN D3) 5000 units CAPS capsule Take 5,000 Units by mouth daily       clotrimazole (LOTRIMIN) 1 % cream Apply topically 2 times daily       CYCLOBENZAPRINE HCL PO Take 5 mg by mouth every 8 hours as needed        docusate sodium (STOOL SOFTENER) 100 MG capsule Take 100 mg by mouth At Bedtime        escitalopram (LEXAPRO) 20 MG tablet Take 1 tablet by mouth at bedtime       fluticasone (FLONASE) 50 MCG/ACT spray Spray 1 spray into both nostrils daily as needed for rhinitis or allergies       furosemide (LASIX) 80 MG tablet Take 40 mg by mouth 2 times daily        guaiFENesin-dextromethorphan (ROBITUSSIN DM) 100-10 MG/5ML syrup Take 5 mLs by mouth every 4 hours as needed for cough 560 mL 0     ipratropium - albuterol 0.5 mg/2.5 mg/3 mL (DUONEB) 0.5-2.5 (3) MG/3ML neb solution Take 1 vial by nebulization 4 times daily       lamoTRIgine (LAMICTAL) 200 MG tablet Take 200 mg by mouth At Bedtime        LANsoprazole (PREVACID) 30 MG CR capsule Take 30 mg by mouth daily         Lidocaine-Menthol 4.5-5 % PTCH Externally apply topically 2 times daily       LIOTHYRONINE SODIUM PO Take 25 mcg by mouth daily       lithium 300 MG capsule Take 1 capsule by mouth in the morning and 2 capsules at bedtime       loperamide (IMODIUM) 2 MG capsule Take 2 mg by mouth every 6 hours as needed for diarrhea       LORAZEPAM PO Take 0.5 mg by mouth 2 times daily as needed for anxiety       MECLIZINE HCL PO Take 25 mg by mouth every 8 hours as needed for dizziness       nicotine (COMMIT) 2 MG lozenge Place 2 mg inside cheek every 3 hours as needed for smoking cessation       OYSTER SHELL CALCIUM PO Take 500 mg by mouth 4 times daily       paliperidone (INVEGA) 6 MG 24 hr tablet Take 6 mg by mouth every morning       perphenazine 4 MG tablet Take 4 mg by mouth 2 times daily        POTASSIUM CHLORIDE ER PO 30meq by mouth two times a day       pregabalin (LYRICA) 150 MG capsule Take 150 mg by mouth 2 times daily        PROPRANOLOL HCL PO Take 10 mg by mouth 2 times daily       travoprost, BAK Free, (TRAVATAN Z) 0.004 % ophthalmic solution Place 1 drop into both eyes At Bedtime       ziprasidone (GEODON) 40 MG capsule Take 1 capsule by mouth in the morning       ziprasidone (GEODON) 80 MG capsule Take 160 mg by mouth At Bedtime          ALLERGIES     Allergies   Allergen Reactions     Codeine Shortness Of Breath     Has tolerated morphine 7/2008     Hmg-Coa-R Inhibitors Other (See Comments)     Patient declines  Patient declines     Lisinopril Cough     No Clinical Screening - See Comments      Statin-hmg-coa reductase inhibitors  Statin-hmg-coa reductase inhibitors     Sulfa Drugs      Mouth sore     Sumatriptan Other (See Comments)       PAST MEDICAL HISTORY:  Past Medical History:   Diagnosis Date     Atrophy of muscle of left lower leg 11/3/2015     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     Hypokalemia 6/4/2010     Vitamin D deficiency 9/19/2012       PAST SURGICAL HISTORY:  Past Surgical History:    Procedure Laterality Date     ARTHRODESIS FOOT Right 2009     BACK SURGERY      x7      BIOPSY/EXCIS CERVICAL LESN       INCISION AND DRAINAGE  2013    Pressure ulcer     REPAIR BLADDER       THORACOTOMY      Abscess       FAMILY HISTORY:  Family History   Problem Relation Age of Onset     Other - See Comments Mother         Multiple myeloma     Diabetes Maternal Grandmother      Cancer Niece          age 12       SOCIAL HISTORY:  Social History     Socioeconomic History     Marital status: Single     Spouse name: Not on file     Number of children: Not on file     Years of education: Not on file     Highest education level: Not on file   Social Needs     Financial resource strain: Not on file     Food insecurity - worry: Not on file     Food insecurity - inability: Not on file     Transportation needs - medical: Not on file     Transportation needs - non-medical: Not on file   Occupational History     Not on file   Tobacco Use     Smoking status: Current Every Day Smoker     Types: Cigarettes     Smokeless tobacco: Never Used   Substance and Sexual Activity     Alcohol use: No     Drug use: No     Sexual activity: Not on file   Other Topics Concern     Parent/sibling w/ CABG, MI or angioplasty before 65F 55M? Not Asked   Social History Narrative     Not on file       Review of Systems:  Skin:  Negative       Eyes:  Positive for glasses    ENT:  Negative      Respiratory:  Positive for shortness of breath     Cardiovascular:  Negative for;palpitations;lightheadedness;dizziness Positive for;chest pain;heaviness;edema in chest area feels like electrical current, and heaviness  Gastroenterology: Negative      Genitourinary:  Positive for   catheter  Musculoskeletal:  Positive for neck pain;back pain    Neurologic:  Positive for migraine headaches    Psychiatric:  Positive for anxiety    Heme/Lymph/Imm:  Negative      Endocrine:  Negative        Physical Exam:  Vitals: /72 (BP Location: Right arm, Patient  "Position: Fowlers, Cuff Size: Adult Large)   Pulse 80   Ht 1.626 m (5' 4\")   Wt 120.2 kg (264 lb 14.4 oz)   SpO2 95%   BMI 45.47 kg/m      Constitutional:           Skin:             Head:           Eyes:           Lymph:      ENT:           Neck:           Respiratory:            Cardiac:                                                           GI:           Extremities and Muscular Skeletal:                 Neurological:           Psych:             CC  No referring provider defined for this encounter.                  "

## 2018-12-20 NOTE — PROGRESS NOTES
Service Date: 12/20/2018      REASON FOR CARDIOLOGY VISIT:  Abnormal EKG.      HISTORY OF PRESENT ILLNESS:  Ms. Resendiz is a pleasant 53-year-old female with morbid obesity, COPD, tobacco abuse, which she states quit about 3 weeks ago but prior to that 30 years of 1-pack-per day, history of bipolar disorder, schizoaffective disorder who was admitted with acute encephalopathy due to lithium toxicity recently, and also acute hypercapnic respiratory failure due to COPD exacerbation.  She has chronic urinary retention with chronic indwelling Abernathy catheter, borderline diabetes according to patient, chronical disease, stage III.  The patient is here because of abnormal EKG.  I had an EKG done last month that shows sinus rhythm with a possible Q-wave in lead 3, left anterior fascicular block with poor R-wave progression in precordial leads.  The patient tells me healthwise she feels okay.  She never had any history of coronary artery disease diagnosed or any history of MI or stroke.  She does not have any hypertension or unknown dyslipidemia.  Her father had probable coronary disease in his 70s.  She has a sibling brother.  No known coronary artery disease in the brother.  As noted above, she has a significant history of tobacco abuse and recently quit.  It looks like she had a stress test done many years ago in 2006.  Mild reduction in mid to distal anterior wall count was seen, likely due to breast tissue attenuation artifact.  Otherwise, test was normal.      The patient denies any chest discomfort.  On repeat questioning, she continues to deny any chest discomfort, tightness at rest or with physical activity.  She is somewhat limited in ambulation due to arthritis.  In fact, she was scheduled for a herniated cervical disk surgery which has been canceled for now pending cardiology evaluation it looks like.  She does use a walker.  She is able to walk across a [employer].  She does not get any chest discomfort with physical  activity.  If she does exert more she can get a little short of breath.  This is longstanding stable.  It looks like she also had an echocardiogram done earlier this year.  I can see the report in Care Everywhere.  LV function was noted to be 70-75% of ejection fraction, RV function was normal.  No significant valve disease.      PHYSICAL EXAMINATION:     VITALS:  Blood pressure 122/72, heart rate 80 regular, weight 264 pounds, BMI 45.57.   GENERAL:  The patient appears pleasant, comfortable.   NECK:  Normal JVP, no bruit.   CARDIOVASCULAR:  S1, S2 normal, no murmur, rub or gallop.   PULMONARY:  Lungs are clear to auscultation bilaterally.   GI SYSTEM:  Abdomen soft, nontender, obese.   EXTREMITIES:  No pitting pedal edema.     NEUROLOGIC:  Alert, oriented x3.   PSYCHIATRIC:  Normal affect.   SKIN:  No obvious rash.   HEENT:  No pallor or icterus.        IMPRESSION AND PLAN:  A very pleasant 53-year-old female with history  of tobacco abuse, which she quit recently, other comorbidities of morbid obesity, bipolar disorder, schizoaffective disorder, chronic kidney disease stage III, borderline diabetes according to the patient, COPD and recent hospitalization for hypercapnic respiratory failure due to COPD exacerbation who is now here for evaluation due to abnormal EKG.  Clinically, she does not appear to have any typical symptoms of angina, although she is somewhat limited in her ambulation.  Given CAD risk factors and a somewhat abnormal EKG, I think it is prudent to rule out coronary artery disease.  We discussed the option of coronary angiogram versus stress testing.  At this time, given lack of typical symptoms, risks and benefits do not favor an invasive approach.  I discussed the option of a Lexiscan nuclear stress test versus cardiac MRI stress perfusion.  Given her body habitus, cardiac MRIS stress perfusion was my preferred choice, but the patient would like to avoid going down to [place] to get this test done  and would like to do a nuclear stress test.  In the past, nuclear stress test has shown fixed anterior apical count reduction likely because of breast tissue attenuation artifact.  Further recommendation will depend upon the results of the Lexiscan stress test in case it is normal or only mild ischemia is seen.  Given lack of symptoms, I think it will be reasonable to proceed with the cervical herniated disk surgery from a cardiac standpoint of view.  If we do see some evidence of ischemia, I would recommend the patient be started on a statin like Lipitor 40-80 mg q. day and a baby aspirin.  I am also setting up a tentative followup in about 3-4 weeks' time.     Addendum(2019)- no inducible ischemia on stress test. Low cardiac risk for cervical disc surgery.        RADHA LINDSAY MD             D: 2018   T: 2018   MT: bernie      Name:     BECCA HANNAH   MRN:      [id number]        Account:      [id number]   :      1965           Service Date: 2018      Document: [id number]

## 2018-12-20 NOTE — LETTER
12/20/2018    Elle Dunlap MD  [facility] [address] [address]    RE: Betsy Resendiz       Dear Colleague,    I had the pleasure of seeing Betsy Resendiz in the [facility].    HPI and Plan:   See dictation(#[id number])    Orders Placed This Encounter   Procedures     NM Lexiscan stress test (nuc card)     Follow-Up with Cardiac Advanced Practice Provider       No orders of the defined types were placed in this encounter.      There are no discontinued medications.      Encounter Diagnosis   Name Primary?     Abnormal electrocardiogram Yes       CURRENT MEDICATIONS:  Current Outpatient Medications   Medication Sig Dispense Refill     ACETAMINOPHEN PO Take 650 mg by mouth every 4 hours as needed for pain       albuterol (PROAIR HFA/PROVENTIL HFA/VENTOLIN HFA) 108 (90 BASE) MCG/ACT Inhaler Inhale 2 puffs into the lungs every 4 hours as needed        brimonidine (ALPHAGAN-P) 0.15 % ophthalmic solution Place 1 drop into both eyes 2 times daily       budesonide (PULMICORT) 0.5 MG/2ML neb solution Take 0.5 mg by nebulization 2 times daily       cariprazine (VRAYLAR) 1.5 MG CAPS capsule Take 2 mg by mouth daily        cholecalciferol (VITAMIN D3) 5000 units CAPS capsule Take 5,000 Units by mouth daily       clotrimazole (LOTRIMIN) 1 % cream Apply topically 2 times daily       CYCLOBENZAPRINE HCL PO Take 5 mg by mouth every 8 hours as needed        docusate sodium (STOOL SOFTENER) 100 MG capsule Take 100 mg by mouth At Bedtime        escitalopram (LEXAPRO) 20 MG tablet Take 1 tablet by mouth at bedtime       fluticasone (FLONASE) 50 MCG/ACT spray Spray 1 spray into both nostrils daily as needed for rhinitis or allergies       furosemide (LASIX) 80 MG tablet Take 40 mg by mouth 2 times daily        guaiFENesin-dextromethorphan (ROBITUSSIN DM) 100-10 MG/5ML syrup Take 5 mLs by mouth every 4 hours as needed for cough 560 mL 0     ipratropium - albuterol 0.5 mg/2.5 mg/3  mL (DUONEB) 0.5-2.5 (3) MG/3ML neb solution Take 1 vial by nebulization 4 times daily       lamoTRIgine (LAMICTAL) 200 MG tablet Take 200 mg by mouth At Bedtime        LANsoprazole (PREVACID) 30 MG CR capsule Take 30 mg by mouth daily        Lidocaine-Menthol 4.5-5 % PTCH Externally apply topically 2 times daily       LIOTHYRONINE SODIUM PO Take 25 mcg by mouth daily       lithium 300 MG capsule Take 1 capsule by mouth in the morning and 2 capsules at bedtime       loperamide (IMODIUM) 2 MG capsule Take 2 mg by mouth every 6 hours as needed for diarrhea       LORAZEPAM PO Take 0.5 mg by mouth 2 times daily as needed for anxiety       MECLIZINE HCL PO Take 25 mg by mouth every 8 hours as needed for dizziness       nicotine (COMMIT) 2 MG lozenge Place 2 mg inside cheek every 3 hours as needed for smoking cessation       OYSTER SHELL CALCIUM PO Take 500 mg by mouth 4 times daily       paliperidone (INVEGA) 6 MG 24 hr tablet Take 6 mg by mouth every morning       perphenazine 4 MG tablet Take 4 mg by mouth 2 times daily        POTASSIUM CHLORIDE ER PO 30meq by mouth two times a day       pregabalin (LYRICA) 150 MG capsule Take 150 mg by mouth 2 times daily        PROPRANOLOL HCL PO Take 10 mg by mouth 2 times daily       travoprost, BAK Free, (TRAVATAN Z) 0.004 % ophthalmic solution Place 1 drop into both eyes At Bedtime       ziprasidone (GEODON) 40 MG capsule Take 1 capsule by mouth in the morning       ziprasidone (GEODON) 80 MG capsule Take 160 mg by mouth At Bedtime          ALLERGIES     Allergies   Allergen Reactions     Codeine Shortness Of Breath     Has tolerated morphine 7/2008     Hmg-Coa-R Inhibitors Other (See Comments)     Patient declines  Patient declines     Lisinopril Cough     No Clinical Screening - See Comments      Statin-hmg-coa reductase inhibitors  Statin-hmg-coa reductase inhibitors     Sulfa Drugs      Mouth sore     Sumatriptan Other (See Comments)       PAST MEDICAL HISTORY:  Past Medical  History:   Diagnosis Date     Atrophy of muscle of left lower leg 11/3/2015     Bullosis diabeticorum (H) 2014     Hypokalemia 2010     Vitamin D deficiency 2012       PAST SURGICAL HISTORY:  Past Surgical History:   Procedure Laterality Date     ARTHRODESIS FOOT Right 2009     BACK SURGERY      x7      BIOPSY/EXCIS CERVICAL LESN       INCISION AND DRAINAGE  2013    Pressure ulcer     REPAIR BLADDER       THORACOTOMY      Abscess       FAMILY HISTORY:  Family History   Problem Relation Age of Onset     Other - See Comments Mother         Multiple myeloma     Diabetes Maternal Grandmother      Cancer Niece          age 12       SOCIAL HISTORY:  Social History     Socioeconomic History     Marital status: Single     Spouse name: Not on file     Number of children: Not on file     Years of education: Not on file     Highest education level: Not on file   Social Needs     Financial resource strain: Not on file     Food insecurity - worry: Not on file     Food insecurity - inability: Not on file     Transportation needs - medical: Not on file     Transportation needs - non-medical: Not on file   Occupational History     Not on file   Tobacco Use     Smoking status: Current Every Day Smoker     Types: Cigarettes     Smokeless tobacco: Never Used   Substance and Sexual Activity     Alcohol use: No     Drug use: No     Sexual activity: Not on file   Other Topics Concern     Parent/sibling w/ CABG, MI or angioplasty before 65F 55M? Not Asked   Social History Narrative     Not on file       Review of Systems:  Skin:  Negative       Eyes:  Positive for glasses    ENT:  Negative      Respiratory:  Positive for shortness of breath     Cardiovascular:  Negative for;palpitations;lightheadedness;dizziness Positive for;chest pain;heaviness;edema in chest area feels like electrical current, and heaviness  Gastroenterology: Negative      Genitourinary:  Positive for   catheter  Musculoskeletal:  Positive for neck  "pain;back pain    Neurologic:  Positive for migraine headaches    Psychiatric:  Positive for anxiety    Heme/Lymph/Imm:  Negative      Endocrine:  Negative        Physical Exam:  Vitals: /72 (BP Location: Right arm, Patient Position: Fowlers, Cuff Size: Adult Large)   Pulse 80   Ht 1.626 m (5' 4\")   Wt 120.2 kg (264 lb 14.4 oz)   SpO2 95%   BMI 45.47 kg/m       Constitutional:           Skin:             Head:           Eyes:           Lymph:      ENT:           Neck:           Respiratory:            Cardiac:                                                           GI:           Extremities and Muscular Skeletal:                 Neurological:           Psych:             CC  No referring provider defined for this encounter.                    Thank you for allowing me to participate in the care of your patient.      Sincerely,     Jose Francisco Valerio MD     [facility], [place]    cc:   No referring provider defined for this encounter.        "

## 2018-12-27 ENCOUNTER — TELEPHONE (OUTPATIENT)
Dept: CARDIOLOGY | Facility: CLINIC | Age: 53
End: 2018-12-27

## 2018-12-27 ENCOUNTER — HOSPITAL ENCOUNTER (OUTPATIENT)
Dept: NUCLEAR MEDICINE | Facility: CLINIC | Age: 53
Setting detail: NUCLEAR MEDICINE
Discharge: HOME | End: 2018-12-27
Attending: INTERNAL MEDICINE | Admitting: INTERNAL MEDICINE
Payer: COMMERCIAL

## 2018-12-27 VITALS — DIASTOLIC BLOOD PRESSURE: 78 MMHG | HEART RATE: 67 BPM | SYSTOLIC BLOOD PRESSURE: 140 MMHG

## 2018-12-27 DIAGNOSIS — R94.31 ABNORMAL ELECTROCARDIOGRAM: ICD-10-CM

## 2018-12-27 PROCEDURE — 34300033 ZZH RX 343: Performed by: INTERNAL MEDICINE

## 2018-12-27 PROCEDURE — 93016 CV STRESS TEST SUPVJ ONLY: CPT | Performed by: INTERNAL MEDICINE

## 2018-12-27 PROCEDURE — 93017 CV STRESS TEST TRACING ONLY: CPT | Performed by: INTERNAL MEDICINE

## 2018-12-27 PROCEDURE — 78452 HT MUSCLE IMAGE SPECT MULT: CPT

## 2018-12-27 PROCEDURE — A9502 TC99M TETROFOSMIN: HCPCS | Performed by: INTERNAL MEDICINE

## 2018-12-27 PROCEDURE — 78452 HT MUSCLE IMAGE SPECT MULT: CPT | Mod: 26 | Performed by: INTERNAL MEDICINE

## 2018-12-27 PROCEDURE — 25000128 H RX IP 250 OP 636: Performed by: INTERNAL MEDICINE

## 2018-12-27 PROCEDURE — 93018 CV STRESS TEST I&R ONLY: CPT | Performed by: INTERNAL MEDICINE

## 2018-12-27 RX ORDER — REGADENOSON 0.08 MG/ML
0.4 INJECTION, SOLUTION INTRAVENOUS ONCE
Status: COMPLETED | OUTPATIENT
Start: 2018-12-27 | End: 2018-12-27

## 2018-12-27 RX ADMIN — TETROFOSMIN 11.5 MCI.: 1.38 INJECTION, POWDER, LYOPHILIZED, FOR SOLUTION INTRAVENOUS at 09:55

## 2018-12-27 RX ADMIN — TETROFOSMIN 33.8 MCI.: 1.38 INJECTION, POWDER, LYOPHILIZED, FOR SOLUTION INTRAVENOUS at 12:35

## 2018-12-27 RX ADMIN — REGADENOSON 0.4 MG: 0.08 INJECTION, SOLUTION INTRAVENOUS at 12:37

## 2018-12-27 NOTE — TELEPHONE ENCOUNTER
Tomographic Findings  Overall, the study quality is the adequate though there is significant  breast attenuation . On the stress images, the anterior wall shows a  mild reduction in radiotracer uptake. On the rest images, anterior  wall appearances are essentially unchanged . Gated images demonstrated  normal wall motion. The fixed anterior wall defect is likely due to  breast attenuation artifact. The left ventricular ejection fraction  was calculated to be 76%. This is likely due to the presence of a  small left ventricle. TID was absent.    Patient has a OV with ZangZing MAURICE 1-15-19      12-20-18 OV  Given CAD risk factors and a somewhat abnormal EKG, I think it is prudent to rule out coronary artery disease.  We discussed the option of coronary angiogram versus stress testing.  At this time, given lack of typical symptoms, risks and benefits do not favor an invasive approach.  I discussed the option of a Lexiscan nuclear stress test versus cardiac MRI stress perfusion.  Given her body habitus, cardiac MRIS stress perfusion was my preferred choice, but the patient would like to avoid going down to [place] to get this test done and would like to do a nuclear stress test.  In the past, nuclear stress test has shown fixed anterior apical count reduction likely because of breast tissue attenuation artifact.  Further recommendation will depend upon the results of the Lexiscan stress test in case it is normal or only mild ischemia is seen.

## 2018-12-28 NOTE — TELEPHONE ENCOUNTER
Call received inquired about results of the stress test done yesterday. Informed that Dr. Valerio is out of the office until 1/2/19. Preliminary results demonstrate normal myocardial perfusion and normal wall motion.   Will not have cardiac clearance for surgical procedure until okay'd by Dr. Valerio.   Will update her once he has reviewed.   Verbalized understanding.

## 2019-01-02 ENCOUNTER — OFFICE VISIT (OUTPATIENT)
Dept: UROLOGY | Facility: CLINIC | Age: 54
End: 2019-01-02
Payer: COMMERCIAL

## 2019-01-02 VITALS
SYSTOLIC BLOOD PRESSURE: 113 MMHG | BODY MASS INDEX: 45.24 KG/M2 | WEIGHT: 265 LBS | HEART RATE: 72 BPM | DIASTOLIC BLOOD PRESSURE: 68 MMHG | HEIGHT: 64 IN

## 2019-01-02 DIAGNOSIS — R82.90 ABNORMAL URINE: Primary | ICD-10-CM

## 2019-01-02 DIAGNOSIS — R33.9 URINARY RETENTION: ICD-10-CM

## 2019-01-02 PROCEDURE — 87088 URINE BACTERIA CULTURE: CPT | Performed by: UROLOGY

## 2019-01-02 PROCEDURE — 87086 URINE CULTURE/COLONY COUNT: CPT | Performed by: UROLOGY

## 2019-01-02 PROCEDURE — 87186 SC STD MICRODIL/AGAR DIL: CPT | Performed by: UROLOGY

## 2019-01-02 ASSESSMENT — PAIN SCALES - GENERAL: PAINLEVEL: NO PAIN (0)

## 2019-01-02 ASSESSMENT — MIFFLIN-ST. JEOR: SCORE: 1802.03

## 2019-01-02 NOTE — LETTER
January 8, 2019       TO: Betsy Resendiz  [address]  [place] [address]       DearMsCharmaine,    We are writing to inform you of your test results.    Test results indicate you may require additional follow up, see comment below.    Resulted Orders   Urine Culture Aerobic Bacterial   Result Value Ref Range    Specimen Description Catheterized Urine     Culture Micro 50,000 to 100,000 colonies/mL  Proteus mirabilis   (A)     Culture Micro (A)      50,000 to 100,000 colonies/mL  Enterococcus faecalis         A prescription for Amoxicillin has been sent to your pharmacy. Please take as directed.      Thank you,  Isis Mcgill [facility]  For Dr. Trisha Tamayo

## 2019-01-02 NOTE — NURSING NOTE
"Chief Complaint   Patient presents with     Cystoscopy     possible prior bladder tumor, intravesical examination       Blood pressure 113/68, pulse 72, height 1.626 m (5' 4\"), weight 120.2 kg (265 lb). Body mass index is 45.49 kg/m .    Patient Active Problem List   Diagnosis     Schizoaffective disorder, bipolar type (H)     Carpal tunnel syndrome     Cervical neck pain with evidence of disc disease     Chronic pain     DM w/o complication type II (H)     History of encephalopathy     Gastroesophageal reflux disease without esophagitis     Generalized osteoarthritis     Immunosuppressed status (H)     Migraine headache     Morbid obesity with BMI of 40.0-44.9, adult (H)     Normocytic anemia     SPIKE (obstructive sleep apnea)     Osteoarthritis of hip     Lymphedema     Pain in joint, ankle and foot     Conversion reaction     Posttraumatic stress disorder     Urinary retention     Rotator cuff tendinitis     Schizoaffective disorder, unspecified type (H)     Tobacco use disorder     Ulnar neuritis     Asthma     Unspecified glaucoma     Bladder tumor     Closed fracture of shaft of right humerus     COPD (chronic obstructive pulmonary disease) (H)     JAVIER (generalized anxiety disorder)     HTN (hypertension)     Auditory hallucinations     COPD exacerbation (H)     Acute respiratory failure with hypoxia and hypercapnia (H)     CKD (chronic kidney disease) stage 3, GFR 30-59 ml/min (H)       Allergies   Allergen Reactions     Codeine Shortness Of Breath     Has tolerated morphine 7/2008     Hmg-Coa-R Inhibitors Other (See Comments)     Patient declines  Patient declines     Iodine      rash     Lisinopril Cough     Morphine      trouble breathing and nausea     No Clinical Screening - See Comments      Statin-hmg-coa reductase inhibitors  Statin-hmg-coa reductase inhibitors     Nsaids      nausea     Sulfa Drugs      Mouth sore     Sumatriptan Other (See Comments)       Current Outpatient Medications   Medication " Sig Dispense Refill     acetaminophen (TYLENOL) 500 MG tablet Take 2 tablets (1,000 mg) by mouth 3 times daily 540 tablet 3     albuterol (PROAIR HFA/PROVENTIL HFA/VENTOLIN HFA) 108 (90 BASE) MCG/ACT Inhaler Inhale 2 puffs into the lungs every 4 hours as needed        brimonidine (ALPHAGAN-P) 0.15 % ophthalmic solution Place 1 drop into both eyes 2 times daily       budesonide (PULMICORT) 0.5 MG/2ML neb solution Take 0.5 mg by nebulization 2 times daily       cariprazine (VRAYLAR) 1.5 MG CAPS capsule Take 2 mg by mouth daily        cholecalciferol (VITAMIN D3) 5000 units CAPS capsule Take 5,000 Units by mouth daily       clotrimazole (LOTRIMIN) 1 % cream Apply topically 2 times daily       CYCLOBENZAPRINE HCL PO Take 5 mg by mouth every 8 hours as needed        docusate sodium (STOOL SOFTENER) 100 MG capsule Take 100 mg by mouth At Bedtime        escitalopram (LEXAPRO) 20 MG tablet Take 1 tablet by mouth at bedtime       fluticasone (FLONASE) 50 MCG/ACT spray Spray 1 spray into both nostrils daily as needed for rhinitis or allergies       furosemide (LASIX) 80 MG tablet Take 40 mg by mouth 2 times daily        guaiFENesin-dextromethorphan (ROBITUSSIN DM) 100-10 MG/5ML syrup Take 5 mLs by mouth every 4 hours as needed for cough 560 mL 0     ipratropium - albuterol 0.5 mg/2.5 mg/3 mL (DUONEB) 0.5-2.5 (3) MG/3ML neb solution Take 1 vial by nebulization 4 times daily       lamoTRIgine (LAMICTAL) 200 MG tablet Take 200 mg by mouth At Bedtime        LANsoprazole (PREVACID) 30 MG CR capsule Take 30 mg by mouth daily        Lidocaine-Menthol 4.5-5 % PTCH Externally apply topically 2 times daily       LIOTHYRONINE SODIUM PO Take 25 mcg by mouth daily       lithium 300 MG capsule Take 1 capsule by mouth in the morning and 2 capsules at bedtime       loperamide (IMODIUM) 2 MG capsule Take 2 mg by mouth every 6 hours as needed for diarrhea       LORAZEPAM PO Take 0.5 mg by mouth 2 times daily as needed for anxiety        MECLIZINE HCL PO Take 25 mg by mouth every 8 hours as needed for dizziness       nicotine (COMMIT) 2 MG lozenge Place 2 mg inside cheek every 3 hours as needed for smoking cessation       OYSTER SHELL CALCIUM PO Take 500 mg by mouth 4 times daily       paliperidone (INVEGA) 6 MG 24 hr tablet Take 6 mg by mouth every morning       perphenazine 4 MG tablet Take 4 mg by mouth 2 times daily        POTASSIUM CHLORIDE ER PO 30meq by mouth two times a day       pregabalin (LYRICA) 150 MG capsule Take 150 mg by mouth 2 times daily        PROPRANOLOL HCL PO Take 10 mg by mouth 2 times daily       travoprost, BAK Free, (TRAVATAN Z) 0.004 % ophthalmic solution Place 1 drop into both eyes At Bedtime       ziprasidone (GEODON) 40 MG capsule Take 1 capsule by mouth in the morning       ziprasidone (GEODON) 80 MG capsule Take 160 mg by mouth At Bedtime          Social History     Tobacco Use     Smoking status: Current Every Day Smoker     Types: Cigarettes     Smokeless tobacco: Never Used   Substance Use Topics     Alcohol use: No     Drug use: No       Karina Franz LPN  1/2/2019  3:55 PM

## 2019-01-02 NOTE — TELEPHONE ENCOUNTER
Update called to patient with  Dr. Valerio response to reviewed stress test as No inducible ischemia. Low cardiac risk.   States it was the MAURICE at the care facility that was requesting cardiac clearance for upcoming surgery. Aware to have her call us should they need more documentation or if they have questions.   Verbalized understanding.

## 2019-01-02 NOTE — PROGRESS NOTES
"January 2, 2019    Return visit    Patient returns today for follow up.  Had catheter changed last week.  She denies any changes in her health since last visit.    /68   Pulse 72   Ht 1.626 m (5' 4\")   Wt 120.2 kg (265 lb)   BMI 45.49 kg/m    She is comfortable, in no distress, non-labored breathing.      Urine with large leuks and positive nitrites    A/P: 53 year old F with urinary retention and history of questionable bladder tumor    Urine culture today    Reschedule cystoscopy in 2 weeks    15 minutes were spent with the patient today, > 50% in counseling and coordination of care    Trisha Tamayo MD MPH   of Urology    CC  Patient Care Team:  Elle Dunlap MD as PCP - General  Camilo Jacome APRN CNP as PCP - Assigned PCP  Camilo Jacome APRN CNP as Nurse Practitioner (Nurse Practitioner - Adult Health)  Trisha Tamayo MD as MD (Urology)  Yesy Wiseman RN as Registered Nurse (Urology)  CAMILO JACOME              "

## 2019-01-02 NOTE — LETTER
"1/2/2019       RE: Betsy Resendiz  [address]  [place] [address]     Dear Colleague,    Thank you for referring your patient, Betsy Resendiz, to the [facility] AND INST FOR PROSTATE AND UROLOGIC CANCERS at [facility]. Please see a copy of my visit note below.    January 2, 2019    Return visit    Patient returns today for follow up.  Had catheter changed last week.  She denies any changes in her health since last visit.    /68   Pulse 72   Ht 1.626 m (5' 4\")   Wt 120.2 kg (265 lb)   BMI 45.49 kg/m     She is comfortable, in no distress, non-labored breathing.      Urine with large leuks and positive nitrites    A/P: 53 year old F with urinary retention and history of questionable bladder tumor    Urine culture today    Reschedule cystoscopy in 2 weeks    15 minutes were spent with the patient today, > 50% in counseling and coordination of care    Trisha Tamayo MD MPH   of Urology    CC  Patient Care Team:  Elle Dunlap MD as PCP - General  Camilo Jacome APRN CNP as PCP - Assigned PCP  Camilo Jacome APRN CNP as Nurse Practitioner (Nurse Practitioner - [facility])  Trisha Tamayo MD as MD (Urology)  Yesy Wiseman, RN as Registered Nurse (Urology)  CAMILO JACOME  "

## 2019-01-02 NOTE — PATIENT INSTRUCTIONS
Schedule cystoscopy with Dr. Tamayo on 1/17/19 at 2:30 PM.  We will let you know the urine test results and reschedule your cystoscopy for January 17th.    It was a pleasure meeting with you today.  Thank you for allowing me and my team the privilege of caring for you today.  YOU are the reason we are here, and I truly hope we provided you with the excellent service you deserve.  Please let us know if there is anything else we can do for you so that we can be sure you are leaving completely satisfied with your care experience.

## 2019-01-06 LAB
BACTERIA SPEC CULT: ABNORMAL
BACTERIA SPEC CULT: ABNORMAL
SPECIMEN SOURCE: ABNORMAL

## 2019-01-08 ENCOUNTER — TELEPHONE (OUTPATIENT)
Dept: UROLOGY | Facility: CLINIC | Age: 54
End: 2019-01-08

## 2019-01-08 ENCOUNTER — PRE VISIT (OUTPATIENT)
Dept: UROLOGY | Facility: CLINIC | Age: 54
End: 2019-01-08

## 2019-01-08 DIAGNOSIS — N39.0 URINARY TRACT INFECTION WITHOUT HEMATURIA, SITE UNSPECIFIED: Primary | ICD-10-CM

## 2019-01-08 RX ORDER — AMOXICILLIN 500 MG/1
CAPSULE ORAL
Qty: 14 CAPSULE | Refills: 0 | Status: SHIPPED | OUTPATIENT
Start: 2019-01-08 | End: 2019-03-06

## 2019-01-08 NOTE — TELEPHONE ENCOUNTER
Message forward to Dr. Tamayo and her RNCC Yesy Wiseman.    Hey Dr. Tamayo,      Can you please let me know if what antibiotic you would like to prescribe for the patient?      Thanks, Krystal Urias MA

## 2019-01-08 NOTE — TELEPHONE ENCOUNTER
I spoke to the patient to let her know that her UA/UC was positive. Patient states the only symptoms that she is having is smelly urine. I spoke to the Nurse Betsy from [facility] [phone](patient's nursing facility) to let them know per Dr. Trisha Tamayo order -she wants patient to take amoxicillin (500 mg) take one tablet twice daily for one week-to start Friday-unless the patient is having symptoms. Prescription order faxed to [phone].          Krystal Urias MA

## 2019-01-08 NOTE — TELEPHONE ENCOUNTER
Health Call Center    Phone Message    May a detailed message be left on voicemail: no    Reason for Call: Other: Pt was told she was going to be prescribed an antibiotic for her UTI/urine results from her last visit with Dr. Tamayo. Please call Pt back to discuss.     Action Taken: Message routed to:  [facility]

## 2019-01-08 NOTE — PROGRESS NOTES
Reason for visit: Cystoscopy       Relevant information: urinary retention, question of a prior bladder tumor     Records/imaging/labs: Pt rescheduled     Pt called: no need for a call     Rooming: dip

## 2019-01-17 ENCOUNTER — OFFICE VISIT (OUTPATIENT)
Dept: UROLOGY | Facility: CLINIC | Age: 54
End: 2019-01-17
Payer: COMMERCIAL

## 2019-01-17 VITALS
DIASTOLIC BLOOD PRESSURE: 70 MMHG | SYSTOLIC BLOOD PRESSURE: 138 MMHG | BODY MASS INDEX: 46.04 KG/M2 | HEART RATE: 65 BPM | WEIGHT: 269.7 LBS | HEIGHT: 64 IN

## 2019-01-17 DIAGNOSIS — R33.9 URINARY RETENTION: Primary | ICD-10-CM

## 2019-01-17 RX ORDER — CIPROFLOXACIN 500 MG/1
500 TABLET, FILM COATED ORAL ONCE
Status: COMPLETED | OUTPATIENT
Start: 2019-01-17 | End: 2019-01-17

## 2019-01-17 RX ADMIN — CIPROFLOXACIN 500 MG: 500 TABLET, FILM COATED ORAL at 17:19

## 2019-01-17 ASSESSMENT — PAIN SCALES - GENERAL
PAINLEVEL: NO PAIN (0)
PAINLEVEL: NO PAIN (0)

## 2019-01-17 ASSESSMENT — MIFFLIN-ST. JEOR: SCORE: 1823.35

## 2019-01-17 NOTE — PATIENT INSTRUCTIONS
"Please return to see me after urodynamics testing    It was a pleasure meeting with you today.  Thank you for allowing me and my team the privilege of caring for you today.  YOU are the reason we are here, and I truly hope we provided you with the excellent service you deserve.  Please let us know if there is anything else we can do for you so that we can be sure you are leaving completely satisfied with your care experience.    AFTER YOUR CYSTOSCOPY        You have just completed a cystoscopy, or \"cysto\", which allowed your physician to learn more about your bladder (or to remove a stent placed after surgery). We suggest that you continue to avoid caffeine, fruit juice, and alcohol for the next 24 hours, however, you are encouraged to return to your normal activities.         A few things that are considered normal after your cystoscopy:     * Small amount of bleeding (or spotting) that clears within the next 24 hours     * Slight burning sensation with urination     * Sensation to of needing to avoid more frequently     * The feeling of \"air\" in your urine     * Mild discomfort that is relieved with Tylenol        Please contact our office promptly if you:     * Develop a fever above 101 degrees     * Are unable to urinate     * Develop bright red blood that does not stop     * Severe pain or swelling         Please contact our office with any concerns or questions @[phone]  "

## 2019-01-17 NOTE — NURSING NOTE
After removing 25 mL of fluid from the balloon I removed a 16 Fr straight tip latex catheter without difficulty.    Invasive Procedure Safety Checklist:    Procedure: Cystoscopy    Action: Complete sections and checkboxes as appropriate.    Pre-procedure:  1. Patient ID Verified with 2 identifiers (Margaux and  or MRN) : YES    2. Procedure and site verified with patient/designee (when able) : YES    3. Accurate consent documentation in medical record : YES    4. H&P (or appropriate assessment) documented in medical record : YES  H&P must be up to 30 days prior to procedure an updated within 24 hours of                 Procedure as applicable.     5. Relevant diagnostic and radiology test results appropriately labeled and displayed as applicable : YES    6. Blood products, implants, devices, and/or special equipment available for the procedure as applicable : YES    7. Procedure site(s) marked with provider initials [Exclusions: None] : NO    8. Marking not required. Reason : Yes  Procedure does not require site marking    Time Out:     Time-Out performed immediately prior to starting procedure, including verbal and active participation of all team members addressing: YES    1. Correct patient identity.  2. Confirmed that the correct side and site are marked.  3. An accurate procedure to be done.  4. Agreement on the procedure to be done.  5. Correct patient position.  6. Relevant images and results are properly labeled and appropriately displayed.  7. The need to administer antibiotics or fluids for irrigation purposes during the procedure as applicable.  8. Safety precautions based on patient history or medication use.    During Procedure: Verification of correct person, site, and procedure occurs any time the responsibility for care of the patient is transferred to another member of the care team.      Administrations This Visit     ciprofloxacin (CIPRO) tablet 500 mg     Admin Date  2019 Action  Given  Dose  500 mg Route  Oral Administered By  Isis Mcgill CMA          lidocaine 2 % (URO-JET) jelly 10 mL     Admin Date  01/17/2019 Action  Given Dose  10 mL Route  Urethral Administered By  Isis Mcgill CMA              The following medication was given:     MEDICATION:  Cipro  ROUTE: oral  SITE: mouth  DOSE: 500 mg  LOT #: [id number]  : [employer]  EXPIRATION DATE: 08/20  NDC#: 003 61101 070 11 2   Was there drug waste? No    MEDICATION:  Lidocaine 2% jelly   ROUTE: topical  SITE: urethra via the meatus   DOSE: 10 mL  LOT #:   : [employer]  EXPIRATION DATE:   NDC#: [address]-56   Was there drug waste? No        Prior to medication administration, verified patient identity using patient's name and date of birth.    Drug Amount Wasted:  None.  Vial/Syringe: Single dose vial    Isis Mcgill CMA  January 17, 2019

## 2019-01-17 NOTE — NURSING NOTE
"Chief Complaint   Patient presents with     Cystoscopy     urinary retention, question of a prior bladder tumor       Blood pressure 138/70, pulse 65, height 1.626 m (5' 4\"), weight 122.3 kg (269 lb 11.2 oz). Body mass index is 46.29 kg/m .    Patient Active Problem List   Diagnosis     Schizoaffective disorder, bipolar type (H)     Carpal tunnel syndrome     Cervical neck pain with evidence of disc disease     Chronic pain     DM w/o complication type II (H)     History of encephalopathy     Gastroesophageal reflux disease without esophagitis     Generalized osteoarthritis     Immunosuppressed status (H)     Migraine headache     Morbid obesity with BMI of 40.0-44.9, adult (H)     Normocytic anemia     SPIKE (obstructive sleep apnea)     Osteoarthritis of hip     Lymphedema     Pain in joint, ankle and foot     Conversion reaction     Posttraumatic stress disorder     Urinary retention     Rotator cuff tendinitis     Schizoaffective disorder, unspecified type (H)     Tobacco use disorder     Ulnar neuritis     Asthma     Unspecified glaucoma     Bladder tumor     Closed fracture of shaft of right humerus     COPD (chronic obstructive pulmonary disease) (H)     JAVIER (generalized anxiety disorder)     HTN (hypertension)     Auditory hallucinations     COPD exacerbation (H)     Acute respiratory failure with hypoxia and hypercapnia (H)     CKD (chronic kidney disease) stage 3, GFR 30-59 ml/min (H)       Allergies   Allergen Reactions     Codeine Shortness Of Breath     Has tolerated morphine 7/2008     Hmg-Coa-R Inhibitors Other (See Comments)     Patient declines  Patient declines     Iodine      rash     Lisinopril Cough     Morphine      trouble breathing and nausea     No Clinical Screening - See Comments      Statin-hmg-coa reductase inhibitors  Statin-hmg-coa reductase inhibitors     Nsaids      nausea     Sulfa Drugs      Mouth sore     Sumatriptan Other (See Comments)       Current Outpatient Medications "   Medication Sig Dispense Refill     acetaminophen (TYLENOL) 500 MG tablet Take 2 tablets (1,000 mg) by mouth 3 times daily 540 tablet 3     albuterol (PROAIR HFA/PROVENTIL HFA/VENTOLIN HFA) 108 (90 BASE) MCG/ACT Inhaler Inhale 2 puffs into the lungs every 4 hours as needed        amoxicillin (AMOXIL) 500 MG capsule Take one (500 mg) tablet twice daily 14 capsule 0     brimonidine (ALPHAGAN-P) 0.15 % ophthalmic solution Place 1 drop into both eyes 2 times daily       budesonide (PULMICORT) 0.5 MG/2ML neb solution Take 0.5 mg by nebulization 2 times daily       cariprazine (VRAYLAR) 1.5 MG CAPS capsule Take 2 mg by mouth daily        cholecalciferol (VITAMIN D3) 5000 units CAPS capsule Take 5,000 Units by mouth daily       clotrimazole (LOTRIMIN) 1 % cream Apply topically 2 times daily       CYCLOBENZAPRINE HCL PO Take 5 mg by mouth every 8 hours as needed        docusate sodium (STOOL SOFTENER) 100 MG capsule Take 100 mg by mouth At Bedtime        escitalopram (LEXAPRO) 20 MG tablet Take 1 tablet by mouth at bedtime       fluticasone (FLONASE) 50 MCG/ACT spray Spray 1 spray into both nostrils daily as needed for rhinitis or allergies       furosemide (LASIX) 80 MG tablet Take 40 mg by mouth 2 times daily        guaiFENesin-dextromethorphan (ROBITUSSIN DM) 100-10 MG/5ML syrup Take 5 mLs by mouth every 4 hours as needed for cough 560 mL 0     ipratropium - albuterol 0.5 mg/2.5 mg/3 mL (DUONEB) 0.5-2.5 (3) MG/3ML neb solution Take 1 vial by nebulization 4 times daily       lamoTRIgine (LAMICTAL) 200 MG tablet Take 200 mg by mouth At Bedtime        LANsoprazole (PREVACID) 30 MG CR capsule Take 30 mg by mouth daily        Lidocaine-Menthol 4.5-5 % PTCH Externally apply topically 2 times daily       LIOTHYRONINE SODIUM PO Take 25 mcg by mouth daily       lithium 300 MG capsule Take 1 capsule by mouth in the morning and 2 capsules at bedtime       loperamide (IMODIUM) 2 MG capsule Take 2 mg by mouth every 6 hours as needed  for diarrhea       LORAZEPAM PO Take 0.5 mg by mouth 2 times daily as needed for anxiety       MECLIZINE HCL PO Take 25 mg by mouth every 8 hours as needed for dizziness       nicotine (COMMIT) 2 MG lozenge Place 2 mg inside cheek every 3 hours as needed for smoking cessation       OYSTER SHELL CALCIUM PO Take 500 mg by mouth 4 times daily       paliperidone (INVEGA) 6 MG 24 hr tablet Take 6 mg by mouth every morning       perphenazine 4 MG tablet Take 4 mg by mouth 2 times daily        POTASSIUM CHLORIDE ER PO 30meq by mouth two times a day       pregabalin (LYRICA) 150 MG capsule Take 150 mg by mouth 2 times daily        PROPRANOLOL HCL PO Take 10 mg by mouth 2 times daily       traMADol (ULTRAM) 50 MG tablet Take 50 mg by mouth every 4 hours as needed for severe pain       travoprost, BAK Free, (TRAVATAN Z) 0.004 % ophthalmic solution Place 1 drop into both eyes At Bedtime       ziprasidone (GEODON) 40 MG capsule Take 1 capsule by mouth in the morning       ziprasidone (GEODON) 80 MG capsule Take 160 mg by mouth At Bedtime          Social History     Tobacco Use     Smoking status: Current Every Day Smoker     Types: Cigarettes     Smokeless tobacco: Never Used   Substance Use Topics     Alcohol use: No     Drug use: No       MAURICE Youngblood  1/17/2019  3:48 PM

## 2019-01-17 NOTE — LETTER
"  RE: Betsy Resendiz  [address]  [place] [id number]     Dear Colleague,    Thank you for referring your patient, Betsy Resendiz, to the [facility] UROLOGY AND INST FOR PROSTATE AND UROLOGIC CANCERS at [facility]. Please see a copy of my visit note below.    January 17, 2019    Return visit    Patient returns today for follow up of her urinary retention and question of bladder tumor.  Last cystoscopy was postponed for infection. She denies any changes in her health since last visit.    /70   Pulse 65   Ht 1.626 m (5' 4\")   Wt 122.3 kg (269 lb 11.2 oz)   BMI 46.29 kg/m     She is comfortable, in no distress, non-labored breathing.  Abdomen is soft, non-tender, non-distended.  Normal external female genitalia.  Negative CST.  Pelvic exam is unremarkable.    Cystoscopy Note: After informed consent was obtained patient was prepped and draped in the standard fashion.  The flexible cystoscope was inserted into a normal appearing urethral meatus.  The urothelium was carefully examined and there were no tumors, masses, stones, foreign bodies, or other urothelial abnormalities noted.  Bilateral ureteral orifices were noted in the normal orthotopic position and both effluxed clear urine.  The cystoscope was retroflexed and the bladder neck was unremarkable.  The urethra was carefully examined upon removing the cystoscope and was unremarkable.  Patient tolerated the procedure without complications noted.      A/P: 53 year old F with urinary retention of unclear etiology, negative cystosocpy    Plan for UDS to assess detrusor function and return afterwards    Trisha Tamayo MD MPH   of Urology    CC  Patient Care Team:  Elle Dunlap MD as PCP - General  Ramona Jacome APRN CNP as PCP - Assigned PCP  Ramona Jacome APRN CNP as Nurse Practitioner (Nurse Practitioner - [facility])  Trisha Tamayo MD as MD (Urology)  Yesy Wiseman, " RN as Registered Nurse (Urology)  JENNIFER MARROQUIN

## 2019-01-17 NOTE — PROGRESS NOTES
"January 17, 2019    Return visit    Patient returns today for follow up of her urinary retention and question of bladder tumor.  Last cystoscopy was postponed for infection. She denies any changes in her health since last visit.    /70   Pulse 65   Ht 1.626 m (5' 4\")   Wt 122.3 kg (269 lb 11.2 oz)   BMI 46.29 kg/m    She is comfortable, in no distress, non-labored breathing.  Abdomen is soft, non-tender, non-distended.  Normal external female genitalia.  Negative CST.  Pelvic exam is unremarkable.    Cystoscopy Note: After informed consent was obtained patient was prepped and draped in the standard fashion.  The flexible cystoscope was inserted into a normal appearing urethral meatus.  The urothelium was carefully examined and there were no tumors, masses, stones, foreign bodies, or other urothelial abnormalities noted.  Bilateral ureteral orifices were noted in the normal orthotopic position and both effluxed clear urine.  The cystoscope was retroflexed and the bladder neck was unremarkable.  The urethra was carefully examined upon removing the cystoscope and was unremarkable.  Patient tolerated the procedure without complications noted.      A/P: 53 year old F with urinary retention of unclear etiology, negative cystosocpy    Plan for UDS to assess detrusor function and return afterwards    Trisha Tamayo MD MPH   of Urology    CC  Patient Care Team:  Jennifer Dunlap MD as PCP - General  Ramona Jacome APRN CNP as PCP - Assigned PCP  Ramona Jacome APRN CNP as Nurse Practitioner (Nurse Practitioner - [facility])  Trisha Tamayo MD as MD (Urology)  Yesy Wiseman RN as Registered Nurse (Urology)  JENNIFER DUNLAP                "

## 2019-02-04 ENCOUNTER — NURSING HOME VISIT (OUTPATIENT)
Dept: GERIATRICS | Facility: CLINIC | Age: 54
End: 2019-02-04
Payer: COMMERCIAL

## 2019-02-04 VITALS
WEIGHT: 258 LBS | DIASTOLIC BLOOD PRESSURE: 84 MMHG | SYSTOLIC BLOOD PRESSURE: 154 MMHG | OXYGEN SATURATION: 96 % | BODY MASS INDEX: 44.29 KG/M2 | RESPIRATION RATE: 18 BRPM | HEART RATE: 98 BPM | TEMPERATURE: 97.8 F

## 2019-02-04 DIAGNOSIS — F25.0 SCHIZOAFFECTIVE DISORDER, BIPOLAR TYPE (H): ICD-10-CM

## 2019-02-04 DIAGNOSIS — Z98.1 STATUS POST CERVICAL SPINAL FUSION: Primary | ICD-10-CM

## 2019-02-04 DIAGNOSIS — R33.9 URINARY RETENTION: ICD-10-CM

## 2019-02-04 DIAGNOSIS — K21.9 GASTROESOPHAGEAL REFLUX DISEASE WITHOUT ESOPHAGITIS: ICD-10-CM

## 2019-02-04 PROBLEM — S42.301A CLOSED FRACTURE OF SHAFT OF RIGHT HUMERUS: Status: RESOLVED | Noted: 2018-05-22 | Resolved: 2019-02-04

## 2019-02-04 PROBLEM — D49.4 BLADDER TUMOR: Status: RESOLVED | Noted: 2018-02-07 | Resolved: 2019-02-04

## 2019-02-04 PROCEDURE — 99310 SBSQ NF CARE HIGH MDM 45: CPT | Performed by: NURSE PRACTITIONER

## 2019-02-04 NOTE — PROGRESS NOTES
[facility]    Chief Complaint   Patient presents with     Hospital F/U       [facility] Record Number:  [id number]  Place of Service where encounter took place:  [facility] [[id number]]    HPI:    Betsy Resendiz is a 53 year old  (1965), who is being seen today for an episodic care visit.  HPI information obtained from: facility chart records, facility staff, patient report and [facility] chart review     Seeing patient for a follow up.   Patient had a c5-c6 cervical fusion on 1/22/19.   She is currently working with therapy. Her pain has been tolerable. She is usually using her pain medication two times per day. States she is trying to use as little as possible.   She is wearing a soft collar. This can be stopped 2 weeks after surgery. She denies any arm numbness or weakness.     She saw urology in Jan. Her cystoscopy was negative for tumor, sone or mass. She is scheduled for urodynamic testing later this month.     Her moods have been stable. She has not used propranolol since admission. She agrees to stop this to simplify her medication list.     ALLERGIES: Codeine; Hmg-coa-r inhibitors; Iodine; Lisinopril; Morphine; No clinical screening - see comments; Nsaids; Sulfa drugs; and Sumatriptan  Past Medical, Surgical, Family and Social History reviewed and updated in [facility].    Current Outpatient Medications   Medication Sig Dispense Refill     acetaminophen (TYLENOL) 500 MG tablet Take 2 tablets (1,000 mg) by mouth 3 times daily 540 tablet 3     albuterol (PROAIR HFA/PROVENTIL HFA/VENTOLIN HFA) 108 (90 BASE) MCG/ACT Inhaler Inhale 2 puffs into the lungs every 4 hours as needed        amoxicillin (AMOXIL) 500 MG capsule Take one (500 mg) tablet twice daily 14 capsule 0     brimonidine (ALPHAGAN-P) 0.15 % ophthalmic solution Place 1 drop into both eyes 2 times daily       budesonide (PULMICORT) 0.5 MG/2ML neb solution Take 0.5 mg by nebulization 2 times  daily       cariprazine (VRAYLAR) 1.5 MG CAPS capsule Take 2 mg by mouth daily        cholecalciferol (VITAMIN D3) 5000 units CAPS capsule Take 5,000 Units by mouth daily       clotrimazole (LOTRIMIN) 1 % cream Apply topically 2 times daily       CYCLOBENZAPRINE HCL PO Take 5 mg by mouth every 8 hours as needed        docusate sodium (STOOL SOFTENER) 100 MG capsule Take 100 mg by mouth At Bedtime        escitalopram (LEXAPRO) 20 MG tablet Take 1 tablet by mouth at bedtime       furosemide (LASIX) 80 MG tablet Take 40 mg by mouth 2 times daily        ipratropium - albuterol 0.5 mg/2.5 mg/3 mL (DUONEB) 0.5-2.5 (3) MG/3ML neb solution Take 1 vial by nebulization 4 times daily       lamoTRIgine (LAMICTAL) 200 MG tablet Take 200 mg by mouth At Bedtime        LANsoprazole (PREVACID) 30 MG CR capsule Take 15 mg by mouth daily        Lidocaine-Menthol 4.5-5 % PTCH Externally apply topically 2 times daily       LIOTHYRONINE SODIUM PO Take 25 mcg by mouth daily       lithium 300 MG capsule Take 1 capsule by mouth in the morning and 2 capsules at bedtime       loperamide (IMODIUM) 2 MG capsule Take 2 mg by mouth every 6 hours as needed for diarrhea       LORAZEPAM PO Take 0.5 mg by mouth 2 times daily as needed for anxiety       MECLIZINE HCL PO Take 25 mg by mouth every 8 hours as needed for dizziness       nicotine (COMMIT) 2 MG lozenge Place 2 mg inside cheek every 3 hours as needed for smoking cessation       OYSTER SHELL CALCIUM PO Take 500 mg by mouth 4 times daily       paliperidone (INVEGA) 6 MG 24 hr tablet Take 6 mg by mouth every morning       perphenazine 4 MG tablet Take 4 mg by mouth 2 times daily        POTASSIUM CHLORIDE ER PO 30meq by mouth two times a day       pregabalin (LYRICA) 150 MG capsule Take 150 mg by mouth 2 times daily        traMADol (ULTRAM) 50 MG tablet Take 50 mg by mouth every 4 hours as needed for severe pain       travoprost, BAK Free, (TRAVATAN Z) 0.004 % ophthalmic solution Place 1 drop  into both eyes At Bedtime       ziprasidone (GEODON) 40 MG capsule Take 1 capsule by mouth in the morning       ziprasidone (GEODON) 80 MG capsule Take 160 mg by mouth At Bedtime        Medications reviewed:  Medications reconciled to facility chart and changes were made to reflect current medications as identified as above med list. Below are the changes that were made:   Medications stopped since last EPIC medication reconciliation:   There are no discontinued medications.    Medications started since last [facility] medication reconciliation:  No orders of the defined types were placed in this encounter.        REVIEW OF SYSTEMS:  10 point ROS of systems including Constitutional, Eyes, Respiratory, Cardiovascular, Gastroenterology, Genitourinary, Integumentary, Musculoskeletal, Psychiatric were all negative except for pertinent positives noted in my HPI.    Physical Exam:  /84   Pulse 98   Temp 97.8  F (36.6  C)   Resp 18   Wt 117 kg (258 lb)   SpO2 96%   BMI 44.29 kg/m    GENERAL APPEARANCE:  Alert, in no distress  RESP:  respiratory effort and palpation of chest normal, auscultation of lungs clear , no respiratory distress  CV:  Palpation and auscultation of heart done , rate and rhythm regular, no murmur, trace peripheral edema  ABDOMEN:  normal bowel sounds, soft, nontender, no hepatosplenomegaly or other masses  M/S:   Gait and station steady, Digits and nails at baseline  SKIN:  Inspection and Palpation of skin and subcutaneous tissue no rashes or lesions   NEURO: 2-12 in normal limits and at patient's baseline  PSYCH:  insight and judgement, memory intact , affect and mood normal      Recent Labs:     CBC RESULTS:   Recent Labs   Lab Test 12/18/18 0825 11/29/18  0910   WBC 9.5 11.1*   RBC 3.90 4.15   HGB 12.6 13.2   HCT 39.8 41.7   * 101*   MCH 32.3 31.8   MCHC 31.7 31.7   RDW 12.7 12.5    232       Last Basic Metabolic Panel:  Recent Labs   Lab Test 12/18/18 0825 11/29/18  0910   NA  144 140   POTASSIUM 4.1 3.6   CHLORIDE 106 103   GAURAV 8.7 9.1   CO2 31 32   BUN 15 20   CR 1.21* 1.25*   * 105*       Liver Function Studies -   Recent Labs   Lab Test 11/29/18  0910 11/25/18  0546   PROTTOTAL 7.7 6.3*   ALBUMIN 3.5 2.8*   BILITOTAL 0.6 0.3   ALKPHOS 102 70   AST 8 14   ALT 30 19       TSH   Date Value Ref Range Status   08/03/2018 1.40 0.40 - 4.00 mU/L Final   ]    Lab Results   Component Value Date    A1C 5.4 11/23/2018    A1C 5.4 08/03/2018         Assessment/Plan:  Status post cervical spinal fusion  - pain is controlled, continue prn oxycodone and flexeril  - continue therapy  - due to follow up with surgeon 6 weeks after surgery    Urinary retention  - has mac cath  - due to follow up with urology for urodynamic testing    Schizoaffective disorder, bipolar type (H)  - moods stable on current regimen  - will discontinue propranolol due to non use  - labs ordered    GERD  - asymptomatic  - will GDR PPI from 30 to 15 mg      Will stop meds that are not being used to simplify med list.     Total time spent with patient visit was 35 min including patient visit and review of past records. Greater than 50% of total time spent with counseling and coordinating care due to complex conditions.     Orders:  1. Discontinue prn tylenol  2. Discontinue prn propanalol  3. Decrease Lansoprazole to 15 mg po every day Dx GERD  4. Discontinue hydrocortisone cream  5. Discontinue fluticasone  6. Discontinue guaifenesin  7. CBC, BMP, TSH, Lithium level Dx Schizoaffective disorder    Electronically signed by  Ramona Jacome, DELL CNP

## 2019-02-04 NOTE — LETTER
2/4/2019        RE: Betsy Resendiz  [address]  [place] [facility] [address]        [place] [facility]    Chief Complaint   Patient presents with     Hospital F/U       [place] Medical Record Number:  [id number]  Place of Service where encounter took place:  [facility] ([facility]) [[id number]]    HPI:    Betsy Resendiz is a 53 year old  (1965), who is being seen today for an episodic care visit.  HPI information obtained from: facility chart records, facility staff, patient report and [facility] chart review     Seeing patient for a follow up.   Patient had a c5-c6 cervical fusion on 1/22/19.   She is currently working with therapy. Her pain has been tolerable. She is usually using her pain medication two times per day. States she is trying to use as little as possible.   She is wearing a soft collar. This can be stopped 2 weeks after surgery. She denies any arm numbness or weakness.     She saw urology in Jan. Her cystoscopy was negative for tumor, sone or mass. She is scheduled for urodynamic testing later this month.     Her moods have been stable. She has not used propranolol since admission. She agrees to stop this to simplify her medication list.     ALLERGIES: Codeine; Hmg-coa-r inhibitors; Iodine; Lisinopril; Morphine; No clinical screening - see comments; Nsaids; Sulfa drugs; and Sumatriptan  Past Medical, Surgical, Family and Social History reviewed and updated in [facility].    Current Outpatient Medications   Medication Sig Dispense Refill     acetaminophen (TYLENOL) 500 MG tablet Take 2 tablets (1,000 mg) by mouth 3 times daily 540 tablet 3     albuterol (PROAIR HFA/PROVENTIL HFA/VENTOLIN HFA) 108 (90 BASE) MCG/ACT Inhaler Inhale 2 puffs into the lungs every 4 hours as needed        amoxicillin (AMOXIL) 500 MG capsule Take one (500 mg) tablet twice daily 14 capsule 0     brimonidine (ALPHAGAN-P) 0.15 % ophthalmic solution Place 1 drop into both eyes 2  times daily       budesonide (PULMICORT) 0.5 MG/2ML neb solution Take 0.5 mg by nebulization 2 times daily       cariprazine (VRAYLAR) 1.5 MG CAPS capsule Take 2 mg by mouth daily        cholecalciferol (VITAMIN D3) 5000 units CAPS capsule Take 5,000 Units by mouth daily       clotrimazole (LOTRIMIN) 1 % cream Apply topically 2 times daily       CYCLOBENZAPRINE HCL PO Take 5 mg by mouth every 8 hours as needed        docusate sodium (STOOL SOFTENER) 100 MG capsule Take 100 mg by mouth At Bedtime        escitalopram (LEXAPRO) 20 MG tablet Take 1 tablet by mouth at bedtime       furosemide (LASIX) 80 MG tablet Take 40 mg by mouth 2 times daily        ipratropium - albuterol 0.5 mg/2.5 mg/3 mL (DUONEB) 0.5-2.5 (3) MG/3ML neb solution Take 1 vial by nebulization 4 times daily       lamoTRIgine (LAMICTAL) 200 MG tablet Take 200 mg by mouth At Bedtime        LANsoprazole (PREVACID) 30 MG CR capsule Take 15 mg by mouth daily        Lidocaine-Menthol 4.5-5 % PTCH Externally apply topically 2 times daily       LIOTHYRONINE SODIUM PO Take 25 mcg by mouth daily       lithium 300 MG capsule Take 1 capsule by mouth in the morning and 2 capsules at bedtime       loperamide (IMODIUM) 2 MG capsule Take 2 mg by mouth every 6 hours as needed for diarrhea       LORAZEPAM PO Take 0.5 mg by mouth 2 times daily as needed for anxiety       MECLIZINE HCL PO Take 25 mg by mouth every 8 hours as needed for dizziness       nicotine (COMMIT) 2 MG lozenge Place 2 mg inside cheek every 3 hours as needed for smoking cessation       OYSTER SHELL CALCIUM PO Take 500 mg by mouth 4 times daily       paliperidone (INVEGA) 6 MG 24 hr tablet Take 6 mg by mouth every morning       perphenazine 4 MG tablet Take 4 mg by mouth 2 times daily        POTASSIUM CHLORIDE ER PO 30meq by mouth two times a day       pregabalin (LYRICA) 150 MG capsule Take 150 mg by mouth 2 times daily        traMADol (ULTRAM) 50 MG tablet Take 50 mg by mouth every 4 hours as needed  for severe pain       travoprost, NICHO Free, (TRAVATAN Z) 0.004 % ophthalmic solution Place 1 drop into both eyes At Bedtime       ziprasidone (GEODON) 40 MG capsule Take 1 capsule by mouth in the morning       ziprasidone (GEODON) 80 MG capsule Take 160 mg by mouth At Bedtime        Medications reviewed:  Medications reconciled to facility chart and changes were made to reflect current medications as identified as above med list. Below are the changes that were made:   Medications stopped since last EPIC medication reconciliation:   There are no discontinued medications.    Medications started since last [facility] medication reconciliation:  No orders of the defined types were placed in this encounter.        REVIEW OF SYSTEMS:  10 point ROS of systems including Constitutional, Eyes, Respiratory, Cardiovascular, Gastroenterology, Genitourinary, Integumentary, Musculoskeletal, Psychiatric were all negative except for pertinent positives noted in my HPI.    Physical Exam:  /84   Pulse 98   Temp 97.8  F (36.6  C)   Resp 18   Wt 117 kg (258 lb)   SpO2 96%   BMI 44.29 kg/m     GENERAL APPEARANCE:  Alert, in no distress  RESP:  respiratory effort and palpation of chest normal, auscultation of lungs clear , no respiratory distress  CV:  Palpation and auscultation of heart done , rate and rhythm regular, no murmur, trace peripheral edema  ABDOMEN:  normal bowel sounds, soft, nontender, no hepatosplenomegaly or other masses  M/S:   Gait and station steady, Digits and nails at baseline  SKIN:  Inspection and Palpation of skin and subcutaneous tissue no rashes or lesions   NEURO: 2-12 in normal limits and at patient's baseline  PSYCH:  insight and judgement, memory intact , affect and mood normal      Recent Labs:     CBC RESULTS:   Recent Labs   Lab Test 12/18/18  0825 11/29/18  0910   WBC 9.5 11.1*   RBC 3.90 4.15   HGB 12.6 13.2   HCT 39.8 41.7   * 101*   MCH 32.3 31.8   MCHC 31.7 31.7   RDW 12.7 12.5   PLT  215 232       Last Basic Metabolic Panel:  Recent Labs   Lab Test 12/18/18  0825 11/29/18  0910    140   POTASSIUM 4.1 3.6   CHLORIDE 106 103   GAURAV 8.7 9.1   CO2 31 32   BUN 15 20   CR 1.21* 1.25*   * 105*       Liver Function Studies -   Recent Labs   Lab Test 11/29/18  0910 11/25/18  0546   PROTTOTAL 7.7 6.3*   ALBUMIN 3.5 2.8*   BILITOTAL 0.6 0.3   ALKPHOS 102 70   AST 8 14   ALT 30 19       TSH   Date Value Ref Range Status   08/03/2018 1.40 0.40 - 4.00 mU/L Final   ]    Lab Results   Component Value Date    A1C 5.4 11/23/2018    A1C 5.4 08/03/2018         Assessment/Plan:  Status post cervical spinal fusion  - pain is controlled, continue prn oxycodone and flexeril  - continue therapy  - due to follow up with surgeon 6 weeks after surgery    Urinary retention  - has mac cath  - due to follow up with urology for urodynamic testing    Schizoaffective disorder, bipolar type (H)  - moods stable on current regimen  - will discontinue propranolol due to non use  - labs ordered    GERD  - asymptomatic  - will GDR PPI from 30 to 15 mg      Will stop meds that are not being used to simplify med list.     Total time spent with patient visit was 35 min including patient visit and review of past records. Greater than 50% of total time spent with counseling and coordinating care due to complex conditions.     Orders:  1. Discontinue prn tylenol  2. Discontinue prn propanalol  3. Decrease Lansoprazole to 15 mg po every day Dx GERD  4. Discontinue hydrocortisone cream  5. Discontinue fluticasone  6. Discontinue guaifenesin  7. CBC, BMP, TSH, Lithium level Dx Schizoaffective disorder    Electronically signed by  DELL Queen CNP                      Sincerely,        DELL Queen CNP

## 2019-02-07 ENCOUNTER — HOSPITAL LABORATORY (OUTPATIENT)
Facility: OTHER | Age: 54
End: 2019-02-07

## 2019-02-07 ENCOUNTER — PRE VISIT (OUTPATIENT)
Dept: UROLOGY | Facility: CLINIC | Age: 54
End: 2019-02-07

## 2019-02-07 LAB
ALBUMIN SERPL-MCNC: 3.4 G/DL (ref 3.4–5)
ALP SERPL-CCNC: 110 U/L (ref 40–150)
ALT SERPL W P-5'-P-CCNC: 35 U/L (ref 0–50)
ANION GAP SERPL CALCULATED.3IONS-SCNC: 7 MMOL/L (ref 3–14)
AST SERPL W P-5'-P-CCNC: 25 U/L (ref 0–45)
BILIRUB SERPL-MCNC: 0.2 MG/DL (ref 0.2–1.3)
BUN SERPL-MCNC: 13 MG/DL (ref 7–30)
CALCIUM SERPL-MCNC: 9.4 MG/DL (ref 8.5–10.1)
CHLORIDE SERPL-SCNC: 101 MMOL/L (ref 94–109)
CO2 SERPL-SCNC: 30 MMOL/L (ref 20–32)
CREAT SERPL-MCNC: 1.11 MG/DL (ref 0.52–1.04)
ERYTHROCYTE [DISTWIDTH] IN BLOOD BY AUTOMATED COUNT: 12.9 % (ref 10–15)
GFR SERPL CREATININE-BSD FRML MDRD: 57 ML/MIN/{1.73_M2}
GLUCOSE SERPL-MCNC: 97 MG/DL (ref 70–99)
HCT VFR BLD AUTO: 38 % (ref 35–47)
HGB BLD-MCNC: 12.1 G/DL (ref 11.7–15.7)
LITHIUM SERPL-SCNC: 0.57 MMOL/L (ref 0.6–1.2)
MCH RBC QN AUTO: 31.8 PG (ref 26.5–33)
MCHC RBC AUTO-ENTMCNC: 31.8 G/DL (ref 31.5–36.5)
MCV RBC AUTO: 100 FL (ref 78–100)
PLATELET # BLD AUTO: 232 10E9/L (ref 150–450)
POTASSIUM SERPL-SCNC: 2.8 MMOL/L (ref 3.4–5.3)
PROT SERPL-MCNC: 7.6 G/DL (ref 6.8–8.8)
RBC # BLD AUTO: 3.81 10E12/L (ref 3.8–5.2)
SODIUM SERPL-SCNC: 138 MMOL/L (ref 133–144)
TSH SERPL DL<=0.005 MIU/L-ACNC: 1.29 MU/L (ref 0.4–4)
WBC # BLD AUTO: 11.2 10E9/L (ref 4–11)

## 2019-02-07 NOTE — TELEPHONE ENCOUNTER
UDS for retention.  Message left for patient to call if any symptoms of UTI ( currently has mac ).  If having sx, will need a UA/UC prior to UDS next week.  Records available in EPIC.

## 2019-02-11 ENCOUNTER — NURSING HOME VISIT (OUTPATIENT)
Dept: GERIATRICS | Facility: CLINIC | Age: 54
End: 2019-02-11
Payer: COMMERCIAL

## 2019-02-11 VITALS
HEART RATE: 67 BPM | BODY MASS INDEX: 45.49 KG/M2 | TEMPERATURE: 97.7 F | SYSTOLIC BLOOD PRESSURE: 142 MMHG | RESPIRATION RATE: 16 BRPM | OXYGEN SATURATION: 97 % | WEIGHT: 265 LBS | DIASTOLIC BLOOD PRESSURE: 89 MMHG

## 2019-02-11 DIAGNOSIS — Z97.8 INDWELLING FOLEY CATHETER PRESENT: ICD-10-CM

## 2019-02-11 DIAGNOSIS — R33.9 URINARY RETENTION: Primary | ICD-10-CM

## 2019-02-11 PROCEDURE — 99308 SBSQ NF CARE LOW MDM 20: CPT | Performed by: NURSE PRACTITIONER

## 2019-02-11 NOTE — PROGRESS NOTES
[place] [facility]    Chief Complaint   Patient presents with     USP Acute       [place] Medical Record Number:  [id number]  Place of Service where encounter took place:  [facility] ([facility] [[id number]]    HPI:    Betsy Resendiz is a 53 year old  (1965), who is being seen today for an episodic care visit.  HPI information obtained from: facility chart records, facility staff, patient report and [facility] chart review.     Patient states she was called by urology about upcoming urodynamic testing. She was advised to have a UA/UC if having any urinary sx's.   Patient states her urine is concentrated with a strong odor   Denies fever, chills or flank pain    REVIEW OF SYSTEMS:  4 point ROS including Respiratory, CV, GI and , other than that noted in the HPI,  is negative    /89   Pulse 67   Temp 97.7  F (36.5  C)   Resp 16   Wt 120.2 kg (265 lb)   SpO2 97%   BMI 45.49 kg/m    GENERAL APPEARANCE:  Alert, in no distress      ASSESSMENT/PLAN:     Urinary retention  Indwelling Abernathy catheter present    1. Change cath, UA/UC after cath change Dx upcoming urinary procedure      Electronically signed by:  DELL Queen CNP

## 2019-02-11 NOTE — LETTER
2/11/2019        RE: Betsy Resendiz  [address]  [place] [address]        [place] [facility]    Chief Complaint   Patient presents with     jail Acute       [place] Medical Record Number:  [id number]  Place of Service where encounter took place:  [facility] (S) [[id number]]    HPI:    Betsy Resendiz is a 53 year old  (1965), who is being seen today for an episodic care visit.  HPI information obtained from: facility chart records, facility staff, patient report and [facility] chart review.     Patient states she was called by urology about upcoming urodynamic testing. She was advised to have a UA/UC if having any urinary sx's.   Patient states her urine is concentrated with a strong odor   Denies fever, chills or flank pain    REVIEW OF SYSTEMS:  4 point ROS including Respiratory, CV, GI and , other than that noted in the HPI,  is negative    /89   Pulse 67   Temp 97.7  F (36.5  C)   Resp 16   Wt 120.2 kg (265 lb)   SpO2 97%   BMI 45.49 kg/m     GENERAL APPEARANCE:  Alert, in no distress      ASSESSMENT/PLAN:     Urinary retention  Indwelling Abernathy catheter present    1. Change cath, UA/UC after cath change Dx upcoming urinary procedure      Electronically signed by:  DELL Queen CNP      Sincerely,        DELL Queen CNP

## 2019-02-12 ENCOUNTER — HOSPITAL LABORATORY (OUTPATIENT)
Facility: OTHER | Age: 54
End: 2019-02-12

## 2019-02-12 ENCOUNTER — TELEPHONE (OUTPATIENT)
Dept: UROLOGY | Facility: CLINIC | Age: 54
End: 2019-02-12

## 2019-02-12 LAB
ALBUMIN UR-MCNC: NEGATIVE MG/DL
APPEARANCE UR: CLEAR
BACTERIA #/AREA URNS HPF: ABNORMAL /HPF
BILIRUB UR QL STRIP: NEGATIVE
COLOR UR AUTO: ABNORMAL
GLUCOSE UR STRIP-MCNC: NEGATIVE MG/DL
HGB UR QL STRIP: ABNORMAL
HYALINE CASTS #/AREA URNS LPF: 1 /LPF (ref 0–2)
KETONES UR STRIP-MCNC: NEGATIVE MG/DL
LEUKOCYTE ESTERASE UR QL STRIP: NEGATIVE
NITRATE UR QL: NEGATIVE
PH UR STRIP: 7 PH (ref 5–7)
RBC #/AREA URNS AUTO: 4 /HPF (ref 0–2)
SOURCE: ABNORMAL
SP GR UR STRIP: 1 (ref 1–1.03)
UROBILINOGEN UR STRIP-MCNC: 0 MG/DL (ref 0–2)
WBC #/AREA URNS AUTO: 6 /HPF (ref 0–5)

## 2019-02-12 NOTE — TELEPHONE ENCOUNTER
[facility] Call Center    Phone Message    May a detailed message be left on voicemail: yes    Reason for Call: Symptoms or Concerns     Current symptom or concern: Betsy is concerned about her Urodynamics testing.  She has had ordor in urine and pressure in abdomen.  A UA was taken today, but results are not back yet.  Will she need to cancel her appointment for tomorrow morning 7am.     Symptoms have been present for:  3-4 day(s)    Has patient previously been seen for this? No      Are there any new or worsening symptoms? No      Action Taken: Message routed to:  [facility] urology

## 2019-02-12 NOTE — TELEPHONE ENCOUNTER
Pt called. Appointment rescheduled. Pt instructed to have a urine culture done 7 days prior to her appointment.     Pt added to wait list.

## 2019-02-14 ENCOUNTER — HOSPITAL LABORATORY (OUTPATIENT)
Facility: OTHER | Age: 54
End: 2019-02-14

## 2019-02-14 LAB
ANION GAP SERPL CALCULATED.3IONS-SCNC: 7 MMOL/L (ref 3–14)
BUN SERPL-MCNC: 15 MG/DL (ref 7–30)
CALCIUM SERPL-MCNC: 9 MG/DL (ref 8.5–10.1)
CHLORIDE SERPL-SCNC: 98 MMOL/L (ref 94–109)
CO2 SERPL-SCNC: 33 MMOL/L (ref 20–32)
CREAT SERPL-MCNC: 1.11 MG/DL (ref 0.52–1.04)
GFR SERPL CREATININE-BSD FRML MDRD: 57 ML/MIN/{1.73_M2}
GLUCOSE SERPL-MCNC: 169 MG/DL (ref 70–99)
POTASSIUM SERPL-SCNC: 3 MMOL/L (ref 3.4–5.3)
SODIUM SERPL-SCNC: 138 MMOL/L (ref 133–144)

## 2019-02-15 ENCOUNTER — NURSING HOME VISIT (OUTPATIENT)
Dept: GERIATRICS | Facility: CLINIC | Age: 54
End: 2019-02-15
Payer: COMMERCIAL

## 2019-02-15 VITALS
BODY MASS INDEX: 45.57 KG/M2 | OXYGEN SATURATION: 94 % | TEMPERATURE: 98.2 F | HEART RATE: 85 BPM | WEIGHT: 265.5 LBS | SYSTOLIC BLOOD PRESSURE: 136 MMHG | DIASTOLIC BLOOD PRESSURE: 88 MMHG | RESPIRATION RATE: 18 BRPM

## 2019-02-15 DIAGNOSIS — I50.32 CHRONIC DIASTOLIC HEART FAILURE (H): ICD-10-CM

## 2019-02-15 DIAGNOSIS — F25.0 SCHIZOAFFECTIVE DISORDER, BIPOLAR TYPE (H): ICD-10-CM

## 2019-02-15 DIAGNOSIS — E87.6 HYPOKALEMIA: ICD-10-CM

## 2019-02-15 DIAGNOSIS — B37.31 YEAST VAGINITIS: Primary | ICD-10-CM

## 2019-02-15 PROCEDURE — 99309 SBSQ NF CARE MODERATE MDM 30: CPT | Performed by: NURSE PRACTITIONER

## 2019-02-15 RX ORDER — CLOTRIMAZOLE 1 %
1 CREAM WITH APPLICATOR VAGINAL AT BEDTIME
COMMUNITY
Start: 2019-02-15 | End: 2019-03-06

## 2019-02-15 NOTE — PROGRESS NOTES
[place] [facility]    Chief Complaint   Patient presents with     shelter Acute       [place] Medical Record Number:  [id number]  Place of Service where encounter took place:  [facility] (S) [[id number]]    HPI:    Betsy Resendiz is a 53 year old  (1965), who is being seen today for an episodic care visit.  HPI information obtained from: facility chart records and [facility] chart review.    Seeing patient to follow up on recent labs.   Patient's K+ is low despite being on a supplement.   Is on diuretics for HF. No reports of increased wt or edema.     Patient reporting vaginal itching. States this started a few days ago. The itching is unbearable. No vaginal discharge noted.     ALLERGIES: Codeine; Hmg-coa-r inhibitors; Iodine; Lisinopril; Morphine; No clinical screening - see comments; Nsaids; Sulfa drugs; and Sumatriptan  Past Medical, Surgical, Family and Social History reviewed and updated in [facility].    Current Outpatient Medications   Medication Sig Dispense Refill     acetaminophen (TYLENOL) 500 MG tablet Take 2 tablets (1,000 mg) by mouth 3 times daily 540 tablet 3     albuterol (PROAIR HFA/PROVENTIL HFA/VENTOLIN HFA) 108 (90 BASE) MCG/ACT Inhaler Inhale 2 puffs into the lungs every 4 hours as needed        amoxicillin (AMOXIL) 500 MG capsule Take one (500 mg) tablet twice daily 14 capsule 0     brimonidine (ALPHAGAN-P) 0.15 % ophthalmic solution Place 1 drop into both eyes 2 times daily       budesonide (PULMICORT) 0.5 MG/2ML neb solution Take 0.5 mg by nebulization 2 times daily       cariprazine (VRAYLAR) 1.5 MG CAPS capsule Take 2 mg by mouth daily        cholecalciferol (VITAMIN D3) 5000 units CAPS capsule Take 5,000 Units by mouth daily       clotrimazole (LOTRIMIN) 1 % cream Apply topically 2 times daily       clotrimazole (LOTRIMIN) 1 % vaginal cream Place 1 Applicatorful vaginally At Bedtime       CYCLOBENZAPRINE HCL PO Take 5 mg by mouth every 8  hours as needed        docusate sodium (STOOL SOFTENER) 100 MG capsule Take 100 mg by mouth At Bedtime        escitalopram (LEXAPRO) 20 MG tablet Take 1 tablet by mouth at bedtime       furosemide (LASIX) 80 MG tablet Take 40 mg by mouth 2 times daily        ipratropium - albuterol 0.5 mg/2.5 mg/3 mL (DUONEB) 0.5-2.5 (3) MG/3ML neb solution Take 1 vial by nebulization 4 times daily       lamoTRIgine (LAMICTAL) 200 MG tablet Take 200 mg by mouth At Bedtime        LANsoprazole (PREVACID) 30 MG CR capsule Take 15 mg by mouth daily        Lidocaine-Menthol 4.5-5 % PTCH Externally apply topically 2 times daily       LIOTHYRONINE SODIUM PO Take 25 mcg by mouth daily       lithium 300 MG capsule Take 1 capsule by mouth in the morning and 2 capsules at bedtime       loperamide (IMODIUM) 2 MG capsule Take 2 mg by mouth every 6 hours as needed for diarrhea       LORAZEPAM PO Take 0.5 mg by mouth 2 times daily as needed for anxiety       MECLIZINE HCL PO Take 25 mg by mouth every 8 hours as needed for dizziness       nicotine (COMMIT) 2 MG lozenge Place 2 mg inside cheek every 3 hours as needed for smoking cessation       OYSTER SHELL CALCIUM PO Take 500 mg by mouth 4 times daily       paliperidone (INVEGA) 6 MG 24 hr tablet Take 6 mg by mouth every morning       perphenazine 4 MG tablet Take 4 mg by mouth 2 times daily        POTASSIUM CHLORIDE ER PO Take 20 mg by mouth 2 times daily        pregabalin (LYRICA) 150 MG capsule Take 150 mg by mouth 2 times daily        traMADol (ULTRAM) 50 MG tablet Take 50 mg by mouth every 4 hours as needed for severe pain       travoprost, BAK Free, (TRAVATAN Z) 0.004 % ophthalmic solution Place 1 drop into both eyes At Bedtime       ziprasidone (GEODON) 40 MG capsule Take 1 capsule by mouth in the morning       ziprasidone (GEODON) 80 MG capsule Take 160 mg by mouth At Bedtime        Medications reviewed:  Medications reconciled to facility chart and changes were made to reflect current  medications as identified as above med list. Below are the changes that were made:   Medications stopped since last EPIC medication reconciliation:   Medications Discontinued During This Encounter   Medication Reason     DOXYCYCLINE HYCLATE PO      Nitrofurantoin Monohyd Macro (MACROBID PO)      nystatin (MYCOSTATIN) 066111 UNIT/ML suspension      OXYBUTYNIN CHLORIDE PO      PREDNISONE PO      traMADol (ULTRAM) 50 MG tablet      trimethoprim-polymyxin b (POLYTRIM) ophthalmic solution        Medications started since last [facility] medication reconciliation:  No orders of the defined types were placed in this encounter.      REVIEW OF SYSTEMS:  4 point ROS including Respiratory, CV, GI and , other than that noted in the HPI,  is negative    Physical Exam:  /88   Pulse 85   Temp 98.2  F (36.8  C)   Resp 18   Wt 120.4 kg (265 lb 8 oz)   SpO2 94%   BMI 45.57 kg/m    GENERAL APPEARANCE:  Alert, in no distress, morbidly obese  RESP:  respiratory effort and palpation of chest normal, lungs clear to auscultation   CV:  Palpation and auscultation of heart done , regular rate and rhythm, no murmur, rub, or gallop  ABDOMEN:  normal bowel sounds, soft, nontender, no hepatosplenomegaly or other masses  :    Examination of external genitalia erytheamtous, white exudate noted  SKIN:  Inspection of skin and subcutaneous tissue baseline  PSYCH:  oriented X 3, affect and mood normal    Recent Labs:     CBC RESULTS:   Recent Labs   Lab Test 02/07/19  1051 12/18/18  0825   WBC 11.2* 9.5   RBC 3.81 3.90   HGB 12.1 12.6   HCT 38.0 39.8    102*   MCH 31.8 32.3   MCHC 31.8 31.7   RDW 12.9 12.7    215       Last Basic Metabolic Panel:  Recent Labs   Lab Test 02/14/19  0854 02/07/19  1051    138   POTASSIUM 3.0* 2.8*   CHLORIDE 98 101   GAURAV 9.0 9.4   CO2 33* 30   BUN 15 13   CR 1.11* 1.11*   * 97       Liver Function Studies -   Recent Labs   Lab Test 02/07/19  1051 11/29/18  0910   PROTTOTAL 7.6 7.7    ALBUMIN 3.4 3.5   BILITOTAL 0.2 0.6   ALKPHOS 110 102   AST 25 8   ALT 35 30       TSH   Date Value Ref Range Status   02/07/2019 1.29 0.40 - 4.00 mU/L Final   08/03/2018 1.40 0.40 - 4.00 mU/L Final   ]    Lab Results   Component Value Date    A1C 5.4 11/23/2018    A1C 5.4 08/03/2018       Assessment/Plan:  Yeast vaginitis  - will treat with clotrimazole PV    Chronic diastolic heart failure (H)  Hypokalemia  Schizoaffective disorder, bipolar type (H)  - on diuretics for HF  - pharmacy concerned that psych meds contributing. Patient is not willing to change medication regimen  - will increase KCL supplement and recheck labs      Orders:  1.  BMP.  Dx: hypokalemia  2.  Discontinue current KCL orders.  Start Potassium Chloride ER 20 meq po BID.  Dx: hypokalemia  3.  Clotrimazole 1% cream, insert 5 gm PV at bedtime x 7 nocs.  Dx: yeast vaginitis     Electronically signed by  DELL Queen CNP

## 2019-02-20 ENCOUNTER — NURSING HOME VISIT (OUTPATIENT)
Dept: GERIATRICS | Facility: CLINIC | Age: 54
End: 2019-02-20
Payer: COMMERCIAL

## 2019-02-20 VITALS
OXYGEN SATURATION: 94 % | RESPIRATION RATE: 18 BRPM | WEIGHT: 258 LBS | HEIGHT: 64 IN | BODY MASS INDEX: 44.05 KG/M2 | SYSTOLIC BLOOD PRESSURE: 138 MMHG | TEMPERATURE: 98 F | DIASTOLIC BLOOD PRESSURE: 80 MMHG | HEART RATE: 94 BPM

## 2019-02-20 DIAGNOSIS — A08.4 VIRAL GASTROENTERITIS: Primary | ICD-10-CM

## 2019-02-20 DIAGNOSIS — M50.90 CERVICAL NECK PAIN WITH EVIDENCE OF DISC DISEASE: ICD-10-CM

## 2019-02-20 DIAGNOSIS — J44.9 CHRONIC OBSTRUCTIVE PULMONARY DISEASE, UNSPECIFIED COPD TYPE (H): ICD-10-CM

## 2019-02-20 PROBLEM — B37.31 YEAST VAGINITIS: Status: ACTIVE | Noted: 2019-02-20

## 2019-02-20 PROBLEM — I50.32 CHRONIC DIASTOLIC HEART FAILURE (H): Status: ACTIVE | Noted: 2019-02-20

## 2019-02-20 PROCEDURE — 99309 SBSQ NF CARE MODERATE MDM 30: CPT | Performed by: NURSE PRACTITIONER

## 2019-02-20 ASSESSMENT — MIFFLIN-ST. JEOR: SCORE: 1770.28

## 2019-02-20 NOTE — PROGRESS NOTES
"[place] [facility]  Chief Complaint   Patient presents with     MCFP Acute     [place] Medical Record Number:  [id number]  Place of Service where encounter took place:  [facility] ([facility] [[id number]]    HPI:    Betsy Resendiz  is a 53 year old (1965), who is being seen today for an episodic care visit.  HPI information obtained from: facility chart records, facility staff, patient report and [facility] chart review.     Seeing patient today for a follow up. She is needing a refill of her lyrica. She is participating in therapy. She reports she is not longer using her oxycodone, but could use it some nights to help with sleep.     Her main concern today is nausea and vomiting. States sx's started last evening. Reports that the \"flu is going around\".    Past Medical and Surgical History reviewed in Epic today.    REVIEW OF SYSTEMS:  4 point ROS including Respiratory, CV, GI and , other than that noted in the HPI,  is negative    Physical Exam:  /80   Pulse 94   Temp 98  F (36.7  C)   Resp 18   Ht 1.626 m (5' 4\")   Wt 117 kg (258 lb)   SpO2 94%   BMI 44.29 kg/m    GENERAL APPEARANCE:  Alert, appears ill  RESP:  respiratory effort and palpation of chest normal, lungs clear to auscultation   CV:  Palpation and auscultation of heart done , regular rate and rhythm, no murmur, rub, or gallop  PSYCH:  oriented X 3, normal insight, judgement and memory    Labs:   Labs done in SNF are in [facility]. Please refer to them using [facility]/Care Everywhere.    ASSESSMENT/PLAN:  Viral gastroenteritis  - recommend bland diet until sx's resolve  -Will order prn zofran  - nsg to update with concerns.     Cervical neck pain with evidence of disc disease  - continue therapy  - can use heat or ice for sx's  - will discontinue oxycodone as patient is no longer using    Chronic obstructive pulmonary disease, unspecified COPD type (H)  - breathing has been stable. Patient " would like to change duo nebs to prn      Orders:  1) Change Duonebs to PRN  2) Discontinue Oxycodone  3) Refill of Lyrica sent to pharmacy  4) Zofran 4 mg ODT Q8H PRN for nausea/vomiting     Total time spent with patient visit at the [facility] nursing facility was 25 min including patient visit, review of past records and discussion with staff/therapy. Greater than 50% of total time spent with counseling and coordinating care due to complex conditions  Electronically signed by:  DELL Queen CNP

## 2019-02-20 NOTE — LETTER
"    2/20/2019        RE: Betsy Resendiz  [address]  [place] [address]        [place] [facility]  Chief Complaint   Patient presents with     skilled nursing Acute     [place] Medical Record Number:  [id number]  Place of Service where encounter took place:  [facility] ([facility] [[id number]]    HPI:    Betsy Resendiz  is a 53 year old (1965), who is being seen today for an episodic care visit.  HPI information obtained from: facility chart records, facility staff, patient report and [facility] chart review.     Seeing patient today for a follow up. She is needing a refill of her lyrica. She is participating in therapy. She reports she is not longer using her oxycodone, but could use it some nights to help with sleep.     Her main concern today is nausea and vomiting. States sx's started last evening. Reports that the \"flu is going around\".    Past Medical and Surgical History reviewed in Epic today.    REVIEW OF SYSTEMS:  4 point ROS including Respiratory, CV, GI and , other than that noted in the HPI,  is negative    Physical Exam:  /80   Pulse 94   Temp 98  F (36.7  C)   Resp 18   Ht 1.626 m (5' 4\")   Wt 117 kg (258 lb)   SpO2 94%   BMI 44.29 kg/m     GENERAL APPEARANCE:  Alert, appears ill  RESP:  respiratory effort and palpation of chest normal, lungs clear to auscultation   CV:  Palpation and auscultation of heart done , regular rate and rhythm, no murmur, rub, or gallop  PSYCH:  oriented X 3, normal insight, judgement and memory    Labs:   Labs done in SNF are in [facility]. Please refer to them using EPIC/Care Everywhere.    ASSESSMENT/PLAN:  Viral gastroenteritis  - recommend bland diet until sx's resolve  -Will order prn zofran  - nsg to update with concerns.     Cervical neck pain with evidence of disc disease  - continue therapy  - can use heat or ice for sx's  - will discontinue oxycodone as patient is no longer " using    Chronic obstructive pulmonary disease, unspecified COPD type (H)  - breathing has been stable. Patient would like to change duo nebs to prn      Orders:  1) Change Duonebs to PRN  2) Discontinue Oxycodone  3) Refill of Lyrica sent to pharmacy  4) Zofran 4 mg ODT Q8H PRN for nausea/vomiting     Total time spent with patient visit at the [facility] nursing [facility] was 25 min including patient visit, review of past records and discussion with staff/therapy. Greater than 50% of total time spent with counseling and coordinating care due to complex conditions  Electronically signed by:  DELL Queen CNP           Sincerely,        DELL Queen CNP

## 2019-02-21 ENCOUNTER — HOSPITAL LABORATORY (OUTPATIENT)
Facility: OTHER | Age: 54
End: 2019-02-21

## 2019-02-21 LAB
ANION GAP SERPL CALCULATED.3IONS-SCNC: 6 MMOL/L (ref 3–14)
BUN SERPL-MCNC: 12 MG/DL (ref 7–30)
CALCIUM SERPL-MCNC: 8.5 MG/DL (ref 8.5–10.1)
CHLORIDE SERPL-SCNC: 101 MMOL/L (ref 94–109)
CO2 SERPL-SCNC: 31 MMOL/L (ref 20–32)
CREAT SERPL-MCNC: 1.09 MG/DL (ref 0.52–1.04)
GFR SERPL CREATININE-BSD FRML MDRD: 58 ML/MIN/{1.73_M2}
GLUCOSE SERPL-MCNC: 157 MG/DL (ref 70–99)
POTASSIUM SERPL-SCNC: 2.9 MMOL/L (ref 3.4–5.3)
SODIUM SERPL-SCNC: 138 MMOL/L (ref 133–144)

## 2019-02-22 ENCOUNTER — TELEPHONE (OUTPATIENT)
Dept: GERIATRICS | Facility: CLINIC | Age: 54
End: 2019-02-22

## 2019-02-22 NOTE — TELEPHONE ENCOUNTER
[place] [facility] TELEPHONE ENCOUNTER    Chief Complaint   Patient presents with     hypokalemia       Betsy Echavarrias is a 53 year old  (1965),Nurse called today to report: K+ of 2.9, currently on lasix and K+ 20 meq TID    ASSESSMENT/PLAN  Hypokalemia     Increase K+ to 40 meq TID po    Recheck K+ on 2/25/19     DELL Ashley CNP

## 2019-02-25 RX ORDER — PREGABALIN 150 MG/1
150 CAPSULE ORAL 2 TIMES DAILY
Qty: 60 CAPSULE | Refills: 5 | Status: SHIPPED | OUTPATIENT
Start: 2019-02-25

## 2019-02-25 RX ORDER — ONDANSETRON 4 MG/1
4 TABLET, ORALLY DISINTEGRATING ORAL EVERY 8 HOURS PRN
Start: 2019-02-25

## 2019-02-26 ENCOUNTER — NURSING HOME VISIT (OUTPATIENT)
Dept: GERIATRICS | Facility: CLINIC | Age: 54
End: 2019-02-26
Payer: COMMERCIAL

## 2019-02-26 VITALS
OXYGEN SATURATION: 94 % | HEART RATE: 82 BPM | DIASTOLIC BLOOD PRESSURE: 84 MMHG | BODY MASS INDEX: 44.63 KG/M2 | TEMPERATURE: 97 F | RESPIRATION RATE: 18 BRPM | WEIGHT: 260 LBS | SYSTOLIC BLOOD PRESSURE: 129 MMHG

## 2019-02-26 DIAGNOSIS — F25.9 SCHIZOAFFECTIVE DISORDER, UNSPECIFIED TYPE (H): Primary | ICD-10-CM

## 2019-02-26 DIAGNOSIS — R94.31 PROLONGED Q-T INTERVAL ON ECG: ICD-10-CM

## 2019-02-26 DIAGNOSIS — E87.6 HYPOKALEMIA: ICD-10-CM

## 2019-02-26 DIAGNOSIS — R10.11 ABDOMINAL PAIN, RIGHT UPPER QUADRANT: ICD-10-CM

## 2019-02-26 DIAGNOSIS — E11.9 TYPE 2 DIABETES MELLITUS WITHOUT COMPLICATION, WITHOUT LONG-TERM CURRENT USE OF INSULIN (H): ICD-10-CM

## 2019-02-26 PROCEDURE — 99309 SBSQ NF CARE MODERATE MDM 30: CPT | Performed by: NURSE PRACTITIONER

## 2019-02-26 NOTE — PROGRESS NOTES
[place] [facility]  [place] Medical Record Number:  [id number]  Place of Service where encounter took place:  [facility] ([facility]) [[id number]]  Chief Complaint   Patient presents with     Nursing Home Acute     HPI:    Betsy Resendiz  is a 53 year old (1965), who is being seen today for an episodic care visit.  HPI information obtained from: facility chart records, facility staff, patient report and [facility] chart review.     Pharmacy review with concerns of multiple psych medications and hx of prolonged QTC.     Met with patient in her room. She reports ongoing abd pain. This started about one week ago. She has had 3 days of vomiting. Feels the zofran is somewhat effective. Has diarrhea. Patient is unsure if blood or mucous in her stool. She has been eating a bland diet. No fever or chills. Has felt weak.     Past Medical and Surgical History reviewed in Epic today.    REVIEW OF SYSTEMS:  4 point ROS including Respiratory, CV, GI and , other than that noted in the HPI,  is negative    Physical Exam:  /84   Pulse 82   Temp 97  F (36.1  C)   Resp 18   Wt 117.9 kg (260 lb)   SpO2 94%   BMI 44.63 kg/m    GENERAL APPEARANCE:  Alert, in no distress  RESP:  respiratory effort and palpation of chest normal, lungs clear to auscultation   CV:  Palpation and auscultation of heart done , regular rate and rhythm, no murmur, rub, or gallop  ABDOMEN:  BS's hyperactive, RUQ tenderness  PSYCH:  oriented X 3, affect and mood normal    Labs:   CBC RESULTS:   Recent Labs   Lab Test 02/07/19  1051 12/18/18  0825   WBC 11.2* 9.5   RBC 3.81 3.90   HGB 12.1 12.6   HCT 38.0 39.8    102*   MCH 31.8 32.3   MCHC 31.8 31.7   RDW 12.9 12.7    215       Last Basic Metabolic Panel:  Recent Labs   Lab Test 02/21/19  0925 02/14/19  0854    138   POTASSIUM 2.9* 3.0*   CHLORIDE 101 98   GAURAV 8.5 9.0   CO2 31 33*   BUN 12 15   CR 1.09* 1.11*   * 169*       Liver  Function Studies -   Recent Labs   Lab Test 02/07/19  1051 11/29/18  0910   PROTTOTAL 7.6 7.7   ALBUMIN 3.4 3.5   BILITOTAL 0.2 0.6   ALKPHOS 110 102   AST 25 8   ALT 35 30       TSH   Date Value Ref Range Status   02/07/2019 1.29 0.40 - 4.00 mU/L Final   08/03/2018 1.40 0.40 - 4.00 mU/L Final   ]    Lab Results   Component Value Date    A1C 5.4 11/23/2018    A1C 5.4 08/03/2018       ASSESSMENT/PLAN:  Schizoaffective disorder, unspecified type (H)  Prolonged Q-T interval on ECG  - Patient following with psych provider for medications  - Patient is not willing to GDR psych meds.   - has seen cardiology for prolonged QT. Patient aware of risk  - per pharmacy Concurrent use of ziprasidone and paliperidone can cause hypokalemia      Hypokalemia  - is on diuretics. Started after recent surgery  - likely exacerbated by diarrhea  - on replacement        Abdominal pain, right upper quadrant  - likely ongoing gastroenteritis  - eating bland diet, has zofran available  - will check labs and RUQ US     Type 2 diabetes mellitus without complication, without long-term current use of insulin (H)  - A1c ordered      Orders:  1. RUQ Ultrasound Dx RUQ abd pain  2. A1c, CBC, CMP Dx DM and ABD pain  3. [place] diet until GI symptoms resolve.    Electronically signed by:  DELL Queen CNP

## 2019-02-26 NOTE — LETTER
2/26/2019        RE: Betsy Resendiz  [address]  [place] [address]        [place] [facility]  [place] [facility] Record Number:  [id number]  Place of Service where encounter took place:  [facility] ([facility]) [[id number]]  Chief Complaint   Patient presents with     Nursing Home Acute     HPI:    Betsy Resendiz  is a 53 year old (1965), who is being seen today for an episodic care visit.  HPI information obtained from: facility chart records, facility staff, patient report and [facility] chart review.     Pharmacy review with concerns of multiple psych medications and hx of prolonged QTC.     Met with patient in her room. She reports ongoing abd pain. This started about one week ago. She has had 3 days of vomiting. Feels the zofran is somewhat effective. Has diarrhea. Patient is unsure if blood or mucous in her stool. She has been eating a bland diet. No fever or chills. Has felt weak.     Past Medical and Surgical History reviewed in [facility] today.    REVIEW OF SYSTEMS:  4 point ROS including Respiratory, CV, GI and , other than that noted in the HPI,  is negative    Physical Exam:  /84   Pulse 82   Temp 97  F (36.1  C)   Resp 18   Wt 117.9 kg (260 lb)   SpO2 94%   BMI 44.63 kg/m     GENERAL APPEARANCE:  Alert, in no distress  RESP:  respiratory effort and palpation of chest normal, lungs clear to auscultation   CV:  Palpation and auscultation of heart done , regular rate and rhythm, no murmur, rub, or gallop  ABDOMEN:  BS's hyperactive, RUQ tenderness  PSYCH:  oriented X 3, affect and mood normal    Labs:   CBC RESULTS:   Recent Labs   Lab Test 02/07/19  1051 12/18/18  0825   WBC 11.2* 9.5   RBC 3.81 3.90   HGB 12.1 12.6   HCT 38.0 39.8    102*   MCH 31.8 32.3   MCHC 31.8 31.7   RDW 12.9 12.7    215       Last Basic Metabolic Panel:  Recent Labs   Lab Test 02/21/19  0925 02/14/19  0854    138   POTASSIUM 2.9* 3.0*    CHLORIDE 101 98   GAURAV 8.5 9.0   CO2 31 33*   BUN 12 15   CR 1.09* 1.11*   * 169*       Liver Function Studies -   Recent Labs   Lab Test 02/07/19  1051 11/29/18  0910   PROTTOTAL 7.6 7.7   ALBUMIN 3.4 3.5   BILITOTAL 0.2 0.6   ALKPHOS 110 102   AST 25 8   ALT 35 30       TSH   Date Value Ref Range Status   02/07/2019 1.29 0.40 - 4.00 mU/L Final   08/03/2018 1.40 0.40 - 4.00 mU/L Final   ]    Lab Results   Component Value Date    A1C 5.4 11/23/2018    A1C 5.4 08/03/2018       ASSESSMENT/PLAN:  Schizoaffective disorder, unspecified type (H)  Prolonged Q-T interval on ECG  - Patient following with psych provider for medications  - Patient is not willing to GDR psych meds.   - has seen cardiology for prolonged QT. Patient aware of risk  - per pharmacy Concurrent use of ziprasidone and paliperidone can cause hypokalemia      Hypokalemia  - is on diuretics. Started after recent surgery  - likely exacerbated by diarrhea  - on replacement        Abdominal pain, right upper quadrant  - likely ongoing gastroenteritis  - eating bland diet, has zofran available  - will check labs and RUQ US     Type 2 diabetes mellitus without complication, without long-term current use of insulin (H)  - A1c ordered      Orders:  1. RUQ Ultrasound Dx RUQ abd pain  2. A1c, CBC, CMP Dx DM and ABD pain  3. [place] diet until GI symptoms resolve.    Electronically signed by:  DELL Queen CNP           Sincerely,        DELL Queen CNP

## 2019-02-27 ENCOUNTER — TRANSFERRED RECORDS (OUTPATIENT)
Dept: HEALTH INFORMATION MANAGEMENT | Facility: CLINIC | Age: 54
End: 2019-02-27

## 2019-02-28 ENCOUNTER — HOSPITAL LABORATORY (OUTPATIENT)
Facility: OTHER | Age: 54
End: 2019-02-28

## 2019-02-28 LAB
ALBUMIN SERPL-MCNC: 3.5 G/DL (ref 3.4–5)
ALP SERPL-CCNC: 112 U/L (ref 40–150)
ALT SERPL W P-5'-P-CCNC: 37 U/L (ref 0–50)
ANION GAP SERPL CALCULATED.3IONS-SCNC: 6 MMOL/L (ref 3–14)
AST SERPL W P-5'-P-CCNC: 24 U/L (ref 0–45)
BILIRUB SERPL-MCNC: 0.4 MG/DL (ref 0.2–1.3)
BUN SERPL-MCNC: 12 MG/DL (ref 7–30)
CALCIUM SERPL-MCNC: 9.4 MG/DL (ref 8.5–10.1)
CHLORIDE SERPL-SCNC: 98 MMOL/L (ref 94–109)
CO2 SERPL-SCNC: 32 MMOL/L (ref 20–32)
CREAT SERPL-MCNC: 1.25 MG/DL (ref 0.52–1.04)
ERYTHROCYTE [DISTWIDTH] IN BLOOD BY AUTOMATED COUNT: 12.6 % (ref 10–15)
GFR SERPL CREATININE-BSD FRML MDRD: 50 ML/MIN/{1.73_M2}
GLUCOSE SERPL-MCNC: 109 MG/DL (ref 70–99)
HBA1C MFR BLD: 5.8 % (ref 0–5.6)
HCT VFR BLD AUTO: 37.1 % (ref 35–47)
HGB BLD-MCNC: 12.6 G/DL (ref 11.7–15.7)
MCH RBC QN AUTO: 32.5 PG (ref 26.5–33)
MCHC RBC AUTO-ENTMCNC: 34 G/DL (ref 31.5–36.5)
MCV RBC AUTO: 96 FL (ref 78–100)
PLATELET # BLD AUTO: 208 10E9/L (ref 150–450)
POTASSIUM SERPL-SCNC: 2.9 MMOL/L (ref 3.4–5.3)
PROT SERPL-MCNC: 7.8 G/DL (ref 6.8–8.8)
RBC # BLD AUTO: 3.88 10E12/L (ref 3.8–5.2)
SODIUM SERPL-SCNC: 136 MMOL/L (ref 133–144)
WBC # BLD AUTO: 9.8 10E9/L (ref 4–11)

## 2019-03-06 ENCOUNTER — DISCHARGE SUMMARY NURSING HOME (OUTPATIENT)
Dept: GERIATRICS | Facility: CLINIC | Age: 54
End: 2019-03-06
Payer: COMMERCIAL

## 2019-03-06 VITALS
WEIGHT: 143 LBS | TEMPERATURE: 98.7 F | RESPIRATION RATE: 18 BRPM | SYSTOLIC BLOOD PRESSURE: 136 MMHG | HEART RATE: 104 BPM | DIASTOLIC BLOOD PRESSURE: 76 MMHG | OXYGEN SATURATION: 96 % | BODY MASS INDEX: 24.55 KG/M2

## 2019-03-06 DIAGNOSIS — R33.9 URINARY RETENTION: ICD-10-CM

## 2019-03-06 DIAGNOSIS — E87.6 HYPOKALEMIA: ICD-10-CM

## 2019-03-06 DIAGNOSIS — G89.4 CHRONIC PAIN SYNDROME: ICD-10-CM

## 2019-03-06 DIAGNOSIS — R94.31 ABNORMAL ELECTROCARDIOGRAM: ICD-10-CM

## 2019-03-06 DIAGNOSIS — E66.01 MORBID OBESITY WITH BMI OF 40.0-44.9, ADULT (H): ICD-10-CM

## 2019-03-06 DIAGNOSIS — M50.90 CERVICAL NECK PAIN WITH EVIDENCE OF DISC DISEASE: ICD-10-CM

## 2019-03-06 DIAGNOSIS — F25.0 SCHIZOAFFECTIVE DISORDER, BIPOLAR TYPE (H): Primary | ICD-10-CM

## 2019-03-06 DIAGNOSIS — J44.9 CHRONIC OBSTRUCTIVE PULMONARY DISEASE, UNSPECIFIED COPD TYPE (H): ICD-10-CM

## 2019-03-06 DIAGNOSIS — I50.32 CHRONIC DIASTOLIC HEART FAILURE (H): ICD-10-CM

## 2019-03-06 PROCEDURE — 99310 SBSQ NF CARE HIGH MDM 45: CPT | Performed by: NURSE PRACTITIONER

## 2019-03-06 RX ORDER — AMOXICILLIN 250 MG
2 CAPSULE ORAL 2 TIMES DAILY PRN
COMMUNITY

## 2019-03-06 NOTE — PROGRESS NOTES
[place] [facility] DISCHARGE SUMMARY  PATIENT'S NAME: Betsy Resendiz  YOB: 1965  MEDICAL RECORD NUMBER:  [id number]  Place of Service where encounter took place:  [facility] ([facility]) [[id number]]    PRIMARY CARE PROVIDER AND CLINIC RESPONSIBLE AFTER TRANSFER:      Non-[facility] Provider     Transferring providers: DELL Queen CNP, MD Sangeeta  Recent Hospitalization/ED:  [facility]   Date of SNF Admission: June / 28 / 2019  Date of SNF (anticipated) Discharge: March / 06 / 2019  Discharged to: previous independent home  Cognitive Scores: Safety Questionnaire: 19/20  Physical Function: [place] Balance Scale: 40/56  DME: none    CODE STATUS/ADVANCE DIRECTIVES DISCUSSION:  Full Code   ALLERGIES: Codeine; Hmg-coa-r inhibitors; Iodine; Lisinopril; Morphine; No clinical screening - see comments; Nsaids; Sulfa drugs; and Sumatriptan    DISCHARGE DIAGNOSIS/NURSING FACILITY COURSE:     Patient admitted to TCU June of '18 following a fall in her apt. She had altered LOC and sustained a right humerus fx. During her stay in TCU she had a hospitalization for COPD exacerbation. She also had a spinal fusion in Jan '19. She was previously on narcotics and benzo's. She has successfully tapered off of these while in TCU.     Patient reporting ongoing loose stools and nausea. Viral gastroenteritis one week ago. No recent fever or chills.   Had a RUQ US that shows Gallbladder with sludge.     Schizoaffective disorder, bipolar type (H)  - following with Ramona Lorenzo  - on multiple psych medications. Pharmacy feels psych meds contributing to QT prolongation. Patient has not been willing to taper doses. Despite reports of ongoing anxiety patient has participated in facility activities and visits with staff and other residents.   - lithium toxicity during hospitalization Dec '18, dose reduced  - Patient has done well without prn ativan    Chronic diastolic heart  failure (H)  Morbid obesity with BMI of 40.0-44.9, adult (H)  - stable on lasix  - saw cardiology Dec '18. Will need to follow up    Hypokalemia  - chronic problem. Worse after spinal fusion. No med known med changes contributing. Diarrhea likely contributing.   - patient has been refusing KCL replacement or daily banana  - Recommend f/u labs in 2-3 days      Abnormal electrocardiogram  - with prolonged QTc.   - saw cards Dec '18  - pharmacy feels psych meds contributing  - Patient would benefit from a MTM referral    Cervical neck pain with evidence of disc disease  Chronic pain syndrome  -Patient previously on chronic narcotics. These were weaned off fall of '18. Long discussion with patient re dependence and tolerance.   - C5-C6 fusion Jan '19. Patient used prn oxycodone for 2-3 weeks, has tapered off  - worked with physical therapy     Urinary retention  - indwelling mac during TCU  - cysto Jan '19 with no tumor, stone or mass  - due to have urodynamic testing  - due to follow up with urology in 2 weeks    chronic obstructive pulmonary disease, unspecified COPD type (H)  - patient has quit smoking!!  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    Discharge Medications:  Current Outpatient Medications   Medication Sig Dispense Refill     acetaminophen (TYLENOL) 500 MG tablet Take 2 tablets (1,000 mg) by mouth 3 times daily 540 tablet 3     albuterol (PROAIR HFA/PROVENTIL HFA/VENTOLIN HFA) 108 (90 BASE) MCG/ACT Inhaler Inhale 2 puffs into the lungs every 4 hours as needed        brimonidine (ALPHAGAN-P) 0.15 % ophthalmic solution Place 1 drop into both eyes 2 times daily       budesonide (PULMICORT) 0.5 MG/2ML neb solution Take 0.5 mg by nebulization 2 times daily       cariprazine (VRAYLAR) 1.5  MG CAPS capsule Take 2 mg by mouth daily        cholecalciferol (VITAMIN D3) 5000 units CAPS capsule Take 5,000 Units by mouth daily       clotrimazole (LOTRIMIN) 1 % cream Apply topically 2 times daily       CYCLOBENZAPRINE HCL PO Take 5 mg by mouth every 8 hours as needed        escitalopram (LEXAPRO) 20 MG tablet Take 1 tablet by mouth at bedtime       furosemide (LASIX) 80 MG tablet Take 40 mg by mouth 2 times daily        ipratropium - albuterol 0.5 mg/2.5 mg/3 mL (DUONEB) 0.5-2.5 (3) MG/3ML neb solution Take 1 vial by nebulization 4 times daily as needed        lamoTRIgine (LAMICTAL) 200 MG tablet Take 200 mg by mouth At Bedtime        LANsoprazole (PREVACID) 30 MG CR capsule Take 15 mg by mouth daily        Lidocaine-Menthol 4.5-5 % PTCH Externally apply topically 2 times daily       LIOTHYRONINE SODIUM PO Take 25 mcg by mouth daily       lithium 300 MG capsule Take 1 capsule by mouth in the morning and 2 capsules at bedtime       loperamide (IMODIUM) 2 MG capsule Take 2 mg by mouth every 6 hours as needed for diarrhea       MECLIZINE HCL PO Take 25 mg by mouth every 8 hours as needed for dizziness       nicotine (COMMIT) 2 MG lozenge Place 2 mg inside cheek every 3 hours as needed for smoking cessation       ondansetron (ZOFRAN-ODT) 4 MG ODT tab Take 1 tablet (4 mg) by mouth every 8 hours as needed for nausea       OYSTER SHELL CALCIUM PO Take 500 mg by mouth 4 times daily       paliperidone (INVEGA) 6 MG 24 hr tablet Take 6 mg by mouth every morning       perphenazine 4 MG tablet Take 4 mg by mouth 2 times daily        POTASSIUM CHLORIDE ER PO Take 40 mg by mouth 3 times daily        pregabalin (LYRICA) 150 MG capsule Take 1 capsule (150 mg) by mouth 2 times daily 60 capsule 5     senna-docusate (SENOKOT-S/PERICOLACE) 8.6-50 MG tablet Take 2 tablets by mouth 2 times daily as needed for constipation       tiZANidine (ZANAFLEX) 4 MG tablet Take 4 mg by mouth every 8 hours as needed for muscle spasms        travoprost, NICHO Free, (TRAVATAN Z) 0.004 % ophthalmic solution Place 1 drop into both eyes At Bedtime       ziprasidone (GEODON) 40 MG capsule Take 1 capsule by mouth in the morning       ziprasidone (GEODON) 80 MG capsule Take 160 mg by mouth At Bedtime          Controlled medications sent with patient:   not applicable/none     ROS:   4 point ROS including Respiratory, CV, GI and , other than that noted in the HPI,  is negative    Physical Exam:   Vitals: /76   Pulse 104   Temp 98.7  F (37.1  C)   Resp 18   Wt 64.9 kg (143 lb)   SpO2 96%   BMI 24.55 kg/m    BMI= Body mass index is 24.55 kg/m .  GENERAL APPEARANCE:  Alert, in no distress, morbidly obese  RESP:  respiratory effort and palpation of chest normal, lungs clear to auscultation   CV:  Palpation and auscultation of heart done , regular rate and rhythm, no murmur, rub, or gallop  ABDOMEN:  normal bowel sounds, soft, nontender, no hepatosplenomegaly or other masses  PSYCH:  oriented X 3, affect and mood normal     SNF labs:  CBC RESULTS:   Recent Labs   Lab Test 02/28/19  1009 02/07/19  1051   WBC 9.8 11.2*   RBC 3.88 3.81   HGB 12.6 12.1   HCT 37.1 38.0   MCV 96 100   MCH 32.5 31.8   MCHC 34.0 31.8   RDW 12.6 12.9    232       Last Basic Metabolic Panel:  Recent Labs   Lab Test 02/28/19  1009 02/21/19  0925    138   POTASSIUM 2.9* 2.9*   CHLORIDE 98 101   GAURAV 9.4 8.5   CO2 32 31   BUN 12 12   CR 1.25* 1.09*   * 157*       Liver Function Studies -   Recent Labs   Lab Test 02/28/19  1009 02/07/19  1051   PROTTOTAL 7.8 7.6   ALBUMIN 3.5 3.4   BILITOTAL 0.4 0.2   ALKPHOS 112 110   AST 24 25   ALT 37 35       TSH   Date Value Ref Range Status   02/07/2019 1.29 0.40 - 4.00 mU/L Final   08/03/2018 1.40 0.40 - 4.00 mU/L Final   ]    Lab Results   Component Value Date    A1C 5.8 02/28/2019    A1C 5.4 11/23/2018       DISCHARGE PLAN:    Follow up labs: No labs orders/due    Medical Follow Up:      Follow up with PCP 5-7    MTM  referral needed and placed by this provider: No    Discharge Services: Home Care:  Registered Nurse    Discharge Instructions Verbalized to Patient at Discharge:     None      1. Okay to discharge home with outpatient PT and RN with Blanka IBARRA  2. Patient to see PCP in 3-5 days, will need labs in 2 days to f/u potassium.  3. Patient will need to F/U with Urology as scheduled Dx Urinary Retention  4. Patient to F/U with Ramona hughes psych med f/u    Total time spent with patient visit was 45 min including patient visit, review of past records and discussion with nsg. Greater than 50% of total time spent with counseling and coordinating care due to complex conditions.   Electronically signed by:  DELL Queen CNP     Documentation of Face to Face and Certification for Home Health Services    I certify that patient: Betys Resendiz is under my care and that I, or a nurse practitioner or physician's assistant working with me, had a face-to-face encounter that meets the physician face-to-face encounter requirements with this patient on: 3/6/2019.    This encounter with the patient was in whole, or in part, for the following medical condition, which is the primary reason for home health care: Schizoaffective disorder, urinary retention.    I certify that, based on my findings, the following services are medically necessary home health services: Nursing.    My clinical findings support the need for the above services because: Nurse is needed: To provide caregiver training to assist with:  Schizoaffective disorder, urinary retention...    Further, I certify that my clinical findings support that this patient is homebound (i.e. absences from home require considerable and taxing effort and are for medical reasons or [facility] services or infrequently or of short duration when for other reasons) because: Mental health symptoms including: Mental health condition is exacerbated by exposure to crowds, unfamiliar environment or  new stressful situations...    Based on the above findings. I certify that this patient is confined to the home and needs intermittent skilled nursing care, physical therapy and/or speech therapy.  The patient is under my care, and I have initiated the establishment of the plan of care.  This patient will be followed by a physician who will periodically review the plan of care.  Physician/Provider to provide follow up care: Ramona Jacome    Attending [facility] physician (the Medicare certified P[facility] provider): Ramona Jacome  Physician Signature: See electronic signature associated with these discharge orders.  Date: 3/6/2019

## 2019-03-06 NOTE — LETTER
3/6/2019        RE: Betsy Resendiz  [address]  [place] [id number]        [place] [facility] DISCHARGE SUMMARY  PATIENT'S NAME: Betsy Resendiz  YOB: 1965  MEDICAL RECORD NUMBER:  [id number]  Place of Service where encounter took place:  [facility] ([facility] [[id number]]    PRIMARY CARE PROVIDER AND CLINIC RESPONSIBLE AFTER TRANSFER:      Non-[facility] Provider     Transferring providers: DELL Queen CNP, Chebli, MD  Recent Hospitalization/ED:  [facility]   Date of SNF Admission: June / 28 / 2019  Date of SNF (anticipated) Discharge: March / 06 / 2019  Discharged to: previous independent home  Cognitive Scores: Safety Questionnaire: 19/20  Physical Function: Looney Balance Scale: 40/56  DME: none    CODE STATUS/ADVANCE DIRECTIVES DISCUSSION:  Full Code   ALLERGIES: Codeine; Hmg-coa-r inhibitors; Iodine; Lisinopril; Morphine; No clinical screening - see comments; Nsaids; Sulfa drugs; and Sumatriptan    DISCHARGE DIAGNOSIS/NURSING FACILITY COURSE:     Patient admitted to TCU June of '18 following a fall in her apt. She had altered LOC and sustained a right humerus fx. During her stay in TCU she had a hospitalization for COPD exacerbation. She also had a spinal fusion in Jan '19. She was previously on narcotics and benzo's. She has successfully tapered off of these while in TCU.     Patient reporting ongoing loose stools and nausea. Viral gastroenteritis one week ago. No recent fever or chills.   Had a RUQ US that shows Gallbladder with sludge.     Schizoaffective disorder, bipolar type (H)  - following with Ramona Lorenzo  - on multiple psych medications. Pharmacy feels psych meds contributing to QT prolongation. Patient has not been willing to taper doses. Despite reports of ongoing anxiety patient has participated in facility activities and visits with staff and other residents.   - lithium toxicity during hospitalization  Dec '18, dose reduced  - Patient has done well without prn ativan    Chronic diastolic heart failure (H)  Morbid obesity with BMI of 40.0-44.9, adult (H)  - stable on lasix  - saw cardiology Dec '18. Will need to follow up    Hypokalemia  - chronic problem. Worse after spinal fusion. No med known med changes contributing. Diarrhea likely contributing.   - patient has been refusing KCL replacement or daily banana  - Recommend f/u labs in 2-3 days      Abnormal electrocardiogram  - with prolonged QTc.   - saw cards Dec '18  - pharmacy feels psych meds contributing  - Patient would benefit from a MTM referral    Cervical neck pain with evidence of disc disease  Chronic pain syndrome  -Patient previously on chronic narcotics. These were weaned off fall of '18. Long discussion with patient re dependence and tolerance.   - C5-C6 fusion Jan '19. Patient used prn oxycodone for 2-3 weeks, has tapered off  - worked with physical therapy     Urinary retention  - indwelling mac during TCU  - cysto Jan '19 with no tumor, stone or mass  - due to have urodynamic testing  - due to follow up with urology in 2 weeks    chronic obstructive pulmonary disease, unspecified COPD type (H)  - patient has quit smoking!!  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    Discharge Medications:  Current Outpatient Medications   Medication Sig Dispense Refill     acetaminophen (TYLENOL) 500 MG tablet Take 2 tablets (1,000 mg) by mouth 3 times daily 540 tablet 3     albuterol (PROAIR HFA/PROVENTIL HFA/VENTOLIN HFA) 108 (90 BASE) MCG/ACT Inhaler Inhale 2 puffs into the lungs every 4 hours as needed        brimonidine (ALPHAGAN-P) 0.15 % ophthalmic solution Place 1 drop into both eyes 2 times daily       budesonide (PULMICORT) 0.5  MG/2ML neb solution Take 0.5 mg by nebulization 2 times daily       cariprazine (VRAYLAR) 1.5 MG CAPS capsule Take 2 mg by mouth daily        cholecalciferol (VITAMIN D3) 5000 units CAPS capsule Take 5,000 Units by mouth daily       clotrimazole (LOTRIMIN) 1 % cream Apply topically 2 times daily       CYCLOBENZAPRINE HCL PO Take 5 mg by mouth every 8 hours as needed        escitalopram (LEXAPRO) 20 MG tablet Take 1 tablet by mouth at bedtime       furosemide (LASIX) 80 MG tablet Take 40 mg by mouth 2 times daily        ipratropium - albuterol 0.5 mg/2.5 mg/3 mL (DUONEB) 0.5-2.5 (3) MG/3ML neb solution Take 1 vial by nebulization 4 times daily as needed        lamoTRIgine (LAMICTAL) 200 MG tablet Take 200 mg by mouth At Bedtime        LANsoprazole (PREVACID) 30 MG CR capsule Take 15 mg by mouth daily        Lidocaine-Menthol 4.5-5 % PTCH Externally apply topically 2 times daily       LIOTHYRONINE SODIUM PO Take 25 mcg by mouth daily       lithium 300 MG capsule Take 1 capsule by mouth in the morning and 2 capsules at bedtime       loperamide (IMODIUM) 2 MG capsule Take 2 mg by mouth every 6 hours as needed for diarrhea       MECLIZINE HCL PO Take 25 mg by mouth every 8 hours as needed for dizziness       nicotine (COMMIT) 2 MG lozenge Place 2 mg inside cheek every 3 hours as needed for smoking cessation       ondansetron (ZOFRAN-ODT) 4 MG ODT tab Take 1 tablet (4 mg) by mouth every 8 hours as needed for nausea       OYSTER SHELL CALCIUM PO Take 500 mg by mouth 4 times daily       paliperidone (INVEGA) 6 MG 24 hr tablet Take 6 mg by mouth every morning       perphenazine 4 MG tablet Take 4 mg by mouth 2 times daily        POTASSIUM CHLORIDE ER PO Take 40 mg by mouth 3 times daily        pregabalin (LYRICA) 150 MG capsule Take 1 capsule (150 mg) by mouth 2 times daily 60 capsule 5     senna-docusate (SENOKOT-S/PERICOLACE) 8.6-50 MG tablet Take 2 tablets by mouth 2 times daily as needed for constipation       tiZANidine  (ZANAFLEX) 4 MG tablet Take 4 mg by mouth every 8 hours as needed for muscle spasms       travoprost, BAK Free, (TRAVATAN Z) 0.004 % ophthalmic solution Place 1 drop into both eyes At Bedtime       ziprasidone (GEODON) 40 MG capsule Take 1 capsule by mouth in the morning       ziprasidone (GEODON) 80 MG capsule Take 160 mg by mouth At Bedtime          Controlled medications sent with patient:   not applicable/none     ROS:   4 point ROS including Respiratory, CV, GI and , other than that noted in the HPI,  is negative    Physical Exam:   Vitals: /76   Pulse 104   Temp 98.7  F (37.1  C)   Resp 18   Wt 64.9 kg (143 lb)   SpO2 96%   BMI 24.55 kg/m     BMI= Body mass index is 24.55 kg/m .  GENERAL APPEARANCE:  Alert, in no distress, morbidly obese  RESP:  respiratory effort and palpation of chest normal, lungs clear to auscultation   CV:  Palpation and auscultation of heart done , regular rate and rhythm, no murmur, rub, or gallop  ABDOMEN:  normal bowel sounds, soft, nontender, no hepatosplenomegaly or other masses  PSYCH:  oriented X 3, affect and mood normal     SNF labs:  CBC RESULTS:   Recent Labs   Lab Test 02/28/19  1009 02/07/19  1051   WBC 9.8 11.2*   RBC 3.88 3.81   HGB 12.6 12.1   HCT 37.1 38.0   MCV 96 100   MCH 32.5 31.8   MCHC 34.0 31.8   RDW 12.6 12.9    232       Last Basic Metabolic Panel:  Recent Labs   Lab Test 02/28/19  1009 02/21/19  0925    138   POTASSIUM 2.9* 2.9*   CHLORIDE 98 101   GAURAV 9.4 8.5   CO2 32 31   BUN 12 12   CR 1.25* 1.09*   * 157*       Liver Function Studies -   Recent Labs   Lab Test 02/28/19  1009 02/07/19  1051   PROTTOTAL 7.8 7.6   ALBUMIN 3.5 3.4   BILITOTAL 0.4 0.2   ALKPHOS 112 110   AST 24 25   ALT 37 35       TSH   Date Value Ref Range Status   02/07/2019 1.29 0.40 - 4.00 mU/L Final   08/03/2018 1.40 0.40 - 4.00 mU/L Final   ]    Lab Results   Component Value Date    A1C 5.8 02/28/2019    A1C 5.4 11/23/2018       DISCHARGE PLAN:    Follow  up labs: No labs orders/due    Medical Follow Up:      Follow up with PCP 5-7    MTM referral needed and placed by this provider: No    Discharge Services: Home Care:  Registered Nurse    Discharge Instructions Verbalized to Patient at Discharge:     None      1. Okay to discharge home with outpatient PT and RN with Blanka STACEY  2. Patient to see PCP in 3-5 days, will need labs in 2 days to f/u potassium.  3. Patient will need to F/U with Urology as scheduled Dx Urinary Retention  4. Patient to F/U with Ramona hughes psych med f/u    Total time spent with patient visit was 45 min including patient visit, review of past records and discussion with nsg. Greater than 50% of total time spent with counseling and coordinating care due to complex conditions.   Electronically signed by:  DELL Queen CNP     Documentation of Face to Face and Certification for Home Health Services    I certify that patient: Betsy Resendiz is under my care and that I, or a nurse practitioner or physician's assistant working with me, had a face-to-face encounter that meets the physician face-to-face encounter requirements with this patient on: 3/6/2019.    This encounter with the patient was in whole, or in part, for the following medical condition, which is the primary reason for home health care: Schizoaffective disorder, urinary retention.    I certify that, based on my findings, the following services are medically necessary home health services: Nursing.    My clinical findings support the need for the above services because: Nurse is needed: To provide caregiver training to assist with:  Schizoaffective disorder, urinary retention...    Further, I certify that my clinical findings support that this patient is homebound (i.e. absences from home require considerable and taxing effort and are for medical reasons or [employer] services or infrequently or of short duration when for other reasons) because: Mental health symptoms including:  Mental health condition is exacerbated by exposure to crowds, unfamiliar environment or new stressful situations...    Based on the above findings. I certify that this patient is confined to the home and needs intermittent skilled nursing care, physical therapy and/or speech therapy.  The patient is under my care, and I have initiated the establishment of the plan of care.  This patient will be followed by a physician who will periodically review the plan of care.  Physician/Provider to provide follow up care: Ramona Jacome    Attending hospital physician (the Medicare certified PECOS provider): Ramona Jacome  Physician Signature: See electronic signature associated with these discharge orders.  Date: 3/6/2019                Sincerely,        DELL Queen CNP

## 2019-03-27 DIAGNOSIS — R33.9 URINARY RETENTION: Primary | ICD-10-CM

## 2019-03-27 NOTE — PROGRESS NOTES
Message left on voicemail.  Per Cris Isaacs PA-C, patient needs a UA/UC this week to rule out UTI prior to UDS appointment.  Orders placed.    MAURICE Lockhart

## 2019-05-18 ENCOUNTER — NURSE TRIAGE (OUTPATIENT)
Dept: NURSING | Facility: CLINIC | Age: 54
End: 2019-05-18

## 2019-05-19 NOTE — TELEPHONE ENCOUNTER
"Patient reports she has had diarrhea and vomiting for several months.  Patient reports she has been hospitalized several times and had her galllbladder taken out.  Patient reports she was just discharged on Wed for vomit and diarrhea.  Patient reports continued vomiting and diarrhea along with 8/10 constant abdominal pain around her belly button.  Reviewed guideline and care advice with caller to be evaluated in ED.  Note from [facility] Triage also recommended ED evaluation.  Caller verbalizes understanding.      Reason for Disposition    Black or tarry bowel movements  (Exception: chronic-unchanged  black-grey bowel movements AND is taking iron pills or Pepto-bismol)    Additional Information    Negative: Shock suspected (e.g., cold/pale/clammy skin, too weak to stand, low BP, rapid pulse)    Negative: Difficult to awaken or acting confused (e.g., disoriented, slurred speech)    Negative: Passed out (i.e., lost consciousness, collapsed and was not responding)    Negative: Sounds like a life-threatening emergency to the triager    Negative: Chest pain    Negative: Pain is mainly in upper abdomen  (if needed ask: \"is it mainly above the belly button?\")    Negative: Followed an abdomen (stomach) injury    Negative: [1] Abdominal pain AND [2] pregnant < 20 weeks    Negative: [1] Abdominal pain AND [2] pregnant > 20 weeks    Negative: [1] Abdominal pain AND [2] postpartum < 1 month since delivery    Negative: [1] SEVERE pain (e.g., excruciating) AND [2] present > 1 hour    Negative: [1] SEVERE pain AND [2] age > 60    Negative: [1] Vomiting AND [2] contains red blood or black (\"coffee ground\") material  (Exception: few red streaks in vomit that only happened once)    Negative: Blood in bowel movements   (Exception: blood on surface of BM with constipation)    Protocols used: ABDOMINAL PAIN - FEMALE-A-AH      "

## 2019-06-13 ENCOUNTER — TELEPHONE (OUTPATIENT)
Dept: UROLOGY | Facility: CLINIC | Age: 54
End: 2019-06-13

## 2019-06-13 NOTE — TELEPHONE ENCOUNTER
Message left on patient's voicemail stating that I extended her UA/UC results.      Krystal Urias MA

## 2019-06-13 NOTE — TELEPHONE ENCOUNTER
[facility] Call Center    Phone Message    May a detailed message be left on voicemail: yes    Reason for Call: Other: UA/UC orders need to be extended. Please extend and call pt when this has been done.     Action Taken: Message routed to:  [facility] ([facility]): UC URO AND PROSTATE

## 2019-06-26 ENCOUNTER — OFFICE VISIT (OUTPATIENT)
Dept: UROLOGY | Facility: CLINIC | Age: 54
End: 2019-06-26
Payer: COMMERCIAL

## 2019-06-26 VITALS — HEIGHT: 64 IN | WEIGHT: 229 LBS | BODY MASS INDEX: 39.09 KG/M2

## 2019-06-26 DIAGNOSIS — R33.9 URINARY RETENTION: Primary | ICD-10-CM

## 2019-06-26 DIAGNOSIS — R39.89 POSSIBLE URINARY TRACT INFECTION: ICD-10-CM

## 2019-06-26 PROBLEM — R10.9 ABDOMINAL PAIN: Status: ACTIVE | Noted: 2019-06-26

## 2019-06-26 PROBLEM — R11.15 CYCLIC VOMITING SYNDROME: Status: ACTIVE | Noted: 2019-03-22

## 2019-06-26 PROBLEM — R19.7 DIARRHEA OF PRESUMED INFECTIOUS ORIGIN: Status: ACTIVE | Noted: 2019-03-22

## 2019-06-26 PROBLEM — R35.89 POLYURIA: Status: ACTIVE | Noted: 2019-05-23

## 2019-06-26 PROBLEM — N17.9 AKI (ACUTE KIDNEY INJURY) (H): Status: ACTIVE | Noted: 2019-05-23

## 2019-06-26 PROBLEM — E27.8 ADRENAL MASS (H): Status: ACTIVE | Noted: 2019-03-28

## 2019-06-26 PROBLEM — K59.9 FUNCTIONAL BOWEL DISORDER: Status: ACTIVE | Noted: 2019-06-26

## 2019-06-26 PROBLEM — E03.8 OTHER SPECIFIED HYPOTHYROIDISM: Status: ACTIVE | Noted: 2019-05-19

## 2019-06-26 PROBLEM — R19.7 DIARRHEA: Status: ACTIVE | Noted: 2019-06-04

## 2019-06-26 PROBLEM — R94.31 PROLONGED QT INTERVAL: Status: ACTIVE | Noted: 2019-05-23

## 2019-06-26 PROBLEM — Z97.8 CHRONIC INDWELLING FOLEY CATHETER: Status: ACTIVE | Noted: 2019-05-19

## 2019-06-26 LAB
APPEARANCE UR: NORMAL
BILIRUB UR QL: NORMAL
COLOR UR: YELLOW
GLUCOSE URINE: NORMAL MG/DL
HGB UR QL: NORMAL
KETONES UR QL: NORMAL MG/DL
LEUKOCYTE ESTERASE URINE: NORMAL
NITRITE UR QL STRIP: NORMAL
PH UR STRIP: 8.5 PH (ref 5–7)
PROTEIN ALBUMIN URINE: NORMAL MG/DL
SOURCE: NORMAL
SP GR UR STRIP: 1.01 (ref 1–1.03)
UROBILINOGEN UR QL STRIP: 0.2 EU/DL (ref 0.2–1)

## 2019-06-26 PROCEDURE — 87086 URINE CULTURE/COLONY COUNT: CPT | Performed by: PHYSICIAN ASSISTANT

## 2019-06-26 RX ORDER — CEPHALEXIN 500 MG/1
500 CAPSULE ORAL 2 TIMES DAILY
Qty: 14 CAPSULE | Refills: 0 | Status: SHIPPED | OUTPATIENT
Start: 2019-06-26 | End: 2019-08-09

## 2019-06-26 RX ORDER — METOCLOPRAMIDE 10 MG/1
10 TABLET ORAL
COMMUNITY
Start: 2019-06-24 | End: 2019-07-08

## 2019-06-26 RX ORDER — OXYCODONE AND ACETAMINOPHEN 5; 325 MG/1; MG/1
TABLET ORAL
Refills: 0 | COMMUNITY
Start: 2019-03-19

## 2019-06-26 RX ORDER — QUETIAPINE FUMARATE 50 MG/1
TABLET, FILM COATED ORAL
Refills: 0 | COMMUNITY
Start: 2019-05-10

## 2019-06-26 RX ORDER — SENNOSIDES A AND B 8.6 MG/1
8.6 TABLET, FILM COATED ORAL
COMMUNITY
Start: 2019-06-24

## 2019-06-26 RX ORDER — CYCLOBENZAPRINE HCL 10 MG
TABLET ORAL
Refills: 0 | COMMUNITY
Start: 2019-03-15

## 2019-06-26 RX ORDER — CEPHALEXIN 500 MG/1
500 CAPSULE ORAL ONCE
Status: COMPLETED | OUTPATIENT
Start: 2019-06-26 | End: 2019-06-26

## 2019-06-26 RX ORDER — METOPROLOL SUCCINATE 25 MG/1
25 TABLET, EXTENDED RELEASE ORAL
COMMUNITY
Start: 2019-06-13

## 2019-06-26 RX ORDER — HYDROMORPHONE HYDROCHLORIDE 2 MG/1
1 TABLET ORAL
COMMUNITY
Start: 2019-06-11

## 2019-06-26 RX ORDER — DIPHENOXYLATE HCL/ATROPINE 2.5-.025MG
1 TABLET ORAL
COMMUNITY
Start: 2019-06-24

## 2019-06-26 RX ORDER — METHOCARBAMOL 500 MG/1
TABLET, FILM COATED ORAL
Refills: 0 | COMMUNITY
Start: 2019-04-18

## 2019-06-26 RX ORDER — PROPRANOLOL HYDROCHLORIDE 10 MG/1
TABLET ORAL
Refills: 1 | COMMUNITY
Start: 2019-03-13

## 2019-06-26 RX ORDER — LORAZEPAM 0.5 MG/1
TABLET ORAL
Refills: 1 | COMMUNITY
Start: 2019-05-10

## 2019-06-26 RX ORDER — GENTAMICIN SULFATE 3 MG/ML
SOLUTION/ DROPS OPHTHALMIC
Refills: 0 | COMMUNITY
Start: 2019-04-18

## 2019-06-26 RX ORDER — BENZTROPINE MESYLATE 0.5 MG/1
1 TABLET ORAL
COMMUNITY
Start: 2019-04-01

## 2019-06-26 RX ORDER — SCOLOPAMINE TRANSDERMAL SYSTEM 1 MG/1
1 PATCH, EXTENDED RELEASE TRANSDERMAL
COMMUNITY
Start: 2019-06-20

## 2019-06-26 RX ORDER — CLOTRIMAZOLE 1 %
CREAM WITH APPLICATOR VAGINAL
Refills: 0 | COMMUNITY
Start: 2019-02-15

## 2019-06-26 RX ORDER — ERGOCALCIFEROL 1.25 MG/1
50000 CAPSULE, LIQUID FILLED ORAL
COMMUNITY
Start: 2019-04-03

## 2019-06-26 RX ORDER — OMEPRAZOLE 40 MG/1
CAPSULE, DELAYED RELEASE ORAL
Refills: 3 | COMMUNITY
Start: 2019-03-07

## 2019-06-26 RX ORDER — IBUPROFEN 200 MG
500 CAPSULE ORAL
COMMUNITY

## 2019-06-26 RX ORDER — PROMETHAZINE HYDROCHLORIDE 12.5 MG/1
TABLET ORAL
Refills: 3 | COMMUNITY
Start: 2019-04-04

## 2019-06-26 RX ORDER — OXYCODONE HYDROCHLORIDE 5 MG/1
TABLET ORAL
Refills: 0 | COMMUNITY
Start: 2019-05-30

## 2019-06-26 RX ADMIN — CEPHALEXIN 500 MG: 500 CAPSULE ORAL at 09:51

## 2019-06-26 ASSESSMENT — MIFFLIN-ST. JEOR: SCORE: 1633.74

## 2019-06-26 ASSESSMENT — PAIN SCALES - GENERAL: PAINLEVEL: SEVERE PAIN (7)

## 2019-06-26 NOTE — PROGRESS NOTES
Removal:  16 Fr straight tipped latex mac catheter removed from urethral meatus without difficulty.    Insertion:  16 Fr straight tipped latex mac catheter inserted into urethral meatus in the usual sterile fashion without difficulty.  Balloon filled with 10 mL sterile H2O.  Received 100 ml clear urine output.   Catheter secured in place with leg strap: Yes.     Patient did tolerate procedure well.    Patient instructed as to where to call or go for pain, fever, leakage, or decreased urine flow.       MAURICE Cornejo  6/26/2019  10:16 AM

## 2019-06-26 NOTE — LETTER
6/26/2019       RE: Betsy Resendiz  [address]  [place] [address]     Dear Colleague,    Thank you for referring your patient, Betsy Resendiz, to the [facility] UROLOGY AND INST FOR PROSTATE AND UROLOGIC CANCERS at [facility]. Please see a copy of my visit note below.    PREPROCEDURE DIAGNOSES:    1. Urinary retention    POSTPROCEDURE DIAGNOSES:  -Borderline small bladder capacity ( mL) with normal filling sensations.  -Fair/normal bladder compliance with possible very mild DO without incontinence near capacity.  -No USI with coughing, but she does leak ~50 mL with standing up from the [employer] chair to transfer onto commode to void.  -Good detrusor contraction during voiding to max Pdet 24 cm H2O.  -Slightly reduced flow rate (Qmax 15 mL/s) with a fluctuating bell-shape flow curve and incomplete bladder emptying (final  mL).  -Some increased EMG activity during voiding that likely correlates with EMG pads getting saturated.  -BOOI is -18.6 which is not suggestive for significant bladder outlet obstruction (although urethra was noted to be slightly tight during Abernathy catheter replacement at the end of today's study - consider clinical correlation with cystoscopy if indicated).    PROCEDURE:    1. Sterile urethral catheterization for measurement of residual urine volume.  2. Complex filling cystometrogram with measurement of bladder and rectal pressures.  3. Complex voiding cystometrogram with measurement of bladder and rectal pressures.  4. Electromyography of the pelvic floor during urodynamics.  5. Interpretation of urodynamics.      INDICATIONS FOR PROCEDURE:  Ms. Betsy Resendiz is a pleasant 54 year old female with urinary retention, currently managed with indwelling Abernathy catheter. Baseline urodynamic assessment is requested today by Dr. Tamayo to better characterize Ms. Betsy Resendiz's voiding dysfunction.      VOIDING DIARY:  Patient did not  complete.    DESCRIPTION OF PROCEDURE:  Risks, benefits, and alternatives to urodynamics were discussed with the patient and she wished to proceed.  Urodynamics are planned to better assess the primary etiology for Ms. Resendiz's urologic dysfunction.  The patient does not currently take anticholinergic medications for her bladder.  After informed consent was obtained, the patient was taken to the procedure room where uroflowmetry was performed. Findings below.     PRE-STUDY UROFLOWMETRY:  Not performed as patient had indwelling Abernathy catheter.  Residual by catheter: 20 mL.  Pretest urine dipstick was negative for nitrites, positive for moderate LE. The patient denies any UTI symptoms today; specifically, denies urgency, hematuria, fevers, chills, N/V, flank pain. In this patient with a chronic indwelling Abernathy catheter, colonization is likely and therefore some degree of pyuria is not completely unexpected. We discussed the risks vs. benefits of proceeding with today's study, including the safest option to be cancelling today's elective procedure and rescheduling until culture results are available and she has been treated appopriately. The patient verbalized understanding and wishes to proceed. Urine will be sent for formal culture and she will be started on empiric antibiotics while awaiting culture results.     Patient's Abernathy catheter was removed uneventfully. Next a 7F double-lumen urodynamics catheter was inserted into the bladder under sterile technique via urethra.  A 7F abdominal manometry catheter was placed in the rectum.  EMG pads were placed on both sides of the anal verge.  The bladder was filled with normal saline at 40 mL/minute and serial pressures were recorded.  With coughing there was an appropriate rise in vesical and abdominal pressures with no change in detrusor pressure, confirming good study catheter placement.    DURING THE FILLING PHASE:  First sensation: 116 mL.  First Desire: 139  mL.  Strong Desire: 148 mL.  Maximum Capacity: filled to 200 mL; true  mL based on voided (143 mL) + leaked (50 mL) + post-void residual (100 mL) volumes    Uninhibited detrusor contractions: possible very mild DO without incontinence near capacity.  Compliance: normal/fair. PDet=14 cmH20 at capacity. Compliance ratio of 14.  Continence: no DOI. She does leak ~50 mL with standing up from the [employer] chair to transfer to the [facility].  EMG: mostly concordant during filling.    DURING THE VOIDING PHASE:  Maximum detrusor contraction with void: 24 cm of H2O pressure.  Voided volume: 143 mL.  Maximum flow rate: 15 mL/sec.  Average flow rate: 6.8 mL/sec.  Postvoid Residual: 100 mL.  EMG activity: increased during voiding which may correlate with EMG pads getting saturated.  Character of voiding curve: bell-shape with some intermittent peaks.  BOOI: -18.6 (suggesting no obstruction - see key below)  [obstructed (VILLALOBOS index [BOOI] ? 40); equivocal (no definite   obstruction; BOOI 20-40); and no obstruction (BOOI ? 20)]    FLUOROSCOPIC IMAGING OF THE BLADDER DURING URODYNAMICS:  Not performed given patient's iodine allergy.    At the completion of today's study, all catheters were removed, Abernathy catheter was replaced, and results were discussed with the patient.    ASSESSMENT/PLAN:  Ms. Betsy Resendiz is a pleasant 54 year old female with urinary retention who demonstrated the following findings today on urodynamic evaluation:    -Borderline small bladder capacity ( mL) with normal filling sensations.  -Fair/normal bladder compliance with possible very mild DO without incontinence near capacity.  -No USI with coughing, but she does leak ~50 mL with standing up from the [employer] chair to transfer onto [facility] to void.  -Good detrusor contraction during voiding to max Pdet 24 cm H2O.  -Slightly reduced flow rate (Qmax 15 mL/s) with a fluctuating bell-shape flow curve and incomplete bladder emptying (final   mL).  -Some increased EMG activity during voiding that likely correlates with EMG pads getting saturated.  -BOOI is -18.6 which is not suggestive for significant bladder outlet obstruction (although urethra was noted to be slightly tight during Mac catheter replacement at the end of today's study - consider clinical correlation with cystoscopy if indicated).    The patient will follow up as scheduled with Dr. Tamayo to further discuss today's study results and make plans for how best to proceed.      - A single Keflex antibiotic (Bactrim contraindicated based on patient allergy and Cipro contraindicated based on interaction potential with tizanidine) was provided for UTI prophylaxis following completion of today's study per department protocol.  The risk of UTI with VUDS is low at ~2.5-3%. Given abnormal urine dip with moderate LE, urine will also be sent for formal culture. Start Keflex 500 mg BID x 7 days while awaiting culture results. Adjust antibiotic accordingly based on final culture and antibiotic susceptibilities.      Thank you for allowing me to participate in the care of Ms. Betsy Resendiz and please don't hesitate to contact me with any questions or concerns.      Cris Isaacs PA-C  Urology Physician Assistant          Removal:  16 Fr straight tipped latex mac catheter removed from urethral meatus without difficulty.    Insertion:  16 Fr straight tipped latex mac catheter inserted into urethral meatus in the usual sterile fashion without difficulty.  Balloon filled with 10 mL sterile H2O.  Received 100 ml clear urine output.   Catheter secured in place with leg strap: Yes.     Patient did tolerate procedure well.    Patient instructed as to where to call or go for pain, fever, leakage, or decreased urine flow.       MAURICE Cornejo  6/26/2019  10:16 AM        Again, thank you for allowing me to participate in the care of your patient.      Sincerely,    Cris Isaacs PA-C

## 2019-06-26 NOTE — NURSING NOTE
The following medication was given:     MEDICATION:  Keflex  ROUTE: PO  SITE: Medication was given orally   DOSE: 500 mg  LOT #: [id number]  : [employer]  EXPIRATION DATE: 10/2020  NDC#: [id number]   Was there drug waste? No      Suzan Caceres, JAVIER  June 26, 2019

## 2019-06-26 NOTE — PATIENT INSTRUCTIONS
UROLOGY CLINIC VISIT PATIENT INSTRUCTIONS    Schedule follow up with Dr. Tamayo next available to discuss results of your urodynamics testing and treatment options going forward.    Start taking cephalexin 500 mg twice daily for 7 days to treat your possible UTI (we gave you a dose in clinic today, so take your next dose this evening, then twice per day until you run out of the medication). The urine was sent for culture today and we will update you if a change of antibiotic is needed based on results.     If you have any issues, questions or concerns in the meantime, do not hesitate to contact us at [phone] or via [employer]     It was a pleasure meeting with you today.  Thank you for allowing me and my team the privilege of caring for you today.  YOU are the reason we are here, and I truly hope we provided you with the excellent service you deserve.  Please let us know if there is anything else we can do for you so that we can be sure you are leaving completely satisfied with your care experience.

## 2019-06-26 NOTE — PROGRESS NOTES
PREPROCEDURE DIAGNOSES:    1. Urinary retention    POSTPROCEDURE DIAGNOSES:  -Borderline small bladder capacity (prison 293 mL) with normal filling sensations.  -Fair/normal bladder compliance with possible very mild DO without incontinence near capacity.  -No USI with coughing, but she does leak ~50 mL with standing up from the Sonesta chair to transfer onto commode to void.  -Good detrusor contraction during voiding to max Pdet 24 cm H2O.  -Slightly reduced flow rate (Qmax 15 mL/s) with a fluctuating bell-shape flow curve and incomplete bladder emptying (final  mL).  -Some increased EMG activity during voiding that likely correlates with EMG pads getting saturated.  -BOOI is -18.6 which is not suggestive for significant bladder outlet obstruction (although urethra was noted to be slightly tight during Abernathy catheter replacement at the end of today's study - consider clinical correlation with cystoscopy if indicated).    PROCEDURE:    1. Sterile urethral catheterization for measurement of residual urine volume.  2. Complex filling cystometrogram with measurement of bladder and rectal pressures.  3. Complex voiding cystometrogram with measurement of bladder and rectal pressures.  4. Electromyography of the pelvic floor during urodynamics.  5. Interpretation of urodynamics.      INDICATIONS FOR PROCEDURE:  Ms. Betsy Resendiz is a pleasant 54 year old female with urinary retention, currently managed with indwelling Abernathy catheter. Baseline urodynamic assessment is requested today by Dr. Tamayo to better characterize Ms. Betsy Resendiz's voiding dysfunction.      VOIDING DIARY:  Patient did not complete.    DESCRIPTION OF PROCEDURE:  Risks, benefits, and alternatives to urodynamics were discussed with the patient and she wished to proceed.  Urodynamics are planned to better assess the primary etiology for Ms. Resendiz's urologic dysfunction.  The patient does not currently take anticholinergic medications for her bladder.   After informed consent was obtained, the patient was taken to the procedure room where uroflowmetry was performed. Findings below.     PRE-STUDY UROFLOWMETRY:  Not performed as patient had indwelling Abernathy catheter.  Residual by catheter: 20 mL.  Pretest urine dipstick was negative for nitrites, positive for moderate LE. The patient denies any UTI symptoms today; specifically, denies urgency, hematuria, fevers, chills, N/V, flank pain. In this patient with a chronic indwelling Abernathy catheter, colonization is likely and therefore some degree of pyuria is not completely unexpected. We discussed the risks vs. benefits of proceeding with today's study, including the safest option to be cancelling today's elective procedure and rescheduling until culture results are available and she has been treated appopriately. The patient verbalized understanding and wishes to proceed. Urine will be sent for formal culture and she will be started on empiric antibiotics while awaiting culture results.     Patient's Abernathy catheter was removed uneventfully. Next a 7F double-lumen urodynamics catheter was inserted into the bladder under sterile technique via urethra.  A 7F abdominal manometry catheter was placed in the rectum.  EMG pads were placed on both sides of the anal verge.  The bladder was filled with normal saline at 40 mL/minute and serial pressures were recorded.  With coughing there was an appropriate rise in vesical and abdominal pressures with no change in detrusor pressure, confirming good study catheter placement.    DURING THE FILLING PHASE:  First sensation: 116 mL.  First Desire: 139 mL.  Strong Desire: 148 mL.  Maximum Capacity: filled to 200 mL; true correction 293 mL based on voided (143 mL) + leaked (50 mL) + post-void residual (100 mL) volumes    Uninhibited detrusor contractions: possible very mild DO without incontinence near capacity.  Compliance: normal/fair. PDet=14 cmH20 at capacity. Compliance ratio of  14.  Continence: no DOI. She does leak ~50 mL with standing up from the Sonesta chair to transfer to the commode.  EMG: mostly concordant during filling.    DURING THE VOIDING PHASE:  Maximum detrusor contraction with void: 24 cm of H2O pressure.  Voided volume: 143 mL.  Maximum flow rate: 15 mL/sec.  Average flow rate: 6.8 mL/sec.  Postvoid Residual: 100 mL.  EMG activity: increased during voiding which may correlate with EMG pads getting saturated.  Character of voiding curve: bell-shape with some intermittent peaks.  BOOI: -18.6 (suggesting no obstruction - see key below)  [obstructed (VILLALOBOS index [BOOI] ? 40); equivocal (no definite   obstruction; BOOI 20-40); and no obstruction (BOOI ? 20)]    FLUOROSCOPIC IMAGING OF THE BLADDER DURING URODYNAMICS:  Not performed given patient's iodine allergy.    At the completion of today's study, all catheters were removed, Abernathy catheter was replaced, and results were discussed with the patient.    ASSESSMENT/PLAN:  Ms. Betsy Resendiz is a pleasant 54 year old female with urinary retention who demonstrated the following findings today on urodynamic evaluation:    -Borderline small bladder capacity (prison 293 mL) with normal filling sensations.  -Fair/normal bladder compliance with possible very mild DO without incontinence near capacity.  -No USI with coughing, but she does leak ~50 mL with standing up from the Sonesta chair to transfer onto commode to void.  -Good detrusor contraction during voiding to max Pdet 24 cm H2O.  -Slightly reduced flow rate (Qmax 15 mL/s) with a fluctuating bell-shape flow curve and incomplete bladder emptying (final  mL).  -Some increased EMG activity during voiding that likely correlates with EMG pads getting saturated.  -BOOI is -18.6 which is not suggestive for significant bladder outlet obstruction (although urethra was noted to be slightly tight during Abernathy catheter replacement at the end of today's study - consider clinical correlation  with cystoscopy if indicated).    The patient will follow up as scheduled with Dr. Tamayo to further discuss today's study results and make plans for how best to proceed.      - A single Keflex antibiotic (Bactrim contraindicated based on patient allergy and Cipro contraindicated based on interaction potential with tizanidine) was provided for UTI prophylaxis following completion of today's study per department protocol.  The risk of UTI with VUDS is low at ~2.5-3%. Given abnormal urine dip with moderate LE, urine will also be sent for formal culture. Start Keflex 500 mg BID x 7 days while awaiting culture results. Adjust antibiotic accordingly based on final culture and antibiotic susceptibilities.      Thank you for allowing me to participate in the care of Ms. Betsy Resendiz and please don't hesitate to contact me with any questions or concerns.      Cris Isaacs PA-C  Urology Physician Assistant

## 2019-06-27 LAB
BACTERIA SPEC CULT: NORMAL
Lab: NORMAL
SPECIMEN SOURCE: NORMAL

## 2019-06-28 ENCOUNTER — TELEPHONE (OUTPATIENT)
Dept: UROLOGY | Facility: CLINIC | Age: 54
End: 2019-06-28

## 2019-06-28 NOTE — TELEPHONE ENCOUNTER
Message left on patients voicemail.  Per Cris Isaacs PA-C, UA/UC results were NEGATIVE for infection- ok to stop taking the abx prescribed yesterday.    MAURICE Lockhart

## 2019-07-31 ENCOUNTER — PRE VISIT (OUTPATIENT)
Dept: UROLOGY | Facility: CLINIC | Age: 54
End: 2019-07-31

## 2019-07-31 NOTE — TELEPHONE ENCOUNTER
Reason for visit: review UDS and discuss treatment options     Relevant information: urinary retention, mac catheter    Records/imaging/labs/orders: all records available    Pt called: no need for a call    At Rooming: regular

## 2019-08-09 ENCOUNTER — OFFICE VISIT (OUTPATIENT)
Dept: UROLOGY | Facility: CLINIC | Age: 54
End: 2019-08-09
Payer: COMMERCIAL

## 2019-08-09 VITALS
SYSTOLIC BLOOD PRESSURE: 118 MMHG | HEART RATE: 59 BPM | BODY MASS INDEX: 39.32 KG/M2 | DIASTOLIC BLOOD PRESSURE: 65 MMHG | WEIGHT: 230.3 LBS | HEIGHT: 64 IN

## 2019-08-09 DIAGNOSIS — Z46.6 ENCOUNTER FOR FOLEY CATHETER REMOVAL: ICD-10-CM

## 2019-08-09 DIAGNOSIS — Z87.440 PERSONAL HISTORY OF URINARY TRACT INFECTION: Primary | ICD-10-CM

## 2019-08-09 RX ORDER — AMOXICILLIN 250 MG/1
250 CAPSULE ORAL DAILY
Qty: 30 CAPSULE | Refills: 0 | Status: SHIPPED | OUTPATIENT
Start: 2019-08-09 | End: 2019-08-09

## 2019-08-09 RX ORDER — AMOXICILLIN 250 MG/1
250 CAPSULE ORAL DAILY
Qty: 30 CAPSULE | Refills: 0 | Status: SHIPPED | OUTPATIENT
Start: 2019-08-09

## 2019-08-09 ASSESSMENT — MIFFLIN-ST. JEOR: SCORE: 1639.63

## 2019-08-09 ASSESSMENT — PAIN SCALES - GENERAL: PAINLEVEL: NO PAIN (0)

## 2019-08-09 NOTE — PATIENT INSTRUCTIONS
Please catheterize twice a day-once in the AM and once prior to bed and record the volumes cathterized    Take the daily antibiotic    Return to see us in one month     It was a pleasure meeting with you today.  Thank you for allowing me and my team the privilege of caring for you today.  YOU are the reason we are here, and I truly hope we provided you with the excellent service you deserve.  Please let us know if there is anything else we can do for you so that we can be sure you are leaving completely satisfied with your care experience.    Step-by-Step:  Inserting a Reusable Catheter (Woman)    Date Last Reviewed: 10/1/2016    [id number] [employer]. [address] [place] [address], [place] [address]. All rights reserved. This information is not intended as a substitute for professional medical care. Always follow your healthcare professional's instructions.

## 2019-08-09 NOTE — NURSING NOTE
Betsy Resendiz comes into clinic today at the request of Dr. Trisha Tamayo for a voiding trial.    Antibiotic was called to pt's pharmacy by the provider.    Approx 300 mL of sterile water instilled into the bladder via catheter.      Removal:  16 Fr straight tipped latex mac catheter removed from urethral meatus without difficulty after removing 9 mL of fluid from the balloon, balloon intact.    Patient voided approx 200 mL of clear urine.     Post-void residual was: not measured, pt self catheterized.    Following clinic protocol I instructed Betsy Resendiz in CIC. Betsy was able to demonstrate good hand hygiene and genital hygiene. She was able to self catheterize without difficulty using a 14 fr straight tip catheter for chronic urinary retention . Pt has indefinite urinary retention.    Pt instructed to catheterize 2 times daily using a 14 Fr straight tipped catheter for chronic urinary retention.    Patient tolerated procedure well.      Education: Teaching done with patient verbally as to where to go or call  if unable to urinate post-catheter removal. Increase fluids.   Plan: Follow-up in one month      This service provided today was under the supervising provider of the day Dr. Trisha Tamayo, who was available if needed.    Isis RICHARDS

## 2019-08-09 NOTE — LETTER
"8/9/2019       RE: Betsy Resendiz  [address]  [place] [address]     Dear Colleague,    Thank you for referring your patient, Betsy Resendiz, to the [facility] PROSTATE AND UROLOGIC CANCERS at [facility]. Please see a copy of my visit note below.    August 9, 2019    Return visit    Patient returns today for follow up.  UDS shows that she has good detrusor contraction but does not empty fully.  No obvious obstruction.  She denies any changes in her health since last visit.    /65   Pulse 59   Ht 1.626 m (5' 4\")   Wt 104.5 kg (230 lb 4.8 oz)   BMI 39.53 kg/m     She is comfortable, in no distress, non-labored breathing.      A/P: 54 year old F with incomplete bladder emptying    Discussed options and patient has done ISC in the past and is agreeable to restarting.  Abernathy catheter removed and nursing staff reviewed ISC with patient    Will do prophylactic abx for one month while she gets started    Start with ISC at least twice a day and record volumes    RTC  1 month to reassess, sooner if needed    15 minutes were spent with the patient today, > 50% in counseling and coordination of care    Trisha Tamayo MD MPH   of Urology    CC  Patient Care Team:  Ramona Jacome APRN CNP as PCP - General (Nurse Practitioner - Adult Health)  Yesy Wiseman, JOY as Registered Nurse (Urology)            "

## 2019-08-09 NOTE — NURSING NOTE
Following clinic protocol I instructed Betsy Resendiz in CIC. Betsy was able to demonstrate good hand hygiene and genital hygiene. She was able to self catheterize without difficulty using a 14 fr straight tip catheter for chronic urinary retention. Pt has indefinite urinary retention.    Pt instructed to catheterize 2 times daily using a 14 Fr straight tipped catheter.

## 2019-08-09 NOTE — NURSING NOTE
"Chief Complaint   Patient presents with     RECHECK     Review UDS       Blood pressure 118/65, pulse 59, height 1.626 m (5' 4\"), weight 104.5 kg (230 lb 4.8 oz). Body mass index is 39.53 kg/m .    Patient Active Problem List   Diagnosis     ACP (advance care planning)     Schizoaffective disorder, bipolar type (H)     Carpal tunnel syndrome     Cervical neck pain with evidence of disc disease     Chronic pain     Cervical myelopathy with cervical radiculopathy     DM w/o complication type II (H)     History of encephalopathy     Gastroesophageal reflux disease without esophagitis     Generalized osteoarthritis     Immunosuppressed status (H)     Migraine headache     Morbid obesity with BMI of 40.0-44.9, adult (H)     Normocytic anemia     SPIKE (obstructive sleep apnea)     Osteoarthritis of hip     Lymphedema     Pain in joint, ankle and foot     Conversion reaction     Posttraumatic stress disorder     Urinary retention     Rotator cuff tendinitis     Schizoaffective disorder, unspecified type (H)     Tobacco use disorder     Ulnar neuritis     Asthma     Unspecified glaucoma     COPD (chronic obstructive pulmonary disease) (H)     JAVIER (generalized anxiety disorder)     HTN (hypertension)     Auditory hallucinations     COPD exacerbation (H)     Acute respiratory failure with hypoxia and hypercapnia (H)     CKD (chronic kidney disease) stage 3, GFR 30-59 ml/min (H)     Yeast vaginitis     Chronic diastolic heart failure (H)     Abdominal pain     Adrenal mass (H)     Aftercare involving the use of plastic surgery     CHAPINCITO (acute kidney injury) (H)     Chronic indwelling Abernathy catheter     Cyclic vomiting syndrome     Diarrhea     Diarrhea of presumed infectious origin     Functional bowel disorder     Other specified hypothyroidism     Polyuria     Prolonged QT interval       Allergies   Allergen Reactions     Codeine Shortness Of Breath     Has tolerated morphine 7/2008     Diphenhydramine Itching     About 5 " minutes after  IV administration- c/o extreme itching to torso and all extremities.      Hmg-Coa-R Inhibitors Other (See Comments)     Patient declines  Patient declines  Patient declines     Iodine      rash     Lisinopril Cough     Morphine      trouble breathing and nausea     No Clinical Screening - See Comments      Statin-hmg-coa reductase inhibitors  Statin-hmg-coa reductase inhibitors     Nsaids      nausea     Sulfa Drugs      Mouth sore  Mouth sore       Sumatriptan Other (See Comments)       Current Outpatient Medications   Medication Sig Dispense Refill     acetaminophen (TYLENOL) 500 MG tablet Take 2 tablets (1,000 mg) by mouth 3 times daily 540 tablet 3     albuterol (PROAIR HFA/PROVENTIL HFA/VENTOLIN HFA) 108 (90 BASE) MCG/ACT Inhaler Inhale 2 puffs into the lungs every 4 hours as needed        benztropine (COGENTIN) 0.5 MG tablet Take 1 tablet by mouth       brimonidine (ALPHAGAN-P) 0.15 % ophthalmic solution Place 1 drop into both eyes 2 times daily       budesonide (PULMICORT) 0.5 MG/2ML neb solution Take 0.5 mg by nebulization 2 times daily       calcium carbonate 500 mg, elemental, (OSCAL 500) 1250 (500 Ca) MG TABS tablet Take 500 mg by mouth       cariprazine (VRAYLAR) 1.5 MG CAPS capsule Take 2 mg by mouth daily        cholecalciferol (VITAMIN D3) 5000 units CAPS capsule Take 5,000 Units by mouth daily       clotrimazole (LOTRIMIN) 1 % cream Apply topically 2 times daily       clotrimazole (LOTRIMIN) 1 % vaginal cream INSERT 5 GRAMS VAGINALLY HS FOR YEAST VAGINITIS FOR 7 DAYS  0     cyclobenzaprine (FLEXERIL) 10 MG tablet TK 1 T PO TID PRF MUSCLE SPASM  0     CYCLOBENZAPRINE HCL PO Take 5 mg by mouth every 8 hours as needed        diphenoxylate-atropine (LOMOTIL) 2.5-0.025 MG tablet Take 1 tablet by mouth       escitalopram (LEXAPRO) 20 MG tablet Take 1 tablet by mouth at bedtime       furosemide (LASIX) 80 MG tablet Take 40 mg by mouth 2 times daily        gentamicin (GARAMYCIN) 0.3 %  ophthalmic solution INT 1 GTT IN OS Q 4 H  0     HYDROmorphone (DILAUDID) 2 MG tablet Take 1 mg by mouth       hyoscyamine (LEVSIN/SL) 0.125 MG sublingual tablet Place 0.125 mg under the tongue       ipratropium - albuterol 0.5 mg/2.5 mg/3 mL (DUONEB) 0.5-2.5 (3) MG/3ML neb solution Take 1 vial by nebulization 4 times daily as needed        lamoTRIgine (LAMICTAL) 200 MG tablet Take 200 mg by mouth At Bedtime        LANsoprazole (PREVACID) 30 MG CR capsule Take 15 mg by mouth daily        Lidocaine-Menthol 4.5-5 % PTCH Externally apply topically 2 times daily       LIOTHYRONINE SODIUM PO Take 25 mcg by mouth daily       lithium 300 MG capsule Take 1 capsule by mouth in the morning and 2 capsules at bedtime       loperamide (IMODIUM) 2 MG capsule Take 2 mg by mouth every 6 hours as needed for diarrhea       LORazepam (ATIVAN) 0.5 MG tablet TK ONE T PO BID PRA  1     MECLIZINE HCL PO Take 25 mg by mouth every 8 hours as needed for dizziness       methocarbamol (ROBAXIN) 500 MG tablet TK 1 T PO Q 6 H FOR UP TO 10 DAYS PRF MUSCLE PAIN OR SPASM  0     metoprolol succinate ER (TOPROL-XL) 25 MG 24 hr tablet Take 25 mg by mouth       nicotine (COMMIT) 2 MG lozenge Place 2 mg inside cheek every 3 hours as needed for smoking cessation       omeprazole (PRILOSEC) 40 MG DR capsule TK 1 C PO QD AC  3     ondansetron (ZOFRAN-ODT) 4 MG ODT tab Take 1 tablet (4 mg) by mouth every 8 hours as needed for nausea       oxyCODONE (ROXICODONE) 5 MG tablet TK 1-2 PO Q 4 H PRN FOR PAIN  0     oxyCODONE-acetaminophen (PERCOCET) 5-325 MG tablet   0     OYSTER SHELL CALCIUM PO Take 500 mg by mouth 4 times daily       paliperidone (INVEGA) 6 MG 24 hr tablet Take 6 mg by mouth every morning       perphenazine 4 MG tablet Take 4 mg by mouth 2 times daily        POTASSIUM CHLORIDE ER PO Take 40 mg by mouth 3 times daily        pregabalin (LYRICA) 150 MG capsule Take 1 capsule (150 mg) by mouth 2 times daily 60 capsule 5     promethazine (PHENERGAN)  12.5 MG tablet TK 1 T PO BID  3     propranolol (INDERAL) 10 MG tablet TK ONE T PO BID PRA  1     QUEtiapine (SEROQUEL) 50 MG tablet TK ONE T PO HS  0     scopolamine (TRANSDERM) 1 MG/3DAYS 72 hr patch Place 1 patch onto the skin       senna (SENNA-TIME) 8.6 MG tablet Take 8.6 mg by mouth       senna-docusate (SENOKOT-S/PERICOLACE) 8.6-50 MG tablet Take 2 tablets by mouth 2 times daily as needed for constipation       tiZANidine (ZANAFLEX) 4 MG tablet Take 4 mg by mouth every 8 hours as needed for muscle spasms       travoprost, BAK Free, (TRAVATAN Z) 0.004 % ophthalmic solution Place 1 drop into both eyes At Bedtime       vitamin D2 (ERGOCALCIFEROL) 32510 units (1250 mcg) capsule Take 50,000 Units by mouth       ziprasidone (GEODON) 40 MG capsule Take 1 capsule by mouth in the morning       ziprasidone (GEODON) 80 MG capsule Take 160 mg by mouth At Bedtime          Social History     Tobacco Use     Smoking status: Current Every Day Smoker     Types: Cigarettes     Smokeless tobacco: Never Used   Substance Use Topics     Alcohol use: No     Drug use: No       MAURICE Youngblood  8/9/2019  12:20 PM

## 2019-08-09 NOTE — PROGRESS NOTES
"August 9, 2019    Return visit    Patient returns today for follow up.  UDS shows that she has good detrusor contraction but does not empty fully.  No obvious obstruction.  She denies any changes in her health since last visit.    /65   Pulse 59   Ht 1.626 m (5' 4\")   Wt 104.5 kg (230 lb 4.8 oz)   BMI 39.53 kg/m    She is comfortable, in no distress, non-labored breathing.      A/P: 54 year old F with incomplete bladder emptying    Discussed options and patient has done ISC in the past and is agreeable to restarting.  Abernathy catheter removed and nursing staff reviewed ISC with patient    Will do prophylactic abx for one month while she gets started    Start with ISC at least twice a day and record volumes    RTC  1 month to reassess, sooner if needed    15 minutes were spent with the patient today, > 50% in counseling and coordination of care    Shelley Tamayo MD MPH   of Urology    CC  Patient Care Team:  Ramona Jacome APRN CNP as PCP - General (Nurse Practitioner - Adult Health)  Ramona Jacome APRN CNP as Nurse Practitioner (Nurse Practitioner - Adult Health)  Shelley Tamayo MD as MD (Urology)  Yesy Wiseman, RN as Registered Nurse (Urology)  Ramona Jacome APRN CNP as Assigned PCP  SHELLEY TAMAYO              "

## 2019-08-09 NOTE — PROGRESS NOTES
[facility]    Once completed please fax to [phone]  E-script to [email or web address]    A. Please include patient demographics and chart notes (ex: indefinite retention) with this order     Name: eBtsy Resendiz   : 1965   Phone: [phone]  Address: [address] [place] [id number]      B. Diagnosis    x Retention of urine (788.20/R33.9)   Urinary Incontinence (788.30/R30)    Incomplete Bladder Emptying (788.21/R39-14)   Urge Incontinence (788.31/N39.41)    Other Specified Retention of Urine (788.29/R33.8)   Other:     C. Order Information/Start Date: 19    Number of Refills: 12  Length of Need: lifetime months    x Patient may receive up to a 90-day supply at one time; therefore, quantity will be three (3) times amount below.    Type (check one): x Hydrophilic    Silicone    Red Rubber    PVC Clear                                      CHECK PRODUCT [place] FREQ OF USE QUANTITY PER STRAIGHT  COUDE    ONE  SIZE PER DAY MONTH TO DISPESE     x Intermittent Catheter 14 2 60 x     Intermittent Catheter with         Insertion Supplies          Condom or External Catheters          ADDITIONAL PRODUCTS/ITEMS ORDERED (check all that apply)     PRODUCT FREQ OF USE QUANTITY PER  _x_ Patient Choice     PER MONTH MONTH TO DISPENSE  ___ Bard   x Tube of Lubricant  60 PRN  ___ Coloplast    Leg Bags    ___ [employer]    Night Bags    ___ GentleCath    Penile Clamp ([place])    ___ Hi-Slip    Disinfection/BZK-PDI Wipes    ___ [place]    Abernathy Insertion Tray    ___Lo Fric    Vacuum Erection Device    ___Magic3    Abernathy Catheters:    ___ MTG    Straight    ___ Tiff-Teleflex    Coude    ___ SpediCath    K[place] size        Balloon size         D. Physician's Information:      Physician's Name: Dr. Trisha Tamayo  Phone: [phone]  Date: 19     [facility]  [address]  [place], [place], [address]    Vernell Adan,   Office: [phone]  Mobile: [phone]  Fax: [phone]  [email or web address] [address].[facility]

## 2019-08-19 ENCOUNTER — TELEPHONE (OUTPATIENT)
Dept: UROLOGY | Facility: CLINIC | Age: 54
End: 2019-08-19

## 2019-09-09 ENCOUNTER — PRE VISIT (OUTPATIENT)
Dept: UROLOGY | Facility: CLINIC | Age: 54
End: 2019-09-09

## 2019-09-09 NOTE — TELEPHONE ENCOUNTER
Reason for visit: ISC follow up     Relevant information: incomplete bladder emptying     Records/imaging/labs/orders: records available    Pt called: no need for a call    At Rooming: regular

## 2021-05-24 ENCOUNTER — RECORDS - HEALTHEAST (OUTPATIENT)
Dept: ADMINISTRATIVE | Facility: CLINIC | Age: 56
End: 2021-05-24

## 2021-05-31 VITALS — BODY MASS INDEX: 43.77 KG/M2 | WEIGHT: 256.4 LBS | HEIGHT: 64 IN

## 2021-06-14 NOTE — ANESTHESIA PREPROCEDURE EVALUATION
Anesthesia Evaluation      Patient summary reviewed     Airway   Mallampati: II   Pulmonary - normal exam   (+) asthma  moderate,  well controlled, sleep apnea, a smoker (1ppd)    ROS comment: History of PE - 11/17. Started Xarelto. Continued through today  Room air sats =93                         Cardiovascular   (+) hypertension well controlled, ,     (-) past MI, CAD, angina  ECG reviewed (ST)  Rhythm: regular  Rate: normal,         Neuro/Psych    (+) depression, anxiety/panic attacks, chronic pain (opioid dependence)    Comments: BPAD  Neck pain  Low back pain    Endo/Other    (+) diabetes mellitus type 2 well controlled, obesity,      Comments: Anemia    GI/Hepatic/Renal - negative ROS   (+) GERD well controlled,        Other findings: K 4.2  GFR 48  Hgb 14.3      Dental    (+) upper dentures                       Anesthesia Plan  Planned anesthetic: peripheral nerve block  Axillary nerve block  IV sedation  Can use CPAP intra op  ASA 4     Anesthetic plan and risks discussed with: patient    Post-op plan: routine recovery

## 2021-06-14 NOTE — ANESTHESIA POSTPROCEDURE EVALUATION
Patient: Betsy Resendiz  EXCISION RIGHT WRIST HOOK OF HAMATE,   Anesthesia type: regional    Patient location: Phase II Recovery  Last vitals:   Vitals:    12/12/17 1015   BP: 119/66   Pulse: 70   Resp: 16   Temp:    SpO2: 92%     Post vital signs: stable  Level of consciousness: awake and responds to simple questions  Post-anesthesia pain: pain controlled  Post-anesthesia nausea and vomiting: no  Pulmonary: unassisted, nasal cannula  Cardiovascular: stable and blood pressure at baseline  Hydration: adequate  Anesthetic events: no    QCDR Measures:  ASA# 11 - Diana-op Cardiac Arrest: ASA11B - Patient did NOT experience unanticipated cardiac arrest  ASA# 12 - Diana-op Mortality Rate: ASA12B - Patient did NOT die  ASA# 13 - PACU Re-Intubation Rate: NA - No ETT / LMA used for case  ASA# 10 - Composite Anes Safety: ASA10A - No serious adverse event    Additional Notes:

## 2021-06-14 NOTE — ANESTHESIA CARE TRANSFER NOTE
Last vitals:   Vitals:    12/12/17 0837   BP: 138/71   Pulse: 66   Resp: 16   Temp: 36.4  C (97.6  F)   SpO2: 98%     Patient's level of consciousness is awake  Spontaneous respirations: yes  Maintains airway independently: yes  Dentition unchanged: yes  Oropharynx: oropharynx clear of all foreign objects    QCDR Measures:  ASA# 20 - Surgical Safety Checklist: WHO surgical safety checklist completed prior to induction  PQRS# 430 - Adult PONV Prevention: 4558F - Pt received => 2 anti-emetic agents (different classes) preop & intraop  ASA# 8 - Peds PONV Prevention: NA - Not pediatric patient, not GA or 2 or more risk factors NOT present  PQRS# 424 - Diana-op Temp Management: 4559F - At least one body temp DOCUMENTED => 35.5C or 95.9F within required timeframe  PQRS# 426 - PACU Transfer Protocol: - Transfer of care checklist used  ASA# 14 - Acute Post-op Pain: ASA14B - Patient did NOT experience pain >= 7 out of 10

## 2021-08-20 ENCOUNTER — TRANSCRIBE ORDERS (OUTPATIENT)
Dept: OTHER | Age: 56
End: 2021-08-20

## 2021-08-20 DIAGNOSIS — H54.7 DECREASED VISUAL ACUITY: Primary | ICD-10-CM

## 2023-02-22 ENCOUNTER — TRANSFERRED RECORDS (OUTPATIENT)
Dept: HEALTH INFORMATION MANAGEMENT | Facility: CLINIC | Age: 58
End: 2023-02-22
Payer: COMMERCIAL

## 2023-03-06 ENCOUNTER — TELEPHONE (OUTPATIENT)
Dept: UROLOGY | Facility: CLINIC | Age: 58
End: 2023-03-06
Payer: COMMERCIAL

## 2023-03-06 NOTE — TELEPHONE ENCOUNTER
[facility] Call Center    Phone Message    May a detailed message be left on voicemail: yes     Reason for Call: Pt called and stated she is having problems with her super pubic catheter. Pt states she is not sure if she can wait for her appt on 3/14/23. Pt would like to know if she could reschedule for retention at [facility] location before 3/14/23? Writer unable to schedule per protocol. Please give pt a call back to discuss. Thank you.    Action Taken: Other: URO    Travel Screening: Not Applicable

## 2023-03-07 NOTE — TELEPHONE ENCOUNTER
Spoke to pt. Pt has SPT in place. Pt reports that her bypassing urine and noted blood. Urine is draining well into the bag. No hematuria. Noted sediment in urine. She is also experiencing pain around the cath site, rated 8/10. She is noting draining is constant. Not taking any medications for treatment. Pt has had SPT for about a year and does not remember who placed it. Site has been bypassing for past 2 months. Pt was discharged from the nursing home last Monday. Advised pt to discuss with PCP for intervention until she is seen by provider. Pt verbalized understanding.     Shruthi Cabrera, MSN RN

## 2023-03-14 ENCOUNTER — TRANSFERRED RECORDS (OUTPATIENT)
Dept: HEALTH INFORMATION MANAGEMENT | Facility: CLINIC | Age: 58
End: 2023-03-14

## 2023-03-14 ENCOUNTER — VIRTUAL VISIT (OUTPATIENT)
Dept: UROLOGY | Facility: CLINIC | Age: 58
End: 2023-03-14
Payer: COMMERCIAL

## 2023-03-14 ENCOUNTER — CARE COORDINATION (OUTPATIENT)
Dept: UROLOGY | Facility: CLINIC | Age: 58
End: 2023-03-14

## 2023-03-14 ENCOUNTER — TRANSCRIBE ORDERS (OUTPATIENT)
Dept: OTHER | Age: 58
End: 2023-03-14

## 2023-03-14 VITALS — WEIGHT: 259 LBS | HEIGHT: 61 IN | BODY MASS INDEX: 48.9 KG/M2

## 2023-03-14 DIAGNOSIS — Z97.8 CHRONIC INDWELLING FOLEY CATHETER: ICD-10-CM

## 2023-03-14 DIAGNOSIS — N32.89 OTHER SPECIFIED DISORDERS OF BLADDER: Primary | ICD-10-CM

## 2023-03-14 DIAGNOSIS — R33.9 INCOMPLETE BLADDER EMPTYING: Primary | ICD-10-CM

## 2023-03-14 PROCEDURE — 99203 OFFICE O/P NEW LOW 30 MIN: CPT | Mod: VID | Performed by: PHYSICIAN ASSISTANT

## 2023-03-14 RX ORDER — BUMETANIDE 2 MG/1
TABLET ORAL
COMMUNITY
Start: 2023-03-09

## 2023-03-14 RX ORDER — BACLOFEN 10 MG/1
10 TABLET ORAL 3 TIMES DAILY
COMMUNITY

## 2023-03-14 ASSESSMENT — PAIN SCALES - GENERAL: PAINLEVEL: NO PAIN (0)

## 2023-03-14 NOTE — PROGRESS NOTES
Home health nurse calling back, she states that pt says she was in ED yesterday and had her SPT changed but nurse doesn't see any documentation of this.  She will be going out to patients house tomorrow or Thursday and will bring cath supplies out to change SPT and also assess for any signs or symptoms of infection around site.  Nurse to report back findings.    Kellen Chavez RN, BSN  [facility]

## 2023-03-14 NOTE — PROGRESS NOTES
Addendum:   Received notes from [facility] after patient's encounter. Per notes, patient is to have monthly SPT exchanges with 16 F. Patient was last seen at [facility] on 2/22/23 and refused to have SPT exchange that day as wanted to see Lisa MC at [facility], however Lisa was not in the office that day, so she continued to refuse catheter exchange. That same day, patient also wanted to discuss having her SPT catheter rerouted higher on her abdomen. RN staff spoke with Dr. Isaacs that same day, who recommended patient see Dr. Stahl for possible Mitrofanoff procedure. Will have patient f/u with Dr. Stahl. Will have notes scanned into EMR.       ______________________________________________   Send link to cell phone  Pt has a  SP cath in now  Betsy is a 57 year old who is being evaluated via a billable video visit.      How would you like to obtain your AVS? MyChart  If the video visit is dropped, the invitation should be resent by: Text to cell phone: [phone]  Will anyone else be joining your video visit? No    Video-Visit Details    Type of service:  Video Visit   Video Start Time: 8:36 AM  Video End Time:9:04 AM    Originating Location (pt. Location): Home    Distant Location (provider location):  On-site  Platform used for Video Visit: [facility]    Name: Betsy Resendiz    MRN: [id number]   YOB: 1965  Accompanied at today's visit by:self              Assessment and Plan:   57 year old female with incomplete bladder emptying s/p SPT placement by Dr. Lisa Thorpe at [facility] on 7/21/22. Patient requesting to have SPT site placed higher and was advised to come to our team for this procedure.     - Will have RN coordinator contact home health team to see if they plan to exchange SPT catheter, otherwise will need to help coordinate SPT exchange with our team or a clinic close to her home.   - Will try to obtain records from [facility] team.   - Will have patient f/u with next  available urologist who specializes in SPT placement.   - Continue oxybutynin 5mg for bladder spasms.   - Continue to monitor site for signs of infection or worsening bleeding. Will try to get update from RN staff who exchange SPT to ensure no signs of significant bleeding or sings of infection.   - After discussing the assessment and plan with patient, patient verbalized understanding and agreed to the above plan. All questions answered.     32 minutes were spent today on the date of the encounter in reviewing the EMR, direct patient care, coordination of care and documentation in addition to reviewing [facility] and [facility] records (10min).     Carol Ferrara PA-C  March 14, 2023    Patient Care Team:  Adal Bueno MD as PCP - [facility], DELL Nicole CNP as Nurse Practitioner (Nurse Practitioner - [facility])  Trisha Tamayo MD as MD (Urology)  Yesy Wiseman, RN as Registered Nurse (Urology)            Chief Complaint:   Urinary retention          History of Present Illness:   March 14, 2023    HISTORY: Betsy Resendiz is a 57 year old female is here for follow-up. We have been following her for incomplete bladder emptying. Last seen in 8/2019 with Dr. Tamayo who reviewed her UDS at that time which showed she has good detrusor contraction but does not empty fully.  No obvious obstruction. She was advised to resume ISC at that time and to follow-up in 1 month. Patient has not followed-up until today. She recently discharged from NH and now resides at home. Has a home health RN that comes 1x/week. Of note, patient had SPT placement ordered by Lisa Thorpe NP from [facility], but SPT was placed by IR team at [facility] on 7/21/22. Patient states she recently followed-up with the [facility] team about 2-3 weeks ago and discussed relocating her SPT site, as her current site bothers her. States the area is always irritated and has some bleeding. Thought she also has a tear along SPT site but was  told by RN staff that it did not look infected. Denies s/s of infection around SPT site today. States she was told by [facility] team that she would need to come to our team if wanted new SPT site placement; do not have these records. Denies issues with mac draining. States the last time her SPT was exchanged was about 1 month ago. She is unsure if the RN staff that come once per week are able to perform SPT exchanges. Takes Oxybutynin 5mg daily for bladder spasms. Has not had any true UTIs per chart review in remote past.     Her PMH is significant for CHF (follows with cardiology), COPD requiring O2 (follows with pulmonology), schizoaffective disorder, bipolar, adrenal mass (stable), chronic venous stasis of LE,  prolonged QT, diarrhea, type II DM (long term insulin) with CKD stage III (follows with nephrology), chroinic pain syndrome (follows with pain clinic).            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     History of thrombophlebitis      Hypokalemia 06/04/2010     Vitamin D deficiency 09/19/2012            Past Surgical History:     Past Surgical History:   Procedure Laterality Date     ARTHRODESIS FOOT Right 2009     BACK SURGERY      x7      BACK SURGERY       BIOPSY/EXCIS CERVICAL LESN       BLADDER SURGERY       BUNIONECTOMY       EXCISION / CURETTAGE BONE CYST CARPAL BONES Right 12/12/2017    Procedure: EXCISION RIGHT WRIST HOOK OF HAMATE, ;  Surgeon: Escobar Charles MD;  Location: [facility] OR;  Service:      INCISION AND DRAINAGE  2013    Pressure ulcer     OTHER SURGICAL HISTORY  1/2009 & 7/2009    arthrodesis, right first metatarsophalengeal jointand later revision     OTHER SURGICAL HISTORY      cervix biopsy     OTHER SURGICAL HISTORY      pressure necrosis and abscess left gluteus- debridement     RADIOFREQUENCY ABLATION Left 01/01/2016     REPAIR BLADDER        REPAIR BLADDER       THORACOTOMY      Abscess     THORACOTOMY Left 2008    from abscess            Social History:     Social History     Tobacco Use     Smoking status: Former     Types: Cigarettes     Smokeless tobacco: Never   Substance Use Topics     Alcohol use: No            Family History:     Family History   Problem Relation Age of Onset     Other - See Comments Mother         Multiple myeloma     Diabetes Maternal Grandmother      Cancer Niece          age 12              Allergies:     Allergies   Allergen Reactions     Cephalexin Hives     Codeine Shortness Of Breath     Has tolerated morphine 2008     Morphine Nausea and Vomiting and Shortness Of Breath     trouble breathing and nausea  trouble breathing and nausea  Morphine NOT tolerated. Please use other opioid  trouble breathing and nausea     Diagnostic X-Ray Materials      rash  rash  rash       Diphenhydramine Itching     About 5 minutes after  IV administration- c/o extreme itching to torso and all extremities.      Hmg-Coa-R Inhibitors Other (See Comments)     Patient declines  Patient declines  Patient declines     Iodine      rash     Lisinopril Cough     No Clinical Screening - See Comments      Statin-hmg-coa reductase inhibitors  Statin-hmg-coa reductase inhibitors     Nsaids      nausea     Other Drug Allergy (See Comments)      Zinc Oxide-cod Liver Oil and Statins-hmg-coa Reductase Inhibitors     Sulfa Drugs      Mouth sore  Mouth sore       Sumatriptan Other (See Comments)            Medications:     Current Outpatient Medications   Medication Sig     albuterol (PROAIR HFA/PROVENTIL HFA/VENTOLIN HFA) 108 (90 BASE) MCG/ACT Inhaler Inhale 2 puffs into the lungs every 4 hours as needed      baclofen (LIORESAL) 10 MG tablet Take 10 mg by mouth 3 times daily     benztropine (COGENTIN) 0.5 MG tablet Take 1 tablet by mouth     brimonidine (ALPHAGAN-P) 0.15 % ophthalmic solution Place 1 drop into both eyes 2 times daily      budesonide (PULMICORT) 0.5 MG/2ML neb solution Take 0.5 mg by nebulization 2 times daily     bumetanide (BUMEX) 2 MG tablet      calcium carbonate 500 mg, elemental, (OSCAL 500) 1250 (500 Ca) MG TABS tablet Take 500 mg by mouth     cariprazine (VRAYLAR) 1.5 MG CAPS capsule Take 2 mg by mouth daily      cholecalciferol (VITAMIN D3) 5000 units CAPS capsule Take 5,000 Units by mouth daily     escitalopram (LEXAPRO) 20 MG tablet Take 1 tablet by mouth at bedtime     gentamicin (GARAMYCIN) 0.3 % ophthalmic solution INT 1 GTT IN OS Q 4 H     lamoTRIgine (LAMICTAL) 200 MG tablet Take 200 mg by mouth At Bedtime      LANsoprazole (PREVACID) 30 MG CR capsule Take 15 mg by mouth daily      LIOTHYRONINE SODIUM PO Take 25 mcg by mouth daily     metoprolol succinate ER (TOPROL-XL) 25 MG 24 hr tablet Take 25 mg by mouth     ondansetron (ZOFRAN-ODT) 4 MG ODT tab Take 1 tablet (4 mg) by mouth every 8 hours as needed for nausea     OYSTER SHELL CALCIUM PO Take 500 mg by mouth 4 times daily     POTASSIUM CHLORIDE ER PO Take 40 mg by mouth 3 times daily      pregabalin (LYRICA) 150 MG capsule Take 1 capsule (150 mg) by mouth 2 times daily     promethazine (PHENERGAN) 12.5 MG tablet TK 1 T PO BID     scopolamine (TRANSDERM) 1 MG/3DAYS 72 hr patch Place 1 patch onto the skin     senna (SENOKOT) 8.6 MG tablet Take 8.6 mg by mouth     travoprost, BAK Free, (TRAVATAN Z) 0.004 % ophthalmic solution Place 1 drop into both eyes At Bedtime     vitamin D2 (ERGOCALCIFEROL) 31756 units (1250 mcg) capsule Take 50,000 Units by mouth     ziprasidone (GEODON) 40 MG capsule Take 1 capsule by mouth in the morning     ziprasidone (GEODON) 80 MG capsule Take 160 mg by mouth At Bedtime      amoxicillin (AMOXIL) 250 MG capsule Take 1 capsule (250 mg) by mouth daily (Patient not taking: Reported on 3/14/2023)     clotrimazole (LOTRIMIN) 1 % cream Apply topically 2 times daily (Patient not taking: Reported on 3/14/2023)     clotrimazole (LOTRIMIN) 1 % vaginal  cream INSERT 5 GRAMS VAGINALLY HS FOR YEAST VAGINITIS FOR 7 DAYS (Patient not taking: Reported on 3/14/2023)     cyclobenzaprine (FLEXERIL) 10 MG tablet TK 1 T PO TID PRF MUSCLE SPASM (Patient not taking: Reported on 3/14/2023)     CYCLOBENZAPRINE HCL PO Take 5 mg by mouth every 8 hours as needed  (Patient not taking: Reported on 3/14/2023)     diphenoxylate-atropine (LOMOTIL) 2.5-0.025 MG tablet Take 1 tablet by mouth (Patient not taking: Reported on 3/14/2023)     furosemide (LASIX) 80 MG tablet Take 40 mg by mouth 2 times daily  (Patient not taking: Reported on 3/14/2023)     HYDROmorphone (DILAUDID) 2 MG tablet Take 1 mg by mouth (Patient not taking: Reported on 3/14/2023)     hyoscyamine (LEVSIN/SL) 0.125 MG sublingual tablet Place 0.125 mg under the tongue (Patient not taking: Reported on 3/14/2023)     ipratropium - albuterol 0.5 mg/2.5 mg/3 mL (DUONEB) 0.5-2.5 (3) MG/3ML neb solution Take 1 vial by nebulization 4 times daily as needed  (Patient not taking: Reported on 3/14/2023)     Lidocaine-Menthol 4.5-5 % PTCH Externally apply topically 2 times daily (Patient not taking: Reported on 3/14/2023)     lithium 300 MG capsule Take 1 capsule by mouth in the morning and 2 capsules at bedtime (Patient not taking: Reported on 3/14/2023)     loperamide (IMODIUM) 2 MG capsule Take 2 mg by mouth every 6 hours as needed for diarrhea (Patient not taking: Reported on 3/14/2023)     LORazepam (ATIVAN) 0.5 MG tablet TK ONE T PO BID PRA (Patient not taking: Reported on 3/14/2023)     MECLIZINE HCL PO Take 25 mg by mouth every 8 hours as needed for dizziness (Patient not taking: Reported on 3/14/2023)     methocarbamol (ROBAXIN) 500 MG tablet TK 1 T PO Q 6 H FOR UP TO 10 DAYS PRF MUSCLE PAIN OR SPASM (Patient not taking: Reported on 3/14/2023)     nicotine (COMMIT) 2 MG lozenge Place 2 mg inside cheek every 3 hours as needed for smoking cessation (Patient not taking: Reported on 3/14/2023)     omeprazole (PRILOSEC) 40 MG DR  "capsule TK 1 C PO QD AC (Patient not taking: Reported on 3/14/2023)     oxyCODONE (ROXICODONE) 5 MG tablet TK 1-2 PO Q 4 H PRN FOR PAIN (Patient not taking: Reported on 3/14/2023)     oxyCODONE-acetaminophen (PERCOCET) 5-325 MG tablet  (Patient not taking: Reported on 3/14/2023)     paliperidone (INVEGA) 6 MG 24 hr tablet Take 6 mg by mouth every morning (Patient not taking: Reported on 3/14/2023)     perphenazine 4 MG tablet Take 4 mg by mouth 2 times daily  (Patient not taking: Reported on 3/14/2023)     propranolol (INDERAL) 10 MG tablet TK ONE T PO BID PRA (Patient not taking: Reported on 3/14/2023)     QUEtiapine (SEROQUEL) 50 MG tablet TK ONE T PO HS (Patient not taking: Reported on 3/14/2023)     senna-docusate (SENOKOT-S/PERICOLACE) 8.6-50 MG tablet Take 2 tablets by mouth 2 times daily as needed for constipation (Patient not taking: Reported on 3/14/2023)     tiZANidine (ZANAFLEX) 4 MG tablet Take 4 mg by mouth every 8 hours as needed for muscle spasms (Patient not taking: Reported on 3/14/2023)     No current facility-administered medications for this visit.             Review of Systems:    ROS: 14 point ROS neg other than the symptoms noted above in the HPI.          Physical Exam:   Height 1.549 m (5' 1\"), weight 117.5 kg (259 lb).  5' 1\", Body mass index is 48.94 kg/m ., 259 lbs 0 oz  Gen appearance: Age-appropriate appearing female in NAD.   HEENT:  EOMI, conjunctiva clear/white. Normal ROM of neck for age.   Psych:  alert , In no acute distress.  Neuro:  A&Ox3  Resp:  Normal respiratory effort; not in acute respiratory distress.          Data:    Labs:    Creatinine   Date Value Ref Range Status   02/28/2019 1.25 (H) 0.52 - 1.04 mg/dL Final     [facility]  12/16/22 Mixed sada ([facility])      CT abdomen/pelvis w/o contrast on 10/18/22 at [facility]  Result Date: 10/18/2022  EXAM: CT ABDOMEN PELVIS WO LOCATION: [facility] DATE/TIME: 10/18/2022 4:42 PM INDICATION: Generalized abdominal pain, RUQ " pain, history of pancreatitis. COMPARISON: CT abdomen pelvis 09/24/2022. TECHNIQUE: CT scan of the abdomen and pelvis was performed without IV contrast. Multiplanar reformats were obtained. Dose reduction techniques were used. CONTRAST: None.   FINDINGS: LOWER CHEST: Some areas of atelectasis in the lung bases. There is also 1.3 cm subpleural consolidation in the left lower lobe, new. HEPATOBILIARY: Marked diffuse hepatic steatosis. Some fatty sparing present within the periphery. No significant mass. No bile duct dilatation. Prior cholecystectomy. PANCREAS: Normal. SPLEEN: Calcified splenic granulomas. ADRENAL GLANDS: There are adrenal lesions less than 1 cm. No follow up is needed for nodules this size. KIDNEYS/BLADDER: No significant mass or hydronephrosis. Some atrophy of the right kidney, unchanged. Tiny nonobstructing calculus in the right lower pole. A Abernathy catheter is present in the bladder. Bladder is decompressed. BOWEL: Diverticulosis of the colon. No acute inflammatory change. No obstruction. Normal appendix. LYMPH NODES: No lymphadenopathy. VASCULATURE: No abdominal aortic aneurysm. Moderate calcific atherosclerosis. PELVIC ORGANS: Normal. MUSCULOSKELETAL: Postsurgical changes along the anterior abdominal wall. A few round nodules in the anterior abdominal subcutaneous fat, as well as in the gluteal soft tissues (which are peripherally calcified). These are presumably related to injections. Postsurgical changes posteriorly over the lumbar spine. A few mild compression deformities in the lumbar spine are stable. There are 2 postsurgical screws engaging the left sacroiliac joint, unchanged.     1. No acute findings in the abdomen and pelvis. 2. Marked hepatic steatosis. 3. Nonobstructing right nephrolithiasis. 4. There is a 1.3 cm subpleural consolidation in the left lower lobe, not present on prior CT. This could represent atelectasis, although infectious or inflammatory etiologies are not entirely  excluded.

## 2023-03-14 NOTE — LETTER
3/14/2023       RE: Betsy Resendiz  [address]  [place] [address]     Dear Colleague,    Thank you for referring your patient, Betsy Resendiz, to the [facility] [place] at [facility]. Please see a copy of my visit note below.    Addendum:   Received notes from [facility] after patient's encounter. Per notes, patient is to have monthly SPT exchanges with 16 F. Patient was last seen at [facility] on 2/22/23 and refused to have SPT exchange that day as wanted to see Lisa MC at [facility], however Lisa was not in the office that day, so she continued to refuse catheter exchange. That same day, patient also wanted to discuss having her SPT catheter rerouted higher on her abdomen. RN staff spoke with Dr. Isaacs that same day, who recommended patient see Dr. Stahl for possible Mitrofanoff procedure. Will have patient f/u with Dr. Stahl. Will have notes scanned into EMR.       ______________________________________________   Send link to cell phone  Pt has a  SP cath in now  Betsy is a 57 year old who is being evaluated via a billable video visit.      How would you like to obtain your AVS? MyChart  If the video visit is dropped, the invitation should be resent by: Text to cell phone: [phone]  Will anyone else be joining your video visit? No    Video-Visit Details    Type of service:  Video Visit   Video Start Time: 8:36 AM  Video End Time:9:04 AM    Originating Location (pt. Location): Home    Distant Location (provider location):  On-site  Platform used for Video Visit: [facility]    Urology Clinic    Name: Betsy Resendiz    MRN: [id number]   YOB: 1965  Accompanied at today's visit by:self              Assessment and Plan:   57 year old female with incomplete bladder emptying s/p SPT placement by Dr. Lisa Thorpe at [facility] on 7/21/22. Patient requesting to have SPT site placed higher and was advised to come to  our team for this procedure.     - Will have RN coordinator contact home health team to see if they plan to exchange SPT catheter, otherwise will need to help coordinate SPT exchange with our team or a clinic close to her home.   - Will try to obtain records from [facility] team.   - Will have patient f/u with next available urologist who specializes in SPT placement.   - Continue oxybutynin 5mg for bladder spasms.   - Continue to monitor site for signs of infection or worsening bleeding. Will try to get update from RN staff who exchange SPT to ensure no signs of significant bleeding or sings of infection.   - After discussing the assessment and plan with patient, patient verbalized understanding and agreed to the above plan. All questions answered.     32 minutes were spent today on the date of the encounter in reviewing the EMR, direct patient care, coordination of care and documentation in addition to reviewing [facility] and [facility] records (10min).     Carol Ferrara PA-C  March 14, 2023    Patient Care Team:  Adal Bueno MD as PCP - [facility] as Nurse Practitioner (Nurse Practitioner - [facility])  Trisha Tamayo MD as MD (Urology)  Yesy Wiseman, RN as Registered Nurse (Urology)            Chief Complaint:   Urinary retention          History of Present Illness:   March 14, 2023    HISTORY: Betsy Resendiz is a 57 year old female is here for follow-up. We have been following her for incomplete bladder emptying. Last seen in 8/2019 with Dr. Tamayo who reviewed her UDS at that time which showed she has good detrusor contraction but does not empty fully.  No obvious obstruction. She was advised to resume ISC at that time and to follow-up in 1 month. Patient has not followed-up until today. She recently discharged from NH and now resides at home. Has a home health RN that comes 1x/week. Of note, patient had SPT placement ordered by Lisa Thorpe NP from [facility], but SPT  was placed by IR team at [facility] on 7/21/22. Patient states she recently followed-up with the [facility] team about 2-3 weeks ago and discussed relocating her SPT site, as her current site bothers her. States the area is always irritated and has some bleeding. Thought she also has a tear along SPT site but was told by RN staff that it did not look infected. Denies s/s of infection around SPT site today. States she was told by [facility] team that she would need to come to our team if wanted new SPT site placement; do not have these records. Denies issues with mac draining. States the last time her SPT was exchanged was about 1 month ago. She is unsure if the RN staff that come once per week are able to perform SPT exchanges. Takes Oxybutynin 5mg daily for bladder spasms. Has not had any true UTIs per chart review in remote past.     Her PMH is significant for CHF (follows with cardiology), COPD requiring O2 (follows with pulmonology), schizoaffective disorder, bipolar, adrenal mass (stable), chronic venous stasis of LE,  prolonged QT, diarrhea, type II DM (long term insulin) with CKD stage III (follows with nephrology), chroinic pain syndrome (follows with pain clinic).            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     History of thrombophlebitis      Hypokalemia 06/04/2010     Vitamin D deficiency 09/19/2012            Past Surgical History:     Past Surgical History:   Procedure Laterality Date     ARTHRODESIS FOOT Right 2009     BACK SURGERY      x7      BACK SURGERY       BIOPSY/EXCIS CERVICAL LESN       BLADDER SURGERY       BUNIONECTOMY       EXCISION / CURETTAGE BONE CYST CARPAL BONES Right 12/12/2017    Procedure: EXCISION RIGHT WRIST HOOK OF ROSCOE, ;  Surgeon: Escobar Charles MD;  Location: [facility] OR;  Service:      INCISION AND DRAINAGE  2013    Pressure ulcer      OTHER SURGICAL HISTORY  2009 & 2009    arthrodesis, right first metatarsophalengeal jointand later revision     OTHER SURGICAL HISTORY      cervix biopsy     OTHER SURGICAL HISTORY      pressure necrosis and abscess left gluteus- debridement     RADIOFREQUENCY ABLATION Left 2016     REPAIR BLADDER       REPAIR BLADDER       THORACOTOMY      Abscess     THORACOTOMY Left 2008    from abscess            Social History:     Social History     Tobacco Use     Smoking status: Former     Types: Cigarettes     Smokeless tobacco: Never   Substance Use Topics     Alcohol use: No            Family History:     Family History   Problem Relation Age of Onset     Other - See Comments Mother         Multiple myeloma     Diabetes Maternal Grandmother      Cancer Niece          age 12              Allergies:     Allergies   Allergen Reactions     Cephalexin Hives     Codeine Shortness Of Breath     Has tolerated morphine 2008     Morphine Nausea and Vomiting and Shortness Of Breath     trouble breathing and nausea  trouble breathing and nausea  Morphine NOT tolerated. Please use other opioid  trouble breathing and nausea     Diagnostic X-Ray Materials      rash  rash  rash       Diphenhydramine Itching     About 5 minutes after  IV administration- c/o extreme itching to torso and all extremities.      Hmg-Coa-R Inhibitors Other (See Comments)     Patient declines  Patient declines  Patient declines     Iodine      rash     Lisinopril Cough     No Clinical Screening - See Comments      Statin-hmg-coa reductase inhibitors  Statin-hmg-coa reductase inhibitors     Nsaids      nausea     Other Drug Allergy (See Comments)      Zinc Oxide-cod Liver Oil and Statins-hmg-coa Reductase Inhibitors     Sulfa Drugs      Mouth sore  Mouth sore       Sumatriptan Other (See Comments)            Medications:     Current Outpatient Medications   Medication Sig     albuterol (PROAIR HFA/PROVENTIL HFA/VENTOLIN HFA) 108 (90  BASE) MCG/ACT Inhaler Inhale 2 puffs into the lungs every 4 hours as needed      baclofen (LIORESAL) 10 MG tablet Take 10 mg by mouth 3 times daily     benztropine (COGENTIN) 0.5 MG tablet Take 1 tablet by mouth     brimonidine (ALPHAGAN-P) 0.15 % ophthalmic solution Place 1 drop into both eyes 2 times daily     budesonide (PULMICORT) 0.5 MG/2ML neb solution Take 0.5 mg by nebulization 2 times daily     bumetanide (BUMEX) 2 MG tablet      calcium carbonate 500 mg, elemental, (OSCAL 500) 1250 (500 Ca) MG TABS tablet Take 500 mg by mouth     cariprazine (VRAYLAR) 1.5 MG CAPS capsule Take 2 mg by mouth daily      cholecalciferol (VITAMIN D3) 5000 units CAPS capsule Take 5,000 Units by mouth daily     escitalopram (LEXAPRO) 20 MG tablet Take 1 tablet by mouth at bedtime     gentamicin (GARAMYCIN) 0.3 % ophthalmic solution INT 1 GTT IN OS Q 4 H     lamoTRIgine (LAMICTAL) 200 MG tablet Take 200 mg by mouth At Bedtime      LANsoprazole (PREVACID) 30 MG CR capsule Take 15 mg by mouth daily      LIOTHYRONINE SODIUM PO Take 25 mcg by mouth daily     metoprolol succinate ER (TOPROL-XL) 25 MG 24 hr tablet Take 25 mg by mouth     ondansetron (ZOFRAN-ODT) 4 MG ODT tab Take 1 tablet (4 mg) by mouth every 8 hours as needed for nausea     OYSTER SHELL CALCIUM PO Take 500 mg by mouth 4 times daily     POTASSIUM CHLORIDE ER PO Take 40 mg by mouth 3 times daily      pregabalin (LYRICA) 150 MG capsule Take 1 capsule (150 mg) by mouth 2 times daily     promethazine (PHENERGAN) 12.5 MG tablet TK 1 T PO BID     scopolamine (TRANSDERM) 1 MG/3DAYS 72 hr patch Place 1 patch onto the skin     senna (SENOKOT) 8.6 MG tablet Take 8.6 mg by mouth     travoprost, BAK Free, (TRAVATAN Z) 0.004 % ophthalmic solution Place 1 drop into both eyes At Bedtime     vitamin D2 (ERGOCALCIFEROL) 52256 units (1250 mcg) capsule Take 50,000 Units by mouth     ziprasidone (GEODON) 40 MG capsule Take 1 capsule by mouth in the morning     ziprasidone (GEODON) 80 MG  capsule Take 160 mg by mouth At Bedtime      amoxicillin (AMOXIL) 250 MG capsule Take 1 capsule (250 mg) by mouth daily (Patient not taking: Reported on 3/14/2023)     clotrimazole (LOTRIMIN) 1 % cream Apply topically 2 times daily (Patient not taking: Reported on 3/14/2023)     clotrimazole (LOTRIMIN) 1 % vaginal cream INSERT 5 GRAMS VAGINALLY HS FOR YEAST VAGINITIS FOR 7 DAYS (Patient not taking: Reported on 3/14/2023)     cyclobenzaprine (FLEXERIL) 10 MG tablet TK 1 T PO TID PRF MUSCLE SPASM (Patient not taking: Reported on 3/14/2023)     CYCLOBENZAPRINE HCL PO Take 5 mg by mouth every 8 hours as needed  (Patient not taking: Reported on 3/14/2023)     diphenoxylate-atropine (LOMOTIL) 2.5-0.025 MG tablet Take 1 tablet by mouth (Patient not taking: Reported on 3/14/2023)     furosemide (LASIX) 80 MG tablet Take 40 mg by mouth 2 times daily  (Patient not taking: Reported on 3/14/2023)     HYDROmorphone (DILAUDID) 2 MG tablet Take 1 mg by mouth (Patient not taking: Reported on 3/14/2023)     hyoscyamine (LEVSIN/SL) 0.125 MG sublingual tablet Place 0.125 mg under the tongue (Patient not taking: Reported on 3/14/2023)     ipratropium - albuterol 0.5 mg/2.5 mg/3 mL (DUONEB) 0.5-2.5 (3) MG/3ML neb solution Take 1 vial by nebulization 4 times daily as needed  (Patient not taking: Reported on 3/14/2023)     Lidocaine-Menthol 4.5-5 % PTCH Externally apply topically 2 times daily (Patient not taking: Reported on 3/14/2023)     lithium 300 MG capsule Take 1 capsule by mouth in the morning and 2 capsules at bedtime (Patient not taking: Reported on 3/14/2023)     loperamide (IMODIUM) 2 MG capsule Take 2 mg by mouth every 6 hours as needed for diarrhea (Patient not taking: Reported on 3/14/2023)     LORazepam (ATIVAN) 0.5 MG tablet TK ONE T PO BID PRA (Patient not taking: Reported on 3/14/2023)     MECLIZINE HCL PO Take 25 mg by mouth every 8 hours as needed for dizziness (Patient not taking: Reported on 3/14/2023)      "methocarbamol (ROBAXIN) 500 MG tablet TK 1 T PO Q 6 H FOR UP TO 10 DAYS PRF MUSCLE PAIN OR SPASM (Patient not taking: Reported on 3/14/2023)     nicotine (COMMIT) 2 MG lozenge Place 2 mg inside cheek every 3 hours as needed for smoking cessation (Patient not taking: Reported on 3/14/2023)     omeprazole (PRILOSEC) 40 MG DR capsule TK 1 C PO QD AC (Patient not taking: Reported on 3/14/2023)     oxyCODONE (ROXICODONE) 5 MG tablet TK 1-2 PO Q 4 H PRN FOR PAIN (Patient not taking: Reported on 3/14/2023)     oxyCODONE-acetaminophen (PERCOCET) 5-325 MG tablet  (Patient not taking: Reported on 3/14/2023)     paliperidone (INVEGA) 6 MG 24 hr tablet Take 6 mg by mouth every morning (Patient not taking: Reported on 3/14/2023)     perphenazine 4 MG tablet Take 4 mg by mouth 2 times daily  (Patient not taking: Reported on 3/14/2023)     propranolol (INDERAL) 10 MG tablet TK ONE T PO BID PRA (Patient not taking: Reported on 3/14/2023)     QUEtiapine (SEROQUEL) 50 MG tablet TK ONE T PO HS (Patient not taking: Reported on 3/14/2023)     senna-docusate (SENOKOT-S/PERICOLACE) 8.6-50 MG tablet Take 2 tablets by mouth 2 times daily as needed for constipation (Patient not taking: Reported on 3/14/2023)     tiZANidine (ZANAFLEX) 4 MG tablet Take 4 mg by mouth every 8 hours as needed for muscle spasms (Patient not taking: Reported on 3/14/2023)     No current facility-administered medications for this visit.             Review of Systems:    ROS: 14 point ROS neg other than the symptoms noted above in the HPI.          Physical Exam:   Height 1.549 m (5' 1\"), weight 117.5 kg (259 lb).  5' 1\", Body mass index is 48.94 kg/m ., 259 lbs 0 oz  Gen appearance: Age-appropriate appearing female in NAD.   HEENT:  EOMI, conjunctiva clear/white. Normal ROM of neck for age.   Psych:  alert , In no acute distress.  Neuro:  A&Ox3  Resp:  Normal respiratory effort; not in acute respiratory distress.          Data:    Labs:    Creatinine   Date Value " Ref Range Status   02/28/2019 1.25 (H) 0.52 - 1.04 mg/dL Final     UC  12/16/22 Mixed sada ([facility])      CT abdomen/pelvis w/o contrast on 10/18/22 at [facility]  Result Date: 10/18/2022  EXAM: CT ABDOMEN PELVIS WO LOCATION: [facility] DATE/TIME: 10/18/2022 4:42 PM INDICATION: Generalized abdominal pain, RUQ pain, history of pancreatitis. COMPARISON: CT abdomen pelvis 09/24/2022. TECHNIQUE: CT scan of the abdomen and pelvis was performed without IV contrast. Multiplanar reformats were obtained. Dose reduction techniques were used. CONTRAST: None.   FINDINGS: LOWER CHEST: Some areas of atelectasis in the lung bases. There is also 1.3 cm subpleural consolidation in the left lower lobe, new. HEPATOBILIARY: Marked diffuse hepatic steatosis. Some fatty sparing present within the periphery. No significant mass. No bile duct dilatation. Prior cholecystectomy. PANCREAS: Normal. SPLEEN: Calcified splenic granulomas. ADRENAL GLANDS: There are adrenal lesions less than 1 cm. No follow up is needed for nodules this size. KIDNEYS/BLADDER: No significant mass or hydronephrosis. Some atrophy of the right kidney, unchanged. Tiny nonobstructing calculus in the right lower pole. A Abernathy catheter is present in the bladder. Bladder is decompressed. BOWEL: Diverticulosis of the colon. No acute inflammatory change. No obstruction. Normal appendix. LYMPH NODES: No lymphadenopathy. VASCULATURE: No abdominal aortic aneurysm. Moderate calcific atherosclerosis. PELVIC ORGANS: Normal. MUSCULOSKELETAL: Postsurgical changes along the anterior abdominal wall. A few round nodules in the anterior abdominal subcutaneous fat, as well as in the gluteal soft tissues (which are peripherally calcified). These are presumably related to injections. Postsurgical changes posteriorly over the lumbar spine. A few mild compression deformities in the lumbar spine are stable. There are 2 postsurgical screws engaging the left sacroiliac joint,  unchanged.     1. No acute findings in the abdomen and pelvis. 2. Marked hepatic steatosis. 3. Nonobstructing right nephrolithiasis. 4. There is a 1.3 cm subpleural consolidation in the left lower lobe, not present on prior CT. This could represent atelectasis, although infectious or inflammatory etiologies are not entirely excluded.               Again, thank you for allowing me to participate in the care of your patient.      Sincerely,    TREE PEREZ PA-C

## 2023-03-14 NOTE — PROGRESS NOTES
Left message for home health nurse to call back to discuss patients SPT exchanges and also to assess the site to ensure there's no sign of infection per Carol Ferrara PA-C.      Kellen Chavez, RN, BSN  [facility]  [phone]

## 2023-03-22 ENCOUNTER — TELEPHONE (OUTPATIENT)
Dept: UROLOGY | Facility: CLINIC | Age: 58
End: 2023-03-22
Payer: COMMERCIAL

## 2023-03-22 NOTE — TELEPHONE ENCOUNTER
[facility] Call Center    Phone Message    May a detailed message be left on voicemail: yes     Reason for Call: Other: .    Evita from [facility] in [place] calling stating pt is in ER and is needing cysto with Elidayanara ASAP to replace pt super pubic cath. [facility]yson next avaible isnt till August. Evita requesting pt be seen ASAP. Please call Evita     Action Taken: Message routed to:  Other: Uro    Travel Screening: Not Applicable

## 2023-03-22 NOTE — TELEPHONE ENCOUNTER
Pt has a urethral mac, SP fell out.     I explained this is a surgical procedure, not done in clinic, Dr. Stahl is not in the clinic today, we have not seen her, and since she has a urethral catheter there is no urgency and she can keep her appointment as scheduled and we can change her catheter at her consult with Dr. Stahl.    Evita expressed understanding.    Isis Mcgill CMA  03/22/23  12:07 PM

## 2023-04-14 ENCOUNTER — PRE VISIT (OUTPATIENT)
Dept: UROLOGY | Facility: CLINIC | Age: 58
End: 2023-04-14
Payer: COMMERCIAL

## 2023-04-14 NOTE — TELEPHONE ENCOUNTER
Reason for visit: surgical consult     Relevant information: mitrofanoff, history of SPT, history of CKD, history of diabetes    Records/imaging/labs/orders: all records available    Pt called: no need for a call    At Rooming: kevin Mcgill CMA  4/14/2023  12:59 PM

## 2023-04-17 ENCOUNTER — TELEPHONE (OUTPATIENT)
Dept: UROLOGY | Facility: CLINIC | Age: 58
End: 2023-04-17

## 2023-04-17 NOTE — TELEPHONE ENCOUNTER
[facility] Call Center    Phone Message    May a detailed message be left on voicemail: yes     Reason for Call: Other: .  Pt wasn't able to make today appt with Gre[facility] for a surgery consult referred by Alem due to pt being in hospital. Writer scheduled pt new appt to June. Pt is requesting to be seen sooner if possible. Please call pt     Action Taken: Message routed to:  Other: Uro    Travel Screening: Not Applicable

## 2023-05-26 ENCOUNTER — PRE VISIT (OUTPATIENT)
Dept: UROLOGY | Facility: CLINIC | Age: 58
End: 2023-05-26
Payer: COMMERCIAL

## 2023-05-26 NOTE — TELEPHONE ENCOUNTER
Reason for visit: surgical consult             Relevant information: mitrofanoff, history of SPT, history of CKD, history of diabetes     Records/imaging/labs/orders: all records available     Pt called: no need for a call     At Rooming: kevin Mcgill CMA  5/26/2023  10:06 AM

## 2023-06-12 ENCOUNTER — ALLIED HEALTH/NURSE VISIT (OUTPATIENT)
Dept: UROLOGY | Facility: CLINIC | Age: 58
End: 2023-06-12
Payer: COMMERCIAL

## 2023-06-12 ENCOUNTER — OFFICE VISIT (OUTPATIENT)
Dept: UROLOGY | Facility: CLINIC | Age: 58
End: 2023-06-12
Payer: COMMERCIAL

## 2023-06-12 VITALS
DIASTOLIC BLOOD PRESSURE: 65 MMHG | HEIGHT: 64 IN | SYSTOLIC BLOOD PRESSURE: 98 MMHG | BODY MASS INDEX: 40.8 KG/M2 | WEIGHT: 239 LBS | HEART RATE: 65 BPM

## 2023-06-12 DIAGNOSIS — R33.9 INCOMPLETE BLADDER EMPTYING: ICD-10-CM

## 2023-06-12 DIAGNOSIS — N31.9 NEUROGENIC BLADDER: Primary | ICD-10-CM

## 2023-06-12 PROCEDURE — 99207 PR NO CHARGE NURSE ONLY: CPT

## 2023-06-12 PROCEDURE — 99215 OFFICE O/P EST HI 40 MIN: CPT | Performed by: UROLOGY

## 2023-06-12 RX ORDER — CIPROFLOXACIN 2 MG/ML
400 INJECTION, SOLUTION INTRAVENOUS EVERY 12 HOURS
Status: CANCELLED | OUTPATIENT
Start: 2023-06-12

## 2023-06-12 ASSESSMENT — PAIN SCALES - GENERAL: PAINLEVEL: NO PAIN (0)

## 2023-06-12 NOTE — LETTER
"6/12/2023       RE: Betsy Resendiz  [address]  [place] [address]     Dear Colleague,    Thank you for referring your patient, Betsy Resendiz, to the [facility]OLOGY [facility] [place] at [facility]. Please see a copy of my visit note below.    HPI:  Betsy Resendiz is a 57 year old female being seen for neurogenic bladder. She is interested in Mitrofanoff.     She is unable to void. She has had a SPT until a couple of months ago when the hole closed up when it was being changed. Other problems with SPT included wound complications at SPT site and fragile skin that tore easily at the SPT site.      UDS 2019 shows poor bladder contractility with early sensation and incomplete emptying.     PMH is significant for CHF (follows with cardiology), COPD requiring O2 (follows with pulmonology), schizoaffective disorder, bipolar, adrenal mass (stable), chronic venous stasis of LE,  prolonged QT, diarrhea, type II DM (long term insulin) with CKD stage III (follows with nephrology), chroinic pain syndrome (follows with pain clinic).     The following subcategories of the HISTORY were reviewed with the patient and updated accordingly. If no updates, this indicates no change since last visit:    Reviewed previous notes from  Pinky Ferrara from [facility]    Exam:  BP 98/65   Pulse 65   Ht 1.626 m (5' 4\")   Wt 108.4 kg (239 lb)   BMI 41.02 kg/m    Abdomen: Degree of obesity is moderate. Abdomen is soft and nontender. No organomegaly. Surgical scars include periumbilical from anterior spine fixation.    Review of Imaging:  The following imaging exams were independently viewed and interpreted by me and discussed with patient:  NM stress text : normal perfusion. 76% EF.    Review of Labs:  The following labs were reviewed by me and discussed with the patient:  Cr 1.47 5/5/23    Assessment & Plan   neurogenic bladder probably from diabetes  Catheter " dependent  COPD on Home O2  She desires a Mitrofanoff catheterizable channel.  I explained to her that she would have to lose about 50 pounds before I be willing to do a Mitrofanoff.  I am concerned that the bowel would not reach the umbilicus.  I am further worried about wound complications especially given her diabetes plus the obesity.  She was disappointed.  She states that she does not think she will ever be able to lose the weight.  Instead she is interested in a suprapubic tube.  She did have some skin tearing and wound complications with the last suprapubic tube and this could happen again but she would like to give it another shot.  I offered to her that we could cap the suprapubic tube so she does not have to wear a bag and that might help improve her quality of life.    Gilberto Stahl MD  [facility] UROLOGY [facility]INIC [place]      ==========================      Additional Coding Information:    Problems:  4 -- two or more stable chronic illnesses    Data Reviewed}        Level of risk:  4 -- minor surgery with patient or procedure risks    Time spent:  40 minutes spent by me on the date of the encounter doing chart review, history and exam, documentation and further activities per the note

## 2023-06-12 NOTE — NURSING NOTE
"Chief Complaint   Patient presents with     Consult     [facility] consult       Blood pressure 98/65, pulse 65, height 1.626 m (5' 4\"), weight 108.4 kg (239 lb). Body mass index is 41.02 kg/m .    Patient Active Problem List   Diagnosis     ACP (advance care planning)     Schizoaffective disorder, bipolar type (H)     Carpal tunnel syndrome     Cervical neck pain with evidence of disc disease     Chronic pain     Cervical myelopathy with cervical radiculopathy (H)     DM w/o complication type II (H)     History of encephalopathy     Gastroesophageal reflux disease without esophagitis     Generalized osteoarthritis     Immunosuppressed status (H)     Migraine headache     Morbid obesity with BMI of 40.0-44.9, adult (H)     Normocytic anemia     SPIKE (obstructive sleep apnea)     Osteoarthritis of hip     Lymphedema     Pain in joint, ankle and foot     Conversion reaction     Posttraumatic stress disorder     Urinary retention     Rotator cuff tendinitis     Schizoaffective disorder, unspecified type (H)     Tobacco use disorder     Ulnar neuritis     Asthma     Unspecified glaucoma     COPD (chronic obstructive pulmonary disease) (H)     JAVIER (generalized anxiety disorder)     HTN (hypertension)     Auditory hallucinations     COPD exacerbation (H)     Acute respiratory failure with hypoxia and hypercapnia (H)     CKD (chronic kidney disease) stage 3, GFR 30-59 ml/min (H)     Yeast vaginitis     Chronic diastolic heart failure (H)     Abdominal pain     Adrenal mass (H)     Aftercare involving the use of plastic surgery     CHAPINCITO (acute kidney injury) (H)     Chronic indwelling Abernathy catheter     Cyclic vomiting syndrome     Diarrhea     Diarrhea of presumed infectious origin     Functional bowel disorder     Other specified hypothyroidism     Polyuria     Prolonged QT interval       Allergies   Allergen Reactions     Cephalexin Hives     Codeine Shortness Of Breath     Has tolerated morphine 7/2008     Morphine " Nausea and Vomiting and Shortness Of Breath     trouble breathing and nausea  trouble breathing and nausea  Morphine NOT tolerated. Please use other opioid  trouble breathing and nausea     Diphenhydramine Itching     About 5 minutes after  IV administration- c/o extreme itching to torso and all extremities.      Iodinated Contrast Media      rash  rash  rash       Iodine      rash     Lisinopril Cough     No Clinical Screening - See Comments      Statin-hmg-coa reductase inhibitors  Statin-hmg-coa reductase inhibitors     Nsaids      nausea     Other Drug Allergy (See Comments)      Zinc Oxide-cod Liver Oil and Statins-hmg-coa Reductase Inhibitors     Statins Other (See Comments)     Patient declines  Patient declines  Patient declines     Sulfa Antibiotics      Mouth sore  Mouth sore       Sumatriptan Other (See Comments)       Current Outpatient Medications   Medication Sig Dispense Refill     albuterol (PROAIR HFA/PROVENTIL HFA/VENTOLIN HFA) 108 (90 BASE) MCG/ACT Inhaler Inhale 2 puffs into the lungs every 4 hours as needed        baclofen (LIORESAL) 10 MG tablet Take 10 mg by mouth 3 times daily       brimonidine (ALPHAGAN-P) 0.15 % ophthalmic solution Place 1 drop into both eyes 2 times daily       budesonide (PULMICORT) 0.5 MG/2ML neb solution Take 0.5 mg by nebulization 2 times daily       calcium carbonate 500 mg, elemental, (OSCAL 500) 1250 (500 Ca) MG TABS tablet Take 500 mg by mouth       gentamicin (GARAMYCIN) 0.3 % ophthalmic solution INT 1 GTT IN OS Q 4 H  0     lamoTRIgine (LAMICTAL) 200 MG tablet Take 200 mg by mouth At Bedtime        LANsoprazole (PREVACID) 30 MG CR capsule Take 15 mg by mouth daily        LIOTHYRONINE SODIUM PO Take 25 mcg by mouth daily       ondansetron (ZOFRAN-ODT) 4 MG ODT tab Take 1 tablet (4 mg) by mouth every 8 hours as needed for nausea       OYSTER SHELL CALCIUM PO Take 500 mg by mouth 4 times daily       POTASSIUM CHLORIDE ER PO Take 40 mg by mouth 3 times daily         pregabalin (LYRICA) 150 MG capsule Take 1 capsule (150 mg) by mouth 2 times daily 60 capsule 5     promethazine (PHENERGAN) 12.5 MG tablet TK 1 T PO BID  3     scopolamine (TRANSDERM) 1 MG/3DAYS 72 hr patch Place 1 patch onto the skin       senna (SENOKOT) 8.6 MG tablet Take 8.6 mg by mouth       travoprost, BAK Free, (TRAVATAN Z) 0.004 % ophthalmic solution Place 1 drop into both eyes At Bedtime       ziprasidone (GEODON) 40 MG capsule Take 1 capsule by mouth in the morning       ziprasidone (GEODON) 80 MG capsule Take 160 mg by mouth At Bedtime        amoxicillin (AMOXIL) 250 MG capsule Take 1 capsule (250 mg) by mouth daily (Patient not taking: Reported on 3/14/2023) 30 capsule 0     benztropine (COGENTIN) 0.5 MG tablet Take 1 tablet by mouth (Patient not taking: Reported on 6/12/2023)       bumetanide (BUMEX) 2 MG tablet        cariprazine (VRAYLAR) 1.5 MG CAPS capsule Take 2 mg by mouth daily  (Patient not taking: Reported on 6/12/2023)       cholecalciferol (VITAMIN D3) 5000 units CAPS capsule Take 5,000 Units by mouth daily (Patient not taking: Reported on 6/12/2023)       clotrimazole (LOTRIMIN) 1 % cream Apply topically 2 times daily (Patient not taking: Reported on 3/14/2023)       clotrimazole (LOTRIMIN) 1 % vaginal cream INSERT 5 GRAMS VAGINALLY HS FOR YEAST VAGINITIS FOR 7 DAYS (Patient not taking: Reported on 3/14/2023)  0     cyclobenzaprine (FLEXERIL) 10 MG tablet TK 1 T PO TID PRF MUSCLE SPASM (Patient not taking: Reported on 3/14/2023)  0     CYCLOBENZAPRINE HCL PO Take 5 mg by mouth every 8 hours as needed  (Patient not taking: Reported on 3/14/2023)       diphenoxylate-atropine (LOMOTIL) 2.5-0.025 MG tablet Take 1 tablet by mouth (Patient not taking: Reported on 3/14/2023)       escitalopram (LEXAPRO) 20 MG tablet Take 1 tablet by mouth at bedtime (Patient not taking: Reported on 6/12/2023)       furosemide (LASIX) 80 MG tablet Take 40 mg by mouth 2 times daily  (Patient not taking: Reported on  3/14/2023)       HYDROmorphone (DILAUDID) 2 MG tablet Take 1 mg by mouth (Patient not taking: Reported on 3/14/2023)       hyoscyamine (LEVSIN/SL) 0.125 MG sublingual tablet Place 0.125 mg under the tongue (Patient not taking: Reported on 3/14/2023)       ipratropium - albuterol 0.5 mg/2.5 mg/3 mL (DUONEB) 0.5-2.5 (3) MG/3ML neb solution Take 1 vial by nebulization 4 times daily as needed  (Patient not taking: Reported on 3/14/2023)       Lidocaine-Menthol 4.5-5 % PTCH Externally apply topically 2 times daily (Patient not taking: Reported on 3/14/2023)       lithium 300 MG capsule Take 1 capsule by mouth in the morning and 2 capsules at bedtime (Patient not taking: Reported on 3/14/2023)       loperamide (IMODIUM) 2 MG capsule Take 2 mg by mouth every 6 hours as needed for diarrhea (Patient not taking: Reported on 3/14/2023)       LORazepam (ATIVAN) 0.5 MG tablet TK ONE T PO BID PRA (Patient not taking: Reported on 3/14/2023)  1     MECLIZINE HCL PO Take 25 mg by mouth every 8 hours as needed for dizziness (Patient not taking: Reported on 3/14/2023)       methocarbamol (ROBAXIN) 500 MG tablet TK 1 T PO Q 6 H FOR UP TO 10 DAYS PRF MUSCLE PAIN OR SPASM (Patient not taking: Reported on 3/14/2023)  0     metoprolol succinate ER (TOPROL-XL) 25 MG 24 hr tablet Take 25 mg by mouth       nicotine (COMMIT) 2 MG lozenge Place 2 mg inside cheek every 3 hours as needed for smoking cessation (Patient not taking: Reported on 3/14/2023)       omeprazole (PRILOSEC) 40 MG DR capsule TK 1 C PO QD AC (Patient not taking: Reported on 3/14/2023)  3     oxyCODONE (ROXICODONE) 5 MG tablet TK 1-2 PO Q 4 H PRN FOR PAIN (Patient not taking: Reported on 3/14/2023)  0     oxyCODONE-acetaminophen (PERCOCET) 5-325 MG tablet  (Patient not taking: Reported on 3/14/2023)  0     paliperidone (INVEGA) 6 MG 24 hr tablet Take 6 mg by mouth every morning (Patient not taking: Reported on 3/14/2023)       perphenazine 4 MG tablet Take 4 mg by mouth 2  times daily  (Patient not taking: Reported on 3/14/2023)       propranolol (INDERAL) 10 MG tablet TK ONE T PO BID PRA (Patient not taking: Reported on 3/14/2023)  1     QUEtiapine (SEROQUEL) 50 MG tablet TK ONE T PO HS (Patient not taking: Reported on 3/14/2023)  0     senna-docusate (SENOKOT-S/PERICOLACE) 8.6-50 MG tablet Take 2 tablets by mouth 2 times daily as needed for constipation (Patient not taking: Reported on 3/14/2023)       tiZANidine (ZANAFLEX) 4 MG tablet Take 4 mg by mouth every 8 hours as needed for muscle spasms (Patient not taking: Reported on 3/14/2023)       vitamin D2 (ERGOCALCIFEROL) 44053 units (1250 mcg) capsule Take 50,000 Units by mouth (Patient not taking: Reported on 6/12/2023)         Social History     Tobacco Use     Smoking status: Former     Types: Cigarettes     Smokeless tobacco: Never   Substance Use Topics     Alcohol use: No     Drug use: No       Isis Mcgill CMA  6/12/2023  2:30 PM

## 2023-06-12 NOTE — PROGRESS NOTES
"Procedure: CYSTOSCOPY, WITH SUPRAPUBIC CATHETER INSERTION    Date: TBD  Location: [facility] OR  Provider: Dr. Stahl     Post op appt: 6 weeks~needs to schedule    H&P: PCP  UA/UC: ordered, pt informed 6/12/23  Bowel Prep: NA    Medications: Reviewed and to be reviewed at pre-op.   Blood thinners: No   Diabetic medications: No   Does pt take: No                          Phentermine                          Ozempic                          Wegovy (Semaglutide)    If yes, \"Hold for 7 days before procedure.  Please consult your prescribing provider if you have questions about holding this medication.\"  Soap: reviewed in clinic 6/12/23  Reviewed when to start clear liquids and when to start NPO: reviewed in clinic 6/12/23  : medical transport or brother  24 hour observation: brother    Pt or family member expressed understanding: Betsy verbalized understanding of surgical prep information.    Iraida Medina RN  6/12/2023  5:46 PM       "

## 2023-06-12 NOTE — PROGRESS NOTES
"HPI:  Betsy Resendiz is a 57 year old female being seen for neurogenic bladder. She is interested in Mitrofanoff.     She is unable to void. She has had a SPT until a couple of months ago when the hole closed up when it was being changed. Other problems with SPT included wound complications at SPT site and fragile skin that tore easily at the SPT site.      UDS 2019 shows poor bladder contractility with early sensation and incomplete emptying.     PMH is significant for CHF (follows with cardiology), COPD requiring O2 (follows with pulmonology), schizoaffective disorder, bipolar, adrenal mass (stable), chronic venous stasis of LE,  prolonged QT, diarrhea, type II DM (long term insulin) with CKD stage III (follows with nephrology), chroinic pain syndrome (follows with pain clinic).     The following subcategories of the HISTORY were reviewed with the patient and updated accordingly. If no updates, this indicates no change since last visit:    Reviewed previous notes from  Pinky Ferrara from [facility]    Exam:  BP 98/65   Pulse 65   Ht 1.626 m (5' 4\")   Wt 108.4 kg (239 lb)   BMI 41.02 kg/m    Abdomen: Degree of obesity is moderate. Abdomen is soft and nontender. No organomegaly. Surgical scars include periumbilical from anterior spine fixation.    Review of Imaging:  The following imaging exams were independently viewed and interpreted by me and discussed with patient:  NM stress text : normal perfusion. 76% EF.    Review of Labs:  The following labs were reviewed by me and discussed with the patient:  Cr 1.47 5/5/23    Assessment & Plan   1. neurogenic bladder probably from diabetes  2. Catheter dependent  3. COPD on Home O2  4. She desires a Mitrofanoff catheterizable channel.  I explained to her that she would have to lose about 50 pounds before I be willing to do a Mitrofanoff.  I am concerned that the bowel would not reach the umbilicus.  I am further worried about wound complications especially given her " diabetes plus the obesity.  She was disappointed.  She states that she does not think she will ever be able to lose the weight.  Instead she is interested in a suprapubic tube.  She did have some skin tearing and wound complications with the last suprapubic tube and this could happen again but she would like to give it another shot.  I offered to her that we could cap the suprapubic tube so she does not have to wear a bag and that might help improve her quality of life.    Gilberto Stahl MD  [facility] [place]      ==========================      Additional Coding Information:    Problems:  4 -- two or more stable chronic illnesses    Data Reviewed}        Level of risk:  4 -- minor surgery with patient or procedure risks    Time spent:  40 minutes spent by me on the date of the encounter doing chart review, history and exam, documentation and further activities per the note

## 2023-06-19 ENCOUNTER — TELEPHONE (OUTPATIENT)
Dept: UROLOGY | Facility: CLINIC | Age: 58
End: 2023-06-19
Payer: COMMERCIAL

## 2023-06-19 DIAGNOSIS — Z97.8 CHRONIC INDWELLING FOLEY CATHETER: ICD-10-CM

## 2023-06-19 DIAGNOSIS — R33.9 INCOMPLETE BLADDER EMPTYING: ICD-10-CM

## 2023-06-19 DIAGNOSIS — N31.9 NEUROGENIC BLADDER: Primary | ICD-10-CM

## 2023-06-19 NOTE — TELEPHONE ENCOUNTER
"Procedure: CYSTOSCOPY, WITH SUPRAPUBIC CATHETER INSERTION    Date: 6/29/23  Location: [facility] OR  Provider: Dr. Stahl     Post op appt: 8/11/23    H&P: 6/22/23 w/[facility]  UA/UC: 6/22/23 w/[facility]~NF x 5 days.  Started 6/26/23, hold DOS then resume  Bowel Prep: NA    Medications: reviewed at pre-op   Blood thinners: No   Diabetic medications: No   Does pt take: No                          Phentermine                          Ozempic                          Wegovy (Semaglutide)    If yes, \"Hold for 7 days before procedure.  Please consult your prescribing provider if you have questions about holding this medication.\"  Soap: reviewed 6/19/23  Reviewed when to start clear liquids and when to start NPO: reviewed 6/19/23  : brother  24 hour observation: brother    Pt or family member expressed understanding: Betsy verbalized understanding of surgical prep information.      Iraida Medina RN  6/19/2023  2:56 PM       "

## 2023-06-23 NOTE — H&P (VIEW-ONLY)
[facility]      Preoperative Consultation   Betsy Resendiz   : 1965   Gender: female    Date of Encounter: 2023    Nursing Notes:   Candy Pepper  2023  7:34 AM  Signed  Betsy Resendiz is a 58 y.o. female (1965) who presents for preop evaluation undergoing CYSTOSCOPY, WITH SUPRAPUBIC CATHETER INSERTION.    Date of Surgery: 23  Surgical Specialty: Urology  Gilberto Rubi MD  Hospital/Surgical Facility:  [facility]  Fax number: [phone]  Surgery type: outpatient  Primary Physician: Adal Bueno         History of Present Illness   Patient with chronic urinary retention requiring a suprapubic catheter.     Review of Systems   A comprehensive review of systems was negative except for items noted in HPI.    Patient Active Problem List   Diagnosis Code     Unspecified asthma J45.909     Migraine, unspecified, without mention of intractable migraine without mention of status migrainosus G43.909     Unspecified glaucoma H40.9     Bipolar Disorder, Unspecified F31.9     Other lymphedema I89.0     Type 2 diabetes mellitus with stage 3a chronic kidney disease (HC) E11.22, N18.31     Pulmonary insufficiency following trauma and surgery 518.5     Tobacco use disorder F17.200     Generalized Osteoarthrosis, First Metatarsophalangeal joint M15.9     Postsurgical arthrodesis status Z98.1     Hallux valgus (acquired) M20.10     possible conversion reaction F44.9     Cervical neck pain with evidence of disc disease M50.90     Schizoaffective disorder, unspecified condition, with seasonal component F25.9     Posttraumatic stress disorder F43.10     Osteoarthritis of hip M16.9     Anxiety F41.9     Rotator cuff tendinitis M75.80     Pain medication agreement Z02.89     Chronic pain, back on continuous opiod dependence  G89.29     ACP (advance care planning) Z71.89     Ulnar neuritis G56.20     Atrophy of muscle of left lower leg M62.562     SPIKE (obstructive  sleep apnea) G47.33     HTN (hypertension) I10     Opioid type dependence, continuous use (HC) F11.20     JAVIER (generalized anxiety disorder) F41.1     Morbid obesity with BMI of 40.0-44.9, adult (HC) E66.01, Z68.41     Chronic ulcer of left leg, limited to breakdown of skin (HC) L97.921     COPD (chronic obstructive pulmonary disease) (HC) J44.9     Bladder tumor D49.4     Chronic respiratory failure with hypoxia and hypercapnia(HC) J96.11     Pulmonary embolus (HC) I26.99     Closed fracture of shaft of right humerus S42.301A     Acute encephalopathy G93.40     Cervical myelopathy with cervical radiculopathy (HC) G95.9, M54.12     Non-intractable cyclical vomiting with nausea R11.15     Adrenal mass (HC) E27.8     Cyclic vomiting syndrome R11.15     Other specified hypothyroidism E03.8     Chronic indwelling Abernathy catheter Z97.8     Polyuria R35.89     Normocytic anemia D64.9     Prolonged QT interval R94.31     Abdominal pain R10.9     Functional bowel disorder K59.9     Venous stasis dermatitis of both lower extremities I87.2     Urinary retention R33.9     Acute urinary retention R33.8     Cellulitis of both lower extremities L03.115, L03.116     Acute hypokalemia E87.6     Chronic, continuous use of opioids F11.90     Anxiety and depression F41.9, F32.A     Cervical disc herniation M50.20     Morbid obesity with body mass index (BMI) greater than or equal to 50 (HC) E66.01     Acute postoperative pain G89.18     Acute neck pain M54.2     S/P cervical spinal fusion Z98.1     Urinary retention R33.9     Cellulitis due to methicillin-resistant Staphylococcus aureus (MRSA) L03.90, B95.62     Diarrhea of presumed infectious origin R19.7     Bilateral lower leg cellulitis L03.116, L03.115     Chronic venous stasis dermatitis of both lower extremities I87.2     Acute cystitis without hematuria N30.00     Cellulitis and abscess of leg L03.119, L02.419     Cellulitis of left lower extremity L03.116     Closed  nondisplaced fracture of hook process of right hamate bone S62.154A     Acute diastolic CHF (congestive heart failure) (HC) I50.31     Acute pancreatitis K85.90     Pneumonia due to COVID-19 virus U07.1, J12.82     Urinary retention R33.9     CHAPINCITO (acute kidney injury) (HC) N17.9     Generalized abdominal pain R10.84     Suprapubic catheter (HC) Z93.59     Hypokalemia E87.6     UTI (urinary tract infection) N39.0     CKD (chronic kidney disease) stage 3, GFR 30-59 ml/min (HC) N18.30     Obesity hypoventilation syndrome (HC) E66.2     Drug-induced constipation K59.03     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 Max acetaminophen dose: 4000mg in 24 hrs.     albuterol HFA (PRO-AIR; VENTOLIN; PROVENTIL) 90 mcg/actuation inhaler Inhale 2 Puffs by mouth every 4 hours if needed (shortness of breath).     albuterol-ipratropium (DUONEB) (2.5-0.5 mg) in 3 mL NEBULIZATION solution Inhale 3 mL by mouth 4 times daily if needed.     amantadine HCL (SYMMETREL) 100 mg capsule Take 1 Capsule by mouth once daily.     baclofen (LIORESAL) 5 mg tab tablet Take 2 Tablets (10 mg) by mouth 3 times daily if needed (Muscle spasms).     blood sugar diagnostic strip Use to test 3 times daily     Blood-Glucose Meter Use to test 3 times day     brimonidine (ALPHAGAN) 0.2 % ophthalmic solution INSTILL 1 DROP IN BOTH EYES TWICE DAILY     budesonide (PULMiCORT) 1 mg/2 mL neb suspension INHALE CONTENTS OF 2 VIALS(2MG) VIA NEBULIZER TWICE DAILY     bumetanide (BUMEX) 2 mg tablet Take 1 Tablet (2 mg) by mouth two times daily.     buprenorphine 20 mcg/hr (BUTRANS) 20 mcg/hour transdermal patch Apply 1 Patch on dry, clean, hairless skin once weekly.     calcium carbonate (Calcium 500) 500 mg calcium (1,250 mg) tablet Take 1 Tablet (500 mg) by mouth four times daily with meals and at bedtime.     Cranberry Extract 425 mg  cap Take 425 mg by mouth once daily.     cyproheptadine (PERIACTIN) 4 mg tablet Take 4 mg by mouth 3 times daily if needed.     diclofenac topical (VOLTAREN) 1 % gel Apply topically to affected area(s) 2 times daily if needed.     durable medical equipment (DME) Velcro leg straps for catheter bag     durable medical equipment (DME) Stat-Lock for mac catheter tubing     durable medical equipment (DME) device with portability, home use, lifetime use     fluticasone (50 mcg per actuation) nasal solution (FLONASE) INHALE ONE SPRAY INTO AFFECTED NOSTRIL(S) ONCE DAILY     hydrOXYzine HCL (ATARAX) 25 mg tablet Take 1 Tablet (25 mg) by mouth every 6 hours if needed for Anxiety (Nausea).     lamoTRIgine (LAMICTAL) 100 mg tablet Take 150 mg by mouth at bedtime.     lancets (Accu-Chek Softclix Lancets) Use to test 3 times daily     lansoprazole (PREVACID) 30 mg capsule Take 1 Capsule (30 mg) by mouth once daily before a meal.     latanoprost (XALATAN) 0.005 % ophthalmic solution Place 1 Drop into both eyes at bedtime.     linaCLOtide (Linzess) 72 mcg cap capsule Take 1 Capsule (72 mcg) by mouth once daily.     liothyronine (CYTOMEL) 25 mcg tablet Take 1 Tablet (25 mcg) by mouth once daily.     magnesium oxide (MAG-) 400 mg tablet Take 1 Tablet (400 mg) by mouth two times daily.     metoprolol succinate (TOPROL XL) 25 mg Sustained-Release tablet Take 1 Tablet (25 mg) by mouth once daily.     miscellaneous medical supply [facility] [place] 30-40 mmHg, knee high    [facility]  [address], [address]  [place], [place] [id number] [phone] (fax)     [facility]  [address], [address]  [place] [address] [phone] (fax)     nystatin powder (MYCOSTATIN) powder Apply 1 Strip topically to affected area(s) 2 times daily if needed.     ondansetron (ZOFRAN ODT) 4 mg disintegrating tablet Place 1 Tablet  (4 mg) on the tongue every 8 hours if needed for Nausea/Vomiting.     oxygen-air delivery systems (HOME OXYGEN) Oxygen for home use. Liters per minute: 1.5 per nasal cannula. Frequency of use: Continuous with portability.;. Length of need: 99  Months.     oxygen-air delivery systems (HOME OXYGEN) Oxygen for home use. Liters per minute: 1-2 per nasal cannula. Frequency of use: Continuous with portability.;. Length of need: 99  Months.     pantoprazole (PROTONIX) 40 mg delayed-release tablet Take 1 Tablet (40 mg) by mouth once daily before a meal.     polyethylene glycol (Miralax) 17 g per packet packet Mix 17 g in liquid then take by mouth two times daily. Hold for loose stool.     potassium chloride (KLOR-CON) 20 mEq extended-release tablet (part/cryst) Take 2 Tablets (40 mEq) by mouth once daily with a meal.     pregabalin (LYRICA) 150 mg capsule Take 150 mg by mouth three times daily.     saliva substitute (Biotene Dry Mouth Oral Rinse) mouthwash Swish and spit 15 mL by mouth 4 times daily if needed for Dry Mouth.     sennosides-docusate (SENOKOT S) (8.6-50 mg) tablet Take 1 Tablet by mouth 2 times daily if needed for Constipation.     spironolactone (ALDACTONE) 100 mg tablet Take 1 Tablet (100 mg) by mouth every morning.     SUMAtriptan (IMITREX) 6 mg/0.5 mL injection (SUBCUTANEOUS use only) Inject 6 mg subcutaneous 2 times daily if needed for Migraine. Give at minimum 2hrs apart. Max Dose: 12mg per 24hrs.     venlafaxine (EFFEXOR XR) 37.5 mg Extended-Release capsule Take 37.5 mg by mouth once daily with a meal.     ziprasidone (GEODON) 60 mg capsule Take 60 mg by mouth at bedtime.     ziprasidone (GEODON) 80 mg capsule Take 80 mg by mouth once daily with a meal.     No current facility-administered medications for this visit.     Medications have been reviewed by me and are current to the best of my knowledge and ability.     Allergies   Allergen Reactions     Morphine Shortness Of Breath and Nausea And Vomiting  "    Morphine NOT tolerated. Please use other opioid     Codeine Shortness Of Breath     Iodine Rash     rash     Keflex [Cephalexin] Hives     Lisinopril Cough     Statins-Hmg-Coa Reductase Inhibitors Other - Describe In Comment Field     Patient declines     Sumatriptan Headache     Zinc Oxide-Cod Liver Oil Itching     Benadryl [Diphenhydramine Hcl] Itching     About 5 minutes after  IV administration- c/o extreme itching to torso and all extremities.      Past Surgical History:   . Laterality Date     Arthrodesis, right first metatarsophalangeal joint.  2009     BACK SURGERY  seven times/back    lumbar /hardware     BIOPSY CERVIX       BLADDER REPAIR       BUNIONECTOMY  2011     COLONOSCOPY W/ BIOPSIES AND POLYPECTOMY N/A 2019     ESOPHAGOSCOPY / EGD  2019     IR BLADDER CATHETER INSERTION N/A 2022    , Dr. Rubén Yeager IR     pressure necrosis and abscess left gluteus.  2014    debridement     RADIOFREQUENCY ABLATION Left 2016     RIGHT REVISION ARTHRODESIS 1ST METATARSAL PHALANGEAL JOINT  2009    Right foot /hardware     THORACOTOMY      [facility] in  from [facility]     Social History     Tobacco Use     Smoking status: Former     Current packs/day: 0.00     Average packs/day: 1 pack/day for 35.0 years (35.0 pk-yrs)     Types: Cigarettes     Start date:      Quit date: 2018     Years since quittin.6     Smokeless tobacco: Never   Vaping Use     Vaping Use: Former     Substances: Flavoring   Substance Use Topics     Alcohol use: No     Alcohol/week: 0.0 standard drinks of alcohol     Drug use: No     Family History   Problem Relation Age of Onset     Cancer Mother         Multiple myeloma     Diabetes Maternal Grandmother      Cancer Other         Maternal Niece-Wilms       PAST DIFFICULTY WITH ANESTHESIA: None     Physical Exam   /76   Pulse 68   Temp 99.4  F (37.4  C) (Oral)   Ht 1.626 m (5' 4\")   Wt 113.8 kg (250 lb 14.4 oz)   SpO2 100%   BMI " 43.07 kg/m   Body mass index is 43.07 kg/m .  General Appearance: Normal.  Head Exam: Normal. Normocephalic, atraumatic.  Eye Exam: Normal external eye, conjunctiva, lids.  Ear Exam: Normal TM's bilaterally. Normal auditory canals and external ears. Non-tender.  Nose Exam: Normal external nose.  OroPharynx Exam:  teeth absent otherwise normal.  Neck Exam: Supple, no masses or nodes.  Thyroid Exam: No nodules or enlargement.  Chest/Respiratory Exam: Normal chest wall and respirations. Clear to auscultation.  Cardiovascular Exam: Regular rate and rhythm. S1, S2, no murmur, click, gallop, or rubs.  Gastrointestinal Exam: Soft, nontender, no abnormal masses or organomegaly.  Lymphatic Exam: Non-palpable nodes in neck nor clavicular regions.  Musculoskeletal Exam: kyphosis, mild  Skin: no rash or abnormalities  Neurologic Exam: Nonfocal; symmetric DTRs, normal gross motor movement, tone, and coordination. No tremor.  Psychiatric Exam: Alert and oriented, appropriate affect.    Labs:     Component      Latest Ref Rng 6/23/2023  1:44 PM   POTASSIUM      3.5 - 5.0 mmol/L 4.3      UC:  Pending    ECG: no     Assessment / Plan     (Z01.818) Preop examination  (primary encounter diagnosis)/(R33.9) Urinary retention  Comment:  discussed with patient today.  Plan:  she is a low risk for above planned procedure.    Patient is cleared for planned procedure.   Electronically Signed by:     Elle Dunlap M.D.  6/23/2023      The Pre-Op Tool    Recommendations      Low Risk Procedure    Cardiac History  No history of coronary artery disease           Labs  Potassium within last 30 days  EKG  Not indicated  Stress Testing  Not indicated    * Testing recommendations are intended to assist, but not direct, clinical decisions.       *    Continue taking Metoprolol (Lopressor, Toprol); be sure to take the evening before and/or morning of the procedure.  Hold Spironolactone (Aldactone) on the morning of procedure.  Take your potassium  pill as usual the morning of the procedure    * Medication recommendations are not intended to be exhaustive; they are limited to common medications that are potentially dangerous if incorrectly managed          Labs  * Data supports elimination of  routine  laboratory testing in favor of focused,  indicated  testing based on medical co-morbidities. A 2009 study randomized 1061 patients undergoing ambulatory, non-cataract surgery to routine or to indicated testing. Perioperative adverse events were similar (Anesthesia & Analgesia 2009;108:467-75; Anesthesiol. Clin. 2016 Mar;34(1):43-58).  EKG  * The ACC/AHA recommends against obtaining routine EKGs in patients undergoing low risk surgeries, a class IIa recommendation (JACC. 2014;64(21);e1-76).     Session ID: [id number]  Endnotes and bibliography available upon request: [email or web address]  *Some images could not be shown.

## 2023-06-26 ENCOUNTER — TELEPHONE (OUTPATIENT)
Dept: UROLOGY | Facility: CLINIC | Age: 58
End: 2023-06-26
Payer: COMMERCIAL

## 2023-06-26 DIAGNOSIS — N31.9 NEUROGENIC BLADDER: Primary | ICD-10-CM

## 2023-06-26 RX ORDER — NITROFURANTOIN 25; 75 MG/1; MG/1
100 CAPSULE ORAL 2 TIMES DAILY
Qty: 10 CAPSULE | Refills: 0 | Status: SHIPPED | OUTPATIENT
Start: 2023-06-26 | End: 2023-07-01

## 2023-06-26 NOTE — TELEPHONE ENCOUNTER
I: Betsy Resendiz, 58 year old    S: +ucx, need for antibiotic pre-procedure    B: Pt is scheduled for SPT insertion on 6/29/23 with Dr. Stahl.    A: +ucx for E.coli & enterococcus faecalis.  NF sensitive to both bacteria's.        R: Rx for NF x 5 days to be started today, held DOS and then resumed.  Writer left a message for pt today.  Nurse line given for questions/concerns.    Iraida Medina RN  06/26/23  8:28 AM

## 2023-06-27 RX ORDER — POLYETHYLENE GLYCOL 3350 17 G/17G
1 POWDER, FOR SOLUTION ORAL 2 TIMES DAILY
COMMUNITY

## 2023-06-27 RX ORDER — PANTOPRAZOLE SODIUM 40 MG/1
40 TABLET, DELAYED RELEASE ORAL DAILY
COMMUNITY

## 2023-06-27 RX ORDER — LATANOPROST 50 UG/ML
1 SOLUTION/ DROPS OPHTHALMIC DAILY
COMMUNITY

## 2023-06-27 RX ORDER — FLUTICASONE PROPIONATE 50 MCG
1 SPRAY, SUSPENSION (ML) NASAL DAILY
COMMUNITY

## 2023-06-27 RX ORDER — AMANTADINE HYDROCHLORIDE 100 MG/1
CAPSULE, GELATIN COATED ORAL
COMMUNITY

## 2023-06-27 RX ORDER — SPIRONOLACTONE 100 MG/1
100 TABLET, FILM COATED ORAL DAILY
COMMUNITY

## 2023-06-27 RX ORDER — BUPRENORPHINE 20 UG/H
PATCH TRANSDERMAL
COMMUNITY
Start: 2023-05-12

## 2023-06-27 RX ORDER — HYDROXYZINE HYDROCHLORIDE 25 MG/1
25 TABLET, FILM COATED ORAL EVERY 6 HOURS PRN
COMMUNITY

## 2023-06-27 RX ORDER — MAGNESIUM OXIDE 400 MG/1
400 TABLET ORAL DAILY
COMMUNITY

## 2023-06-27 RX ORDER — VENLAFAXINE HYDROCHLORIDE 37.5 MG/1
CAPSULE, EXTENDED RELEASE ORAL
COMMUNITY

## 2023-06-27 RX ORDER — CRANBERRY FRUIT EXTRACT 425 MG
CAPSULE ORAL
COMMUNITY

## 2023-06-27 RX ORDER — CYPROHEPTADINE HYDROCHLORIDE 4 MG/1
4 TABLET ORAL 3 TIMES DAILY PRN
COMMUNITY

## 2023-06-27 RX ORDER — SUMATRIPTAN SUCCINATE 6 MG/.5ML
INJECTION, SOLUTION SUBCUTANEOUS ONCE
COMMUNITY
End: 2023-06-27

## 2023-06-27 RX ORDER — CEFUROXIME AXETIL 250 MG/1
TABLET ORAL
COMMUNITY

## 2023-06-28 ENCOUNTER — ANESTHESIA EVENT (OUTPATIENT)
Dept: SURGERY | Facility: CLINIC | Age: 58
End: 2023-06-28
Payer: COMMERCIAL

## 2023-06-28 NOTE — ANESTHESIA PREPROCEDURE EVALUATION
Anesthesia Pre-Procedure Evaluation    Patient: Betsy Resendiz   MRN: [id number] : 1965        Procedure : Procedure(s):  CYSTOSCOPY, WITH SUPRAPUBIC CATHETER INSERTION  Latex Free          Past Medical History:   Diagnosis Date     Atrophy of muscle of left lower leg 2015     Bullosis diabeticorum (H) 2014     History of thrombophlebitis      Hypokalemia 2010     Vitamin D deficiency 2012      Past Surgical History:   Procedure Laterality Date     ARTHRODESIS FOOT Right 2009     BACK SURGERY      x7      BACK SURGERY       BIOPSY/EXCIS CERVICAL LESN       BLADDER SURGERY       BUNIONECTOMY       EXCISION / CURETTAGE BONE CYST CARPAL BONES Right 2017    Procedure: EXCISION RIGHT WRIST HOOK OF ROSCOE, ;  Surgeon: Escobar Charles MD;  Location: [facility];  Service:      INCISION AND DRAINAGE  2013    Pressure ulcer     OTHER SURGICAL HISTORY  2009 & 2009    arthrodesis, right first metatarsophalengeal jointand later revision     OTHER SURGICAL HISTORY      cervix biopsy     OTHER SURGICAL HISTORY      pressure necrosis and abscess left gluteus- debridement     RADIOFREQUENCY ABLATION Left 2016     REPAIR BLADDER       REPAIR BLADDER       THORACOTOMY      Abscess     THORACOTOMY Left 2008    from abscess      Allergies   Allergen Reactions     Cephalexin Hives     Codeine Shortness Of Breath     Has tolerated morphine 2008     Morphine Nausea and Vomiting and Shortness Of Breath     trouble breathing and nausea  trouble breathing and nausea  Morphine NOT tolerated. Please use other opioid  trouble breathing and nausea     Cod Liver Oil      Diphenhydramine Itching     About 5 minutes after  IV administration- c/o extreme itching to torso and all extremities.      Iodinated Contrast Media      rash  rash  rash       Iodine      rash     Lisinopril Cough     No Clinical Screening - See Comments      Statin-hmg-coa reductase  inhibitors  Statin-hmg-coa reductase inhibitors     Nsaids      nausea     Other Drug Allergy (See Comments)      Zinc Oxide-cod Liver Oil and Statins-hmg-coa Reductase Inhibitors     Statins Other (See Comments)     Patient declines  Patient declines  Patient declines     Sulfa Antibiotics      Mouth sore  Mouth sore       Sumatriptan Other (See Comments)     Zinc       Social History     Tobacco Use     Smoking status: Former     Types: Cigarettes     Smokeless tobacco: Never   Substance Use Topics     Alcohol use: No      Wt Readings from Last 1 Encounters:   06/12/23 108.4 kg (239 lb)        Anesthesia Evaluation            ROS/MED HX  ENT/Pulmonary:     (+) sleep apnea, uses CPAP, Moderate Persistent, asthma Treatment: Inhaler daily,  COPD,     Neurologic:  - neg neurologic ROS     Cardiovascular:     (+) hypertension-----    METS/Exercise Tolerance:     Hematologic:       Musculoskeletal:       GI/Hepatic:     (+) GERD,     Renal/Genitourinary:     (+) renal disease, type: CRI,     Endo:     (+) type II DM, thyroid problem, Obesity,     Psychiatric/Substance Use:     (+) psychiatric history anxiety, bipolar and other (comment) (schizoaffective)     Infectious Disease:       Malignancy:       Other:            Physical Exam    Airway        Mallampati: II   TM distance: > 3 FB   Neck ROM: full   Mouth opening: > 3 cm    Respiratory Devices and Support         Dental       (+) Edentulous      Cardiovascular   cardiovascular exam normal          Pulmonary   pulmonary exam normal                OUTSIDE LABS:  CBC:   Lab Results   Component Value Date    WBC 9.8 02/28/2019    WBC 11.2 (H) 02/07/2019    HGB 12.6 02/28/2019    HGB 12.1 02/07/2019    HCT 37.1 02/28/2019    HCT 38.0 02/07/2019     02/28/2019     02/07/2019     BMP:   Lab Results   Component Value Date     02/28/2019     02/21/2019    POTASSIUM 2.9 (L) 02/28/2019    POTASSIUM 2.9 (L) 02/21/2019    CHLORIDE 98 02/28/2019     CHLORIDE 101 02/21/2019    CO2 32 02/28/2019    CO2 31 02/21/2019    BUN 12 02/28/2019    BUN 12 02/21/2019    CR 1.25 (H) 02/28/2019    CR 1.09 (H) 02/21/2019     (H) 02/28/2019     (H) 02/21/2019     COAGS:   Lab Results   Component Value Date    INR 0.96 12/18/2018     POC:   Lab Results   Component Value Date     (H) 11/23/2018     HEPATIC:   Lab Results   Component Value Date    ALBUMIN 3.5 02/28/2019    PROTTOTAL 7.8 02/28/2019    ALT 37 02/28/2019    AST 24 02/28/2019    ALKPHOS 112 02/28/2019    BILITOTAL 0.4 02/28/2019     OTHER:   Lab Results   Component Value Date    A1C 5.8 (H) 02/28/2019    GAURVA 9.4 02/28/2019    TSH 1.29 02/07/2019    CRP 38.8 (H) 08/30/2018    SED 33 (H) 08/30/2018       Anesthesia Plan    ASA Status:  3   NPO Status:  NPO Appropriate    Anesthesia Type: General.     - Airway: ETT   Induction: Intravenous, RSI.   Maintenance: Balanced.        Consents    Anesthesia Plan(s) and associated risks, benefits, and realistic alternatives discussed. Questions answered and patient/representative(s) expressed understanding.    - Discussed:     - Discussed with:  Patient      - Extended Intubation/Ventilatory Support Discussed: Yes.      - Patient is DNR/DNI Status: No    Use of blood products discussed: No .     Postoperative Care    Pain management: Multi-modal analgesia.   PONV prophylaxis: Ondansetron (or other 5HT-3)     Comments:              PAC Discussion and Assessment    ASA Classification: 3    Anesthetic techniques and relevant risks discussed: GA  Invasive monitoring and risk discussed: No    Possibility and Risk of blood transfusion discussed: No  NPO instructions given: NPO after midnight    Needs early admission to pre-op area: No                                             Renae Tucker MD

## 2023-06-29 ENCOUNTER — ANESTHESIA (OUTPATIENT)
Dept: SURGERY | Facility: CLINIC | Age: 58
End: 2023-06-29
Payer: COMMERCIAL

## 2023-06-29 ENCOUNTER — HOSPITAL ENCOUNTER (OUTPATIENT)
Facility: CLINIC | Age: 58
Discharge: HOME | End: 2023-06-29
Attending: UROLOGY | Admitting: UROLOGY
Payer: COMMERCIAL

## 2023-06-29 VITALS
HEART RATE: 64 BPM | RESPIRATION RATE: 14 BRPM | OXYGEN SATURATION: 97 % | BODY MASS INDEX: 41.14 KG/M2 | HEIGHT: 64 IN | TEMPERATURE: 97.7 F | SYSTOLIC BLOOD PRESSURE: 127 MMHG | DIASTOLIC BLOOD PRESSURE: 68 MMHG | WEIGHT: 240.96 LBS

## 2023-06-29 DIAGNOSIS — M50.90 CERVICAL NECK PAIN WITH EVIDENCE OF DISC DISEASE: ICD-10-CM

## 2023-06-29 DIAGNOSIS — R33.9 URINARY RETENTION: Primary | ICD-10-CM

## 2023-06-29 DIAGNOSIS — N31.9 NEUROGENIC BLADDER: ICD-10-CM

## 2023-06-29 LAB
GLUCOSE BLDC GLUCOMTR-MCNC: 111 MG/DL (ref 70–99)
GLUCOSE BLDC GLUCOMTR-MCNC: 125 MG/DL (ref 70–99)

## 2023-06-29 PROCEDURE — 250N000011 HC RX IP 250 OP 636: Performed by: UROLOGY

## 2023-06-29 PROCEDURE — 272N000001 HC OR GENERAL SUPPLY STERILE: Performed by: UROLOGY

## 2023-06-29 PROCEDURE — 250N000011 HC RX IP 250 OP 636

## 2023-06-29 PROCEDURE — 76942 ECHO GUIDE FOR BIOPSY: CPT | Mod: 26 | Performed by: UROLOGY

## 2023-06-29 PROCEDURE — 82962 GLUCOSE BLOOD TEST: CPT

## 2023-06-29 PROCEDURE — 710N000012 HC RECOVERY PHASE 2, PER MINUTE: Performed by: UROLOGY

## 2023-06-29 PROCEDURE — 258N000003 HC RX IP 258 OP 636

## 2023-06-29 PROCEDURE — 250N000025 HC SEVOFLURANE, PER MIN: Performed by: UROLOGY

## 2023-06-29 PROCEDURE — 360N000082 HC SURGERY LEVEL 2 W/ FLUORO, PER MIN: Performed by: UROLOGY

## 2023-06-29 PROCEDURE — 250N000011 HC RX IP 250 OP 636: Mod: JZ | Performed by: ANESTHESIOLOGY

## 2023-06-29 PROCEDURE — 250N000009 HC RX 250

## 2023-06-29 PROCEDURE — 370N000017 HC ANESTHESIA TECHNICAL FEE, PER MIN: Performed by: UROLOGY

## 2023-06-29 PROCEDURE — 51102 DRAIN BL W/CATH INSERTION: CPT | Mod: GC | Performed by: UROLOGY

## 2023-06-29 PROCEDURE — 710N000010 HC RECOVERY PHASE 1, LEVEL 2, PER MIN: Performed by: UROLOGY

## 2023-06-29 PROCEDURE — 999N000141 HC STATISTIC PRE-PROCEDURE NURSING ASSESSMENT: Performed by: UROLOGY

## 2023-06-29 PROCEDURE — 250N000009 HC RX 250: Performed by: UROLOGY

## 2023-06-29 PROCEDURE — C2627 CATH, SUPRAPUBIC/CYSTOSCOPIC: HCPCS | Performed by: UROLOGY

## 2023-06-29 RX ORDER — DIPHENHYDRAMINE HYDROCHLORIDE 50 MG/ML
25 INJECTION INTRAMUSCULAR; INTRAVENOUS EVERY 6 HOURS PRN
Status: DISCONTINUED | OUTPATIENT
Start: 2023-06-29 | End: 2023-06-29 | Stop reason: HOSPADM

## 2023-06-29 RX ORDER — SODIUM CHLORIDE, SODIUM LACTATE, POTASSIUM CHLORIDE, CALCIUM CHLORIDE 600; 310; 30; 20 MG/100ML; MG/100ML; MG/100ML; MG/100ML
INJECTION, SOLUTION INTRAVENOUS CONTINUOUS
Status: DISCONTINUED | OUTPATIENT
Start: 2023-06-29 | End: 2023-06-29 | Stop reason: HOSPADM

## 2023-06-29 RX ORDER — EPHEDRINE SULFATE 50 MG/ML
INJECTION, SOLUTION INTRAMUSCULAR; INTRAVENOUS; SUBCUTANEOUS PRN
Status: DISCONTINUED | OUTPATIENT
Start: 2023-06-29 | End: 2023-06-29

## 2023-06-29 RX ORDER — OXYCODONE HYDROCHLORIDE 5 MG/1
5 TABLET ORAL EVERY 6 HOURS PRN
Qty: 6 TABLET | Refills: 0 | Status: SHIPPED | OUTPATIENT
Start: 2023-06-29 | End: 2023-07-02

## 2023-06-29 RX ORDER — DEXAMETHASONE SODIUM PHOSPHATE 4 MG/ML
INJECTION, SOLUTION INTRA-ARTICULAR; INTRALESIONAL; INTRAMUSCULAR; INTRAVENOUS; SOFT TISSUE PRN
Status: DISCONTINUED | OUTPATIENT
Start: 2023-06-29 | End: 2023-06-29

## 2023-06-29 RX ORDER — LIDOCAINE HYDROCHLORIDE 20 MG/ML
JELLY TOPICAL ONCE
Status: DISCONTINUED | OUTPATIENT
Start: 2023-06-29 | End: 2023-06-29 | Stop reason: HOSPADM

## 2023-06-29 RX ORDER — ONDANSETRON 4 MG/1
4 TABLET, ORALLY DISINTEGRATING ORAL EVERY 30 MIN PRN
Status: DISCONTINUED | OUTPATIENT
Start: 2023-06-29 | End: 2023-06-29 | Stop reason: HOSPADM

## 2023-06-29 RX ORDER — PROPOFOL 10 MG/ML
INJECTION, EMULSION INTRAVENOUS PRN
Status: DISCONTINUED | OUTPATIENT
Start: 2023-06-29 | End: 2023-06-29

## 2023-06-29 RX ORDER — PHENAZOPYRIDINE HYDROCHLORIDE 100 MG/1
100 TABLET, FILM COATED ORAL ONCE
Status: DISCONTINUED | OUTPATIENT
Start: 2023-06-29 | End: 2023-06-29 | Stop reason: HOSPADM

## 2023-06-29 RX ORDER — ONDANSETRON 2 MG/ML
4 INJECTION INTRAMUSCULAR; INTRAVENOUS EVERY 30 MIN PRN
Status: DISCONTINUED | OUTPATIENT
Start: 2023-06-29 | End: 2023-06-29 | Stop reason: HOSPADM

## 2023-06-29 RX ORDER — FENTANYL CITRATE 50 UG/ML
INJECTION, SOLUTION INTRAMUSCULAR; INTRAVENOUS PRN
Status: DISCONTINUED | OUTPATIENT
Start: 2023-06-29 | End: 2023-06-29

## 2023-06-29 RX ORDER — DIPHENHYDRAMINE HCL 25 MG
25 CAPSULE ORAL EVERY 6 HOURS PRN
Status: DISCONTINUED | OUTPATIENT
Start: 2023-06-29 | End: 2023-06-29 | Stop reason: HOSPADM

## 2023-06-29 RX ORDER — LIDOCAINE HYDROCHLORIDE AND EPINEPHRINE 10; 10 MG/ML; UG/ML
INJECTION, SOLUTION INFILTRATION; PERINEURAL PRN
Status: DISCONTINUED | OUTPATIENT
Start: 2023-06-29 | End: 2023-06-29 | Stop reason: HOSPADM

## 2023-06-29 RX ORDER — HYDROMORPHONE HCL IN WATER/PF 6 MG/30 ML
0.2 PATIENT CONTROLLED ANALGESIA SYRINGE INTRAVENOUS EVERY 5 MIN PRN
Status: DISCONTINUED | OUTPATIENT
Start: 2023-06-29 | End: 2023-06-29 | Stop reason: HOSPADM

## 2023-06-29 RX ORDER — OXYCODONE HYDROCHLORIDE 10 MG/1
10 TABLET ORAL
Status: DISCONTINUED | OUTPATIENT
Start: 2023-06-29 | End: 2023-06-29 | Stop reason: HOSPADM

## 2023-06-29 RX ORDER — LIDOCAINE HYDROCHLORIDE 20 MG/ML
INJECTION, SOLUTION INFILTRATION; PERINEURAL PRN
Status: DISCONTINUED | OUTPATIENT
Start: 2023-06-29 | End: 2023-06-29

## 2023-06-29 RX ORDER — FENTANYL CITRATE 50 UG/ML
50 INJECTION, SOLUTION INTRAMUSCULAR; INTRAVENOUS EVERY 5 MIN PRN
Status: DISCONTINUED | OUTPATIENT
Start: 2023-06-29 | End: 2023-06-29 | Stop reason: HOSPADM

## 2023-06-29 RX ORDER — OXYCODONE HYDROCHLORIDE 5 MG/1
5 TABLET ORAL
Status: DISCONTINUED | OUTPATIENT
Start: 2023-06-29 | End: 2023-06-29 | Stop reason: HOSPADM

## 2023-06-29 RX ORDER — FENTANYL CITRATE 50 UG/ML
25 INJECTION, SOLUTION INTRAMUSCULAR; INTRAVENOUS EVERY 5 MIN PRN
Status: DISCONTINUED | OUTPATIENT
Start: 2023-06-29 | End: 2023-06-29 | Stop reason: HOSPADM

## 2023-06-29 RX ORDER — ACETAMINOPHEN 325 MG/1
975 TABLET ORAL ONCE
Status: DISCONTINUED | OUTPATIENT
Start: 2023-06-29 | End: 2023-06-29 | Stop reason: HOSPADM

## 2023-06-29 RX ORDER — ONDANSETRON 2 MG/ML
INJECTION INTRAMUSCULAR; INTRAVENOUS PRN
Status: DISCONTINUED | OUTPATIENT
Start: 2023-06-29 | End: 2023-06-29

## 2023-06-29 RX ORDER — HYDROMORPHONE HCL IN WATER/PF 6 MG/30 ML
0.4 PATIENT CONTROLLED ANALGESIA SYRINGE INTRAVENOUS EVERY 5 MIN PRN
Status: DISCONTINUED | OUTPATIENT
Start: 2023-06-29 | End: 2023-06-29 | Stop reason: HOSPADM

## 2023-06-29 RX ORDER — CIPROFLOXACIN 2 MG/ML
400 INJECTION, SOLUTION INTRAVENOUS EVERY 12 HOURS
Status: DISCONTINUED | OUTPATIENT
Start: 2023-06-29 | End: 2023-06-29 | Stop reason: HOSPADM

## 2023-06-29 RX ORDER — SODIUM CHLORIDE, SODIUM LACTATE, POTASSIUM CHLORIDE, CALCIUM CHLORIDE 600; 310; 30; 20 MG/100ML; MG/100ML; MG/100ML; MG/100ML
INJECTION, SOLUTION INTRAVENOUS CONTINUOUS PRN
Status: DISCONTINUED | OUTPATIENT
Start: 2023-06-29 | End: 2023-06-29

## 2023-06-29 RX ORDER — LIDOCAINE 40 MG/G
CREAM TOPICAL
Status: DISCONTINUED | OUTPATIENT
Start: 2023-06-29 | End: 2023-06-29 | Stop reason: HOSPADM

## 2023-06-29 RX ADMIN — PROPOFOL 120 MG: 10 INJECTION, EMULSION INTRAVENOUS at 10:18

## 2023-06-29 RX ADMIN — FENTANYL CITRATE 25 MCG: 50 INJECTION, SOLUTION INTRAMUSCULAR; INTRAVENOUS at 11:43

## 2023-06-29 RX ADMIN — FENTANYL CITRATE 25 MCG: 50 INJECTION, SOLUTION INTRAMUSCULAR; INTRAVENOUS at 11:27

## 2023-06-29 RX ADMIN — DEXAMETHASONE SODIUM PHOSPHATE 4 MG: 4 INJECTION, SOLUTION INTRA-ARTICULAR; INTRALESIONAL; INTRAMUSCULAR; INTRAVENOUS; SOFT TISSUE at 10:33

## 2023-06-29 RX ADMIN — FENTANYL CITRATE 100 MCG: 50 INJECTION, SOLUTION INTRAMUSCULAR; INTRAVENOUS at 10:20

## 2023-06-29 RX ADMIN — HYDROMORPHONE HYDROCHLORIDE 0.2 MG: 0.2 INJECTION, SOLUTION INTRAMUSCULAR; INTRAVENOUS; SUBCUTANEOUS at 12:00

## 2023-06-29 RX ADMIN — MIDAZOLAM 1 MG: 1 INJECTION INTRAMUSCULAR; INTRAVENOUS at 10:03

## 2023-06-29 RX ADMIN — HYDROMORPHONE HYDROCHLORIDE 0.4 MG: 0.2 INJECTION, SOLUTION INTRAMUSCULAR; INTRAVENOUS; SUBCUTANEOUS at 12:28

## 2023-06-29 RX ADMIN — EPHEDRINE SULFATE 10 MG: 5 INJECTION INTRAVENOUS at 10:22

## 2023-06-29 RX ADMIN — ONDANSETRON 4 MG: 2 INJECTION INTRAMUSCULAR; INTRAVENOUS at 10:52

## 2023-06-29 RX ADMIN — EPHEDRINE SULFATE 5 MG: 5 INJECTION INTRAVENOUS at 10:31

## 2023-06-29 RX ADMIN — FENTANYL CITRATE 50 MCG: 50 INJECTION, SOLUTION INTRAMUSCULAR; INTRAVENOUS at 11:36

## 2023-06-29 RX ADMIN — LIDOCAINE HYDROCHLORIDE 60 MG: 20 INJECTION, SOLUTION INFILTRATION; PERINEURAL at 10:17

## 2023-06-29 RX ADMIN — HYDROMORPHONE HYDROCHLORIDE 0.2 MG: 0.2 INJECTION, SOLUTION INTRAMUSCULAR; INTRAVENOUS; SUBCUTANEOUS at 12:10

## 2023-06-29 RX ADMIN — HYDROMORPHONE HYDROCHLORIDE 0.4 MG: 0.2 INJECTION, SOLUTION INTRAMUSCULAR; INTRAVENOUS; SUBCUTANEOUS at 12:40

## 2023-06-29 RX ADMIN — HYDROMORPHONE HYDROCHLORIDE 0.4 MG: 0.2 INJECTION, SOLUTION INTRAMUSCULAR; INTRAVENOUS; SUBCUTANEOUS at 12:21

## 2023-06-29 RX ADMIN — CIPROFLOXACIN 400 MG: 2 INJECTION INTRAVENOUS at 10:01

## 2023-06-29 RX ADMIN — SODIUM CHLORIDE, POTASSIUM CHLORIDE, SODIUM LACTATE AND CALCIUM CHLORIDE: 600; 310; 30; 20 INJECTION, SOLUTION INTRAVENOUS at 10:11

## 2023-06-29 RX ADMIN — SUCCINYLCHOLINE CHLORIDE 100 MG: 20 INJECTION, SOLUTION INTRAMUSCULAR; INTRAVENOUS; PARENTERAL at 10:19

## 2023-06-29 ASSESSMENT — ACTIVITIES OF DAILY LIVING (ADL)
ADLS_ACUITY_SCORE: 37
ADLS_ACUITY_SCORE: 35
ADLS_ACUITY_SCORE: 37

## 2023-06-29 ASSESSMENT — COPD QUESTIONNAIRES: COPD: 1

## 2023-06-29 NOTE — ANESTHESIA POSTPROCEDURE EVALUATION
Patient: Betsy Resendiz    Procedure: Procedure(s):  CYSTOSCOPY, WITH SUPRAPUBIC CATHETER INSERTION  Latex Free       Anesthesia Type:  General    Note:  Disposition: Outpatient   Postop Pain Control: Uneventful            Sign Out: Well controlled pain   PONV: No   Neuro/Psych: Uneventful            Sign Out: Acceptable/Baseline neuro status   Airway/Respiratory: Uneventful            Sign Out: Acceptable/Baseline resp. status   CV/Hemodynamics: Uneventful            Sign Out: Acceptable CV status; No obvious hypovolemia; No obvious fluid overload   Other NRE: NONE   DID A NON-ROUTINE EVENT OCCUR? No           Last vitals:  Vitals Value Taken Time   /85 06/29/23 1215   Temp 36.4  C (97.5  F) 06/29/23 1115   Pulse 65 06/29/23 1225   Resp 12 06/29/23 1215   SpO2 93 % 06/29/23 1225   Vitals shown include unvalidated device data.    Electronically Signed By: Renae Tucker MD  June 29, 2023  12:26 PM

## 2023-06-29 NOTE — ANESTHESIA PROCEDURE NOTES
Airway       Patient location during procedure: OR       Procedure Start/Stop Times: 6/29/2023 10:20 AM  Staff -        CRNA: Calvin Omalley APRN CRNA       Performed By: CRNA  Consent for Airway        Urgency: elective  Indications and Patient Condition       Indications for airway management: darlene-procedural       Induction type:intravenous       Mask difficulty assessment: 1 - vent by mask    Final Airway Details       Final airway type: endotracheal airway       Successful airway: ETT - single  Endotracheal Airway Details        Cuffed: yes       Successful intubation technique: video laryngoscopy       VL Blade Size: MAC 3       Grade View of Cords: 2       Adjucts: stylet       Position: Right       Measured from: gums/teeth       Secured at (cm): 21       Bite block used: None    Post intubation assessment        Placement verified by: capnometry, equal breath sounds and chest rise        Number of attempts at approach: 1       Number of other approaches attempted: 0       Secured with: pink tape       Ease of procedure: easy       Dentition: Intact and Unchanged    Medication(s) Administered   Medication Administration Time: 6/29/2023 10:20 AM

## 2023-06-29 NOTE — OP NOTE
OPERATIVE NOTE    PREOPERATIVE DIAGNOSIS: Urinary retention  POSTOPERATIVE DIAGNOSIS: Same    PROCEDURES PERFORMED:   1. Suprapubic cystostomy 41503  2. Limited ultrasound of the pelvis for ultrasound guidance 73017-86  2. Flexible cystoscopy 42341    STAFF SURGEON: Dr. Gilberto Stahl MD; present for all key portions of the procedure case.     RESIDENT(S): Garth Bolton MD    Medical Student: Ongilberto Padilla    ANESTHESIA: GETA    ESTIMATED BLOOD LOSS: Minimal, 2cc mL.     IV FLUIDS: see dictated anesthesia record    COMPLICATIONS: None.   TUBES: 16Fr sp tube   SIGNIFICANT FINDINGS: trabeculated bladder with chronic catheter changes, clear trajectory to bladder, filled to capacity (large) under ultrasound and 19 cc placed in sp tube catheter, 16Fr    BRIEF HISTORY OF PRESENT ILLNESS: Patient with NGB managed with SPT. SPT fell out and desires another one. Actually interested in Mitrofanoff but is a poor candidate. Patient understood risks, benefits and alternatives and agreed to proceed.    DESCRIPTION OF PROCEDURE: After obtaining informed consent, correctly identifying and marking the patient and confirming, preoperative antibiotics were given. She was brought back to the operating room table, After anesthesia induction, she was prepped and draped in the standard sterile fashion in the dorsal lithotomy position.     We began the case by filling the bladder using a flexible cystoscope. The bladder was of small capacity and had clear urine, and was noted for moderate trabeculation and chronic mac catheter changes. Next, using ultrasound guidance, we localized the bladder and our plane of access for the needle placement, with no bowel or other structures noted between the abdominal wall and the bladder. Her prior SPT location was noted on US and on the skin superficially. We then injected 0.25% marcaine two finger breadths from the pubic symphysis at the midline and perpendicular to the abdominal wall. The needle was  seen in the bladder cystoscopically. We then inserted a spinal needle through the same trajectory and visualized our needle.     We then enlarged our access point to 1 cm using an 11# blader scalpel, bluntly dissected with a lou to fascia, and we then inserted a fred trocar in through the incision, noting the tip of the needle in the bladder with our cystoscope.  We then removed the inner cannula of the trocar and placed the 16-Senegalese Mac catheter through the trocar and inflated the catheter with 10 mL in the balloon. We then removed the trocar. A dressing was placed around the suprapubic catheter and a drainage bag was attached. The skin was cinched around the mac catheter using a figure of horizontal mattress interrupted suture with a 4-0 Monocryl.      The patient was awakened from anesthesia in stable condition.     Plan:  -f/u in 4-6 weeks for first catheter exchange in urology clinic    Garth Bolton  PGY-3  [phone]    As the attending surgeon I, Gilberto Stahl, was present and scrubbed throughout the procedure.

## 2023-06-29 NOTE — DISCHARGE INSTRUCTIONS
[facility]  Same-Day Surgery   Adult Discharge Orders & Instructions     For 24 hours after surgery    Get plenty of rest.  A responsible adult must stay with you for at least 24 hours after you leave the hospital.   Do not drive or use heavy equipment.  If you have weakness or tingling, don't drive or use heavy equipment until this feeling goes away.  Do not drink alcohol.  Avoid strenuous or risky activities.  Ask for help when climbing stairs.   You may feel lightheaded.  IF so, sit for a few minutes before standing.  Have someone help you get up.   If you have nausea (feel sick to your stomach): Drink only clear liquids such as apple juice, ginger ale, broth or 7-Up.  Rest may also help.  Be sure to drink enough fluids.  Move to a regular diet as you feel able.  You may have a slight fever. Call the doctor if your fever is over 100 F (37.7 C) (taken under the tongue) or lasts longer than 24 hours.  You may have a dry mouth, a sore throat, muscle aches or trouble sleeping.  These should go away after 24 hours.  Do not make important or legal decisions.   Call your doctor for any of the followin.  Signs of infection (fever, growing tenderness at the surgery site, a large amount of drainage or bleeding, severe pain, foul-smelling drainage, redness, swelling).    2. It has been over 8 to 10 hours since surgery and you are still not able to urinate (pass water).    3.  Headache for over 24 hours.    4.  Numbness, tingling or weakness the day after surgery (if you had spinal anesthesia).  To contact a doctor, call Dr Rubi's office at [phone] (clinic) or:    '   [phone] and ask for the resident on call for Urology (answered 24 hours a day)  '   Emergency Department:    [facility]: [phone]       (TTY for hearing impaired: [phone])    [facility]: [phone]       (TTY for hearing impaired: [phone])

## 2023-06-29 NOTE — OR NURSING
Discussed discharge instructions over the phone with patient's brother Delfino. Verbalized understanding of plan.     Asda Fish RN on 6/29/2023 at 2:14 PM

## 2023-06-29 NOTE — INTERVAL H&P NOTE
"I have reviewed the surgical (or preoperative) H&P that is linked to this encounter, and examined the patient. There are no significant changes    Clinical Conditions Present on Arrival:  Clinically Significant Risk Factors Present on Admission                  # Severe Obesity: Estimated body mass index is 41.02 kg/m  as calculated from the following:    Height as of 6/12/23: 1.626 m (5' 4\").    Weight as of 6/12/23: 108.4 kg (239 lb).       "

## 2023-06-29 NOTE — ANESTHESIA CARE TRANSFER NOTE
Patient: Betsy Resendiz    Procedure: Procedure(s):  CYSTOSCOPY, WITH SUPRAPUBIC CATHETER INSERTION  Latex Free       Diagnosis: Neurogenic bladder [N31.9]  Diagnosis Additional Information: No value filed.    Anesthesia Type:   General     Note:    Oropharynx: oropharynx clear of all foreign objects and spontaneously breathing  Level of Consciousness: awake  Oxygen Supplementation: face mask  Level of Supplemental Oxygen (L/min / FiO2): 6  Independent Airway: airway patency satisfactory and stable  Dentition: dentition unchanged  Vital Signs Stable: post-procedure vital signs reviewed and stable  Report to RN Given: handoff report given  Patient transferred to: PACU    Handoff Report: Identifed the Patient, Identified the Reponsible Provider, Reviewed the pertinent medical history, Discussed the surgical course, Reviewed Intra-OP anesthesia mangement and issues during anesthesia, Set expectations for post-procedure period and Allowed opportunity for questions and acknowledgement of understanding      Vitals:  Vitals Value Taken Time   /75 06/29/23 1104   Temp     Pulse 74 06/29/23 1106   Resp     SpO2 99 % 06/29/23 1106   Vitals shown include unvalidated device data.    Electronically Signed By: DELL Zamora CRNA  June 29, 2023  11:07 AM

## 2023-07-06 ENCOUNTER — TELEPHONE (OUTPATIENT)
Dept: UROLOGY | Facility: CLINIC | Age: 58
End: 2023-07-06
Payer: COMMERCIAL

## 2023-07-06 NOTE — TELEPHONE ENCOUNTER
Pt returned my call. Described issue with catheter/catheter bag join. Smaller bag mailed to patients temporary address.    Pt asked if she could plug catheter now, I explained that the tract is still healing and she should wait until she sees Dr. Stahl for the first exchange. Reinforced Shruthi VILLALOBOS RN instructions.    Isis Mcgill CMA  07/06/23  3:57 PM    ------------------------------------------------------------------------------------------------  Message left asking pt to call back to discuss catheter connection. Message included my direct line for call back.    Isis Mcgill CMA  07/06/23  1:16 PM

## 2023-07-06 NOTE — TELEPHONE ENCOUNTER
[facility] Call Center    Phone Message    May a detailed message be left on voicemail: yes     Reason for Call: Other: pt calling, nurse came today and gave her meds, she looked at sight and looks infected, was told to go to ER, she don't want to go, her cath comes apart all the time, please advise   Pt wants to talk to urology before she goes to ER  Action Taken: Other: urology    Travel Screening: Not Applicable

## 2023-07-06 NOTE — TELEPHONE ENCOUNTER
S: Spoke to pt. Pt reports home care nurse advised her to go to the ER for possible wound infection.   B: 58-year-old female with history of urinary retention. Had SPT placed on 6/29/23 by Dr. Stahl. Pt reports that she currently lives in a tent, waiting on an apartment to open up for her.   A: Home care nurse visit pt today and assessed SPT site. Noted urine is draining well into the bag. Site looks red and irritated. Pt reports that it is tender to touch. There has been some pus like drainage, but not a lot. Pt does not have a dressing on site to catch drainage and states that it hasn't been that much to need a dressing. Urine is yellow and clear. No fever.   Pt also reports that the bag and catheter are not staying together. It comes apart frequently and that her and her friends have been trying to shove them together and attempted to use tape. She expressed frustration with the bag always coming off and urine spilling.   R: Advised to cleanse site with water and dry thoroughly and gently. Then to apply a thin layer of bacitracin at least daily. Reviewed urgent symptoms to monitor for such as fever greater than 100.4, retention, uncontrolled pain, spreading redness, or copious amount of pus like drainage. Also advised to ensure to drink at least 8 glasses of water daily. Pt verbalized understanding.  Sending to care team to assist pt to trouble shoot connection concern.     Shruthi Cabrera, MSN RN

## 2023-07-14 ENCOUNTER — TELEPHONE (OUTPATIENT)
Dept: UROLOGY | Facility: CLINIC | Age: 58
End: 2023-07-14
Payer: COMMERCIAL

## 2023-07-14 NOTE — TELEPHONE ENCOUNTER
[employer]    Once completed please fax to [phone]  E-script to [email or web address]    A. Please include patient demographics and chart notes (ex: indefinite retention) with this order     Name: Betsy Resendiz   : 1965   Phone: [phone]  Address: No address on file.    B. Diagnosis     Retention of urine (788.20/R33.9)   Urinary Incontinence (788.30/R30)    Incomplete Bladder Emptying (788.21/R39-14)   Urge Incontinence (788.31/N39.41)    Other Specified Retention of Urine (788.29/R33.8)   Other:     C. Order Information/Start Date: 23    Number of Refills: 99         PRODUCT FREQ OF USE QUANTITY PER       PER MONTH MONTH TO DISPENSE             X Leg Bags 2 2     X Night Bags 2 2                                                                               D. Physician's Information:      Physician's Name: Dr. Gilberto Stahl  Phone: [phone]  Date: 23    [facility]  [place] [facility]  [address] [place] [address] [address] [place] [address], [address]    Hugo Ly Manager  Office: [phone]  Mobile: [phone]  Fax: [phone]  [email or web address]

## 2023-07-14 NOTE — TELEPHONE ENCOUNTER
Patient contacted clinic back to discuss. Needs back up bag. Order created and faxed to [address] [place] [address].  Karina MARCOS LPN  [facility]

## 2023-07-14 NOTE — TELEPHONE ENCOUNTER
LVM for patient to call clinic back. Drainage bag was mailed out last week, unsure if patient received this. We can put in an order to a medical supply company if she would like further drainage bags.  Karina MARCOS LPN  [facility]

## 2023-07-14 NOTE — TELEPHONE ENCOUNTER
[facility] Call Center    Phone Message    May a detailed message be left on voicemail: yes     Reason for Call: Other: Patient calling wanting to know if she can get another drainage bag or two. Please contact patient in regards to this message. Thank you     Action Taken: Message routed to:  [facility] Urology    Travel Screening: Not Applicable

## 2023-07-17 ENCOUNTER — DOCUMENTATION ONLY (OUTPATIENT)
Dept: UROLOGY | Facility: CLINIC | Age: 58
End: 2023-07-17
Payer: COMMERCIAL

## 2023-07-17 NOTE — PROGRESS NOTES
Type of Form Received: Order (drainage bags, catheter anchoring device)    Form Received (Date) 7/17/23   Form Filled out Yes, date 7/17/23   Placed in provider folder Yes       Received Completed forms Yes   Faxed Forms Faxed To: [address] [place] [address]  Fax Number: [phone]   Sent to HIM (Date) 7/19/23

## 2023-07-19 ENCOUNTER — MEDICAL CORRESPONDENCE (OUTPATIENT)
Dept: HEALTH INFORMATION MANAGEMENT | Facility: CLINIC | Age: 58
End: 2023-07-19
Payer: COMMERCIAL

## 2023-08-02 ENCOUNTER — DOCUMENTATION ONLY (OUTPATIENT)
Dept: UROLOGY | Facility: CLINIC | Age: 58
End: 2023-08-02
Payer: COMMERCIAL

## 2023-08-02 NOTE — PROGRESS NOTES
Type of Form Received: Transportation Letter of Medical Necessity    Form Received (Date) 8/2/23   Form Filled out Yes, date 8/2/23   Placed in provider folder Yes       Received Completed forms Yes   Faxed Forms Faxed To: Medical Transportation Management  Fax Number: [phone]   Sent to HIM (Date) 8/7/23

## 2023-08-07 ENCOUNTER — MEDICAL CORRESPONDENCE (OUTPATIENT)
Dept: HEALTH INFORMATION MANAGEMENT | Facility: CLINIC | Age: 58
End: 2023-08-07
Payer: COMMERCIAL

## 2023-08-07 ENCOUNTER — TELEPHONE (OUTPATIENT)
Dept: UROLOGY | Facility: CLINIC | Age: 58
End: 2023-08-07
Payer: COMMERCIAL

## 2023-08-07 NOTE — TELEPHONE ENCOUNTER
[facility] Call Center    Phone Message    May a detailed message be left on voicemail: no     Reason for Call: Other: Other: Patient stated that the medical transportation company never received paperwork for a ride. Please fax this to [phone] so patient can get ride to appointment. Please follow up with patient.     Action Taken: Message routed to:  [facility] Urology    Travel Screening: Not Applicable

## 2023-08-10 NOTE — PROGRESS NOTES
Subjective     REASON FOR VISIT  Neurogenic bladder follow-up and suprapubic catheter change    HISTORY OF PRESENT ILLNESS  Ms. Resendiz is a pleasant 58 year old female with history of neurogenic bladder with recent repeat suprapubic channel creation. Her neurogenic bladder has historically been controlled with an suprapubic tube, but closed up in early 2023 during an exchange. Other problems with her suprapubic tube have included wound complications and fragile skin that tore easily at the suprapubic site.  UDS from 2019 shows poor bladder contractility with early sensation and incomplete emptying.  She desires a catheterizable Mitrofanoff channel, but Dr. Stahl has stated she would need to lose approximately 50 pounds before he would consider doing the surgery as he is concerned that bowel would not reach the umbilicus as well as anticipating additional complications given her diabetes plus the obesity.  She expressed disappointment as she does not believe she would be able to lose the weight, so recommended a repeat suprapubic tube channel be placed.    She underwent repeat suprapubic tube channel placement on 6/29/2023 and presents today for her first suprapubic tube exchange and discussion of follow-up.    Today:  States the new suprapubic tube has been functioning well  Endorsing some bladder and back pain that she describes as stabbing for 3 weeks that has stayed consistent  No on an anticholinergic  Requests catheter caps so she can cap the suprapubic tube during the day  No fevers  States she did not sleep well that night, was dozing off during our visit    Objective     PHYSICAL EXAMINATION  General: Alert, oriented, no acute distress    PROCEDURE  Patient was suprapubic tube site was exposed as they were lying flat on her back.  Sterile gloves were donned and site was washed with iodine 3 separate times in a circular motion inward outward.  Approximately 7.5 mL of fluid was removed from the catheter  balloon, then was removed from the bladder.  Immediately, a well-lubricated 16 Cymraes latex catheter was inserted through the suprapubic channel with no resistance and had slight return of urine.  Patient expressed discomfort as a bladder spasm, then this catheter balloon was filled with approximately 7.5 mL of fluid.  Catheter hub did well, and 30 cc of sterile saline was able to be flushed in another catheter.  When patient sat up urine return was also noted.    Assessment   Neurogenic bladder controlled with suprapubic tube  Bladder spasms    It was my pleasure to meet with Betsy in follow-up for her recent suprapubic tube replacement.  I am very glad to see that the catheter has been functioning well and that the suprapubic tube exchange today went as smooth as it did.  We first discussed the medication to help control what I believe are bladder spasms to see if it would help with some of her suprapubic and low back discomfort.  However, if her symptoms get worse or she starts to experience fevers, chills, blood in the catheter, or increased fatigue I encouraged her to contact us or present to the emergency department for urine evaluation.  However, given the stability of her symptoms over the last 3 weeks I am not concerned that this is a UTI.    In regards to follow-up, we will plan to see her back once a month for suprapubic tube exchange, and then we will also plan to see her back once a year in the office with a kidney ultrasound and kidney function test.  Finally, we provided her with an additional night bag, multiple catheter caps, and updated suprapubic tube care instructions.    Ms. Resendiz expressed understanding and agreement to the above discussion and plan and all of her questions were answered to her satisfaction.      PLAN  Successful suprapubic tube exchange today  Monthly suprapubic tube exchanges in the office  Oxybutynin for control of suprapubic discomfort  1 year follow-up with renal  ultrasound and BMP just before hand    SIGNED:    Fei Henderson PA-C    I spent a total of 20 minutes spent on the date of the encounter doing chart review, history and exam, documentation, and further activities as noted above.

## 2023-08-11 ENCOUNTER — OFFICE VISIT (OUTPATIENT)
Dept: UROLOGY | Facility: CLINIC | Age: 58
End: 2023-08-11
Payer: COMMERCIAL

## 2023-08-11 VITALS
SYSTOLIC BLOOD PRESSURE: 127 MMHG | HEIGHT: 64 IN | WEIGHT: 238 LBS | HEART RATE: 58 BPM | DIASTOLIC BLOOD PRESSURE: 55 MMHG | RESPIRATION RATE: 21 BRPM | OXYGEN SATURATION: 98 % | BODY MASS INDEX: 40.63 KG/M2

## 2023-08-11 DIAGNOSIS — N31.9 NEUROGENIC BLADDER: Primary | ICD-10-CM

## 2023-08-11 DIAGNOSIS — N32.89 BLADDER SPASMS: ICD-10-CM

## 2023-08-11 PROCEDURE — 51705 CHANGE OF BLADDER TUBE: CPT | Performed by: STUDENT IN AN ORGANIZED HEALTH CARE EDUCATION/TRAINING PROGRAM

## 2023-08-11 PROCEDURE — 99213 OFFICE O/P EST LOW 20 MIN: CPT | Mod: 25 | Performed by: STUDENT IN AN ORGANIZED HEALTH CARE EDUCATION/TRAINING PROGRAM

## 2023-08-11 RX ORDER — OXYBUTYNIN CHLORIDE 5 MG/1
5 TABLET, EXTENDED RELEASE ORAL DAILY
Qty: 30 TABLET | Refills: 11 | Status: SHIPPED | OUTPATIENT
Start: 2023-08-11 | End: 2023-10-06

## 2023-08-11 ASSESSMENT — PAIN SCALES - GENERAL: PAINLEVEL: NO PAIN (0)

## 2023-08-11 NOTE — NURSING NOTE
"Chief Complaint   Patient presents with    Follow Up     Catheter recheck.        Blood pressure 127/55, pulse 58, resp. rate 21, height 1.626 m (5' 4\"), weight 108 kg (238 lb), SpO2 98 %. Body mass index is 40.85 kg/m .    Patient Active Problem List   Diagnosis    ACP (advance care planning)    Schizoaffective disorder, bipolar type (H)    Carpal tunnel syndrome    Cervical neck pain with evidence of disc disease    Chronic pain    Cervical myelopathy with cervical radiculopathy (H)    DM w/o complication type II (H)    History of encephalopathy    Gastroesophageal reflux disease without esophagitis    Generalized osteoarthritis    Immunosuppressed status (H)    Migraine headache    Morbid obesity with BMI of 40.0-44.9, adult (H)    Normocytic anemia    SPIKE (obstructive sleep apnea)    Osteoarthritis of hip    Lymphedema    Pain in joint, ankle and foot    Conversion reaction    Posttraumatic stress disorder    Urinary retention    Rotator cuff tendinitis    Schizoaffective disorder, unspecified type (H)    Tobacco use disorder    Ulnar neuritis    Asthma    Unspecified glaucoma    COPD (chronic obstructive pulmonary disease) (H)    JAVIER (generalized anxiety disorder)    HTN (hypertension)    Auditory hallucinations    COPD exacerbation (H)    Acute respiratory failure with hypoxia and hypercapnia (H)    CKD (chronic kidney disease) stage 3, GFR 30-59 ml/min (H)    Yeast vaginitis    Chronic diastolic heart failure (H)    Abdominal pain    Adrenal mass (H)    Aftercare involving the use of plastic surgery    CHAPINCITO (acute kidney injury) (H)    Chronic indwelling Abernathy catheter    Cyclic vomiting syndrome    Diarrhea    Diarrhea of presumed infectious origin    Functional bowel disorder    Other specified hypothyroidism    Polyuria    Prolonged QT interval       Allergies   Allergen Reactions    Cephalexin Hives    Codeine Shortness Of Breath     Has tolerated morphine 7/2008    Morphine Nausea and Vomiting and " Shortness Of Breath     trouble breathing and nausea  trouble breathing and nausea  Morphine NOT tolerated. Please use other opioid  trouble breathing and nausea    Cod Liver Oil     Diphenhydramine Itching     About 5 minutes after  IV administration- c/o extreme itching to torso and all extremities.     Iodinated Contrast Media      rash  rash  rash      Iodine      rash    Lisinopril Cough    No Clinical Screening - See Comments      Statin-hmg-coa reductase inhibitors  Statin-hmg-coa reductase inhibitors    Nsaids      nausea    Other Drug Allergy (See Comments)      Zinc Oxide-cod Liver Oil and Statins-hmg-coa Reductase Inhibitors    Statins Other (See Comments)     Patient declines  Patient declines  Patient declines    Sulfa Antibiotics      Mouth sore  Mouth sore      Sumatriptan Other (See Comments)    Zinc        Current Outpatient Medications   Medication Sig Dispense Refill    albuterol (PROAIR HFA/PROVENTIL HFA/VENTOLIN HFA) 108 (90 BASE) MCG/ACT Inhaler Inhale 2 puffs into the lungs every 4 hours as needed       amantadine (SYMMETREL) 100 MG capsule amantadine HCl 100 mg capsule      amoxicillin (AMOXIL) 250 MG capsule Take 1 capsule (250 mg) by mouth daily (Patient not taking: Reported on 3/14/2023) 30 capsule 0    baclofen (LIORESAL) 10 MG tablet Take 10 mg by mouth 3 times daily      benztropine (COGENTIN) 0.5 MG tablet Take 1 tablet by mouth (Patient not taking: Reported on 6/12/2023)      brimonidine (ALPHAGAN-P) 0.15 % ophthalmic solution Place 1 drop into both eyes 2 times daily      budesonide (PULMICORT) 0.5 MG/2ML neb solution Take 0.5 mg by nebulization 2 times daily      bumetanide (BUMEX) 2 MG tablet       buprenorphine (BUTRANS) 20 MCG/HR WK patch buprenorphine 20 mcg/hour weekly transdermal patch      calcium carbonate 500 mg, elemental, (OSCAL 500) 1250 (500 Ca) MG TABS tablet Take 500 mg by mouth      cariprazine (VRAYLAR) 1.5 MG CAPS capsule Take 2 mg by mouth daily  (Patient not  taking: Reported on 6/12/2023)      cholecalciferol (VITAMIN D3) 5000 units CAPS capsule Take 5,000 Units by mouth daily (Patient not taking: Reported on 6/12/2023)      clotrimazole (LOTRIMIN) 1 % cream Apply topically 2 times daily (Patient not taking: Reported on 3/14/2023)      clotrimazole (LOTRIMIN) 1 % vaginal cream INSERT 5 GRAMS VAGINALLY HS FOR YEAST VAGINITIS FOR 7 DAYS (Patient not taking: Reported on 3/14/2023)  0    Cranberry 425 MG CAPS       cyclobenzaprine (FLEXERIL) 10 MG tablet TK 1 T PO TID PRF MUSCLE SPASM (Patient not taking: Reported on 3/14/2023)  0    CYCLOBENZAPRINE HCL PO Take 5 mg by mouth every 8 hours as needed  (Patient not taking: Reported on 3/14/2023)      cyproheptadine (PERIACTIN) 4 MG tablet Take 4 mg by mouth 3 times daily as needed for allergies      diclofenac (VOLTAREN) 1 % topical gel Apply topically 2 times daily PRN      diphenoxylate-atropine (LOMOTIL) 2.5-0.025 MG tablet Take 1 tablet by mouth (Patient not taking: Reported on 3/14/2023)      escitalopram (LEXAPRO) 20 MG tablet       fluticasone (FLONASE) 50 MCG/ACT nasal spray Spray 1 spray into both nostrils daily      furosemide (LASIX) 80 MG tablet Take 40 mg by mouth 2 times daily  (Patient not taking: Reported on 3/14/2023)      gentamicin (GARAMYCIN) 0.3 % ophthalmic solution INT 1 GTT IN OS Q 4 H  0    HYDROmorphone (DILAUDID) 2 MG tablet Take 1 mg by mouth (Patient not taking: Reported on 3/14/2023)      hydrOXYzine (ATARAX) 25 MG tablet Take 25 mg by mouth every 6 hours as needed for itching      hyoscyamine (LEVSIN/SL) 0.125 MG sublingual tablet Place 0.125 mg under the tongue (Patient not taking: Reported on 3/14/2023)      ipratropium - albuterol 0.5 mg/2.5 mg/3 mL (DUONEB) 0.5-2.5 (3) MG/3ML neb solution Take 1 vial by nebulization 4 times daily as needed      lamoTRIgine (LAMICTAL) 200 MG tablet Take 200 mg by mouth At Bedtime       LANsoprazole (PREVACID) 30 MG CR capsule Take 15 mg by mouth daily        latanoprost (XALATAN) 0.005 % ophthalmic solution 1 drop daily      Lidocaine-Menthol 4.5-5 % PTCH Externally apply topically 2 times daily (Patient not taking: Reported on 3/14/2023)      linaclotide (LINZESS) 72 MCG capsule Take by mouth every morning (before breakfast)      LIOTHYRONINE SODIUM PO Take 25 mcg by mouth daily      lithium 300 MG capsule Take 1 capsule by mouth in the morning and 2 capsules at bedtime (Patient not taking: Reported on 3/14/2023)      loperamide (IMODIUM) 2 MG capsule Take 2 mg by mouth every 6 hours as needed for diarrhea (Patient not taking: Reported on 3/14/2023)      LORazepam (ATIVAN) 0.5 MG tablet TK ONE T PO BID PRA (Patient not taking: Reported on 3/14/2023)  1    magnesium oxide (MAG-OX) 400 MG tablet Take 400 mg by mouth daily      MECLIZINE HCL PO Take 25 mg by mouth every 8 hours as needed for dizziness (Patient not taking: Reported on 3/14/2023)      methocarbamol (ROBAXIN) 500 MG tablet TK 1 T PO Q 6 H FOR UP TO 10 DAYS PRF MUSCLE PAIN OR SPASM (Patient not taking: Reported on 3/14/2023)  0    metoprolol succinate ER (TOPROL-XL) 25 MG 24 hr tablet Take 25 mg by mouth      nicotine (COMMIT) 2 MG lozenge Place 2 mg inside cheek every 3 hours as needed for smoking cessation (Patient not taking: Reported on 3/14/2023)      omeprazole (PRILOSEC) 40 MG DR capsule TK 1 C PO QD AC (Patient not taking: Reported on 3/14/2023)  3    ondansetron (ZOFRAN-ODT) 4 MG ODT tab Take 1 tablet (4 mg) by mouth every 8 hours as needed for nausea      oxyCODONE (ROXICODONE) 5 MG tablet TK 1-2 PO Q 4 H PRN FOR PAIN (Patient not taking: Reported on 3/14/2023)  0    oxyCODONE-acetaminophen (PERCOCET) 5-325 MG tablet  (Patient not taking: Reported on 3/14/2023)  0    OYSTER SHELL CALCIUM PO Take 500 mg by mouth 4 times daily      paliperidone (INVEGA) 6 MG 24 hr tablet Take 6 mg by mouth every morning (Patient not taking: Reported on 3/14/2023)      pantoprazole (PROTONIX) 40 MG EC tablet Take 40 mg  by mouth daily      perphenazine 4 MG tablet Take 4 mg by mouth 2 times daily  (Patient not taking: Reported on 3/14/2023)      polyethylene glycol (MIRALAX) 17 GM/Dose powder Take 1 Capful by mouth 2 times daily      POTASSIUM CHLORIDE ER PO Take 40 mg by mouth 3 times daily       pregabalin (LYRICA) 150 MG capsule Take 1 capsule (150 mg) by mouth 2 times daily (Patient taking differently: Take 150 mg by mouth 3 times daily) 60 capsule 5    promethazine (PHENERGAN) 12.5 MG tablet TK 1 T PO BID  3    propranolol (INDERAL) 10 MG tablet TK ONE T PO BID PRA (Patient not taking: Reported on 3/14/2023)  1    QUEtiapine (SEROQUEL) 50 MG tablet TK ONE T PO HS (Patient not taking: Reported on 3/14/2023)  0    scopolamine (TRANSDERM) 1 MG/3DAYS 72 hr patch Place 1 patch onto the skin      senna (SENOKOT) 8.6 MG tablet Take 8.6 mg by mouth      senna-docusate (SENOKOT-S/PERICOLACE) 8.6-50 MG tablet Take 2 tablets by mouth 2 times daily as needed for constipation (Patient not taking: Reported on 3/14/2023)      spironolactone (ALDACTONE) 100 MG tablet Take 100 mg by mouth daily      SUMAtriptan (IMITREX STATDOSE) 6 MG/0.5ML pen injector kit sumatriptan 6 mg/0.5 mL subcutaneous pen injector      tiZANidine (ZANAFLEX) 4 MG tablet Take 4 mg by mouth every 8 hours as needed for muscle spasms (Patient not taking: Reported on 3/14/2023)      travoprost, BAK Free, (TRAVATAN Z) 0.004 % ophthalmic solution Place 1 drop into both eyes At Bedtime      venlafaxine (EFFEXOR XR) 37.5 MG 24 hr capsule venlafaxine ER 37.5 mg capsule,extended release 24 hr      vitamin D2 (ERGOCALCIFEROL) 49282 units (1250 mcg) capsule Take 50,000 Units by mouth (Patient not taking: Reported on 6/12/2023)      ziprasidone (GEODON) 40 MG capsule 60 mg At Bedtime      ziprasidone (GEODON) 80 MG capsule Take 80 mg by mouth daily         Social History     Tobacco Use    Smoking status: Former     Types: Cigarettes    Smokeless tobacco: Never   Substance Use  Topics    Alcohol use: No    Drug use: No       Vidal Gutierrez  8/11/2023  11:45 AM

## 2023-08-11 NOTE — LETTER
8/11/2023       RE: Betsy Resendiz  [address]  [place] [address]     Dear Colleague,    Thank you for referring your patient, Betsy Resendiz, to the [facility] [place] [facility]. Please see a copy of my visit note below.    Subjective    REASON FOR VISIT  Neurogenic bladder follow-up and suprapubic catheter change    HISTORY OF PRESENT ILLNESS  Ms. Resendiz is a pleasant 58 year old female with history of neurogenic bladder with recent repeat suprapubic channel creation. Her neurogenic bladder has historically been controlled with an suprapubic tube, but closed up in early 2023 during an exchange. Other problems with her suprapubic tube have included wound complications and fragile skin that tore easily at the suprapubic site.  UDS from 2019 shows poor bladder contractility with early sensation and incomplete emptying.  She desires a catheterizable Mitrofanoff channel, but Dr. Stahl has stated she would need to lose approximately 50 pounds before he would consider doing the surgery as he is concerned that bowel would not reach the umbilicus as well as anticipating additional complications given her diabetes plus the obesity.  She expressed disappointment as she does not believe she would be able to lose the weight, so recommended a repeat suprapubic tube channel be placed.    She underwent repeat suprapubic tube channel placement on 6/29/2023 and presents today for her first suprapubic tube exchange and discussion of follow-up.    Today:  States the new suprapubic tube has been functioning well  Endorsing some bladder and back pain that she describes as stabbing for 3 weeks that has stayed consistent  No on an anticholinergic  Requests catheter caps so she can cap the suprapubic tube during the day  No fevers  States she did not sleep well that night, was dozing off during our visit    Objective    PHYSICAL  EXAMINATION  General: Alert, oriented, no acute distress    PROCEDURE  Patient was suprapubic tube site was exposed as they were lying flat on her back.  Sterile gloves were donned and site was washed with iodine 3 separate times in a circular motion inward outward.  Approximately 7.5 mL of fluid was removed from the catheter balloon, then was removed from the bladder.  Immediately, a well-lubricated 16 Sudanese latex catheter was inserted through the suprapubic channel with no resistance and had slight return of urine.  Patient expressed discomfort as a bladder spasm, then this catheter balloon was filled with approximately 7.5 mL of fluid.  Catheter hub did well, and 30 cc of sterile saline was able to be flushed in another catheter.  When patient sat up urine return was also noted.    Assessment  Neurogenic bladder controlled with suprapubic tube  Bladder spasms    It was my pleasure to meet with Betsy in follow-up for her recent suprapubic tube replacement.  I am very glad to see that the catheter has been functioning well and that the suprapubic tube exchange today went as smooth as it did.  We first discussed the medication to help control what I believe are bladder spasms to see if it would help with some of her suprapubic and low back discomfort.  However, if her symptoms get worse or she starts to experience fevers, chills, blood in the catheter, or increased fatigue I encouraged her to contact us or present to the emergency department for urine evaluation.  However, given the stability of her symptoms over the last 3 weeks I am not concerned that this is a UTI.    In regards to follow-up, we will plan to see her back once a month for suprapubic tube exchange, and then we will also plan to see her back once a year in the office with a kidney ultrasound and kidney function test.  Finally, we provided her with an additional night bag, multiple catheter caps, and updated suprapubic tube care instructions.    Ms.  Astrid expressed understanding and agreement to the above discussion and plan and all of her questions were answered to her satisfaction.      PLAN  Successful suprapubic tube exchange today  Monthly suprapubic tube exchanges in the office  Oxybutynin for control of suprapubic discomfort  1 year follow-up with renal ultrasound and BMP just before hand    SIGNED:    Fei Henderson PA-C    I spent a total of 20 minutes spent on the date of the encounter doing chart review, history and exam, documentation, and further activities as noted above.

## 2023-08-25 ENCOUNTER — PRE VISIT (OUTPATIENT)
Dept: UROLOGY | Facility: CLINIC | Age: 58
End: 2023-08-25
Payer: COMMERCIAL

## 2023-08-25 DIAGNOSIS — N31.9 NEUROGENIC BLADDER: Primary | ICD-10-CM

## 2023-08-25 RX ORDER — NITROFURANTOIN 25; 75 MG/1; MG/1
100 CAPSULE ORAL ONCE
Status: DISCONTINUED | OUTPATIENT
Start: 2023-09-11 | End: 2023-09-25

## 2023-08-25 NOTE — CONFIDENTIAL NOTE
Reason for nurse visit: SPT change    Diagnosis: NGB    Ordering provider: Dr. Stahl    Last seen by provider: 8/11/2023 - Asad Henderson, P-C       Allergies   Allergen Reactions    Cephalexin Hives    Codeine Shortness Of Breath     Has tolerated morphine 7/2008    Morphine Nausea and Vomiting and Shortness Of Breath     trouble breathing and nausea  trouble breathing and nausea  Morphine NOT tolerated. Please use other opioid  trouble breathing and nausea    Cod Liver Oil     Diphenhydramine Itching     About 5 minutes after  IV administration- c/o extreme itching to torso and all extremities.     Iodinated Contrast Media      rash  rash  rash      Iodine      rash    Lisinopril Cough    No Clinical Screening - See Comments      Statin-hmg-coa reductase inhibitors  Statin-hmg-coa reductase inhibitors    Nsaids      nausea    Other Drug Allergy (See Comments)      Zinc Oxide-cod Liver Oil and Statins-hmg-coa Reductase Inhibitors    Statins Other (See Comments)     Patient declines  Patient declines  Patient declines    Sulfa Antibiotics      Mouth sore  Mouth sore      Sumatriptan Other (See Comments)    Zinc         Karie Petty

## 2023-09-12 ENCOUNTER — TELEPHONE (OUTPATIENT)
Dept: UROLOGY | Facility: CLINIC | Age: 58
End: 2023-09-12
Payer: COMMERCIAL

## 2023-09-12 NOTE — TELEPHONE ENCOUNTER
Documentation for catheter supply needs.      Patient has indefinite retention and is catheter dependent.  She requires ongoing supply needs as her retention will be a lifelong issue.      Daisy Le RN

## 2023-09-18 ENCOUNTER — TELEPHONE (OUTPATIENT)
Dept: UROLOGY | Facility: CLINIC | Age: 58
End: 2023-09-18
Payer: COMMERCIAL

## 2023-09-18 DIAGNOSIS — N31.9 NEUROGENIC BLADDER: Primary | ICD-10-CM

## 2023-09-18 NOTE — TELEPHONE ENCOUNTER
Pt called, orders faxed to requested medical supply company.    Pt call transferred to scheduling to schedule Nurse visit.    Isis Mcgill CMA  09/18/23  2:25 PM

## 2023-09-18 NOTE — TELEPHONE ENCOUNTER
[facility] Call Center    Phone Message    May a detailed message be left on voicemail: yes     Reason for Call: Other: Pt will need more catheter bags sent over to home address. Thanks      Action Taken: Message routed to:  [facility] Uro     Travel Screening: Not Applicable

## 2023-09-25 RX ORDER — NITROFURANTOIN 25; 75 MG/1; MG/1
100 CAPSULE ORAL ONCE
Status: DISCONTINUED | OUTPATIENT
Start: 2023-09-26 | End: 2023-10-16

## 2023-10-04 ENCOUNTER — TELEPHONE (OUTPATIENT)
Dept: UROLOGY | Facility: CLINIC | Age: 58
End: 2023-10-04
Payer: COMMERCIAL

## 2023-10-04 DIAGNOSIS — N31.9 NEUROGENIC BLADDER: ICD-10-CM

## 2023-10-04 NOTE — TELEPHONE ENCOUNTER
[facility] Call Center    Phone Message    May a detailed message be left on voicemail: yes     Reason for Call: Other: pt calling, meds not working for her bladder spasams, please advise with her     Action Taken: Other: urology    Travel Screening: Not Applicable

## 2023-10-06 RX ORDER — OXYBUTYNIN CHLORIDE 5 MG/1
10 TABLET, EXTENDED RELEASE ORAL DAILY
Qty: 60 TABLET | Refills: 11 | Status: SHIPPED | OUTPATIENT
Start: 2023-10-06 | End: 2023-10-28

## 2023-10-06 NOTE — TELEPHONE ENCOUNTER
[facility] Call Center    Phone Message    May a detailed message be left on voicemail: yes     Reason for Call: Other: pt calling again about her meds and them not working for her bladder spasams, please advise     Action Taken: Other: urology    Travel Screening: Not Applicable

## 2023-10-06 NOTE — TELEPHONE ENCOUNTER
I have increased the dose of her oxybutynin to 10 mg daily. I have update he prescription but she can start taking this dose now by taking two 5 mg tablets instead of one starting today or tomorrow.

## 2023-10-16 RX ORDER — NITROFURANTOIN 25; 75 MG/1; MG/1
100 CAPSULE ORAL ONCE
Status: COMPLETED | OUTPATIENT
Start: 2023-10-17 | End: 2023-10-17

## 2023-10-17 ENCOUNTER — OFFICE VISIT (OUTPATIENT)
Dept: UROLOGY | Facility: CLINIC | Age: 58
End: 2023-10-17
Payer: COMMERCIAL

## 2023-10-17 DIAGNOSIS — N31.9 NEUROGENIC BLADDER: Primary | ICD-10-CM

## 2023-10-17 PROCEDURE — 51705 CHANGE OF BLADDER TUBE: CPT

## 2023-10-17 RX ADMIN — NITROFURANTOIN 100 MG: 25; 75 CAPSULE ORAL at 14:59

## 2023-10-17 NOTE — PROGRESS NOTES
Betsy Resendiz comes into clinic today at the request of Dr. Stahl with the diagnosis of NGB for a catheter change.    Order has been verified: Yes.    The following medication was given:     MEDICATION: Macrobid (Nitrofurantoin)  ROUTE: PO  SITE: Medication was given orally  DOSE: 100mg  LOT #: [id number]  :  AvOlliee/[place]  EXPIRATION DATE: 09/2024  NDC#: [id number]   Was there drug waste? No    Prior to medication administration, verified patient identity using patient's name and date of birth.  Due to medication administration, patient instructed to remain in clinic for 15 minutes  afterwards, and to report any adverse reaction to me immediately.      Allergies   Allergen Reactions    Cephalexin Hives    Codeine Shortness Of Breath     Has tolerated morphine 7/2008    Morphine Nausea and Vomiting and Shortness Of Breath     trouble breathing and nausea  trouble breathing and nausea  Morphine NOT tolerated. Please use other opioid  trouble breathing and nausea    Cod Liver Oil     Diphenhydramine Itching     About 5 minutes after  IV administration- c/o extreme itching to torso and all extremities.     Iodinated Contrast Media      rash  rash  rash      Iodine      rash    Lisinopril Cough    No Clinical Screening - See Comments      Statin-hmg-coa reductase inhibitors  Statin-hmg-coa reductase inhibitors    Nsaids      nausea    Other Drug Allergy (See Comments)      Zinc Oxide-cod Liver Oil and Statins-hmg-coa Reductase Inhibitors    Statins Other (See Comments)     Patient declines  Patient declines  Patient declines    Sulfa Antibiotics      Mouth sore  Mouth sore      Sumatriptan Other (See Comments)    Zinc        Removal:  16 Fr straight tipped latex mac catheter removed from suprapubic meatus without difficulty after removing 5.5 mL of fluid from the balloon.    Insertion:  16 Fr straight tipped latex mac catheter inserted into suprapubic meatus in the usual sterile fashion without  difficulty.  Received > 0 ml positive urine return.  Irrigated pt's bladder with 60 mL of sterile water to ensure proper plaecement.  Balloon filled with 7 mL sterile H2O after positive urine return.  Catheter secured in place with leg strap: Yes.     Patient did tolerate procedure well.     Patient instructed as to where to call or go for pain, fever, leakage, or decreased urine flow.     This service provided today was under the direct supervision of Naomi louis CNP, who was available if needed.    Karie Petty   10/17/2023  2:41 PM

## 2023-10-27 ENCOUNTER — TELEPHONE (OUTPATIENT)
Dept: UROLOGY | Facility: CLINIC | Age: 58
End: 2023-10-27
Payer: COMMERCIAL

## 2023-10-27 DIAGNOSIS — N31.9 NEUROGENIC BLADDER: ICD-10-CM

## 2023-10-28 RX ORDER — OXYBUTYNIN CHLORIDE 10 MG/1
10 TABLET, EXTENDED RELEASE ORAL DAILY
Qty: 90 TABLET | Refills: 4 | Status: SHIPPED | OUTPATIENT
Start: 2023-10-28

## 2024-01-03 ENCOUNTER — TELEPHONE (OUTPATIENT)
Dept: UROLOGY | Facility: CLINIC | Age: 59
End: 2024-01-03
Payer: COMMERCIAL

## 2024-01-03 NOTE — TELEPHONE ENCOUNTER
Left Voicemail (1st Attempt) for the patient to call back and schedule the following:    Appointment type: Return   Provider: Asad Henderson  Return date: Next Available in Aug 2024  Specialty phone number: [phone]  Additional appointment(s) needed: N/A  Additonal Notes: N/A

## 2024-01-05 ENCOUNTER — TELEPHONE (OUTPATIENT)
Dept: UROLOGY | Facility: CLINIC | Age: 59
End: 2024-01-05
Payer: COMMERCIAL

## 2024-04-30 ENCOUNTER — TELEPHONE (OUTPATIENT)
Dept: UROLOGY | Facility: CLINIC | Age: 59
End: 2024-04-30
Payer: COMMERCIAL

## 2024-04-30 NOTE — TELEPHONE ENCOUNTER
[facility] Call Center    Phone Message    May a detailed message be left on voicemail: no     Reason for Call: Other: pt calling and needing a cath change, please call pt     Action Taken: Other: urology    Travel Screening: Not Applicable

## 2024-05-28 ENCOUNTER — PRE VISIT (OUTPATIENT)
Dept: UROLOGY | Facility: CLINIC | Age: 59
End: 2024-05-28

## 2024-05-28 RX ORDER — NITROFURANTOIN 25; 75 MG/1; MG/1
100 CAPSULE ORAL ONCE
Status: CANCELLED | OUTPATIENT
Start: 2024-05-28 | End: 2024-05-28

## 2024-07-02 ENCOUNTER — TRANSFERRED RECORDS (OUTPATIENT)
Dept: MULTI SPECIALTY CLINIC | Facility: CLINIC | Age: 59
End: 2024-07-02

## 2024-07-02 ENCOUNTER — TELEPHONE (OUTPATIENT)
Dept: UROLOGY | Facility: CLINIC | Age: 59
End: 2024-07-02
Payer: COMMERCIAL

## 2024-07-02 LAB — HBA1C MFR BLD: 5.8 %

## 2024-07-02 NOTE — TELEPHONE ENCOUNTER
[facility] Call Center    Phone Message    May a detailed message be left on voicemail: yes, [phone]     Reason for Call: Appointment Intake    Patient would like to schedule appointment for catheter exchange, requesting to speak to clinic manager. please call patient back to schedule.    Action Taken: Adult Urology, [facility]    Travel Screening: Not Applicable     Date of Service:

## 2024-07-02 NOTE — TELEPHONE ENCOUNTER
This writer spoke with pt regarding a history of no shows to appointments. Pt verbalized understanding or no show policy and agreed to call at least 24 hours prior to the appointment if she needs to cancel. Catheter exchange scheduled for 7/12 at 3:30pm. Pt agreed to arrive by 3pm.     Suni Liang, RN  Patient Care Supervisor- [facility]

## 2024-07-09 ENCOUNTER — DOCUMENTATION ONLY (OUTPATIENT)
Dept: UROLOGY | Facility: CLINIC | Age: 59
End: 2024-07-09
Payer: COMMERCIAL

## 2024-07-09 NOTE — PROGRESS NOTES
Type of Form Received: Order (anchoring device, bedside & urinary drainage bags)    Form Received (Date) 7/9/24   Form Filled out Yes, date 7/9/24   Placed in provider folder Yes       Received Completed forms Yes   Faxed Forms Faxed To: [address] [place] [address]  Fax Number: [phone]   Sent to HIM (Date) 7/10/24

## 2024-07-10 ENCOUNTER — MEDICAL CORRESPONDENCE (OUTPATIENT)
Dept: HEALTH INFORMATION MANAGEMENT | Facility: CLINIC | Age: 59
End: 2024-07-10
Payer: COMMERCIAL

## 2024-07-11 ENCOUNTER — PRE VISIT (OUTPATIENT)
Dept: UROLOGY | Facility: CLINIC | Age: 59
End: 2024-07-11
Payer: COMMERCIAL

## 2024-07-11 DIAGNOSIS — Z97.8 CHRONIC INDWELLING FOLEY CATHETER: Primary | ICD-10-CM

## 2024-07-11 DIAGNOSIS — Z46.6 URINARY CATHETER CHANGE REQUIRED: ICD-10-CM

## 2024-07-11 NOTE — TELEPHONE ENCOUNTER
Reason for nurse visit:   SPT Exchange    Transfer Assistance Needed:   No    Diagnosis:   NGB    Ordering provider:   Fei Henderson PA-C    Last seen by provider:   8/11/2023      16 fr straight-tipped latex     Allergies   Allergen Reactions    Cephalexin Hives    Codeine Shortness Of Breath     Has tolerated morphine 7/2008    Morphine Nausea and Vomiting and Shortness Of Breath     trouble breathing and nausea  trouble breathing and nausea  Morphine NOT tolerated. Please use other opioid  trouble breathing and nausea    Cod Liver Oil     Diphenhydramine Itching     About 5 minutes after  IV administration- c/o extreme itching to torso and all extremities.     Iodinated Contrast Media      rash  rash  rash      Iodine      rash    Lisinopril Cough    No Clinical Screening - See Comments      Statin-hmg-coa reductase inhibitors  Statin-hmg-coa reductase inhibitors    Nsaids      nausea    Other Drug Allergy (See Comments)      Zinc Oxide-cod Liver Oil and Statins-hmg-coa Reductase Inhibitors    Statins Other (See Comments)     Patient declines  Patient declines  Patient declines    Sulfa Antibiotics      Mouth sore  Mouth sore      Sumatriptan Other (See Comments)    Max Echevarria LPN

## 2024-07-12 RX ORDER — NITROFURANTOIN 25; 75 MG/1; MG/1
100 CAPSULE ORAL ONCE
Status: COMPLETED | OUTPATIENT
Start: 2024-07-12 | End: 2024-07-22

## 2024-07-22 ENCOUNTER — OFFICE VISIT (OUTPATIENT)
Dept: UROLOGY | Facility: CLINIC | Age: 59
End: 2024-07-22
Payer: COMMERCIAL

## 2024-07-22 DIAGNOSIS — N31.9 NEUROGENIC BLADDER: Primary | ICD-10-CM

## 2024-07-22 PROCEDURE — 51705 CHANGE OF BLADDER TUBE: CPT

## 2024-07-22 RX ADMIN — NITROFURANTOIN 100 MG: 25; 75 CAPSULE ORAL at 12:58

## 2024-07-22 NOTE — PROGRESS NOTES
Betsy Resendiz comes into clinic today at the request of Fei Henderson PA-C with the diagnosis of NGB for a catheter exchange.    Order has been verified: Yes.    Allergies   Allergen Reactions    Cephalexin Hives    Codeine Shortness Of Breath     Has tolerated morphine 7/2008    Morphine Nausea and Vomiting and Shortness Of Breath     trouble breathing and nausea  trouble breathing and nausea  Morphine NOT tolerated. Please use other opioid  trouble breathing and nausea    Cod Liver Oil     Diphenhydramine Itching     About 5 minutes after  IV administration- c/o extreme itching to torso and all extremities.     Iodinated Contrast Media      rash  rash  rash      Iodine      rash    Lisinopril Cough    No Clinical Screening - See Comments      Statin-hmg-coa reductase inhibitors  Statin-hmg-coa reductase inhibitors    Nsaids      nausea    Other Drug Allergy (See Comments)      Zinc Oxide-cod Liver Oil and Statins-hmg-coa Reductase Inhibitors    Statins Other (See Comments)     Patient declines  Patient declines  Patient declines    Sulfa Antibiotics      Mouth sore  Mouth sore      Sumatriptan Other (See Comments)    Zinc        The following medication was given:     MEDICATION: Macrobid (Nitrofurantoin)  ROUTE: PO  SITE: Medication was given orally  DOSE: 100mg  LOT #: [id number]  : [employer]  EXPIRATION DATE: 12/2024  NDC#: [id number]   Was there drug waste? No    Prior to medication administration, verified patient identity using patient's name and date of birth.  Due to medication administration, patient instructed to remain in clinic for 15 minutes  afterwards, and to report any adverse reaction to me immediately.    Removal:  16 Fr straight tipped latex mac catheter removed from suprapubic meatus without difficulty after removing 8 mL of fluid from the balloon.    Insertion:  16 Fr straight tipped latex mac catheter inserted into suprapubic meatus in the usual sterile fashion without  difficulty.  Received > 100 ml yellow positive urine return.   Balloon filled with 10 mL sterile H2O after positive urine return.  Catheter secured in place with leg strap: Yes.     Patient did tolerate procedure well.     Patient instructed as to where to call or go for pain, fever, leakage, or decreased urine flow.     This service provided today was under the direct supervision of Gilberto Stahl MD, who was available if needed.    Cynthia Echevarria LPN   7/22/2024  12:51 PM

## 2024-07-23 ENCOUNTER — PRE VISIT (OUTPATIENT)
Dept: UROLOGY | Facility: CLINIC | Age: 59
End: 2024-07-23
Payer: COMMERCIAL

## 2024-07-23 NOTE — TELEPHONE ENCOUNTER
Reason for visit: 1 year follow up + renal US and BMP prior     Relevant information: some hx of neurogenic bladder, bladder spasms    Records/imaging/labs/orders: all records available    Pt called: no need for a call    At Rooming:  Standard rooming      Abel Mason  7/23/2024  2:52 PM   Isotretinoin Counseling: Patient should get monthly blood tests, not donate blood, not drive at night if vision affected, not share medication, and not undergo elective surgery for 6 months after tx completed. Side effects reviewed, pt to contact office should one occur.

## 2024-08-09 NOTE — PROGRESS NOTES
Subjective     REASON FOR VISIT  Neurogenic bladder follow-up    HISTORY OF PRESENT ILLNESS  Ms. Resendiz is a pleasant 59 year old female whom speaking with today in follow-up for history of neurogenic bladder currently controlled with suprapubic tube.  It is unclear what is the cause of her neurogenic bladder, but urodynamic study from 2019 shows poor bladder contractility with early sensation and incomplete emptying.  She recently had her suprapubic tube channel reconstructed and replaced on 6/29/2023 with Dr. Stahl, and saw me on 8/11/2023 for her subsequent first exchange with a new channel.  Plan was to undergo monthly suprapubic tube exchanges as well as a yearly follow-up with renal ultrasound and BMP beforehand.    It appears that Betsy has only undergone a couple of suprapubic tube exchanges with us in the last year, most recently on 7/22/2024.  She has no showed many many appointments and has been talked to about this pattern.    Today:  Overall feels like her situation is stable -minimal UTIs  Does complain of some leakage around the suprapubic tube site    Objective     PHYSICAL EXAMINATION  General: Alert, oriented, no acute distress    LABS  Lab Results   Component Value Date    CR 1.25 (H) 08/14/2024    CR 1.25 (H) 02/28/2019    CR 1.09 (H) 02/21/2019    CR 1.11 (H) 02/14/2019    CR 1.11 (H) 02/07/2019    CR 1.21 (H) 12/18/2018    CR 1.25 (H) 11/29/2018    CR 1.29 (H) 11/25/2018        IMAGING  I personally reviewed the renal ultrasound completed today at 8/14/2024 which does not appear to have any hydronephrosis.    Assessment & Plan    Neurogenic bladder with urinary retention currently controlled with suprapubic tube    It was my pleasure to meet with Betsy in follow-up for her history of neurogenic bladder currently controlled with a suprapubic tube.  Overall, I am glad to hear her situation is stable and my recommendations would be to continue with monthly suprapubic tube exchanges as well as  following up in 1 year with a repeat renal ultrasound and BMP.    It does not appear she has any appointments on board for repeat suprapubic tube exchanges, so the plan will be to get her on for next Friday at the [facility] and then monthly after that and I will ask her to schedule a handful of these to be sure we do not run into a difficult scheduling problem again.    We will also plan to increase the size of her catheter to an 18 Azeri catheter at her next suprapubic tube exchange in an effort to decrease the bypass she is experiencing.    Ms. Resendiz expressed understanding and agreement to the above discussion and plan and all of her questions were answered to her satisfaction.     PLAN  Monthly suprapubic tube exchanges, next to take place on 8/23/2024  Repeat renal ultrasound and BMP in 1 year with follow-up office visit with me shortly afterwards, possibly the same day    SIGNED:    Fei Henderson PA-C

## 2024-08-14 ENCOUNTER — ANCILLARY PROCEDURE (OUTPATIENT)
Dept: ULTRASOUND IMAGING | Facility: CLINIC | Age: 59
End: 2024-08-14
Attending: STUDENT IN AN ORGANIZED HEALTH CARE EDUCATION/TRAINING PROGRAM
Payer: COMMERCIAL

## 2024-08-14 ENCOUNTER — LAB (OUTPATIENT)
Dept: LAB | Facility: CLINIC | Age: 59
End: 2024-08-14
Payer: COMMERCIAL

## 2024-08-14 ENCOUNTER — OFFICE VISIT (OUTPATIENT)
Dept: UROLOGY | Facility: CLINIC | Age: 59
End: 2024-08-14
Payer: COMMERCIAL

## 2024-08-14 VITALS
BODY MASS INDEX: 40.85 KG/M2 | SYSTOLIC BLOOD PRESSURE: 103 MMHG | OXYGEN SATURATION: 95 % | HEIGHT: 64 IN | DIASTOLIC BLOOD PRESSURE: 73 MMHG | HEART RATE: 77 BPM

## 2024-08-14 DIAGNOSIS — N31.9 NEUROGENIC BLADDER: ICD-10-CM

## 2024-08-14 DIAGNOSIS — Z97.8 CHRONIC INDWELLING FOLEY CATHETER: ICD-10-CM

## 2024-08-14 DIAGNOSIS — N31.9 NEUROGENIC BLADDER: Primary | ICD-10-CM

## 2024-08-14 LAB
ANION GAP SERPL CALCULATED.3IONS-SCNC: 9 MMOL/L (ref 7–15)
BUN SERPL-MCNC: 11.1 MG/DL (ref 6–20)
CALCIUM SERPL-MCNC: 8.8 MG/DL (ref 8.8–10.4)
CHLORIDE SERPL-SCNC: 105 MMOL/L (ref 98–107)
CREAT SERPL-MCNC: 1.25 MG/DL (ref 0.51–0.95)
EGFRCR SERPLBLD CKD-EPI 2021: 50 ML/MIN/1.73M2
GLUCOSE SERPL-MCNC: 116 MG/DL (ref 70–99)
HCO3 SERPL-SCNC: 27 MMOL/L (ref 22–29)
POTASSIUM SERPL-SCNC: 4.7 MMOL/L (ref 3.4–5.3)
SODIUM SERPL-SCNC: 141 MMOL/L (ref 135–145)

## 2024-08-14 PROCEDURE — 99213 OFFICE O/P EST LOW 20 MIN: CPT | Performed by: STUDENT IN AN ORGANIZED HEALTH CARE EDUCATION/TRAINING PROGRAM

## 2024-08-14 PROCEDURE — 36415 COLL VENOUS BLD VENIPUNCTURE: CPT | Performed by: PATHOLOGY

## 2024-08-14 PROCEDURE — 80048 BASIC METABOLIC PNL TOTAL CA: CPT | Performed by: PATHOLOGY

## 2024-08-14 PROCEDURE — 76770 US EXAM ABDO BACK WALL COMP: CPT | Mod: GC | Performed by: STUDENT IN AN ORGANIZED HEALTH CARE EDUCATION/TRAINING PROGRAM

## 2024-08-14 ASSESSMENT — PAIN SCALES - GENERAL: PAINLEVEL: EXTREME PAIN (9)

## 2024-08-14 NOTE — LETTER
8/14/2024       RE: Betsy Resendiz  [address]  [place] [address]     Dear Colleague,    Thank you for referring your patient, Betsy Resendiz, to the [facility] [place] at [facility]. Please see a copy of my visit note below.    Subjective    REASON FOR VISIT  Neurogenic bladder follow-up    HISTORY OF PRESENT ILLNESS  Ms. Resendiz is a pleasant 59 year old female whom speaking with today in follow-up for history of neurogenic bladder currently controlled with suprapubic tube.  It is unclear what is the cause of her neurogenic bladder, but urodynamic study from 2019 shows poor bladder contractility with early sensation and incomplete emptying.  She recently had her suprapubic tube channel reconstructed and replaced on 6/29/2023 with Dr. Stahl, and saw me on 8/11/2023 for her subsequent first exchange with a new channel.  Plan was to undergo monthly suprapubic tube exchanges as well as a yearly follow-up with renal ultrasound and BMP beforehand.    It appears that Betsy has only undergone a couple of suprapubic tube exchanges with us in the last year, most recently on 7/22/2024.  She has no showed many many appointments and has been talked to about this pattern.    Today:  Overall feels like her situation is stable -minimal UTIs  Does complain of some leakage around the suprapubic tube site    Objective    PHYSICAL EXAMINATION  General: Alert, oriented, no acute distress    LABS  Lab Results   Component Value Date    CR 1.25 (H) 08/14/2024    CR 1.25 (H) 02/28/2019    CR 1.09 (H) 02/21/2019    CR 1.11 (H) 02/14/2019    CR 1.11 (H) 02/07/2019    CR 1.21 (H) 12/18/2018    CR 1.25 (H) 11/29/2018    CR 1.29 (H) 11/25/2018        IMAGING  I personally reviewed the renal ultrasound completed today at 8/14/2024 which does not appear to have any hydronephrosis.    Assessment & Plan   Neurogenic bladder with urinary retention currently controlled  with suprapubic tube    It was my pleasure to meet with Betsy in follow-up for her history of neurogenic bladder currently controlled with a suprapubic tube.  Overall, I am glad to hear her situation is stable and my recommendations would be to continue with monthly suprapubic tube exchanges as well as following up in 1 year with a repeat renal ultrasound and BMP.    It does not appear she has any appointments on board for repeat suprapubic tube exchanges, so the plan will be to get her on for next Friday at the [facility] and then monthly after that and I will ask her to schedule a handful of these to be sure we do not run into a difficult scheduling problem again.    We will also plan to increase the size of her catheter to an 18 [place]wana catheter at her next suprapubic tube exchange in an effort to decrease the bypass she is experiencing.    Ms. Resendiz expressed understanding and agreement to the above discussion and plan and all of her questions were answered to her satisfaction.     PLAN  Monthly suprapubic tube exchanges, next to take place on 8/23/2024  Repeat renal ultrasound and BMP in 1 year with follow-up office visit with me shortly afterwards, possibly the same day    SIGNED:    Fei Henderson PA-C      Again, thank you for allowing me to participate in the care of your patient.      Sincerely,    Fei Henderson PA-C

## 2024-08-14 NOTE — NURSING NOTE
"No chief complaint on file.      Blood pressure 103/73, pulse 77, height 1.626 m (5' 4\"), SpO2 95%. Body mass index is 40.85 kg/m .    Patient Active Problem List   Diagnosis    ACP (advance care planning)    Schizoaffective disorder, bipolar type (H)    Carpal tunnel syndrome    Cervical neck pain with evidence of disc disease    Chronic pain    Cervical myelopathy with cervical radiculopathy (H)    DM w/o complication type II (H)    History of encephalopathy    Gastroesophageal reflux disease without esophagitis    Generalized osteoarthritis    Immunosuppressed status (H24)    Migraine headache    Morbid obesity with BMI of 40.0-44.9, adult (H)    Normocytic anemia    SPIKE (obstructive sleep apnea)    Osteoarthritis of hip    Lymphedema    Pain in joint, ankle and foot    Conversion reaction    Posttraumatic stress disorder    Urinary retention    Rotator cuff tendinitis    Schizoaffective disorder, unspecified type (H)    Tobacco use disorder    Ulnar neuritis    Asthma    Unspecified glaucoma    COPD (chronic obstructive pulmonary disease) (H)    JAVIER (generalized anxiety disorder)    HTN (hypertension)    Auditory hallucinations    COPD exacerbation (H)    Acute respiratory failure with hypoxia and hypercapnia (H)    CKD (chronic kidney disease) stage 3, GFR 30-59 ml/min (H)    Yeast vaginitis    Chronic diastolic heart failure (H)    Abdominal pain    Adrenal mass (H24)    Aftercare involving the use of plastic surgery    CHAPINCITO (acute kidney injury) (H24)    Chronic indwelling Abernathy catheter    Cyclic vomiting syndrome    Diarrhea    Diarrhea of presumed infectious origin    Functional bowel disorder    Other specified hypothyroidism    Polyuria    Prolonged QT interval    Neurogenic bladder       Allergies   Allergen Reactions    Cephalexin Hives    Codeine Shortness Of Breath     Has tolerated morphine 7/2008    Morphine Nausea and Vomiting and Shortness Of Breath     trouble breathing and nausea  trouble " breathing and nausea  Morphine NOT tolerated. Please use other opioid  trouble breathing and nausea    Cod Liver Oil     Diphenhydramine Itching     About 5 minutes after  IV administration- c/o extreme itching to torso and all extremities.     Iodinated Contrast Media      rash  rash  rash      Iodine      rash    Lisinopril Cough    No Clinical Screening - See Comments      Statin-hmg-coa reductase inhibitors  Statin-hmg-coa reductase inhibitors    Nsaids      nausea    Other Drug Allergy (See Comments)      Zinc Oxide-cod Liver Oil and Statins-hmg-coa Reductase Inhibitors    Statins Other (See Comments)     Patient declines  Patient declines  Patient declines    Sulfa Antibiotics      Mouth sore  Mouth sore      Sumatriptan Other (See Comments)    Zinc        Current Outpatient Medications   Medication Sig Dispense Refill    albuterol (PROAIR HFA/PROVENTIL HFA/VENTOLIN HFA) 108 (90 BASE) MCG/ACT Inhaler Inhale 2 puffs into the lungs every 4 hours as needed       baclofen (LIORESAL) 10 MG tablet Take 10 mg by mouth 3 times daily      brimonidine (ALPHAGAN-P) 0.15 % ophthalmic solution Place 1 drop into both eyes 2 times daily      budesonide (PULMICORT) 0.5 MG/2ML neb solution Take 0.5 mg by nebulization 2 times daily      bumetanide (BUMEX) 2 MG tablet       buprenorphine (BUTRANS) 20 MCG/HR WK patch buprenorphine 20 mcg/hour weekly transdermal patch      calcium carbonate 500 mg, elemental, (OSCAL 500) 1250 (500 Ca) MG TABS tablet Take 500 mg by mouth      fluticasone (FLONASE) 50 MCG/ACT nasal spray Spray 1 spray into both nostrils daily      ipratropium - albuterol 0.5 mg/2.5 mg/3 mL (DUONEB) 0.5-2.5 (3) MG/3ML neb solution Take 1 vial by nebulization 4 times daily as needed      lamoTRIgine (LAMICTAL) 200 MG tablet Take 200 mg by mouth At Bedtime       LANsoprazole (PREVACID) 30 MG CR capsule Take 15 mg by mouth daily       latanoprost (XALATAN) 0.005 % ophthalmic solution 1 drop daily      linaclotide  (LINZESS) 72 MCG capsule Take by mouth every morning (before breakfast)      LIOTHYRONINE SODIUM PO Take 25 mcg by mouth daily      metoprolol succinate ER (TOPROL-XL) 25 MG 24 hr tablet Take 25 mg by mouth      omeprazole (PRILOSEC) 40 MG DR capsule   3    oxyBUTYnin ER (DITROPAN XL) 10 MG 24 hr tablet Take 1 tablet (10 mg) by mouth daily 90 tablet 4    pantoprazole (PROTONIX) 40 MG EC tablet Take 40 mg by mouth daily      perphenazine 4 MG tablet Take 4 mg by mouth 2 times daily      POTASSIUM CHLORIDE ER PO Take 40 mg by mouth 3 times daily       pregabalin (LYRICA) 150 MG capsule Take 1 capsule (150 mg) by mouth 2 times daily (Patient taking differently: Take 150 mg by mouth 3 times daily) 60 capsule 5    promethazine (PHENERGAN) 12.5 MG tablet TK 1 T PO BID  3    propranolol (INDERAL) 10 MG tablet   1    senna (SENOKOT) 8.6 MG tablet Take 8.6 mg by mouth      spironolactone (ALDACTONE) 100 MG tablet Take 100 mg by mouth daily      travoprost, NICHO Free, (TRAVATAN Z) 0.004 % ophthalmic solution Place 1 drop into both eyes At Bedtime      venlafaxine (EFFEXOR XR) 37.5 MG 24 hr capsule venlafaxine ER 37.5 mg capsule,extended release 24 hr      ziprasidone (GEODON) 40 MG capsule 60 mg At Bedtime      ziprasidone (GEODON) 80 MG capsule Take 80 mg by mouth daily      amantadine (SYMMETREL) 100 MG capsule amantadine HCl 100 mg capsule (Patient not taking: Reported on 8/14/2024)      amoxicillin (AMOXIL) 250 MG capsule Take 1 capsule (250 mg) by mouth daily (Patient not taking: Reported on 3/14/2023) 30 capsule 0    benztropine (COGENTIN) 0.5 MG tablet Take 1 tablet by mouth (Patient not taking: Reported on 6/12/2023)      cariprazine (VRAYLAR) 1.5 MG CAPS capsule Take 2 mg by mouth daily  (Patient not taking: Reported on 6/12/2023)      cholecalciferol (VITAMIN D3) 5000 units CAPS capsule Take 5,000 Units by mouth daily (Patient not taking: Reported on 6/12/2023)      clotrimazole (LOTRIMIN) 1 % cream Apply topically 2  times daily (Patient not taking: Reported on 3/14/2023)      clotrimazole (LOTRIMIN) 1 % vaginal cream INSERT 5 GRAMS VAGINALLY HS FOR YEAST VAGINITIS FOR 7 DAYS (Patient not taking: Reported on 3/14/2023)  0    Cranberry 425 MG CAPS  (Patient not taking: Reported on 8/14/2024)      cyclobenzaprine (FLEXERIL) 10 MG tablet TK 1 T PO TID PRF MUSCLE SPASM (Patient not taking: Reported on 3/14/2023)  0    CYCLOBENZAPRINE HCL PO Take 5 mg by mouth every 8 hours as needed  (Patient not taking: Reported on 3/14/2023)      cyproheptadine (PERIACTIN) 4 MG tablet Take 4 mg by mouth 3 times daily as needed for allergies (Patient not taking: Reported on 8/14/2024)      diclofenac (VOLTAREN) 1 % topical gel Apply topically 2 times daily PRN (Patient not taking: Reported on 8/14/2024)      diphenoxylate-atropine (LOMOTIL) 2.5-0.025 MG tablet Take 1 tablet by mouth (Patient not taking: Reported on 3/14/2023)      escitalopram (LEXAPRO) 20 MG tablet  (Patient not taking: Reported on 8/14/2024)      furosemide (LASIX) 80 MG tablet Take 40 mg by mouth 2 times daily  (Patient not taking: Reported on 3/14/2023)      gentamicin (GARAMYCIN) 0.3 % ophthalmic solution INT 1 GTT IN OS Q 4 H (Patient not taking: Reported on 8/14/2024)  0    HYDROmorphone (DILAUDID) 2 MG tablet Take 1 mg by mouth (Patient not taking: Reported on 3/14/2023)      hydrOXYzine (ATARAX) 25 MG tablet Take 25 mg by mouth every 6 hours as needed for itching (Patient not taking: Reported on 8/14/2024)      hyoscyamine (LEVSIN/SL) 0.125 MG sublingual tablet Place 0.125 mg under the tongue (Patient not taking: Reported on 3/14/2023)      Lidocaine-Menthol 4.5-5 % PTCH Externally apply topically 2 times daily (Patient not taking: Reported on 3/14/2023)      lithium 300 MG capsule Take 1 capsule by mouth in the morning and 2 capsules at bedtime (Patient not taking: Reported on 3/14/2023)      loperamide (IMODIUM) 2 MG capsule Take 2 mg by mouth every 6 hours as needed for  diarrhea (Patient not taking: Reported on 3/14/2023)      LORazepam (ATIVAN) 0.5 MG tablet TK ONE T PO BID PRA (Patient not taking: Reported on 3/14/2023)  1    magnesium oxide (MAG-OX) 400 MG tablet Take 400 mg by mouth daily (Patient not taking: Reported on 8/14/2024)      MECLIZINE HCL PO Take 25 mg by mouth every 8 hours as needed for dizziness (Patient not taking: Reported on 3/14/2023)      methocarbamol (ROBAXIN) 500 MG tablet TK 1 T PO Q 6 H FOR UP TO 10 DAYS PRF MUSCLE PAIN OR SPASM (Patient not taking: Reported on 3/14/2023)  0    nicotine (COMMIT) 2 MG lozenge Place 2 mg inside cheek every 3 hours as needed for smoking cessation (Patient not taking: Reported on 3/14/2023)      ondansetron (ZOFRAN-ODT) 4 MG ODT tab Take 1 tablet (4 mg) by mouth every 8 hours as needed for nausea (Patient not taking: Reported on 8/14/2024)      oxyCODONE (ROXICODONE) 5 MG tablet TK 1-2 PO Q 4 H PRN FOR PAIN (Patient not taking: Reported on 3/14/2023)  0    oxyCODONE-acetaminophen (PERCOCET) 5-325 MG tablet  (Patient not taking: Reported on 3/14/2023)  0    OYSTER SHELL CALCIUM PO Take 500 mg by mouth 4 times daily (Patient not taking: Reported on 8/14/2024)      paliperidone (INVEGA) 6 MG 24 hr tablet Take 6 mg by mouth every morning (Patient not taking: Reported on 3/14/2023)      polyethylene glycol (MIRALAX) 17 GM/Dose powder Take 1 Capful by mouth 2 times daily (Patient not taking: Reported on 8/14/2024)      QUEtiapine (SEROQUEL) 50 MG tablet TK ONE T PO HS (Patient not taking: Reported on 3/14/2023)  0    scopolamine (TRANSDERM) 1 MG/3DAYS 72 hr patch Place 1 patch onto the skin (Patient not taking: Reported on 8/14/2024)      senna-docusate (SENOKOT-S/PERICOLACE) 8.6-50 MG tablet Take 2 tablets by mouth 2 times daily as needed for constipation (Patient not taking: Reported on 3/14/2023)      SUMAtriptan (IMITREX STATDOSE) 6 MG/0.5ML pen injector kit sumatriptan 6 mg/0.5 mL subcutaneous pen injector (Patient not  taking: Reported on 8/14/2024)      tiZANidine (ZANAFLEX) 4 MG tablet Take 4 mg by mouth every 8 hours as needed for muscle spasms (Patient not taking: Reported on 8/14/2024)      vitamin D2 (ERGOCALCIFEROL) 77744 units (1250 mcg) capsule Take 50,000 Units by mouth (Patient not taking: Reported on 6/12/2023)         Social History     Tobacco Use    Smoking status: Former     Types: Cigarettes    Smokeless tobacco: Never   Substance Use Topics    Alcohol use: No    Drug use: No       Cynthia Echevarria LPN  8/14/2024  3:48 PM

## 2024-08-20 ENCOUNTER — PRE VISIT (OUTPATIENT)
Dept: UROLOGY | Facility: CLINIC | Age: 59
End: 2024-08-20
Payer: COMMERCIAL

## 2024-08-20 DIAGNOSIS — Z46.6 URINARY CATHETER CHANGE REQUIRED: Primary | ICD-10-CM

## 2024-08-20 RX ORDER — NITROFURANTOIN 25; 75 MG/1; MG/1
100 CAPSULE ORAL ONCE
Status: COMPLETED | OUTPATIENT
Start: 2024-08-20 | End: 2024-09-24

## 2024-08-20 NOTE — TELEPHONE ENCOUNTER
Reason for nurse visit:   SPT Exchange    Transfer Assistance Needed:   Unknown - remove chairs from room before getting patient - uses a w/c for mobility    Diagnosis:   NGB, Chronic indwelling catheter    Ordering provider:   Fei Henderson    Last seen by provider:   8/14/2024       Allergies   Allergen Reactions    Cephalexin Hives    Codeine Shortness Of Breath     Has tolerated morphine 7/2008    Morphine Nausea and Vomiting and Shortness Of Breath     trouble breathing and nausea  trouble breathing and nausea  Morphine NOT tolerated. Please use other opioid  trouble breathing and nausea    Cod Liver Oil     Diphenhydramine Itching     About 5 minutes after  IV administration- c/o extreme itching to torso and all extremities.     Iodinated Contrast Media      rash  rash  rash      Iodine      rash    Lisinopril Cough    No Clinical Screening - See Comments      Statin-hmg-coa reductase inhibitors  Statin-hmg-coa reductase inhibitors    Nsaids      nausea    Other Drug Allergy (See Comments)      Zinc Oxide-cod Liver Oil and Statins-hmg-coa Reductase Inhibitors    Statins Other (See Comments)     Patient declines  Patient declines  Patient declines    Sulfa Antibiotics      Mouth sore  Mouth sore      Sumatriptan Other (See Comments)    Zinc       16 fr straight-tipped latex    Cynthia Echevarria LPN

## 2024-09-24 ENCOUNTER — OFFICE VISIT (OUTPATIENT)
Dept: UROLOGY | Facility: CLINIC | Age: 59
End: 2024-09-24
Payer: COMMERCIAL

## 2024-09-24 DIAGNOSIS — N31.9 NEUROGENIC BLADDER: Primary | ICD-10-CM

## 2024-09-24 PROCEDURE — 51705 CHANGE OF BLADDER TUBE: CPT

## 2024-09-24 RX ADMIN — NITROFURANTOIN 100 MG: 25; 75 CAPSULE ORAL at 15:21

## 2024-09-24 NOTE — PROGRESS NOTES
Betsy Resendiz comes into clinic today at the request of Fei Henderson PA-C with the diagnosis of Neurogenic bladder for a catheter exchange.    Order has been verified: Yes.    Allergies   Allergen Reactions    Cephalexin Hives    Codeine Shortness Of Breath     Has tolerated morphine 7/2008    Morphine Nausea and Vomiting and Shortness Of Breath     trouble breathing and nausea  trouble breathing and nausea  Morphine NOT tolerated. Please use other opioid  trouble breathing and nausea    Cod Liver Oil     Diphenhydramine Itching     About 5 minutes after  IV administration- c/o extreme itching to torso and all extremities.     Iodinated Contrast Media      rash  rash  rash      Iodine      rash    Lisinopril Cough    No Clinical Screening - See Comments      Statin-hmg-coa reductase inhibitors  Statin-hmg-coa reductase inhibitors    Nsaids      nausea    Other Drug Allergy (See Comments)      Zinc Oxide-cod Liver Oil and Statins-hmg-coa Reductase Inhibitors    Statins Other (See Comments)     Patient declines  Patient declines  Patient declines    Sulfa Antibiotics      Mouth sore  Mouth sore      Sumatriptan Other (See Comments)    Zinc        The following medication was given:     MEDICATION: Macrobid (Nitrofurantoin)  ROUTE: PO  SITE: Medication was given orally  DOSE: 100mg  LOT #: [id number]  :  [employer]  EXPIRATION DATE: 2026-10  NDC#: [id number]   Was there drug waste? No    Prior to medication administration, verified patient identity using patient's name and date of birth.  Due to medication administration, patient instructed to remain in clinic for 15 minutes  afterwards, and to report any adverse reaction to me immediately.    Removal:  16 Fr straight tipped latex mac catheter removed from suprapubic meatus without difficulty after removing 8 mL of fluid from the balloon.    Insertion:  18 Fr straight tipped latex mac catheter inserted into suprapubic meatus in the usual sterile fashion  without difficulty.  Received > 20 ml yellow positive urine return.   Balloon filled with 10 mL sterile H2O after positive urine return.  Catheter secured in place with leg strap: Yes.     Patient did tolerate procedure well.     Patient instructed as to where to call or go for pain, fever, leakage, or decreased urine flow.     This service provided today was under the direct supervision of Escobar Shannon MD, who was available if needed.    Paola Clayton   9/24/2024  2:53 PM

## 2024-10-21 ENCOUNTER — PRE VISIT (OUTPATIENT)
Dept: UROLOGY | Facility: CLINIC | Age: 59
End: 2024-10-21
Payer: COMMERCIAL

## 2024-10-21 DIAGNOSIS — Z46.6 URINARY CATHETER (FOLEY) CHANGE REQUIRED: Primary | ICD-10-CM

## 2024-10-21 RX ORDER — NITROFURANTOIN 25; 75 MG/1; MG/1
100 CAPSULE ORAL ONCE
Status: SHIPPED | OUTPATIENT
Start: 2024-10-22

## 2024-10-21 NOTE — TELEPHONE ENCOUNTER
Reason for visit: Cath exchange     Relevant information: Last cath exchange patient was sized up from a 16fr to 18fr latex mac with 10cc balloon. She was given macrobid at last visit due to numerous contraindication with other antibiotics and current medications.     Records/imaging/labs/orders: Order from Asad Henderson on 8/14/24 for monthly cath exchanges    At Rooming: Verify catheter size. Give antibiotic (macrobid) and cath exchange.     BRENTON BARROW RN  10/21/2024  3:25 PM

## 2024-11-15 ENCOUNTER — PRE VISIT (OUTPATIENT)
Dept: UROLOGY | Facility: CLINIC | Age: 59
End: 2024-11-15
Payer: COMMERCIAL

## 2024-11-15 DIAGNOSIS — Z46.6 URINARY CATHETER (FOLEY) CHANGE REQUIRED: Primary | ICD-10-CM

## 2024-11-15 RX ORDER — NITROFURANTOIN 25; 75 MG/1; MG/1
100 CAPSULE ORAL ONCE
OUTPATIENT
Start: 2024-11-15

## 2024-11-15 NOTE — TELEPHONE ENCOUNTER
Reason for nurse visit: SPT change    Supplies needed: Catheter material - Latex, Catheter size - 16Fr, and Catheter type - straight tip      Antibiotic: Macrobid    Diagnosis: NGB with urinary retention    Ordering provider: Fei Henderson PA-C    Last seen by provider: 8/14/24       Allergies   Allergen Reactions    Cephalexin Hives    Codeine Shortness Of Breath     Has tolerated morphine 7/2008    Morphine Nausea and Vomiting and Shortness Of Breath     trouble breathing and nausea  trouble breathing and nausea  Morphine NOT tolerated. Please use other opioid  trouble breathing and nausea    Cod Liver Oil     Diphenhydramine Itching     About 5 minutes after  IV administration- c/o extreme itching to torso and all extremities.     Iodinated Contrast Media      rash  rash  rash      Iodine      rash    Lisinopril Cough    No Clinical Screening - See Comments      Statin-hmg-coa reductase inhibitors  Statin-hmg-coa reductase inhibitors    Nsaids      nausea    Other Drug Allergy (See Comments)      Zinc Oxide-cod Liver Oil and Statins-hmg-coa Reductase Inhibitors    Statins Other (See Comments)     Patient declines  Patient declines  Patient declines    Sulfa Antibiotics      Mouth sore  Mouth sore      Sumatriptan Other (See Comments)    Max Clayton

## 2024-11-19 ENCOUNTER — DOCUMENTATION ONLY (OUTPATIENT)
Dept: UROLOGY | Facility: CLINIC | Age: 59
End: 2024-11-19

## 2024-11-19 NOTE — PROGRESS NOTES
Call received from patient. She will not be here before 3 and is calling to cancel her nurse visit.      Thank you,  Jeanie Rosenberg RN, BSN   Urology Triage Nurse

## 2024-12-16 ENCOUNTER — OFFICE VISIT (OUTPATIENT)
Dept: UROLOGY | Facility: CLINIC | Age: 59
End: 2024-12-16
Payer: COMMERCIAL

## 2024-12-16 ENCOUNTER — PRE VISIT (OUTPATIENT)
Dept: UROLOGY | Facility: CLINIC | Age: 59
End: 2024-12-16

## 2024-12-16 DIAGNOSIS — N31.9 NEUROGENIC BLADDER: Primary | ICD-10-CM

## 2024-12-16 DIAGNOSIS — Z46.6 URINARY CATHETER (FOLEY) CHANGE REQUIRED: ICD-10-CM

## 2024-12-16 RX ORDER — NITROFURANTOIN 25; 75 MG/1; MG/1
100 CAPSULE ORAL ONCE
Status: COMPLETED | OUTPATIENT
Start: 2024-12-16 | End: 2024-12-16

## 2024-12-16 RX ADMIN — NITROFURANTOIN 100 MG: 25; 75 CAPSULE ORAL at 15:34

## 2024-12-16 NOTE — PROGRESS NOTES
Betsy Resendiz comes into clinic today at the request of Fei ARRIAGA with the diagnosis of NGB for a catheter exchange .    Order has been verified: Yes.    Allergies   Allergen Reactions    Cephalexin Hives    Codeine Shortness Of Breath     Has tolerated morphine 7/2008    Morphine Nausea and Vomiting and Shortness Of Breath     trouble breathing and nausea  trouble breathing and nausea  Morphine NOT tolerated. Please use other opioid  trouble breathing and nausea    Cod Liver Oil     Diphenhydramine Itching     About 5 minutes after  IV administration- c/o extreme itching to torso and all extremities.     Iodinated Contrast Media      rash  rash  rash      Iodine      rash    Lisinopril Cough    No Clinical Screening - See Comments      Statin-hmg-coa reductase inhibitors  Statin-hmg-coa reductase inhibitors    Nsaids      nausea    Other Drug Allergy (See Comments)      Zinc Oxide-cod Liver Oil and Statins-hmg-coa Reductase Inhibitors    Statins Other (See Comments)     Patient declines  Patient declines  Patient declines    Sulfa Antibiotics      Mouth sore  Mouth sore      Sumatriptan Other (See Comments)    Zinc        The following medication was given:     MEDICATION: Macrobid (Nitrofurantoin)  ROUTE: PO  SITE: Medication was given orally  DOSE: 100mg  LOT #: [id number]  :  Mahendra Reeves  EXPIRATION DATE: 2025-10  NDC#: [id number]   Was there drug waste? No    Prior to medication administration, verified patient identity using patient's name and date of birth.  Due to medication administration, patient instructed to remain in clinic for 15 minutes  afterwards, and to report any adverse reaction to me immediately.    Removal:  18 Fr straight tipped latex mac catheter removed from suprapubic meatus without difficulty after removing 7.5 mL of fluid from the balloon.    Insertion:  18 Fr straight tipped latex mac catheter inserted into suprapubic meatus in the usual sterile fashion without  difficulty.  Received > 75 ml yellow positive urine return.   Balloon filled with 10 mL sterile H2O after positive urine return.  Catheter secured in place with leg strap: Yes.     Patient did tolerate procedure well.     Patient instructed as to where to call or go for pain, fever, leakage, or decreased urine flow.     This service provided today was under the direct supervision of Gilberto Mendoza, who was available if needed.    Marion Santos   12/16/2024  3:09 PM

## 2024-12-16 NOTE — TELEPHONE ENCOUNTER
Reason for nurse visit: SPT change     Supplies needed: Catheter material - Latex, Catheter size - 16Fr, and Catheter type - straight tip            Antibiotic: Macrobid     Diagnosis: NGB with urinary retention     Ordering provider: Fei Henderson PA-C     Last seen by provider: 8/14/24

## 2024-12-16 NOTE — PATIENT INSTRUCTIONS
Please follow up in 4 weeks for a repeat cath exchange nurse visit.     It was a pleasure meeting with you today.  Thank you for allowing me and my team the privilege of caring for you today.  YOU are the reason we are here, and I truly hope we provided you with the excellent service you deserve.  Please let us know if there is anything else we can do for you so that we can be sure you are leaving completely satisfied with your care experience.

## 2024-12-19 DIAGNOSIS — R39.89 SUSPECTED UTI: Primary | ICD-10-CM

## 2024-12-19 NOTE — PROGRESS NOTES
Patient c/o dark, sedimented urine and hematuria and is asking for a UA/UC check. Orders placed and will have faxed to [facility] per her request. Encouraged her to push fluids.      Thank you,  Jeanie Rosenberg RN, BSN   Urology Triage Nurse

## 2025-01-19 DIAGNOSIS — N31.9 NEUROGENIC BLADDER: ICD-10-CM

## 2025-01-25 NOTE — TELEPHONE ENCOUNTER
Last Written Prescription:    oxyBUTYnin ER (DITROPAN XL) 10 MG 24 hr tablet  10 mg, DAILY           Summary: Take 1 tablet (10 mg) by mouth daily, Disp-90 tablet, R-4, E-Prescribe  Dose, Route, Frequency: 10 mg, Oral, DAILYStart: 10/28/2023Ord/Sold: 10/28/2023 (O)Ordered On: 10/28/2023Pharmacy: [facility] #[id number] [address] AT [facility] AVEReportDx Associated: Taking: Long-term: Med Note:              Patient Sig: Take 1 tablet (10 mg) by mouth daily  Ordering Department: [facility]  Authorized By: Gilberto Stahl MD  Dispense: 90 tablet  Refills: 4 ordered       ----------------------  Last Visit Date: 8/14/24 [facility]  Future Visit Date: None      Muscarinic Antagonists (Urinary Incontinence Agents) [id number]/20/2025 10:40 AM   Protocol Details Patient does not have a diagnosis of glaucoma on the problem list      Protocol failed, routed to provider     Margaret VILLALOBOS RN  P Central Nursing/Red Flag Triage & Med Refill Team

## 2025-01-27 RX ORDER — OXYBUTYNIN CHLORIDE 10 MG/1
10 TABLET, EXTENDED RELEASE ORAL DAILY
Qty: 90 TABLET | Refills: 2 | Status: SHIPPED | OUTPATIENT
Start: 2025-01-27

## 2025-02-13 DIAGNOSIS — N31.9 NEUROGENIC BLADDER: ICD-10-CM

## 2025-02-13 DIAGNOSIS — Z97.8 CHRONIC INDWELLING FOLEY CATHETER: ICD-10-CM

## 2025-02-13 DIAGNOSIS — N31.9 NEUROGENIC BLADDER: Primary | ICD-10-CM

## 2025-02-13 DIAGNOSIS — R39.9 UTI SYMPTOMS: Primary | ICD-10-CM

## 2025-02-13 NOTE — PROGRESS NOTES
Called to Betsy, she c/o cramping in her bladder area and increased sediment in her urine draining from her SPT. She denied fever, chills, weakness, fatigue. She continues on oxybutynin for bladder spasms.     I offered her a UA/UC order to r/o an infection, which she asked to be sent to [facility] where she would collect tomorrow. Will await UA/UC results.       Demond HONG RN  Urology Triage

## 2025-03-03 ENCOUNTER — MEDICAL CORRESPONDENCE (OUTPATIENT)
Dept: HEALTH INFORMATION MANAGEMENT | Facility: CLINIC | Age: 60
End: 2025-03-03
Payer: COMMERCIAL

## 2025-03-07 NOTE — PROGRESS NOTES
"HPI:  Betsy Resendiz is a 59 year old female with history of NGB of unclear etiology s/p SPT being seen for follow-up.      Medsur History    2019 - UDS shows poor bladder contractility with early sensation and incomplete emptying     6/29/2023-SPT channel reconstructed and replaced by Dr. Stahl.  Poor candidate for Mitrofanoff.    8/14/2024-some leakage around SPT, plan for increasing SPT to 18 Citizen of [place] for leakage.    9/24/2024-SPT changed to 18 Citizen of [place].    Today    - last suprapubic tube change was 2 months    - less leakage after SPT changed to 18 Fr    - she noticed sediments, bladder spasms, and urgency 3 months ago. She took some antibiotics, not effective. Uauc was done earlier today    - on oxybutynin 10        Exam:  Ht 1.626 m (5' 4\")   Wt 117.9 kg (260 lb)   BMI 44.63 kg/m    General: age-appropriate appearing female in NAD sitting in  chair  HEENT: Head AT/NC, EOMI, CN Grossly intact.  : SPT stoma healthy looking, urine clear with some sediments    Review of Imaging:  The following imaging exams were independently viewed and interpreted by me and discussed with patient:  ABILIO - no hydronephrosis    Review of Labs:  The following labs were reviewed by me and discussed with the patient:  Recent Results (from the past 720 hours)   Basic metabolic panel    Collection Time: 03/11/25  2:10 PM   Result Value Ref Range    Sodium 144 135 - 145 mmol/L    Potassium 4.6 3.4 - 5.3 mmol/L    Chloride 107 98 - 107 mmol/L    Carbon Dioxide (CO2) 29 22 - 29 mmol/L    Anion Gap 8 7 - 15 mmol/L    Urea Nitrogen 14.4 6.0 - 20.0 mg/dL    Creatinine 1.39 (H) 0.51 - 0.95 mg/dL    GFR Estimate 44 (L) >60 mL/min/1.73m2    Calcium 8.5 (L) 8.8 - 10.4 mg/dL    Glucose 73 70 - 99 mg/dL   Routine UA with microscopic - No culture    Collection Time: 03/11/25  2:15 PM   Result Value Ref Range    Color Urine Yellow Colorless, Straw, Light Yellow, Yellow    Appearance Urine Slightly Cloudy (A) Clear    Glucose Urine Negative Negative " mg/dL    Bilirubin Urine Negative Negative    Ketones Urine Negative Negative mg/dL    Specific Gravity Urine 1.022 1.003 - 1.035    Blood Urine Moderate (A) Negative    pH Urine 6.5 5.0 - 7.0    Protein Albumin Urine Negative Negative mg/dL    Urobilinogen Urine Normal Normal, 2.0 mg/dL    Nitrite Urine Positive (A) Negative    Leukocyte Esterase Urine Large (A) Negative    Bacteria Urine Many (A) None Seen /HPF    WBC Clumps Urine Present (A) None Seen /HPF    Mucus Urine Present (A) None Seen /LPF    RBC Urine 39 (H) <=2 /HPF    WBC Urine >182 (H) <=5 /HPF    Squamous Epithelials Urine 1 <=1 /HPF         Assessment & Plan   NGB managed by SPT    -I had a lengthy discussion with the patient about the difference between asymptomatic bacteriuria and urinary tract infection. I explained to patient that because of her multiple urologic problems, she will be chronically colonized with bacteria in the urine and that infection only occurs when there is invasion of the bacteria through the bladder mucosal lining. I emphasized that there was no role for surveillance urine cultures or urinalysis. I stressed that vague symptoms plus a positive culture do not constitute a urinary tract infection. I described that foul-smelling or cloudy urine is a sign of bacterial overgrowth but not of urinary tract infection.  I explained the danger of overtreatment of asymptomatic bacteriuria with antibiotics and her role in antibiotic stewardship. I reviewed the symptoms of a true urinary tract infection to include: fever, flank pain, suprapubic pain, increased urinary incontinence, spasticity or hematuria. Patient understood the discussion and recommendations.    - continue suprapubic tube change in the clinic monthly  - SPT changed today.  - switch from oxybutynin to detrol  - discussed bladder irrigation w/ at least 250 cc tap water daily and as needed to prevent clogging and UTI. irrigation kits 4/month - order will be faxed to [facility].  - f/u in 3 mo        Radha Martin NP  [facility] UROLOGY CLINIC [place]    ==========================      Additional Coding Information:    Problems:  3 -- one stable chronic illness    Level of risk:  4 -- prescription drug management    Time spent:  I spent a total of 30 minutes on the day of the visit.   Time spent by me today doing chart review, history and exam, documentation and further activities per the note

## 2025-03-11 ENCOUNTER — LAB (OUTPATIENT)
Dept: LAB | Facility: CLINIC | Age: 60
End: 2025-03-11
Payer: COMMERCIAL

## 2025-03-11 ENCOUNTER — OFFICE VISIT (OUTPATIENT)
Dept: UROLOGY | Facility: CLINIC | Age: 60
End: 2025-03-11
Payer: COMMERCIAL

## 2025-03-11 ENCOUNTER — ANCILLARY PROCEDURE (OUTPATIENT)
Dept: ULTRASOUND IMAGING | Facility: CLINIC | Age: 60
End: 2025-03-11
Attending: STUDENT IN AN ORGANIZED HEALTH CARE EDUCATION/TRAINING PROGRAM
Payer: COMMERCIAL

## 2025-03-11 VITALS — HEIGHT: 64 IN | BODY MASS INDEX: 44.39 KG/M2 | WEIGHT: 260 LBS

## 2025-03-11 DIAGNOSIS — N31.9 NEUROGENIC BLADDER: ICD-10-CM

## 2025-03-11 DIAGNOSIS — Z97.8 CHRONIC INDWELLING FOLEY CATHETER: ICD-10-CM

## 2025-03-11 DIAGNOSIS — R33.9 URINARY RETENTION: ICD-10-CM

## 2025-03-11 DIAGNOSIS — N31.9 NEUROGENIC BLADDER: Primary | ICD-10-CM

## 2025-03-11 LAB
ALBUMIN UR-MCNC: NEGATIVE MG/DL
ANION GAP SERPL CALCULATED.3IONS-SCNC: 8 MMOL/L (ref 7–15)
APPEARANCE UR: ABNORMAL
BACTERIA #/AREA URNS HPF: ABNORMAL /HPF
BILIRUB UR QL STRIP: NEGATIVE
BUN SERPL-MCNC: 14.4 MG/DL (ref 6–20)
CALCIUM SERPL-MCNC: 8.5 MG/DL (ref 8.8–10.4)
CHLORIDE SERPL-SCNC: 107 MMOL/L (ref 98–107)
COLOR UR AUTO: YELLOW
CREAT SERPL-MCNC: 1.39 MG/DL (ref 0.51–0.95)
EGFRCR SERPLBLD CKD-EPI 2021: 44 ML/MIN/1.73M2
GLUCOSE SERPL-MCNC: 73 MG/DL (ref 70–99)
GLUCOSE UR STRIP-MCNC: NEGATIVE MG/DL
HCO3 SERPL-SCNC: 29 MMOL/L (ref 22–29)
HGB UR QL STRIP: ABNORMAL
KETONES UR STRIP-MCNC: NEGATIVE MG/DL
LEUKOCYTE ESTERASE UR QL STRIP: ABNORMAL
MUCOUS THREADS #/AREA URNS LPF: PRESENT /LPF
NITRATE UR QL: POSITIVE
PH UR STRIP: 6.5 [PH] (ref 5–7)
POTASSIUM SERPL-SCNC: 4.6 MMOL/L (ref 3.4–5.3)
RBC URINE: 39 /HPF
SODIUM SERPL-SCNC: 144 MMOL/L (ref 135–145)
SP GR UR STRIP: 1.02 (ref 1–1.03)
SQUAMOUS EPITHELIAL: 1 /HPF
UROBILINOGEN UR STRIP-MCNC: NORMAL MG/DL
WBC CLUMPS #/AREA URNS HPF: PRESENT /HPF
WBC URINE: >182 /HPF

## 2025-03-11 PROCEDURE — 51705 CHANGE OF BLADDER TUBE: CPT

## 2025-03-11 PROCEDURE — 99000 SPECIMEN HANDLING OFFICE-LAB: CPT | Performed by: PATHOLOGY

## 2025-03-11 PROCEDURE — 76770 US EXAM ABDO BACK WALL COMP: CPT | Mod: GC | Performed by: STUDENT IN AN ORGANIZED HEALTH CARE EDUCATION/TRAINING PROGRAM

## 2025-03-11 PROCEDURE — 99214 OFFICE O/P EST MOD 30 MIN: CPT | Mod: 25

## 2025-03-11 PROCEDURE — 87086 URINE CULTURE/COLONY COUNT: CPT | Performed by: STUDENT IN AN ORGANIZED HEALTH CARE EDUCATION/TRAINING PROGRAM

## 2025-03-11 PROCEDURE — 80048 BASIC METABOLIC PNL TOTAL CA: CPT | Performed by: PATHOLOGY

## 2025-03-11 PROCEDURE — 36415 COLL VENOUS BLD VENIPUNCTURE: CPT | Performed by: PATHOLOGY

## 2025-03-11 RX ORDER — TOLTERODINE 2 MG/1
2 CAPSULE, EXTENDED RELEASE ORAL DAILY
Qty: 30 CAPSULE | Refills: 2 | Status: SHIPPED | OUTPATIENT
Start: 2025-03-11

## 2025-03-11 NOTE — PROGRESS NOTES
Betsy Resendiz comes into clinic today at the request of Tati Martin with the diagnosis of NGB for a catheter exchange.    Order has been verified: Yes.    No prophylactic antibiotic was given d/t allergies per Tati.      Allergies   Allergen Reactions    Cephalexin Hives    Codeine Shortness Of Breath     Has tolerated morphine 7/2008    Morphine Nausea and Vomiting and Shortness Of Breath     trouble breathing and nausea  trouble breathing and nausea  Morphine NOT tolerated. Please use other opioid  trouble breathing and nausea    Cod Liver Oil     Diphenhydramine Itching     About 5 minutes after  IV administration- c/o extreme itching to torso and all extremities.     Iodinated Contrast Media      rash  rash  rash      Iodine      rash    Lisinopril Cough    No Clinical Screening - See Comments      Statin-hmg-coa reductase inhibitors  Statin-hmg-coa reductase inhibitors    Nsaids      nausea    Other Drug Allergy (See Comments)      Zinc Oxide-cod Liver Oil and Statins-hmg-coa Reductase Inhibitors    Statins Other (See Comments)     Patient declines  Patient declines  Patient declines    Sulfa Antibiotics      Mouth sore  Mouth sore      Sumatriptan Other (See Comments)    Zinc        Removal:  18 Fr straight tipped latex mac catheter removed from suprapubic meatus without difficulty after removing 10 mL of fluid from the balloon.    Insertion:  18 Fr straight tipped latex mac catheter inserted into suprapubic meatus in the usual sterile fashion without difficulty.  Received > 25 ml yellow positive urine return.   Balloon filled with 10 mL sterile H2O after positive urine return.  Catheter secured in place with leg strap: Yes.     Patient did tolerate procedure well.     Patient instructed as to where to call or go for pain, fever, leakage, or decreased urine flow.     This service provided today was under the direct supervision of Tati Martin CNP, who was available if needed.    Rand Low RN    3/11/2025  3:59 PM

## 2025-03-11 NOTE — LETTER
"3/11/2025       RE: Betsy Resendiz  [address]  [place] [facility] [address]     Dear Colleague,    Thank you for referring your patient, Betsy Resendiz, to the [facility] UROLOGY CLINIC [place] [facility]. Please see a copy of my visit note below.    HPI:  Betsy Resendiz is a 59 year old female with history of NGB of unclear etiology s/p SPT being seen for follow-up.      Medsur History    2019 - UDS shows poor bladder contractility with early sensation and incomplete emptying     6/29/2023-SPT channel reconstructed and replaced by Dr. Stahl.  Poor candidate for Mitrofanoff.    8/14/2024-some leakage around SPT, plan for increasing SPT to 18 [place] for leakage.    9/24/2024-SPT changed to 18 [place].    Today    - last suprapubic tube change was 2 months    - less leakage after SPT changed to 18 Fr    - she noticed sediments, bladder spasms, and urgency 3 months ago. She took some antibiotics, not effective. Uauc was done earlier today    - on oxybutynin 10        Exam:  Ht 1.626 m (5' 4\")   Wt 117.9 kg (260 lb)   BMI 44.63 kg/m    General: age-appropriate appearing female in NAD sitting in  chair  HEENT: Head AT/NC, EOMI, CN Grossly intact.  : SPT stoma healthy looking, urine clear with some sediments    Review of Imaging:  The following imaging exams were independently viewed and interpreted by me and discussed with patient:  ABILIO - no hydronephrosis    Review of Labs:  The following labs were reviewed by me and discussed with the patient:  Recent Results (from the past 720 hours)   Basic metabolic panel    Collection Time: 03/11/25  2:10 PM   Result Value Ref Range    Sodium 144 135 - 145 mmol/L    Potassium 4.6 3.4 - 5.3 mmol/L    Chloride 107 98 - 107 mmol/L    Carbon Dioxide (CO2) 29 22 - 29 mmol/L    Anion Gap 8 7 - 15 mmol/L    Urea Nitrogen 14.4 6.0 - 20.0 mg/dL    Creatinine 1.39 (H) 0.51 - 0.95 mg/dL    GFR Estimate 44 (L) >60 mL/min/1.73m2 "    Calcium 8.5 (L) 8.8 - 10.4 mg/dL    Glucose 73 70 - 99 mg/dL   Routine UA with microscopic - No culture    Collection Time: 03/11/25  2:15 PM   Result Value Ref Range    Color Urine Yellow Colorless, Straw, Light Yellow, Yellow    Appearance Urine Slightly Cloudy (A) Clear    Glucose Urine Negative Negative mg/dL    Bilirubin Urine Negative Negative    Ketones Urine Negative Negative mg/dL    Specific Gravity Urine 1.022 1.003 - 1.035    Blood Urine Moderate (A) Negative    pH Urine 6.5 5.0 - 7.0    Protein Albumin Urine Negative Negative mg/dL    Urobilinogen Urine Normal Normal, 2.0 mg/dL    Nitrite Urine Positive (A) Negative    Leukocyte Esterase Urine Large (A) Negative    Bacteria Urine Many (A) None Seen /HPF    WBC Clumps Urine Present (A) None Seen /HPF    Mucus Urine Present (A) None Seen /LPF    RBC Urine 39 (H) <=2 /HPF    WBC Urine >182 (H) <=5 /HPF    Squamous Epithelials Urine 1 <=1 /HPF         Assessment & Plan  NGB managed by SPT    -I had a lengthy discussion with the patient about the difference between asymptomatic bacteriuria and urinary tract infection. I explained to patient that because of her multiple urologic problems, she will be chronically colonized with bacteria in the urine and that infection only occurs when there is invasion of the bacteria through the bladder mucosal lining. I emphasized that there was no role for surveillance urine cultures or urinalysis. I stressed that vague symptoms plus a positive culture do not constitute a urinary tract infection. I described that foul-smelling or cloudy urine is a sign of bacterial overgrowth but not of urinary tract infection.  I explained the danger of overtreatment of asymptomatic bacteriuria with antibiotics and her role in antibiotic stewardship. I reviewed the symptoms of a true urinary tract infection to include: fever, flank pain, suprapubic pain, increased urinary incontinence, spasticity or hematuria. Patient understood the  discussion and recommendations.    - continue suprapubic tube change in the clinic monthly  - SPT changed today.  - switch from oxybutynin to detrol  - discussed bladder irrigation w/ at least 250 cc tap water daily and as needed to prevent clogging and UTI. irrigation kits 4/month - order will be faxed to [address] [place] [address].  - f/u in 3 mo        Radha Martin NP  [facility] UROLOGY CLINIC [place]    ==========================      Additional Coding Information:    Problems:  3 -- one stable chronic illness    Level of risk:  4 -- prescription drug management    Time spent:  I spent a total of 30 minutes on the day of the visit.   Time spent by me today doing chart review, history and exam, documentation and further activities per the note              Betsy LAURA Resendiz comes into clinic today at the request of Tati Martin with the diagnosis of NGB for a catheter exchange.    Order has been verified: Yes.    No prophylactic antibiotic was given d/t allergies per Tati.      Allergies   Allergen Reactions     Cephalexin Hives     Codeine Shortness Of Breath     Has tolerated morphine 7/2008     Morphine Nausea and Vomiting and Shortness Of Breath     trouble breathing and nausea  trouble breathing and nausea  Morphine NOT tolerated. Please use other opioid  trouble breathing and nausea     Cod Liver Oil      Diphenhydramine Itching     About 5 minutes after  IV administration- c/o extreme itching to torso and all extremities.      Iodinated Contrast Media      rash  rash  rash       Iodine      rash     Lisinopril Cough     No Clinical Screening - See Comments      Statin-hmg-coa reductase inhibitors  Statin-hmg-coa reductase inhibitors     Nsaids      nausea     Other Drug Allergy (See Comments)      Zinc Oxide-cod Liver Oil and Statins-hmg-coa Reductase Inhibitors     Statins Other (See Comments)     Patient declines  Patient declines  Patient declines     Sulfa Antibiotics      Mouth sore  Mouth sore        Sumatriptan Other (See Comments)     Zinc        Removal:  18 Fr straight tipped latex mac catheter removed from suprapubic meatus without difficulty after removing 10 mL of fluid from the balloon.    Insertion:  18 Fr straight tipped latex mac catheter inserted into suprapubic meatus in the usual sterile fashion without difficulty.  Received > 25 ml yellow positive urine return.   Balloon filled with 10 mL sterile H2O after positive urine return.  Catheter secured in place with leg strap: Yes.     Patient did tolerate procedure well.     Patient instructed as to where to call or go for pain, fever, leakage, or decreased urine flow.     This service provided today was under the direct supervision of Tati Martin CNP, who was available if needed.    Rand Low RN   3/11/2025  3:59 PM      Again, thank you for allowing me to participate in the care of your patient.      Sincerely,    Radha Martin NP

## 2025-03-11 NOTE — PATIENT INSTRUCTIONS
Irrigate bladder via mac catheter with at least 240 mL tap water     1.Detach catheter from urine collection bag.  2.Using a piston syringe, instill 60 mL tap water.   3.Instill a second syringe full of 60 mL tap water.   4.Once second syringe of 60mL (120 total) has been instilled, gently pull back 60 mL.  5.Repeat step 4 until contents has become sufficiently turbulent.  6.clean catheter connection ports with alcohol wipes and re-attach catheter to urine collection bag.     Sediment or mucous may be seen on withdrawal of tap water.        [email or web address]

## 2025-03-12 ENCOUNTER — TELEPHONE (OUTPATIENT)
Dept: UROLOGY | Facility: CLINIC | Age: 60
End: 2025-03-12
Payer: COMMERCIAL

## 2025-03-12 ENCOUNTER — HOSPITAL ENCOUNTER (OUTPATIENT)
Dept: GENERAL RADIOLOGY | Facility: CLINIC | Age: 60
Discharge: HOME | End: 2025-03-12
Attending: STUDENT IN AN ORGANIZED HEALTH CARE EDUCATION/TRAINING PROGRAM
Payer: COMMERCIAL

## 2025-03-12 PROCEDURE — 999N000122 CT OUTSIDE READ

## 2025-03-12 NOTE — TELEPHONE ENCOUNTER
It appears that the renal ultrasound I ordered to take place in August 2025 was completed early, which ultimately ended up showing a small lesion in the right kidney measuring approximately 1.1 cm.  They are recommending CT to further evaluate.  However, had a CT scan in October 2024 which did not note any findings through [facility].  We unfortunately do not have these images.    Alice, could you assist me in getting these actual images from the [facility] system so that we can have our radiologist review these results to correlate with the new finding on the ultrasound?    Demond or Jeanie, will you please contact this patient to inform her that something was seen on the renal ultrasound that we are looking further into by getting her images from the CT scan from October which does not appear to see the same thing?  Thank you!

## 2025-03-13 LAB — BACTERIA UR CULT: NORMAL

## 2025-03-18 ENCOUNTER — TELEPHONE (OUTPATIENT)
Dept: UROLOGY | Facility: CLINIC | Age: 60
End: 2025-03-18
Payer: COMMERCIAL

## 2025-03-18 NOTE — TELEPHONE ENCOUNTER
Was able to have our radiologist review her outside CT scans from recently to compare to the recent renal ultrasound finding.  Luckily, it appears that the lesion seen on her most recent ultrasound corresponds to very stable lesions seen for years.  The only recommendation is to get a repeat renal ultrasound in 1 year which I believe there is already an order in for, but we should get her on the calendar for.    Jeanie, would you assist me in contacting Ivone just to give her the good news that there is nothing concerning at this time and we just need to stick with the regular plan of repeating a renal ultrasound in 1 year?  Thank you!

## 2025-03-25 ENCOUNTER — TRANSFERRED RECORDS (OUTPATIENT)
Dept: HEALTH INFORMATION MANAGEMENT | Facility: CLINIC | Age: 60
End: 2025-03-25
Payer: COMMERCIAL

## 2025-03-26 ENCOUNTER — TELEPHONE (OUTPATIENT)
Dept: UROLOGY | Facility: CLINIC | Age: 60
End: 2025-03-26
Payer: COMMERCIAL

## 2025-03-26 NOTE — TELEPHONE ENCOUNTER
[facility] Call Center    Phone Message    May a detailed message be left on voicemail: yes     Reason for Call: Other: pt calling and she still has not receive the syringes to flush her cath out, please reach out to her regarding this     Action Taken: Other: urology    Travel Screening: Not Applicable     Date of Service:

## 2025-03-27 NOTE — TELEPHONE ENCOUNTER
Call placed to patient. She reports she gets her medical supplies from [address] [place] [address], not [facility]. Will have the DME order faxed to [address] [place] [address].      Thank you,  Jeanie Rosenberg RN, BSN   Urology Triage Nurse

## 2025-04-03 ENCOUNTER — HOSPITAL ENCOUNTER (OUTPATIENT)
Facility: CLINIC | Age: 60
End: 2025-04-03
Attending: ORTHOPAEDIC SURGERY | Admitting: ORTHOPAEDIC SURGERY
Payer: COMMERCIAL

## 2025-04-21 ENCOUNTER — TELEPHONE (OUTPATIENT)
Dept: UROLOGY | Facility: CLINIC | Age: 60
End: 2025-04-21
Payer: COMMERCIAL

## 2025-04-21 NOTE — TELEPHONE ENCOUNTER
You have an appointment currently scheduled with Tati Martin on 6/13/25.  Due to changes to the providers schedule we need to reschedule your appointment.      Please use your MyChart or call [phone] to reschedule this appointment at your earliest convenience.    We apologize for any inconvenience this may cause.    We look forward to seeing you at your upcoming appointment and thank you for choosing [facility].    Thank you for trusting us with your care.    Sincerely, [facility]

## 2025-04-21 NOTE — TELEPHONE ENCOUNTER
Patient confirmed scheduled appointment:  Date: 6/16/25  Time: 2:30pm  Visit type: Return patient   Provider: Tati Martin  Location: [facility]  Testing/imaging:   Additional notes:

## 2025-05-01 ENCOUNTER — TELEPHONE (OUTPATIENT)
Dept: UROLOGY | Facility: CLINIC | Age: 60
End: 2025-05-01
Payer: COMMERCIAL

## 2025-05-14 DIAGNOSIS — N31.9 NEUROGENIC BLADDER: ICD-10-CM

## 2025-05-16 RX ORDER — OXYBUTYNIN CHLORIDE 10 MG/1
10 TABLET, EXTENDED RELEASE ORAL DAILY
Qty: 90 TABLET | Refills: 2 | OUTPATIENT
Start: 2025-05-16

## 2025-05-16 NOTE — TELEPHONE ENCOUNTER
Last Written Prescription:     oxyBUTYnin ER (DITROPAN XL) 10 MG 24 hr tablet 90 tablet 2 1/27/2025 -- No   Sig - Route: Take 1 tablet (10 mg) by mouth daily. - Oral     ----------------------  Last Visit Date: 3/11/25 - continue suprapubic tube change in the clinic monthly  - SPT changed today.  - switch from oxybutynin to detrol  - discussed bladder irrigation w/ at least 250 cc tap water daily and as needed to prevent clogging and UTI. irrigation kits 4/month - order will be faxed to [address] [place] [address].  - f/u in 3 mo  Future Visit Date: 6/16/25  ----------------------         Refill decision: Medication unable to be refilled by RN due to: Verification - order discrepancy, clarification needed, Sig modification needed  Oxybutynin order remains active, last note ( 3/11/25) reviews switching to Detrol( script for #30/ 2 RF sent 3/11/25)      Request from pharmacy:  Requested Prescriptions   Pending Prescriptions Disp Refills    oxyBUTYnin ER (DITROPAN XL) 10 MG 24 hr tablet 90 tablet 2     Sig: Take 1 tablet (10 mg) by mouth daily.       Muscarinic Antagonists (Urinary Incontinence Agents) Failed - 5/16/2025 10:14 AM        Failed - Patient does not have a diagnosis of glaucoma on the problem list     If glaucoma diagnosis is new, refer refill to physician.          Passed - Medication is Oxybutynin and patient is 5 years of age or older        Passed - Medication is active on med list and the sig matches. RN to manually verify dose and sig if red X/fail.     If the protocol passes (green check), you do not need to verify med dose and sig.    A prescription matches if they are the same clinical intention.    For Example: once daily and every morning are the same.    The protocol can not identify upper and lower case letters as matching and will fail.     For Example: Take 1 tablet (50 mg) by mouth daily     TAKE 1 TABLET (50 MG) BY MOUTH DAILY    For all fails (red x), verify dose and sig.    If the refill does match what  is on file, the RN can still proceed to approve the refill request.       If they do not match, route to the appropriate provider.             Passed - Recent (12 mo) or future (90 days) visit within the authorizing provider's specialty     The patient must have completed an in-person or virtual visit within the past 12 months or has a future visit scheduled within the next 90 days with the authorizing provider s specialty.  Urgent care and e-visits do not qualify as an office visit for this protocol.          Passed - Medication indicated for associated diagnosis     Medication is associated with one or more of the following diagnoses:  Bladder pain  Disorder of the GI tract  Hyperhidrosis  Neurogenic bladder  Neurogenic detrusor overactivity  Overactive Bladder  Urge incontinence  Urgent desire to urinate  Incontinence  Spasm of urinary bladder          Passed - Patient is 18 years of age or older

## 2025-06-10 ENCOUNTER — PRE VISIT (OUTPATIENT)
Dept: UROLOGY | Facility: CLINIC | Age: 60
End: 2025-06-10
Payer: COMMERCIAL

## 2025-06-10 NOTE — TELEPHONE ENCOUNTER
Previsit Planning        Reason for visit: Follow-up / SPT Management     Relevant Information: 16 Fr, Straight, Latex     Records/imaging/labs/orders: Available    Rooming: Standard

## 2025-06-23 ENCOUNTER — DOCUMENTATION ONLY (OUTPATIENT)
Dept: UROLOGY | Facility: CLINIC | Age: 60
End: 2025-06-23
Payer: COMMERCIAL

## 2025-06-23 DIAGNOSIS — N31.9 NEUROGENIC BLADDER: ICD-10-CM

## 2025-06-23 NOTE — PROGRESS NOTES
COPD/PN Week 3 Survey    Flowsheet Row Responses   [facility] facility patient discharged from? [facility]   Does the patient have one of the following disease processes/diagnoses(primary or secondary)? COPD/Pneumonia   Was the primary reason for admission: COPD exacerbation   Week 3 attempt successful? No   Unsuccessful attempts Attempt 1          ANNA THOMAS - Registered Nurse   Call placed to patient. She asks about getting her catheter change rescheduled to 7/3/25. Message sent to LAURA Leo and PRITI Bautista to ask about their availability.      Thank you,  Jeanie Rosenberg RN, BSN   Urology Triage Nurse     Pre-application: Motor, sensory, and vascular responses intact in the injured extremity./Post-application: Motor, sensory, and vascular responses intact in the injured extremity./The patient/caregiver verbalized understanding of how to care for the injured extremity with splint

## 2025-06-26 RX ORDER — OXYBUTYNIN CHLORIDE 10 MG/1
10 TABLET, EXTENDED RELEASE ORAL DAILY
Qty: 90 TABLET | Refills: 2 | OUTPATIENT
Start: 2025-06-26

## 2025-06-26 NOTE — TELEPHONE ENCOUNTER
"Last Written Prescription:     oxyBUTYnin ER (DITROPAN XL) 10 MG 24 hr tablet 90 tablet 2 1/27/2025 -- No   Sig - Route: Take 1 tablet (10 mg) by mouth daily. - Oral     ----------------------   Last Visit Date: 3/11/25 - continue suprapubic tube change in the clinic monthly  - SPT changed today.  - switch from oxybutynin to detrol  - discussed bladder irrigation w/ at least 250 cc tap water daily and as needed to prevent clogging and UTI. irrigation kits 4/month - order will be faxed to [address] [place] [address].  - f/u in 3 mo  Future Visit Date: 7/29/25    Refill decision:      [x] Medication unable to be refilled by RN due to: Other:  Per chart review:Urology note 3/11/25 and review by CAROLA Rosenberg RN on 5/14/25 Oxybutynin refused\" Switched to Detrol on 3/11/25 \" Medication has been discontinued     "

## 2025-07-03 ENCOUNTER — ALLIED HEALTH/NURSE VISIT (OUTPATIENT)
Dept: UROLOGY | Facility: CLINIC | Age: 60
End: 2025-07-03
Payer: COMMERCIAL

## 2025-07-03 DIAGNOSIS — Z46.6 URINARY CATHETER (FOLEY) CHANGE REQUIRED: ICD-10-CM

## 2025-07-03 RX ORDER — NITROFURANTOIN 25; 75 MG/1; MG/1
100 CAPSULE ORAL ONCE
Status: COMPLETED | OUTPATIENT
Start: 2025-07-03 | End: 2025-07-03

## 2025-07-03 RX ADMIN — NITROFURANTOIN 100 MG: 25; 75 CAPSULE ORAL at 15:15

## 2025-07-03 NOTE — PROGRESS NOTES
Betsy Resendiz comes into clinic today at the request of Mario Garcia DNP with the diagnosis of Urinary Retention/NGB for a catheter exchange.    Order has been verified: Yes.    Allergies   Allergen Reactions    Cephalexin Hives    Codeine Shortness Of Breath     Has tolerated morphine 7/2008    Morphine Nausea and Vomiting and Shortness Of Breath     trouble breathing and nausea  trouble breathing and nausea  Morphine NOT tolerated. Please use other opioid  trouble breathing and nausea    Cod Liver Oil     Diphenhydramine Itching     About 5 minutes after  IV administration- c/o extreme itching to torso and all extremities.     Iodinated Contrast Media      rash  rash  rash      Iodine      rash    Lisinopril Cough    No Clinical Screening - See Comments      Statin-hmg-coa reductase inhibitors  Statin-hmg-coa reductase inhibitors    Nsaids      nausea    Other Drug Allergy (See Comments)      Zinc Oxide-cod Liver Oil and Statins-hmg-coa Reductase Inhibitors    Statins Other (See Comments)     Patient declines  Patient declines  Patient declines    Sulfa Antibiotics      Mouth sore  Mouth sore      Sumatriptan Other (See Comments)    Zinc        The following medication was given:     MEDICATION: Macrobid (Nitrofurantoin)  ROUTE: PO  SITE: Medication was given orally  DOSE: 100mg  LOT #: [id number]  : [employer]  EXPIRATION DATE: 2025/10  NDC#: [id number]   Was there drug waste? No    Prior to medication administration, verified patient identity using patient's name and date of birth.  Due to medication administration, patient instructed to remain in clinic for 15 minutes  afterwards, and to report any adverse reaction to me immediately.    Removal:  16 Fr straight tipped latex mac catheter removed from suprapubic meatus without difficulty after removing 7 mL of fluid from the balloon.    Insertion:  16 Fr straight tipped latex mac catheter inserted into suprapubic meatus in the usual sterile fashion  without difficulty.  Received > 50 ml clear positive urine return.   Balloon filled with 10 mL sterile H2O after positive urine return.  Catheter secured in place with leg strap: Yes.     Patient did tolerate procedure well.     Patient instructed as to where to call or go for pain, fever, leakage, or decreased urine flow.     This service provided today was under the direct supervision of Mario Garcia DNP, who was available if needed.    Horacio Lujan, EMT-P   7/3/2025  2:55 PM

## 2025-08-18 RX ORDER — NITROFURANTOIN 25; 75 MG/1; MG/1
100 CAPSULE ORAL ONCE
Status: ACTIVE | OUTPATIENT
Start: 2025-08-19

## 2025-08-19 ENCOUNTER — PRE VISIT (OUTPATIENT)
Dept: UROLOGY | Facility: CLINIC | Age: 60
End: 2025-08-19

## 2025-08-19 DIAGNOSIS — Z46.6 URINARY CATHETER (FOLEY) CHANGE REQUIRED: Primary | ICD-10-CM

## 2025-08-19 RX ORDER — TIRZEPATIDE 15 MG/.5ML
15 INJECTION, SOLUTION SUBCUTANEOUS WEEKLY
COMMUNITY
Start: 2025-03-12 | End: 2025-08-19 | Stop reason: HOSPADM

## (undated) DEVICE — BAG URINARY DRAIN 4000ML LF 153509

## (undated) DEVICE — GLOVE BIOGEL PI MICRO INDICATOR UNDERGLOVE SZ 8.0 48980

## (undated) DEVICE — CATH INTRODUCER SUPRAPUBIC 16FR SF-S16-851

## (undated) DEVICE — SUCTION MANIFOLD NEPTUNE 2 SYS 4 PORT 0702-020-000

## (undated) DEVICE — Device

## (undated) DEVICE — PACK CYSTO UMMC CUSTOM

## (undated) DEVICE — NDL PERC ACCESS NAVIGUIDE W/SLDER 18GX20CM M0067001330

## (undated) DEVICE — NDL COUNTER 20CT 31142493

## (undated) DEVICE — GLOVE BIOGEL PI MICRO SZ 7.5 48575

## (undated) DEVICE — SURGICAL TOWEL

## (undated) DEVICE — LINEN GOWN XLG 5407

## (undated) DEVICE — BLADE CLIPPER SGL USE 9680

## (undated) DEVICE — ENDO SEAL BX PORT BPS-A

## (undated) DEVICE — CATH FOLYSIL 16FR 15ML AA6116

## (undated) DEVICE — DRAPE GYN/UROLOGY FLUID POUCH TUR 29455

## (undated) DEVICE — SYR 10ML FINGER CONTROL W/O NDL 309695

## (undated) DEVICE — SPONGE RAY-TEC 4X8" 7318

## (undated) DEVICE — COVER NEOPROBE SOFTFLEX 5X96" W/BANDS 20-PC596

## (undated) DEVICE — PAD CHUX UNDERPAD 23X24" 7136

## (undated) DEVICE — SU MONOCRYL 4-0 PS-2 27" UND Y426H

## (undated) DEVICE — DRAPE U SPLIT 74X120" 29440

## (undated) DEVICE — SYR 10ML LL W/O NDL 302995

## (undated) DEVICE — SYR PISTON URETHRAL 60ML 68000

## (undated) DEVICE — LINEN TOWEL PACK X5 5464

## (undated) RX ORDER — HYDROMORPHONE HCL IN WATER/PF 6 MG/30 ML
PATIENT CONTROLLED ANALGESIA SYRINGE INTRAVENOUS
Status: DISPENSED
Start: 2023-06-29

## (undated) RX ORDER — EPHEDRINE SULFATE 50 MG/ML
INJECTION, SOLUTION INTRAMUSCULAR; INTRAVENOUS; SUBCUTANEOUS
Status: DISPENSED
Start: 2023-06-29

## (undated) RX ORDER — NITROFURANTOIN 25; 75 MG/1; MG/1
CAPSULE ORAL
Status: DISPENSED
Start: 2025-07-03

## (undated) RX ORDER — FENTANYL CITRATE 50 UG/ML
INJECTION, SOLUTION INTRAMUSCULAR; INTRAVENOUS
Status: DISPENSED
Start: 2023-06-29

## (undated) RX ORDER — NITROFURANTOIN 25; 75 MG/1; MG/1
CAPSULE ORAL
Status: DISPENSED
Start: 2024-07-22

## (undated) RX ORDER — ONDANSETRON 2 MG/ML
INJECTION INTRAMUSCULAR; INTRAVENOUS
Status: DISPENSED
Start: 2023-06-29

## (undated) RX ORDER — FENTANYL CITRATE-0.9 % NACL/PF 10 MCG/ML
PLASTIC BAG, INJECTION (ML) INTRAVENOUS
Status: DISPENSED
Start: 2023-06-29

## (undated) RX ORDER — NITROFURANTOIN 25; 75 MG/1; MG/1
CAPSULE ORAL
Status: DISPENSED
Start: 2023-10-17

## (undated) RX ORDER — NITROFURANTOIN 25; 75 MG/1; MG/1
CAPSULE ORAL
Status: DISPENSED
Start: 2024-09-24

## (undated) RX ORDER — LIDOCAINE HYDROCHLORIDE AND EPINEPHRINE 10; 10 MG/ML; UG/ML
INJECTION, SOLUTION INFILTRATION; PERINEURAL
Status: DISPENSED
Start: 2023-06-29

## (undated) RX ORDER — NITROFURANTOIN 25; 75 MG/1; MG/1
CAPSULE ORAL
Status: DISPENSED
Start: 2024-12-16

## (undated) RX ORDER — CIPROFLOXACIN 2 MG/ML
INJECTION, SOLUTION INTRAVENOUS
Status: DISPENSED
Start: 2023-06-29

## (undated) RX ORDER — CEPHALEXIN 500 MG/1
CAPSULE ORAL
Status: DISPENSED
Start: 2019-06-26

## (undated) RX ORDER — BALANCED SALT SOLUTION 6.4; .75; .48; .3; 3.9; 1.7 MG/ML; MG/ML; MG/ML; MG/ML; MG/ML; MG/ML
SOLUTION OPHTHALMIC
Status: DISPENSED
Start: 2023-06-29

## (undated) RX ORDER — CIPROFLOXACIN 500 MG/1
TABLET, FILM COATED ORAL
Status: DISPENSED
Start: 2019-01-17

## (undated) RX ORDER — PROPOFOL 10 MG/ML
INJECTION, EMULSION INTRAVENOUS
Status: DISPENSED
Start: 2023-06-29